# Patient Record
Sex: MALE | Race: WHITE | NOT HISPANIC OR LATINO | Employment: OTHER | URBAN - METROPOLITAN AREA
[De-identification: names, ages, dates, MRNs, and addresses within clinical notes are randomized per-mention and may not be internally consistent; named-entity substitution may affect disease eponyms.]

---

## 2022-05-27 ENCOUNTER — LAB (OUTPATIENT)
Dept: LAB | Facility: HOSPITAL | Age: 72
End: 2022-05-27

## 2022-05-27 ENCOUNTER — OFFICE VISIT (OUTPATIENT)
Dept: INTERNAL MEDICINE | Facility: CLINIC | Age: 72
End: 2022-05-27

## 2022-05-27 VITALS
DIASTOLIC BLOOD PRESSURE: 96 MMHG | WEIGHT: 315 LBS | OXYGEN SATURATION: 96 % | SYSTOLIC BLOOD PRESSURE: 152 MMHG | HEIGHT: 69 IN | HEART RATE: 71 BPM | BODY MASS INDEX: 46.65 KG/M2 | TEMPERATURE: 97.9 F

## 2022-05-27 DIAGNOSIS — E11.43 TYPE 2 DIABETES MELLITUS WITH DIABETIC AUTONOMIC NEUROPATHY, WITHOUT LONG-TERM CURRENT USE OF INSULIN: Primary | ICD-10-CM

## 2022-05-27 DIAGNOSIS — Z91.89 COMPLIANCE WITH MEDICATION REGIMEN: ICD-10-CM

## 2022-05-27 DIAGNOSIS — E55.9 VITAMIN D DEFICIENCY: ICD-10-CM

## 2022-05-27 DIAGNOSIS — G89.4 CHRONIC PAIN SYNDROME: ICD-10-CM

## 2022-05-27 DIAGNOSIS — J44.9 CHRONIC OBSTRUCTIVE PULMONARY DISEASE, UNSPECIFIED COPD TYPE: ICD-10-CM

## 2022-05-27 DIAGNOSIS — R31.29 OTHER MICROSCOPIC HEMATURIA: ICD-10-CM

## 2022-05-27 DIAGNOSIS — R82.998 DARK URINE: ICD-10-CM

## 2022-05-27 DIAGNOSIS — I25.10 CORONARY ARTERY DISEASE INVOLVING NATIVE CORONARY ARTERY OF NATIVE HEART WITHOUT ANGINA PECTORIS: ICD-10-CM

## 2022-05-27 DIAGNOSIS — D17.1 LIPOMA OF BACK: ICD-10-CM

## 2022-05-27 DIAGNOSIS — I10 ESSENTIAL HYPERTENSION: ICD-10-CM

## 2022-05-27 DIAGNOSIS — K42.9 UMBILICAL HERNIA WITHOUT OBSTRUCTION AND WITHOUT GANGRENE: ICD-10-CM

## 2022-05-27 DIAGNOSIS — K21.9 GASTROESOPHAGEAL REFLUX DISEASE WITHOUT ESOPHAGITIS: ICD-10-CM

## 2022-05-27 DIAGNOSIS — R60.0 PEDAL EDEMA: ICD-10-CM

## 2022-05-27 LAB
BILIRUB BLD-MCNC: NEGATIVE MG/DL
CLARITY, POC: CLEAR
COLOR UR: ABNORMAL
EXPIRATION DATE: ABNORMAL
EXPIRATION DATE: NORMAL
GLUCOSE UR STRIP-MCNC: NEGATIVE MG/DL
HBA1C MFR BLD: 5.6 %
KETONES UR QL: NEGATIVE
LEUKOCYTE EST, POC: NEGATIVE
Lab: ABNORMAL
Lab: NORMAL
NITRITE UR-MCNC: NEGATIVE MG/ML
PH UR: 6 [PH] (ref 5–8)
PROT UR STRIP-MCNC: NEGATIVE MG/DL
RBC # UR STRIP: ABNORMAL /UL
SP GR UR: 1.01 (ref 1–1.03)
UROBILINOGEN UR QL: NORMAL

## 2022-05-27 PROCEDURE — 83036 HEMOGLOBIN GLYCOSYLATED A1C: CPT | Performed by: NURSE PRACTITIONER

## 2022-05-27 PROCEDURE — 99204 OFFICE O/P NEW MOD 45 MIN: CPT | Performed by: NURSE PRACTITIONER

## 2022-05-27 PROCEDURE — 3044F HG A1C LEVEL LT 7.0%: CPT | Performed by: NURSE PRACTITIONER

## 2022-05-27 PROCEDURE — 81003 URINALYSIS AUTO W/O SCOPE: CPT | Performed by: NURSE PRACTITIONER

## 2022-05-27 RX ORDER — POTASSIUM CHLORIDE 750 MG/1
10 TABLET, EXTENDED RELEASE ORAL
Qty: 10 TABLET | Refills: 5 | Status: SHIPPED | OUTPATIENT
Start: 2022-05-27 | End: 2022-06-26

## 2022-05-27 RX ORDER — METOPROLOL SUCCINATE 50 MG/1
TABLET, EXTENDED RELEASE ORAL
COMMUNITY
End: 2022-05-27 | Stop reason: SDUPTHER

## 2022-05-27 RX ORDER — LISINOPRIL 10 MG/1
TABLET ORAL
COMMUNITY
Start: 2022-05-25 | End: 2022-05-27 | Stop reason: SDUPTHER

## 2022-05-27 RX ORDER — IPRATROPIUM/ALBUTEROL SULFATE 20-100 MCG
1 MIST INHALER (GRAM) INHALATION 3 TIMES DAILY
Qty: 4 G | Refills: 5 | Status: SHIPPED | OUTPATIENT
Start: 2022-05-27

## 2022-05-27 RX ORDER — LISINOPRIL 10 MG/1
10 TABLET ORAL DAILY
Qty: 30 TABLET | Refills: 5 | Status: SHIPPED | OUTPATIENT
Start: 2022-05-27 | End: 2023-02-20

## 2022-05-27 RX ORDER — GABAPENTIN 600 MG/1
TABLET ORAL
COMMUNITY
Start: 2022-04-27 | End: 2022-05-27 | Stop reason: SDUPTHER

## 2022-05-27 RX ORDER — FAMOTIDINE 20 MG/1
TABLET, FILM COATED ORAL
COMMUNITY
Start: 2022-05-25 | End: 2022-05-27 | Stop reason: SDUPTHER

## 2022-05-27 RX ORDER — FAMOTIDINE 20 MG/1
20 TABLET, FILM COATED ORAL DAILY
Qty: 30 TABLET | Refills: 5 | Status: SHIPPED | OUTPATIENT
Start: 2022-05-27 | End: 2022-12-12

## 2022-05-27 RX ORDER — PANTOPRAZOLE SODIUM 40 MG/1
40 TABLET, DELAYED RELEASE ORAL DAILY
Qty: 30 TABLET | Refills: 5 | Status: SHIPPED | OUTPATIENT
Start: 2022-05-27 | End: 2022-11-28

## 2022-05-27 RX ORDER — ERGOCALCIFEROL 1.25 MG/1
50000 CAPSULE ORAL WEEKLY
Qty: 30 CAPSULE | Refills: 5 | Status: SHIPPED | OUTPATIENT
Start: 2022-05-27

## 2022-05-27 RX ORDER — TAMSULOSIN HYDROCHLORIDE 0.4 MG/1
1 CAPSULE ORAL DAILY
Qty: 30 CAPSULE | Refills: 5 | Status: SHIPPED | OUTPATIENT
Start: 2022-05-27 | End: 2022-12-12

## 2022-05-27 RX ORDER — POTASSIUM CHLORIDE 750 MG/1
TABLET, EXTENDED RELEASE ORAL
COMMUNITY
Start: 2022-05-25 | End: 2022-05-27 | Stop reason: SDUPTHER

## 2022-05-27 RX ORDER — IBUPROFEN 600 MG/1
TABLET ORAL
COMMUNITY
Start: 2022-05-25 | End: 2022-05-27 | Stop reason: SDUPTHER

## 2022-05-27 RX ORDER — FUROSEMIDE 40 MG/1
40 TABLET ORAL
Qty: 10 TABLET | Refills: 5 | Status: SHIPPED | OUTPATIENT
Start: 2022-05-27 | End: 2022-12-12

## 2022-05-27 RX ORDER — METOPROLOL SUCCINATE 50 MG/1
50 TABLET, EXTENDED RELEASE ORAL DAILY
Qty: 30 TABLET | Refills: 5 | Status: SHIPPED | OUTPATIENT
Start: 2022-05-27 | End: 2022-12-12

## 2022-05-27 RX ORDER — ASPIRIN 81 MG/1
TABLET ORAL
COMMUNITY

## 2022-05-27 RX ORDER — PROMETHAZINE HYDROCHLORIDE 25 MG/1
TABLET ORAL
COMMUNITY
Start: 2022-03-30 | End: 2022-06-03

## 2022-05-27 RX ORDER — PANTOPRAZOLE SODIUM 40 MG/1
TABLET, DELAYED RELEASE ORAL
COMMUNITY
Start: 2022-02-28 | End: 2022-05-27 | Stop reason: SDUPTHER

## 2022-05-27 RX ORDER — FLUOXETINE HYDROCHLORIDE 20 MG/1
20 CAPSULE ORAL DAILY
Qty: 30 CAPSULE | Refills: 5 | Status: SHIPPED | OUTPATIENT
Start: 2022-05-27 | End: 2023-01-24

## 2022-05-27 RX ORDER — NITROGLYCERIN 0.4 MG/1
0.4 TABLET SUBLINGUAL
Qty: 100 TABLET | Refills: 1 | Status: SHIPPED | OUTPATIENT
Start: 2022-05-27 | End: 2022-06-26

## 2022-05-27 RX ORDER — GABAPENTIN 600 MG/1
600 TABLET ORAL 3 TIMES DAILY
Qty: 90 TABLET | Refills: 2 | Status: SHIPPED | OUTPATIENT
Start: 2022-05-27 | End: 2022-09-01

## 2022-05-27 RX ORDER — IBUPROFEN 600 MG/1
600 TABLET ORAL EVERY 6 HOURS PRN
Qty: 120 TABLET | Refills: 5 | Status: SHIPPED | OUTPATIENT
Start: 2022-05-27 | End: 2022-12-12

## 2022-05-27 RX ORDER — SIMVASTATIN 20 MG
20 TABLET ORAL NIGHTLY
Qty: 90 TABLET | Refills: 1 | Status: SHIPPED | OUTPATIENT
Start: 2022-05-27

## 2022-05-27 RX ORDER — FUROSEMIDE 40 MG/1
TABLET ORAL
COMMUNITY
Start: 2022-05-25 | End: 2022-05-27 | Stop reason: SDUPTHER

## 2022-05-27 RX ORDER — SIMVASTATIN 20 MG
TABLET ORAL
COMMUNITY
Start: 2022-05-25 | End: 2022-05-27 | Stop reason: SDUPTHER

## 2022-05-27 RX ORDER — TAMSULOSIN HYDROCHLORIDE 0.4 MG/1
CAPSULE ORAL
COMMUNITY
End: 2022-05-27 | Stop reason: SDUPTHER

## 2022-05-27 RX ORDER — FLUOXETINE HYDROCHLORIDE 20 MG/1
CAPSULE ORAL
COMMUNITY
Start: 2022-05-25 | End: 2022-05-27 | Stop reason: SDUPTHER

## 2022-05-27 RX ORDER — NITROGLYCERIN 0.4 MG/1
TABLET SUBLINGUAL
COMMUNITY
End: 2022-05-27 | Stop reason: SDUPTHER

## 2022-05-27 RX ORDER — ALBUTEROL SULFATE 2.5 MG/3ML
SOLUTION RESPIRATORY (INHALATION)
COMMUNITY
Start: 2022-05-25

## 2022-05-27 RX ORDER — IPRATROPIUM/ALBUTEROL SULFATE 20-100 MCG
MIST INHALER (GRAM) INHALATION EVERY 6 HOURS SCHEDULED
COMMUNITY
End: 2022-05-27 | Stop reason: SDUPTHER

## 2022-05-27 RX ORDER — ERGOCALCIFEROL 1.25 MG/1
CAPSULE ORAL
COMMUNITY
End: 2022-05-27 | Stop reason: SDUPTHER

## 2022-05-27 NOTE — PROGRESS NOTES
"Subjective   Chief Complaint   Patient presents with   • Establish Care   • Med Refill       Dieter Christiansen is a 72 y.o. male here today as a new patient to establish care.  Prior PCP was Dr Sal.  Pt has Type 2 Diabetes.  His current treatment includes Metformin.  Pt takes Gabapentin on a daily basis for \"pain all over body\".  He is unable to localize where the pain is.  Pt has a large umbilical hernia.  This has been present for years. Pt's stepdaughter states that no one will operate on him because they want him to lose weight.  Pt has a history of COPD.  He use to be a heavy smoker but has tapered down to 5-6 cigarettes a day.  Wears 2 liters 02 prn.  Has a history of sleep apnea.  Suppose to use a CPAP machine but pt doesn't use it because \"it makes him sick\".  Pt requesting his urine be checked.  States his urine is really dark.  He feels like he drinks plenty of water.    Review of Systems   Constitutional: Negative for activity change, appetite change and fatigue.   HENT: Negative for congestion.    Respiratory: Positive for cough and shortness of breath.    Cardiovascular: Negative for chest pain and leg swelling.   Gastrointestinal: Negative for abdominal pain.   Genitourinary: Positive for frequency and nocturia. Negative for dysuria and flank pain.   Musculoskeletal: Positive for arthralgias and back pain.   Neurological: Negative for dizziness, weakness and confusion.   Psychiatric/Behavioral: Negative for behavioral problems and decreased concentration.       Past Medical History:   Diagnosis Date   • COPD (chronic obstructive pulmonary disease) (HCC)    • Coronary artery disease involving native coronary artery of native heart without angina pectoris    • Diabetes mellitus (HCC)    • GERD (gastroesophageal reflux disease)    • Heart attack (HCC)    • Hyperlipidemia    • Hypertension    • MARY ALICE (obstructive sleep apnea)      Past Surgical History:   Procedure Laterality Date   • FEMORAL ARTERY STENT   "     Family History   Problem Relation Age of Onset   • Diabetes Father    • Diabetes Brother      Social History     Tobacco Use   Smoking Status Current Every Day Smoker   • Packs/day: 0.50   • Years: 30.00   • Pack years: 15.00   • Types: Cigarettes   Smokeless Tobacco Former User   Tobacco Comment    20 years ago      Social History     Substance and Sexual Activity   Alcohol Use Not Currently      Current Outpatient Medications on File Prior to Visit   Medication Sig   • albuterol (PROVENTIL) (2.5 MG/3ML) 0.083% nebulizer solution    • aspirin (aspirin) 81 MG EC tablet aspirin 81 mg tablet,delayed release   Take 1 tablet every day by oral route.   • promethazine (PHENERGAN) 25 MG tablet    • [DISCONTINUED] ergocalciferol (ERGOCALCIFEROL) 1.25 MG (27109 UT) capsule ergocalciferol (vitamin D2) 1,250 mcg (50,000 unit) capsule   1 po weekly   • [DISCONTINUED] famotidine (PEPCID) 20 MG tablet    • [DISCONTINUED] FLUoxetine (PROzac) 20 MG capsule    • [DISCONTINUED] furosemide (LASIX) 40 MG tablet    • [DISCONTINUED] gabapentin (NEURONTIN) 600 MG tablet    • [DISCONTINUED] ibuprofen (ADVIL,MOTRIN) 600 MG tablet    • [DISCONTINUED] ipratropium-albuterol (Combivent Respimat)  MCG/ACT inhaler Every 6 (Six) Hours.   • [DISCONTINUED] lisinopril (PRINIVIL,ZESTRIL) 10 MG tablet    • [DISCONTINUED] metFORMIN (GLUCOPHAGE) 500 MG tablet metformin 500 mg tablet   • [DISCONTINUED] metoprolol succinate XL (TOPROL-XL) 50 MG 24 hr tablet metoprolol succinate ER 50 mg tablet,extended release 24 hr   • [DISCONTINUED] nitroglycerin (NITROSTAT) 0.4 MG SL tablet nitroglycerin 0.4 mg sublingual tablet   • [DISCONTINUED] pantoprazole (PROTONIX) 40 MG EC tablet    • [DISCONTINUED] potassium chloride (K-DUR,KLOR-CON) 10 MEQ CR tablet    • [DISCONTINUED] simvastatin (ZOCOR) 20 MG tablet    • [DISCONTINUED] tamsulosin (FLOMAX) 0.4 MG capsule 24 hr capsule tamsulosin 0.4 mg capsule     No current facility-administered medications on file  "prior to visit.     No Known Allergies    Objective   Vitals:    05/27/22 1330   BP: 152/96   Pulse: 71   Temp: 97.9 °F (36.6 °C)   TempSrc: Temporal   SpO2: 96%   Weight: (!) 151 kg (333 lb 6.4 oz)   Height: 175.3 cm (69\")     Body mass index is 49.23 kg/m².    Physical Exam  Vitals and nursing note reviewed.   HENT:      Head: Normocephalic.   Eyes:      Pupils: Pupils are equal, round, and reactive to light.   Cardiovascular:      Rate and Rhythm: Normal rate and regular rhythm.      Pulses: Normal pulses.      Heart sounds: Murmur (Gr2) heard.   Pulmonary:      Effort: Pulmonary effort is normal. No respiratory distress.      Breath sounds: Rhonchi present. No rales.      Comments: Decreased breath sounds throughout  Abdominal:      General: Bowel sounds are normal.      Palpations: Abdomen is soft.      Tenderness: There is abdominal tenderness.      Hernia: A hernia (umbilical, large) is present.   Skin:     General: Skin is warm and dry.      Capillary Refill: Capillary refill takes less than 2 seconds.      Comments: Upper back noted to have a large lipoma, nontender   Neurological:      General: No focal deficit present.      Mental Status: He is alert and oriented to person, place, and time.      Gait: Gait is intact.   Psychiatric:         Attention and Perception: Attention normal.         Mood and Affect: Mood normal.         Behavior: Behavior normal.           Results for orders placed or performed in visit on 05/27/22   POC Glycosylated Hemoglobin (Hb A1C)    Specimen: Blood   Result Value Ref Range    Hemoglobin A1C 5.6 %    Lot Number [id number]     Expiration Date 02/04/2024    POC Urinalysis Dipstick, Automated    Specimen: Urine   Result Value Ref Range    Color Dark Yellow Yellow, Straw, Dark Yellow, Lupe    Clarity, UA Clear Clear    Specific Gravity  1.015 1.005 - 1.030    pH, Urine 6.0 5.0 - 8.0    Leukocytes Negative Negative    Nitrite, UA Negative Negative    Protein, POC Negative " Negative mg/dL    Glucose, UA Negative Negative, 1000 mg/dL (3+) mg/dL    Ketones, UA Negative Negative    Urobilinogen, UA Normal Normal    Bilirubin Negative Negative    Blood, UA 3+ (A) Negative    Lot Number [id number]     Expiration Date 12/17/2023          Assessment & Plan   Problem List Items Addressed This Visit    None     Visit Diagnoses     Type 2 diabetes mellitus with diabetic autonomic neuropathy, without long-term current use of insulin ([facility])    -  Primary    Relevant Medications    gabapentin (NEURONTIN) 600 MG tablet    metFORMIN (GLUCOPHAGE) 500 MG tablet    Other Relevant Orders    POC Glycosylated Hemoglobin (Hb A1C) (Completed)    Coronary artery disease involving native coronary artery of native heart without angina pectoris        Relevant Medications    metoprolol succinate XL (TOPROL-XL) 50 MG 24 hr tablet    nitroglycerin (NITROSTAT) 0.4 MG SL tablet    simvastatin (ZOCOR) 20 MG tablet    Lipoma of back        Umbilical hernia without obstruction and without gangrene        Compliance with medication regimen        Relevant Orders    Compliance Drug Analysis, Ur - Urine, Clean Catch    Chronic pain syndrome         Dark urine        Relevant Orders    POC Urinalysis Dipstick, Automated (Completed)    Other microscopic hematuria        Vitamin D deficiency        Relevant Medications    ergocalciferol (ERGOCALCIFEROL) 1.25 MG (56733 UT) capsule    Chronic obstructive pulmonary disease, unspecified COPD type ([facility])        Relevant Medications    albuterol (PROVENTIL) (2.5 MG/3ML) 0.083% nebulizer solution    promethazine (PHENERGAN) 25 MG tablet    ipratropium-albuterol (Combivent Respimat)  MCG/ACT inhaler    Pedal edema        Relevant Medications    furosemide (LASIX) 40 MG tablet    potassium chloride (K-DUR,KLOR-CON) 10 MEQ CR tablet    Gastroesophageal reflux disease without esophagitis        Relevant Medications    famotidine (PEPCID) 20 MG tablet    pantoprazole (PROTONIX)  40 MG EC tablet    Essential hypertension        Relevant Medications    furosemide (LASIX) 40 MG tablet    lisinopril (PRINIVIL,ZESTRIL) 10 MG tablet    metoprolol succinate XL (TOPROL-XL) 50 MG 24 hr tablet         [facility] appropriate  Meds refilled  UDS collected  Controlled substance contract signed  Schedule [facility] and will get labs at that time  Hernia large, pt morbidly obese, discussed weight loss with him  Large lipoma on upper back, pt does not want this removed  Large blood in urine, will cont to monitor, may need to see urology    Current Outpatient Medications:   •  ergocalciferol (ERGOCALCIFEROL) 1.25 MG (83611 UT) capsule, Take 1 capsule by mouth 1 (One) Time Per Week., Disp: 30 capsule, Rfl: 5  •  famotidine (PEPCID) 20 MG tablet, Take 1 tablet by mouth Daily., Disp: 30 tablet, Rfl: 5  •  FLUoxetine (PROzac) 20 MG capsule, Take 1 capsule by mouth Daily., Disp: 30 capsule, Rfl: 5  •  furosemide (LASIX) 40 MG tablet, Take 1 tablet by mouth Every 72 (Seventy-Two) Hours for 30 days., Disp: 10 tablet, Rfl: 5  •  gabapentin (NEURONTIN) 600 MG tablet, Take 1 tablet by mouth 3 (Three) Times a Day., Disp: 90 tablet, Rfl: 2  •  ibuprofen (ADVIL,MOTRIN) 600 MG tablet, Take 1 tablet by mouth Every 6 (Six) Hours As Needed for Mild Pain ., Disp: 120 tablet, Rfl: 5  •  ipratropium-albuterol (Combivent Respimat)  MCG/ACT inhaler, Inhale 1 puff 3 (Three) Times a Day., Disp: 4 g, Rfl: 5  •  lisinopril (PRINIVIL,ZESTRIL) 10 MG tablet, Take 1 tablet by mouth Daily., Disp: 30 tablet, Rfl: 5  •  metFORMIN (GLUCOPHAGE) 500 MG tablet, Take 1 tablet by mouth Daily With Breakfast., Disp: 30 tablet, Rfl: 5  •  metoprolol succinate XL (TOPROL-XL) 50 MG 24 hr tablet, Take 1 tablet by mouth Daily., Disp: 30 tablet, Rfl: 5  •  nitroglycerin (NITROSTAT) 0.4 MG SL tablet, Place 1 tablet under the tongue Every 5 (Five) Minutes As Needed for Chest Pain for up to 30 days. Take no more than 3 doses in 15 minutes., Disp: 100 tablet,  Rfl: 1  •  pantoprazole (PROTONIX) 40 MG EC tablet, Take 1 tablet by mouth Daily., Disp: 30 tablet, Rfl: 5  •  potassium chloride (K-DUR,KLOR-CON) 10 MEQ CR tablet, Take 1 tablet by mouth Every 72 (Seventy-Two) Hours for 30 days., Disp: 10 tablet, Rfl: 5  •  simvastatin (ZOCOR) 20 MG tablet, Take 1 tablet by mouth Every Night., Disp: 90 tablet, Rfl: 1  •  tamsulosin (FLOMAX) 0.4 MG capsule 24 hr capsule, Take 1 capsule by mouth Daily., Disp: 30 capsule, Rfl: 5  •  albuterol (PROVENTIL) (2.5 MG/3ML) 0.083% nebulizer solution, , Disp: , Rfl:   •  aspirin (aspirin) 81 MG EC tablet, aspirin 81 mg tablet,delayed release  Take 1 tablet every day by oral route., Disp: , Rfl:   •  promethazine (PHENERGAN) 25 MG tablet, , Disp: , Rfl:        Plan of care reviewed with the patient at the conclusion of today's visit.  Education was provided regarding diagnosis, management, and any prescribed or recommended OTC medications.  Patient verbalized understanding of and agreement with management plan.     Return in about 6 weeks (around 7/8/2022) for Medicare Wellness.        EUNICE Fields

## 2022-06-03 DIAGNOSIS — R11.0 NAUSEA: ICD-10-CM

## 2022-06-03 RX ORDER — PROMETHAZINE HYDROCHLORIDE 25 MG/1
TABLET ORAL
Qty: 20 TABLET | Refills: 5 | Status: SHIPPED | OUTPATIENT
Start: 2022-06-03 | End: 2022-12-06

## 2022-07-28 DIAGNOSIS — F34.1 DYSTHYMIC DISORDER: ICD-10-CM

## 2022-07-28 DIAGNOSIS — E87.6 HYPOKALEMIA: ICD-10-CM

## 2022-07-28 RX ORDER — BUPROPION HYDROCHLORIDE 150 MG/1
TABLET ORAL
Qty: 30 TABLET | Refills: 5 | Status: SHIPPED | OUTPATIENT
Start: 2022-07-28 | End: 2022-09-02

## 2022-07-28 RX ORDER — POTASSIUM CHLORIDE 750 MG/1
TABLET, EXTENDED RELEASE ORAL
Qty: 60 TABLET | Refills: 5 | Status: SHIPPED | OUTPATIENT
Start: 2022-07-28

## 2022-07-28 NOTE — TELEPHONE ENCOUNTER
I'm almost positive that I refilled all these meds when I saw him in May.  I put 5 refills on them.  Tell him to call his pharmacy

## 2022-09-01 DIAGNOSIS — E11.43 TYPE 2 DIABETES MELLITUS WITH DIABETIC AUTONOMIC NEUROPATHY, WITHOUT LONG-TERM CURRENT USE OF INSULIN: ICD-10-CM

## 2022-09-01 RX ORDER — GABAPENTIN 600 MG/1
TABLET ORAL
Qty: 90 TABLET | Refills: 1 | Status: SHIPPED | OUTPATIENT
Start: 2022-09-01 | End: 2022-10-27

## 2022-09-02 ENCOUNTER — OFFICE VISIT (OUTPATIENT)
Dept: INTERNAL MEDICINE | Facility: CLINIC | Age: 72
End: 2022-09-02

## 2022-09-02 ENCOUNTER — LAB (OUTPATIENT)
Dept: LAB | Facility: HOSPITAL | Age: 72
End: 2022-09-02

## 2022-09-02 VITALS
OXYGEN SATURATION: 99 % | SYSTOLIC BLOOD PRESSURE: 126 MMHG | HEART RATE: 85 BPM | BODY MASS INDEX: 46.65 KG/M2 | DIASTOLIC BLOOD PRESSURE: 82 MMHG | WEIGHT: 315 LBS | HEIGHT: 69 IN | TEMPERATURE: 97 F

## 2022-09-02 DIAGNOSIS — Z12.11 SCREENING FOR COLON CANCER: ICD-10-CM

## 2022-09-02 DIAGNOSIS — M47.812 CERVICAL SPINE ARTHRITIS: ICD-10-CM

## 2022-09-02 DIAGNOSIS — Z12.2 SCREENING FOR LUNG CANCER: ICD-10-CM

## 2022-09-02 DIAGNOSIS — Z13.6 SCREENING FOR AAA (ABDOMINAL AORTIC ANEURYSM): ICD-10-CM

## 2022-09-02 DIAGNOSIS — F17.210 CIGARETTE SMOKER: ICD-10-CM

## 2022-09-02 DIAGNOSIS — L40.9 PSORIASIS: ICD-10-CM

## 2022-09-02 DIAGNOSIS — M47.26 OSTEOARTHRITIS OF SPINE WITH RADICULOPATHY, LUMBAR REGION: ICD-10-CM

## 2022-09-02 DIAGNOSIS — Z00.00 MEDICARE ANNUAL WELLNESS VISIT, INITIAL: Primary | ICD-10-CM

## 2022-09-02 DIAGNOSIS — E11.43 TYPE 2 DIABETES MELLITUS WITH DIABETIC AUTONOMIC NEUROPATHY, WITHOUT LONG-TERM CURRENT USE OF INSULIN: ICD-10-CM

## 2022-09-02 DIAGNOSIS — Z12.5 SCREENING FOR PROSTATE CANCER: ICD-10-CM

## 2022-09-02 DIAGNOSIS — Z11.59 ENCOUNTER FOR HEPATITIS C SCREENING TEST FOR LOW RISK PATIENT: ICD-10-CM

## 2022-09-02 DIAGNOSIS — H91.8X3 OTHER SPECIFIED HEARING LOSS OF BOTH EARS: ICD-10-CM

## 2022-09-02 DIAGNOSIS — B37.2 CANDIDAL SKIN INFECTION: ICD-10-CM

## 2022-09-02 PROBLEM — G89.29 CHRONIC BACK PAIN: Status: ACTIVE | Noted: 2022-09-02

## 2022-09-02 PROBLEM — M54.9 CHRONIC BACK PAIN: Status: ACTIVE | Noted: 2022-09-02

## 2022-09-02 LAB
EXPIRATION DATE: NORMAL
HBA1C MFR BLD: 5.8 %
Lab: NORMAL

## 2022-09-02 PROCEDURE — G0402 INITIAL PREVENTIVE EXAM: HCPCS | Performed by: NURSE PRACTITIONER

## 2022-09-02 PROCEDURE — 86803 HEPATITIS C AB TEST: CPT | Performed by: NURSE PRACTITIONER

## 2022-09-02 PROCEDURE — 83036 HEMOGLOBIN GLYCOSYLATED A1C: CPT | Performed by: NURSE PRACTITIONER

## 2022-09-02 PROCEDURE — 1170F FXNL STATUS ASSESSED: CPT | Performed by: NURSE PRACTITIONER

## 2022-09-02 PROCEDURE — 80053 COMPREHEN METABOLIC PANEL: CPT | Performed by: NURSE PRACTITIONER

## 2022-09-02 PROCEDURE — 3044F HG A1C LEVEL LT 7.0%: CPT | Performed by: NURSE PRACTITIONER

## 2022-09-02 PROCEDURE — 1160F RVW MEDS BY RX/DR IN RCRD: CPT | Performed by: NURSE PRACTITIONER

## 2022-09-02 PROCEDURE — 84443 ASSAY THYROID STIM HORMONE: CPT | Performed by: NURSE PRACTITIONER

## 2022-09-02 PROCEDURE — G0103 PSA SCREENING: HCPCS | Performed by: NURSE PRACTITIONER

## 2022-09-02 PROCEDURE — 85025 COMPLETE CBC W/AUTO DIFF WBC: CPT | Performed by: NURSE PRACTITIONER

## 2022-09-02 PROCEDURE — 36415 COLL VENOUS BLD VENIPUNCTURE: CPT | Performed by: NURSE PRACTITIONER

## 2022-09-02 RX ORDER — TRIAMCINOLONE ACETONIDE 1 MG/G
1 CREAM TOPICAL 2 TIMES DAILY PRN
Qty: 453 G | Refills: 1 | Status: SHIPPED | OUTPATIENT
Start: 2022-09-02

## 2022-09-02 RX ORDER — NYSTATIN 100000 [USP'U]/G
POWDER TOPICAL 3 TIMES DAILY
Qty: 60 G | Refills: 5 | Status: SHIPPED | OUTPATIENT
Start: 2022-09-02

## 2022-09-02 RX ORDER — TRAMADOL HYDROCHLORIDE 50 MG/1
50 TABLET ORAL EVERY 8 HOURS PRN
Qty: 90 TABLET | Refills: 0 | Status: SHIPPED | OUTPATIENT
Start: 2022-09-02 | End: 2022-10-19

## 2022-09-02 RX ORDER — NYSTATIN AND TRIAMCINOLONE ACETONIDE 100000; 1 [USP'U]/G; MG/G
1 OINTMENT TOPICAL 2 TIMES DAILY
Qty: 60 G | Refills: 5 | Status: SHIPPED | OUTPATIENT
Start: 2022-09-02

## 2022-09-02 NOTE — PROGRESS NOTES
The ABCs of the Annual Wellness Visit  Initial Medicare Wellness Visit    Chief Complaint   Patient presents with   • Medicare Wellness-subsequent       Subjective   History of Present Illness:  Dieter Christiansen is a 72 y.o. male who presents for an Initial Medicare Wellness Visit.    HEALTH RISK ASSESSMENT    Recent Hospitalizations:  No hospitalization(s) within the last year.    Current Medical Providers:  Patient Care Team:  Ian Zamora APRN as PCP - General (Family Medicine)    Smoking Status:  Social History     Tobacco Use   Smoking Status Current Every Day Smoker   • Packs/day: 0.50   • Years: 30.00   • Pack years: 15.00   • Types: Cigarettes   Smokeless Tobacco Former User   Tobacco Comment    20 years ago       Alcohol Consumption:  Social History     Substance and Sexual Activity   Alcohol Use Not Currently       Depression Screen:   PHQ-2/PHQ-9 Depression Screening 9/2/2022   Little Interest or Pleasure in Doing Things 0-->not at all   Feeling Down, Depressed or Hopeless 0-->not at all   PHQ-9: Brief Depression Severity Measure Score 0       Fall Risk Screen:  JUSTIN Fall Risk Assessment was completed, and patient is at MODERATE risk for falls. Assessment completed on:9/2/2022    Health Habits and Functional and Cognitive Screening:  Functional & Cognitive Status 9/2/2022   Do you have difficulty preparing food and eating? Yes   Do you have difficulty bathing yourself, getting dressed or grooming yourself? Yes   Do you have difficulty using the toilet? No   Do you have difficulty moving around from place to place? Yes   Do you have trouble with steps or getting out of a bed or a chair? Yes   Current Diet Unhealthy Diet   Dental Exam Up to date   Eye Exam Up to date   Exercise (times per week) 0 times per week   Current Exercises Include No Regular Exercise   Do you need help using the phone?  No   Are you deaf or do you have serious difficulty hearing?  Yes   Do you need help with transportation? Yes    Do you need help shopping? Yes   Do you need help preparing meals?  Yes   Do you need help with housework?  No   Do you need help with laundry? Yes   Do you need help taking your medications? Yes   Do you need help managing money? Yes   Do you ever drive or ride in a car without wearing a seat belt? Yes   Have you felt unusual stress, anger or loneliness in the last month? No   Who do you live with? Child   If you need help, do you have trouble finding someone available to you? No   Have you been bothered in the last four weeks by sexual problems? No   Do you have difficulty concentrating, remembering or making decisions? No         Does the patient have evidence of cognitive impairment? No    Asprin use counseling:Taking ASA appropriately as indicated    Age-appropriate Screening Schedule:  Refer to the list below for future screening recommendations based on patient's age, sex and/or medical conditions. Orders for these recommended tests are listed in the plan section. The patient has been provided with a written plan.    Health Maintenance   Topic Date Due   • URINE MICROALBUMIN  Never done   • TDAP/TD VACCINES (1 - Tdap) Never done   • ZOSTER VACCINE (1 of 2) Never done   • DIABETIC FOOT EXAM  Never done   • DIABETIC EYE EXAM  Never done   • LIPID PANEL  Never done   • INFLUENZA VACCINE  10/01/2022   • HEMOGLOBIN A1C  03/02/2023          The following portions of the patient's history were reviewed and updated as appropriate: allergies, current medications, past family history, past medical history, past social history, past surgical history and problem list.    Outpatient Medications Prior to Visit   Medication Sig Dispense Refill   • albuterol (PROVENTIL) (2.5 MG/3ML) 0.083% nebulizer solution      • aspirin 81 MG EC tablet aspirin 81 mg tablet,delayed release   Take 1 tablet every day by oral route.     • ergocalciferol (ERGOCALCIFEROL) 1.25 MG (58279 UT) capsule Take 1 capsule by mouth 1 (One) Time Per  Week. 30 capsule 5   • famotidine (PEPCID) 20 MG tablet Take 1 tablet by mouth Daily. 30 tablet 5   • FLUoxetine (PROzac) 20 MG capsule Take 1 capsule by mouth Daily. 30 capsule 5   • gabapentin (NEURONTIN) 600 MG tablet TAKE ONE TABLET THREE TIMES DAILY 90 tablet 1   • ibuprofen (ADVIL,MOTRIN) 600 MG tablet Take 1 tablet by mouth Every 6 (Six) Hours As Needed for Mild Pain . 120 tablet 5   • ipratropium-albuterol (Combivent Respimat)  MCG/ACT inhaler Inhale 1 puff 3 (Three) Times a Day. 4 g 5   • lisinopril (PRINIVIL,ZESTRIL) 10 MG tablet Take 1 tablet by mouth Daily. 30 tablet 5   • metFORMIN (GLUCOPHAGE) 500 MG tablet Take 1 tablet by mouth Daily With Breakfast. 30 tablet 5   • metoprolol succinate XL (TOPROL-XL) 50 MG 24 hr tablet Take 1 tablet by mouth Daily. 30 tablet 5   • pantoprazole (PROTONIX) 40 MG EC tablet Take 1 tablet by mouth Daily. 30 tablet 5   • potassium chloride (K-DUR,KLOR-CON) 10 MEQ CR tablet TAKE TWO TABLETS DAILY 60 tablet 5   • promethazine (PHENERGAN) 25 MG tablet TAKE ONE TABLET EVERY 6 HOURS AS NEEDED FOR NAUSEA 20 tablet 5   • simvastatin (ZOCOR) 20 MG tablet Take 1 tablet by mouth Every Night. 90 tablet 1   • tamsulosin (FLOMAX) 0.4 MG capsule 24 hr capsule Take 1 capsule by mouth Daily. 30 capsule 5   • buPROPion XL (WELLBUTRIN XL) 150 MG 24 hr tablet TAKE ONE TABLET DAILY 30 tablet 5   • furosemide (LASIX) 40 MG tablet Take 1 tablet by mouth Every 72 (Seventy-Two) Hours for 30 days. 10 tablet 5   • nitroglycerin (NITROSTAT) 0.4 MG SL tablet Place 1 tablet under the tongue Every 5 (Five) Minutes As Needed for Chest Pain for up to 30 days. Take no more than 3 doses in 15 minutes. 100 tablet 1     No facility-administered medications prior to visit.       Patient Active Problem List   Diagnosis   • Chronic back pain       Advanced Care Planning:  ACP discussion was held with the patient during this visit. Patient does not have an advance directive, declines further  "assistance.    Review of Systems   Constitutional: Negative for activity change, appetite change and fatigue.   HENT: Negative for congestion.    Respiratory: Positive for cough and shortness of breath.    Cardiovascular: Negative for chest pain and leg swelling.   Gastrointestinal: Negative for abdominal pain.   Musculoskeletal: Positive for arthralgias and back pain.   Skin: Positive for dry skin.   Neurological: Negative for dizziness, weakness and confusion.   Psychiatric/Behavioral: Positive for agitation (due to chronic pain). Negative for behavioral problems and decreased concentration.       Compared to one year ago, the patient feels his physical health is worse.  Compared to one year ago, the patient feels his mental health is the same.    Reviewed chart for potential of high risk medication in the elderly: yes  Reviewed chart for potential of harmful drug interactions in the elderly:yes    Objective         Vitals:    09/02/22 1415   BP: 126/82   BP Location: Left arm   Patient Position: Sitting   Pulse: 85   Temp: 97 °F (36.1 °C)   SpO2: 99%   Weight: (!) 152 kg (335 lb)   Height: 175.3 cm (69\")       Body mass index is 49.47 kg/m².  Discussed the patient's BMI with him. The BMI is above average; BMI management plan is completed.    Physical Exam  Vitals and nursing note reviewed.   Constitutional:       Appearance: He is morbidly obese.      Comments: Pt is maladorous   HENT:      Head: Normocephalic.      Right Ear: External ear normal.      Left Ear: External ear normal.      Ears:      Comments: Both TMs dull, decreased hearing  Eyes:      Pupils: Pupils are equal, round, and reactive to light.   Neck:      Thyroid: No thyromegaly or thyroid tenderness.      Vascular: No carotid bruit.   Cardiovascular:      Rate and Rhythm: Normal rate and regular rhythm.      Pulses: Normal pulses.           Carotid pulses are 2+ on the right side and 2+ on the left side.     Heart sounds: Murmur (Gr 2/6) heard. "   Pulmonary:      Effort: Pulmonary effort is normal.      Breath sounds: Normal breath sounds.   Abdominal:      General: Bowel sounds are normal. There is no distension.      Palpations: Abdomen is soft.      Tenderness: There is no abdominal tenderness.      Hernia: A hernia is present.   Musculoskeletal:      Cervical back: Pain with movement present.      Lumbar back: Tenderness present. Decreased range of motion.      Right lower le+ Edema present.      Left lower le+ Edema present.   Lymphadenopathy:      Cervical: No cervical adenopathy.   Skin:     General: Skin is warm and dry.      Capillary Refill: Capillary refill takes less than 2 seconds.      Comments: Psoriasis in both ears   Neurological:      General: No focal deficit present.      Mental Status: He is alert and oriented to person, place, and time.      Gait: Gait is intact.   Psychiatric:         Attention and Perception: Attention normal.         Mood and Affect: Mood normal.         Behavior: Behavior normal.         Thought Content: Thought content normal.         Judgment: Judgment normal.         Lab Results   Component Value Date    HGBA1C 5.8 2022        Assessment & Plan   Medicare Risks and Personalized Health Plan   CMS Preventative Services Quick Reference  Abdominal Aortic Aneurysm Screening  Advance Directive Discussion  Cardiovascular risk  Chronic Pain   Colon Cancer Screening  Diabetic Lab Screening   Fall Risk  Glaucoma Risk  Hearing Problem  Immunizations Discussed/Encouraged (specific immunizations; Shingrix )  Lung Cancer Risk  Obesity/Overweight   Prostate Cancer Screening   Tobacco Use/Dependance (use dotphrase .tobaccocessation for documentation)    The above risks/problems have been discussed with the patient.  Pertinent information has been shared with the patient in the After Visit Summary.  Follow up plans and orders are seen below in the Assessment/Plan Section.    Diagnoses and all orders for this  visit:    1. Medicare annual wellness visit, initial (Primary)  -     CBC w AUTO Differential; Future  -     Comprehensive Metabolic Panel  -     TSH Rfx On Abnormal To Free T4  -     CBC w AUTO Differential    2. Screening for colon cancer  -     Cologuard - Stool, Per Rectum; Future    3. Encounter for hepatitis C screening test for low risk patient  -     Hepatitis C Antibody    4. Type 2 diabetes mellitus with diabetic autonomic neuropathy, without long-term current use of insulin (HCC)  -     POC Glycosylated Hemoglobin (Hb A1C)  -     Ambulatory Referral for Diabetic Eye Exam-Ophthalmology    5. Other specified hearing loss of both ears  -     Ambulatory Referral to ENT (Otolaryngology)    6. Cigarette smoker  -      CT Chest Low Dose Cancer Screening WO; Future    7. Screening for prostate cancer  -     PSA Screen; Future  -     PSA Screen    8. Psoriasis  -     triamcinolone (KENALOG) 0.1 % cream; Apply 1 application topically to the appropriate area as directed 2 (Two) Times a Day As Needed for Rash.  Dispense: 453 g; Refill: 1    9. Candidal skin infection  -     nystatin (MYCOSTATIN) 121489 UNIT/GM powder; Apply  topically to the appropriate area as directed 3 (Three) Times a Day.  Dispense: 60 g; Refill: 5  -     nystatin-triamcinolone (MYCOLOG) 181750-3.1 UNIT/GM-% ointment; Apply 1 application topically to the appropriate area as directed 2 (Two) Times a Day.  Dispense: 60 g; Refill: 5    10. Screening for AAA (abdominal aortic aneurysm)  -      aaa screen limited; Future    11. Cervical spine arthritis  -     traMADol (ULTRAM) 50 MG tablet; Take 1 tablet by mouth Every 8 (Eight) Hours As Needed for Moderate Pain.  Dispense: 90 tablet; Refill: 0    12. Osteoarthritis of spine with radiculopathy, lumbar region  -     traMADol (ULTRAM) 50 MG tablet; Take 1 tablet by mouth Every 8 (Eight) Hours As Needed for Moderate Pain.  Dispense: 90 tablet; Refill: 0    13. Screening for lung cancer  -      CT Chest  Low Dose Cancer Screening WO; Future      I advised the patient of the risks in continuing to use tobacco, and I provided this patient with smoking cessation educational materials.  I also discussed how to quit smoking and the patient has NOT expressed the willingness to quit.      During this visit, I spent greater than 10 minutes counseling the patient regarding smoking cessation.       Order Cologuard  Schedule lung ca screen  A1c 5.8  Refer for eye exam  Refer to hearing specialist per pt request  Prior records reviewed - C and L-spine XRays in chart  Tramadol prn pain  Discussed with pt that I would like for him to lose 5-8 lbs by his next visit in 4 weeks, if I seen that he was trying to help himself I would give him something stronger for pain if he needed it    Follow Up:  Return in about 4 weeks (around 9/30/2022) for Recheck Chronic neck and Back Pain.     An After Visit Summary and PPPS were given to the patient.

## 2022-09-03 LAB
ALBUMIN SERPL-MCNC: 3.8 G/DL (ref 3.5–5.2)
ALBUMIN/GLOB SERPL: 1.2 G/DL
ALP SERPL-CCNC: 105 U/L (ref 39–117)
ALT SERPL W P-5'-P-CCNC: 8 U/L (ref 1–41)
ANION GAP SERPL CALCULATED.3IONS-SCNC: 13.3 MMOL/L (ref 5–15)
AST SERPL-CCNC: 17 U/L (ref 1–40)
BASOPHILS # BLD AUTO: 0.03 10*3/MM3 (ref 0–0.2)
BASOPHILS NFR BLD AUTO: 0.4 % (ref 0–1.5)
BILIRUB SERPL-MCNC: 0.8 MG/DL (ref 0–1.2)
BUN SERPL-MCNC: 13 MG/DL (ref 8–23)
BUN/CREAT SERPL: 11.9 (ref 7–25)
CALCIUM SPEC-SCNC: 9.4 MG/DL (ref 8.6–10.5)
CHLORIDE SERPL-SCNC: 105 MMOL/L (ref 98–107)
CO2 SERPL-SCNC: 22.7 MMOL/L (ref 22–29)
CREAT SERPL-MCNC: 1.09 MG/DL (ref 0.76–1.27)
DEPRECATED RDW RBC AUTO: 44.1 FL (ref 37–54)
EGFRCR SERPLBLD CKD-EPI 2021: 72.6 ML/MIN/1.73
EOSINOPHIL # BLD AUTO: 0.2 10*3/MM3 (ref 0–0.4)
EOSINOPHIL NFR BLD AUTO: 2.3 % (ref 0.3–6.2)
ERYTHROCYTE [DISTWIDTH] IN BLOOD BY AUTOMATED COUNT: 12.8 % (ref 12.3–15.4)
GLOBULIN UR ELPH-MCNC: 3.1 GM/DL
GLUCOSE SERPL-MCNC: 123 MG/DL (ref 65–99)
HCT VFR BLD AUTO: 42.6 % (ref 37.5–51)
HCV AB SER DONR QL: NORMAL
HGB BLD-MCNC: 14.3 G/DL (ref 13–17.7)
IMM GRANULOCYTES # BLD AUTO: 0.04 10*3/MM3 (ref 0–0.05)
IMM GRANULOCYTES NFR BLD AUTO: 0.5 % (ref 0–0.5)
LYMPHOCYTES # BLD AUTO: 1.77 10*3/MM3 (ref 0.7–3.1)
LYMPHOCYTES NFR BLD AUTO: 20.8 % (ref 19.6–45.3)
MCH RBC QN AUTO: 31.3 PG (ref 26.6–33)
MCHC RBC AUTO-ENTMCNC: 33.6 G/DL (ref 31.5–35.7)
MCV RBC AUTO: 93.2 FL (ref 79–97)
MONOCYTES # BLD AUTO: 0.7 10*3/MM3 (ref 0.1–0.9)
MONOCYTES NFR BLD AUTO: 8.2 % (ref 5–12)
NEUTROPHILS NFR BLD AUTO: 5.78 10*3/MM3 (ref 1.7–7)
NEUTROPHILS NFR BLD AUTO: 67.8 % (ref 42.7–76)
NRBC BLD AUTO-RTO: 0 /100 WBC (ref 0–0.2)
PLATELET # BLD AUTO: 221 10*3/MM3 (ref 140–450)
PMV BLD AUTO: 10.6 FL (ref 6–12)
POTASSIUM SERPL-SCNC: 4.4 MMOL/L (ref 3.5–5.2)
PROT SERPL-MCNC: 6.9 G/DL (ref 6–8.5)
PSA SERPL-MCNC: 1.47 NG/ML (ref 0–4)
RBC # BLD AUTO: 4.57 10*6/MM3 (ref 4.14–5.8)
SODIUM SERPL-SCNC: 141 MMOL/L (ref 136–145)
TSH SERPL DL<=0.05 MIU/L-ACNC: 1.59 UIU/ML (ref 0.27–4.2)
WBC NRBC COR # BLD: 8.52 10*3/MM3 (ref 3.4–10.8)

## 2022-09-09 ENCOUNTER — TELEPHONE (OUTPATIENT)
Dept: INTERNAL MEDICINE | Facility: CLINIC | Age: 72
End: 2022-09-09

## 2022-09-09 NOTE — TELEPHONE ENCOUNTER
Caller: ELIEL COLEMAN    Relationship: Emergency Contact    Best call back number: [phone]    What medication are you requesting:     MEDICATION FOR KIDNEY INFECTION     What are your current symptoms:     BURNING WHEN URINATING, BACK PAIN    How long have you been experiencing symptoms:     2 OR 3 DAYS    Have you had these symptoms before:    [x] Yes  [] No    Have you been treated for these symptoms before:   [x] Yes  [] No    If a prescription is needed, what is your preferred pharmacy and phone number:      [facility] [address] - [phone]  - [phone] FX     Additional notes:    N/A

## 2022-09-14 ENCOUNTER — TELEPHONE (OUTPATIENT)
Dept: INTERNAL MEDICINE | Facility: CLINIC | Age: 72
End: 2022-09-14

## 2022-09-14 DIAGNOSIS — R39.9 UTI SYMPTOMS: Primary | ICD-10-CM

## 2022-09-14 RX ORDER — CIPROFLOXACIN 500 MG/1
500 TABLET, FILM COATED ORAL 2 TIMES DAILY
Qty: 20 TABLET | Refills: 0 | Status: SHIPPED | OUTPATIENT
Start: 2022-09-14 | End: 2022-09-24

## 2022-09-25 ENCOUNTER — HOSPITAL ENCOUNTER (OUTPATIENT)
Dept: CT IMAGING | Facility: HOSPITAL | Age: 72
End: 2022-09-25

## 2022-09-25 ENCOUNTER — APPOINTMENT (OUTPATIENT)
Dept: ULTRASOUND IMAGING | Facility: HOSPITAL | Age: 72
End: 2022-09-25

## 2022-10-07 ENCOUNTER — HOSPITAL ENCOUNTER (OUTPATIENT)
Dept: CT IMAGING | Facility: HOSPITAL | Age: 72
Discharge: HOME | End: 2022-10-07

## 2022-10-07 ENCOUNTER — HOSPITAL ENCOUNTER (OUTPATIENT)
Dept: ULTRASOUND IMAGING | Facility: HOSPITAL | Age: 72
Discharge: HOME | End: 2022-10-07

## 2022-10-07 DIAGNOSIS — Z12.2 SCREENING FOR LUNG CANCER: ICD-10-CM

## 2022-10-07 DIAGNOSIS — Z13.6 SCREENING FOR AAA (ABDOMINAL AORTIC ANEURYSM): ICD-10-CM

## 2022-10-07 DIAGNOSIS — F17.210 CIGARETTE SMOKER: ICD-10-CM

## 2022-10-07 PROCEDURE — 71271 CT THORAX LUNG CANCER SCR C-: CPT

## 2022-10-07 PROCEDURE — 76706 US ABDL AORTA SCREEN AAA: CPT

## 2022-10-12 ENCOUNTER — TELEPHONE (OUTPATIENT)
Dept: INTERNAL MEDICINE | Facility: CLINIC | Age: 72
End: 2022-10-12

## 2022-10-12 DIAGNOSIS — N28.9 RENAL LESION: Primary | ICD-10-CM

## 2022-10-12 NOTE — TELEPHONE ENCOUNTER
----- Message from EUNICE Fields sent at 10/12/2022  8:41 AM EDT -----  Let pt know that his aorta is on the upper end of normal but doesn't look like an aneurysm at this time.  As far as the scan of his chest.....he has a nodule in his right lung that is slightly suspicious.  The radiologist recommends a repeat CT scan in about 3 months to re-evaluate it.  The scan also picked up a large lesion on his left kidney.  I'm going to get him scheduled for a kidney ultrasound to make sure this is just a cyst.

## 2022-10-19 ENCOUNTER — LAB (OUTPATIENT)
Dept: LAB | Facility: HOSPITAL | Age: 72
End: 2022-10-19

## 2022-10-19 ENCOUNTER — TELEPHONE (OUTPATIENT)
Dept: INTERNAL MEDICINE | Facility: CLINIC | Age: 72
End: 2022-10-19

## 2022-10-19 ENCOUNTER — OFFICE VISIT (OUTPATIENT)
Dept: INTERNAL MEDICINE | Facility: CLINIC | Age: 72
End: 2022-10-19

## 2022-10-19 VITALS
BODY MASS INDEX: 46.65 KG/M2 | WEIGHT: 315 LBS | SYSTOLIC BLOOD PRESSURE: 126 MMHG | TEMPERATURE: 96.4 F | HEART RATE: 60 BPM | OXYGEN SATURATION: 99 % | DIASTOLIC BLOOD PRESSURE: 72 MMHG | HEIGHT: 69 IN

## 2022-10-19 DIAGNOSIS — I10 ESSENTIAL HYPERTENSION: ICD-10-CM

## 2022-10-19 DIAGNOSIS — G89.29 CHRONIC RIGHT-SIDED LOW BACK PAIN WITH RIGHT-SIDED SCIATICA: ICD-10-CM

## 2022-10-19 DIAGNOSIS — M54.41 CHRONIC RIGHT-SIDED LOW BACK PAIN WITH RIGHT-SIDED SCIATICA: ICD-10-CM

## 2022-10-19 DIAGNOSIS — M47.812 CERVICAL SPINE ARTHRITIS: Primary | ICD-10-CM

## 2022-10-19 DIAGNOSIS — E66.01 MORBID (SEVERE) OBESITY DUE TO EXCESS CALORIES: ICD-10-CM

## 2022-10-19 LAB
AMPHET+METHAMPHET UR QL: NEGATIVE
AMPHETAMINES UR QL: NEGATIVE
BARBITURATES UR QL SCN: NEGATIVE
BENZODIAZ UR QL SCN: NEGATIVE
BUPRENORPHINE SERPL-MCNC: NEGATIVE NG/ML
CANNABINOIDS SERPL QL: NEGATIVE
COCAINE UR QL: NEGATIVE
METHADONE UR QL SCN: NEGATIVE
OPIATES UR QL: NEGATIVE
OXYCODONE UR QL SCN: NEGATIVE
PCP UR QL SCN: NEGATIVE
PROPOXYPH UR QL: NEGATIVE
TRICYCLICS UR QL SCN: NEGATIVE

## 2022-10-19 PROCEDURE — 99214 OFFICE O/P EST MOD 30 MIN: CPT | Performed by: NURSE PRACTITIONER

## 2022-10-19 PROCEDURE — 80306 DRUG TEST PRSMV INSTRMNT: CPT | Performed by: NURSE PRACTITIONER

## 2022-10-19 RX ORDER — HYDROCODONE BITARTRATE AND ACETAMINOPHEN 5; 325 MG/1; MG/1
1 TABLET ORAL EVERY 8 HOURS PRN
Qty: 90 TABLET | Refills: 0 | Status: SHIPPED | OUTPATIENT
Start: 2022-10-19 | End: 2022-10-20 | Stop reason: SDUPTHER

## 2022-10-19 NOTE — PROGRESS NOTES
"Chief Complaint   Patient presents with   • Neck Pain     Follow-Up       HPI  Dieter Christiansen is a 72 y.o. male presents for follow-up on chronic neck and back pain.   Pt has noted arthritis in his neck.  He complains of chronic low back pain.  Occasionally the pain radiates down his right leg.  The pain is constant.  He was given Tramadol at his last appt but he states it was not effective.  He had an MRI of his back \"years ago\" but he does not have record of this.  His wife states that he complains of his pain constantly and he's breaking her heart.  He has done PT and had injection in his back years ago    The following portions of the patient's history were reviewed and updated as appropriate: allergies, current medications, past family history, past medical history, past social history, past surgical history and problem list.    Subjective  Review of Systems   Constitutional: Negative for activity change, appetite change and fatigue.   HENT: Negative for congestion.    Respiratory: Negative for cough and shortness of breath.    Cardiovascular: Negative for chest pain and leg swelling.   Gastrointestinal: Negative for abdominal pain.   Musculoskeletal: Positive for arthralgias, back pain and neck pain.   Neurological: Negative for dizziness, weakness and confusion.   Psychiatric/Behavioral: Negative for behavioral problems and decreased concentration.       Objective  Visit Vitals  /72 (BP Location: Left arm, Patient Position: Sitting)   Pulse 60   Temp 96.4 °F (35.8 °C)   Ht 175.3 cm (69\")   Wt (!) 152 kg (336 lb)   SpO2 99%   BMI 49.62 kg/m²        Physical Exam  Vitals and nursing note reviewed.   Constitutional:       Appearance: He is morbidly obese.   HENT:      Head: Normocephalic.   Eyes:      Pupils: Pupils are equal, round, and reactive to light.   Cardiovascular:      Rate and Rhythm: Normal rate and regular rhythm.      Pulses: Normal pulses.      Heart sounds: Normal heart sounds.   Pulmonary: "      Effort: Pulmonary effort is normal.      Breath sounds: Normal breath sounds.   Musculoskeletal:      Lumbar back: Decreased range of motion.      Comments: Gait slowed, walking with a cane   Skin:     General: Skin is warm and dry.      Capillary Refill: Capillary refill takes less than 2 seconds.   Neurological:      General: No focal deficit present.      Mental Status: He is alert and oriented to person, place, and time.      Gait: Gait is intact.   Psychiatric:         Attention and Perception: Attention normal.         Mood and Affect: Mood normal.         Behavior: Behavior normal.          Procedures     Assessment and Plan  Diagnoses and all orders for this visit:    1. Cervical spine arthritis (Primary)  -     HYDROcodone-acetaminophen (NORCO) 5-325 MG per tablet; Take 1 tablet by mouth Every 8 (Eight) Hours As Needed for Severe Pain.  Dispense: 90 tablet; Refill: 0  -     Urine Drug Screen - Urine, Clean Catch; Future    2. Chronic right-sided low back pain with right-sided sciatica  -     HYDROcodone-acetaminophen (NORCO) 5-325 MG per tablet; Take 1 tablet by mouth Every 8 (Eight) Hours As Needed for Severe Pain.  Dispense: 90 tablet; Refill: 0  -     Urine Drug Screen - Urine, Clean Catch; Future  -     XR Spine Lumbar 2 or 3 View; Future  -     Ambulatory Referral to Physical Therapy Evaluate and treat    3. Morbid (severe) obesity due to excess calories (HCC)    4. Essential hypertension      Tramadol ineffective  Hunter appropriate  L-spine XR and refer to PT - will need MRI and plan to refer to pain mgmt  Lortab to help with severe pain  BP well controlled on Lisinopril and Metoprolol  D/W pt at length that he needed to lose weight to help with his pain    Return in about 4 weeks (around 11/16/2022) for Recheck Chronic Back Pain. Obesity.        EUNICE Fields

## 2022-10-19 NOTE — TELEPHONE ENCOUNTER
AT CHECKOUT, PT'S EMERGENCY CONTACT REQUESTED THAT THE APPT FOR THE PT'S ULTRASOUND BE MADE AT THE [facility] LOCATION.

## 2022-10-20 ENCOUNTER — TELEPHONE (OUTPATIENT)
Dept: INTERNAL MEDICINE | Facility: CLINIC | Age: 72
End: 2022-10-20

## 2022-10-20 DIAGNOSIS — G89.29 CHRONIC RIGHT-SIDED LOW BACK PAIN WITH RIGHT-SIDED SCIATICA: ICD-10-CM

## 2022-10-20 DIAGNOSIS — M47.812 CERVICAL SPINE ARTHRITIS: ICD-10-CM

## 2022-10-20 DIAGNOSIS — M54.41 CHRONIC RIGHT-SIDED LOW BACK PAIN WITH RIGHT-SIDED SCIATICA: ICD-10-CM

## 2022-10-20 RX ORDER — HYDROCODONE BITARTRATE AND ACETAMINOPHEN 5; 325 MG/1; MG/1
1 TABLET ORAL EVERY 8 HOURS PRN
Qty: 21 TABLET | Refills: 0 | Status: SHIPPED | OUTPATIENT
Start: 2022-10-20 | End: 2022-12-06 | Stop reason: DRUGHIGH

## 2022-10-20 NOTE — TELEPHONE ENCOUNTER
Called Patient's daughter back . She want me to fax PA to the pharmacy, Faxed paper to his Pharmacy.

## 2022-10-20 NOTE — TELEPHONE ENCOUNTER
Pharmacy Name: [facility]    Pharmacy representative phone number: [phone]    What medication are you calling in regards to: HYDROCODONE    What question does the pharmacy have: PHARMACY STATES THAT INSURANCE WILL NOT COVER UNLESS PATIENT HAS A 7 DAY SUPPLY FIRST. PLEASE SEND IN A PRESCRIPTION FOR 7 DAY SUPPLY    Who is the provider that prescribed the medication: ALEXANDRE SIMON    Additional notes:

## 2022-10-20 NOTE — TELEPHONE ENCOUNTER
Provider: ALEXANDRE SIMON   Caller: ELIEL COLEMAN  Relationship to Patient: STEP DAUGHTER   Pharmacy: [facility]   Phone Number: [phone]  Reason for Call: THE CALLER STATES THAT THE PHARMACY TOLD HER THAT THEY MEDICATION HYDROCODONE NEEDS A PRIOR AUTHORIZATION  SHE STATES THAT THE PHARMACY TOLD HER THAT THEY WOULD FAX PAPERWORK TO THE OFFICE THE CALLER WOULD LIKE TO KNOW IF THERE IS ANY UPDATE ON THAT AUTHORIZATION

## 2022-10-27 DIAGNOSIS — E11.43 TYPE 2 DIABETES MELLITUS WITH DIABETIC AUTONOMIC NEUROPATHY, WITHOUT LONG-TERM CURRENT USE OF INSULIN: ICD-10-CM

## 2022-10-27 RX ORDER — GABAPENTIN 600 MG/1
TABLET ORAL
Qty: 90 TABLET | Refills: 3 | Status: SHIPPED | OUTPATIENT
Start: 2022-10-27 | End: 2023-01-04 | Stop reason: SDUPTHER

## 2022-11-28 DIAGNOSIS — K21.9 GASTROESOPHAGEAL REFLUX DISEASE WITHOUT ESOPHAGITIS: ICD-10-CM

## 2022-11-28 RX ORDER — PANTOPRAZOLE SODIUM 40 MG/1
TABLET, DELAYED RELEASE ORAL
Qty: 30 TABLET | Refills: 5 | Status: SHIPPED | OUTPATIENT
Start: 2022-11-28

## 2022-12-06 ENCOUNTER — OFFICE VISIT (OUTPATIENT)
Dept: INTERNAL MEDICINE | Facility: CLINIC | Age: 72
End: 2022-12-06

## 2022-12-06 VITALS
TEMPERATURE: 97.5 F | BODY MASS INDEX: 46.65 KG/M2 | HEIGHT: 69 IN | SYSTOLIC BLOOD PRESSURE: 130 MMHG | OXYGEN SATURATION: 99 % | WEIGHT: 315 LBS | HEART RATE: 87 BPM | DIASTOLIC BLOOD PRESSURE: 78 MMHG

## 2022-12-06 DIAGNOSIS — E11.43 TYPE 2 DIABETES MELLITUS WITH DIABETIC AUTONOMIC NEUROPATHY, WITHOUT LONG-TERM CURRENT USE OF INSULIN: ICD-10-CM

## 2022-12-06 DIAGNOSIS — G89.29 CHRONIC RIGHT-SIDED LOW BACK PAIN WITH RIGHT-SIDED SCIATICA: ICD-10-CM

## 2022-12-06 DIAGNOSIS — M54.41 CHRONIC RIGHT-SIDED LOW BACK PAIN WITH RIGHT-SIDED SCIATICA: ICD-10-CM

## 2022-12-06 DIAGNOSIS — M47.812 CERVICAL SPINE ARTHRITIS: Primary | ICD-10-CM

## 2022-12-06 DIAGNOSIS — D17.79 LIPOMA OF OTHER SPECIFIED SITES: ICD-10-CM

## 2022-12-06 DIAGNOSIS — R11.0 NAUSEA: ICD-10-CM

## 2022-12-06 DIAGNOSIS — Z23 NEEDS FLU SHOT: ICD-10-CM

## 2022-12-06 PROCEDURE — 90662 IIV NO PRSV INCREASED AG IM: CPT | Performed by: NURSE PRACTITIONER

## 2022-12-06 PROCEDURE — 99214 OFFICE O/P EST MOD 30 MIN: CPT | Performed by: NURSE PRACTITIONER

## 2022-12-06 PROCEDURE — G0008 ADMIN INFLUENZA VIRUS VAC: HCPCS | Performed by: NURSE PRACTITIONER

## 2022-12-06 RX ORDER — PROMETHAZINE HYDROCHLORIDE 25 MG/1
TABLET ORAL
Qty: 20 TABLET | Refills: 5 | Status: SHIPPED | OUTPATIENT
Start: 2022-12-06

## 2022-12-06 RX ORDER — HYDROCODONE BITARTRATE AND ACETAMINOPHEN 10; 325 MG/1; MG/1
1 TABLET ORAL EVERY 8 HOURS PRN
Qty: 90 TABLET | Refills: 0 | Status: SHIPPED | OUTPATIENT
Start: 2022-12-06 | End: 2023-01-04 | Stop reason: SDUPTHER

## 2022-12-06 NOTE — PROGRESS NOTES
"Chief Complaint   Patient presents with   • Follow-up   • Back Pain       HPI  Dieter Christiansen is a 72 y.o. male presents for follow-up on cervical and lumbar radiculopathy.   Pt was started on Hydrocodone 5mg at his last appt.  He states he has only noted mild improvement with the medication.  However, his family states that she can tell a difference in him.  He has been a little more active around the house.   He has lost 6 lbs since his last visit.      The following portions of the patient's history were reviewed and updated as appropriate: allergies, current medications, past family history, past medical history, past social history, past surgical history and problem list.    Subjective  Review of Systems   Constitutional: Negative for activity change, appetite change and fatigue.   HENT: Negative for congestion.    Respiratory: Positive for cough. Negative for shortness of breath.    Cardiovascular: Negative for chest pain and leg swelling.   Gastrointestinal: Negative for abdominal pain.   Musculoskeletal: Positive for arthralgias, back pain and neck pain.   Neurological: Negative for dizziness, weakness and confusion.   Psychiatric/Behavioral: Negative for behavioral problems and decreased concentration.       Objective  Visit Vitals  /78 (BP Location: Left arm, Patient Position: Sitting)   Pulse 87   Temp 97.5 °F (36.4 °C)   Ht 175.3 cm (69\")   Wt (!) 150 kg (330 lb)   SpO2 99%   BMI 48.73 kg/m²        Physical Exam  Vitals and nursing note reviewed.   Constitutional:       Appearance: He is morbidly obese.   HENT:      Head: Normocephalic.   Eyes:      Pupils: Pupils are equal, round, and reactive to light.   Cardiovascular:      Rate and Rhythm: Normal rate and regular rhythm.      Pulses: Normal pulses.      Heart sounds: Normal heart sounds.   Pulmonary:      Effort: Pulmonary effort is normal. No respiratory distress.      Breath sounds: Rhonchi (RUL) present. No wheezing.   Skin:     General: Skin " is warm and dry.      Capillary Refill: Capillary refill takes less than 2 seconds.          Neurological:      General: No focal deficit present.      Mental Status: He is alert and oriented to person, place, and time.      Gait: Gait is intact.   Psychiatric:         Attention and Perception: Attention normal.         Mood and Affect: Mood normal.         Behavior: Behavior normal.         Thought Content: Thought content normal.         Judgment: Judgment normal.          Procedures     Assessment and Plan  Diagnoses and all orders for this visit:    1. Cervical spine arthritis (Primary)  -     HYDROcodone-acetaminophen (NORCO)  MG per tablet; Take 1 tablet by mouth Every 8 (Eight) Hours As Needed for Moderate Pain.  Dispense: 90 tablet; Refill: 0    2. Chronic right-sided low back pain with right-sided sciatica  -     HYDROcodone-acetaminophen (NORCO)  MG per tablet; Take 1 tablet by mouth Every 8 (Eight) Hours As Needed for Moderate Pain.  Dispense: 90 tablet; Refill: 0    3. Type 2 diabetes mellitus with diabetic autonomic neuropathy, without long-term current use of insulin ([facility])  -     Ambulatory Referral for Diabetic Eye Exam-Ophthalmology    4. Needs flu shot  -     Fluzone High-Dose 65+yrs (9701-5312)    5. Lipoma of other specified sites    Increase Hydcorodone dose for uncontrolled chronic pain  Hunter reviewed, appropriate  Refer to dermatology for large lipoma  Refer for eye exam  Flu vax today  Pt encouraged to cont to lose weight and be more active    Return in about 4 weeks (around 1/3/2023) for Recheck Chronic Pain.          EUNICE Craft

## 2022-12-12 DIAGNOSIS — I25.10 CORONARY ARTERY DISEASE INVOLVING NATIVE CORONARY ARTERY OF NATIVE HEART WITHOUT ANGINA PECTORIS: ICD-10-CM

## 2022-12-12 DIAGNOSIS — K21.9 GASTROESOPHAGEAL REFLUX DISEASE WITHOUT ESOPHAGITIS: ICD-10-CM

## 2022-12-12 DIAGNOSIS — R60.0 PEDAL EDEMA: ICD-10-CM

## 2022-12-12 RX ORDER — TAMSULOSIN HYDROCHLORIDE 0.4 MG/1
CAPSULE ORAL
Qty: 30 CAPSULE | Refills: 5 | Status: SHIPPED | OUTPATIENT
Start: 2022-12-12

## 2022-12-12 RX ORDER — IBUPROFEN 600 MG/1
TABLET ORAL
Qty: 120 TABLET | Refills: 5 | Status: SHIPPED | OUTPATIENT
Start: 2022-12-12

## 2022-12-12 RX ORDER — METOPROLOL SUCCINATE 50 MG/1
TABLET, EXTENDED RELEASE ORAL
Qty: 30 TABLET | Refills: 5 | Status: SHIPPED | OUTPATIENT
Start: 2022-12-12

## 2022-12-12 RX ORDER — FUROSEMIDE 40 MG/1
TABLET ORAL
Qty: 10 TABLET | Refills: 5 | Status: SHIPPED | OUTPATIENT
Start: 2022-12-12

## 2022-12-12 RX ORDER — FAMOTIDINE 20 MG/1
TABLET, FILM COATED ORAL
Qty: 30 TABLET | Refills: 5 | Status: SHIPPED | OUTPATIENT
Start: 2022-12-12

## 2022-12-13 ENCOUNTER — TELEPHONE (OUTPATIENT)
Dept: INTERNAL MEDICINE | Facility: CLINIC | Age: 72
End: 2022-12-13

## 2022-12-13 ENCOUNTER — APPOINTMENT (OUTPATIENT)
Dept: ULTRASOUND IMAGING | Facility: HOSPITAL | Age: 72
End: 2022-12-13

## 2022-12-13 DIAGNOSIS — D17.79 LIPOMA OF OTHER SPECIFIED SITES: Primary | ICD-10-CM

## 2022-12-13 NOTE — TELEPHONE ENCOUNTER
Rand called and wanted to know if pt has an appointment scheduled or a referral to have the mass on pt neck looked at.

## 2022-12-20 ENCOUNTER — APPOINTMENT (OUTPATIENT)
Dept: ULTRASOUND IMAGING | Facility: HOSPITAL | Age: 72
End: 2022-12-20

## 2022-12-29 ENCOUNTER — HOSPITAL ENCOUNTER (OUTPATIENT)
Dept: ULTRASOUND IMAGING | Facility: HOSPITAL | Age: 72
Discharge: HOME | End: 2022-12-29
Admitting: NURSE PRACTITIONER

## 2022-12-29 DIAGNOSIS — N28.9 RENAL LESION: ICD-10-CM

## 2022-12-29 PROCEDURE — 76775 US EXAM ABDO BACK WALL LIM: CPT

## 2023-01-04 ENCOUNTER — OFFICE VISIT (OUTPATIENT)
Dept: INTERNAL MEDICINE | Facility: CLINIC | Age: 73
End: 2023-01-04
Payer: MEDICARE

## 2023-01-04 VITALS
HEART RATE: 64 BPM | SYSTOLIC BLOOD PRESSURE: 138 MMHG | DIASTOLIC BLOOD PRESSURE: 72 MMHG | OXYGEN SATURATION: 99 % | TEMPERATURE: 97.1 F | WEIGHT: 315 LBS | HEIGHT: 69 IN | BODY MASS INDEX: 46.65 KG/M2

## 2023-01-04 DIAGNOSIS — M54.41 CHRONIC RIGHT-SIDED LOW BACK PAIN WITH RIGHT-SIDED SCIATICA: Primary | ICD-10-CM

## 2023-01-04 DIAGNOSIS — J44.9 CHRONIC OBSTRUCTIVE PULMONARY DISEASE, UNSPECIFIED COPD TYPE: ICD-10-CM

## 2023-01-04 DIAGNOSIS — M47.812 CERVICAL SPINE ARTHRITIS: ICD-10-CM

## 2023-01-04 DIAGNOSIS — E11.43 TYPE 2 DIABETES MELLITUS WITH DIABETIC AUTONOMIC NEUROPATHY, WITHOUT LONG-TERM CURRENT USE OF INSULIN: ICD-10-CM

## 2023-01-04 DIAGNOSIS — G89.29 CHRONIC RIGHT-SIDED LOW BACK PAIN WITH RIGHT-SIDED SCIATICA: Primary | ICD-10-CM

## 2023-01-04 LAB
EXPIRATION DATE: NORMAL
HBA1C MFR BLD: 5.7 %
Lab: NORMAL

## 2023-01-04 PROCEDURE — 83036 HEMOGLOBIN GLYCOSYLATED A1C: CPT | Performed by: NURSE PRACTITIONER

## 2023-01-04 PROCEDURE — 99214 OFFICE O/P EST MOD 30 MIN: CPT | Performed by: NURSE PRACTITIONER

## 2023-01-04 PROCEDURE — 3044F HG A1C LEVEL LT 7.0%: CPT | Performed by: NURSE PRACTITIONER

## 2023-01-04 PROCEDURE — 1159F MED LIST DOCD IN RCRD: CPT | Performed by: NURSE PRACTITIONER

## 2023-01-04 PROCEDURE — 1160F RVW MEDS BY RX/DR IN RCRD: CPT | Performed by: NURSE PRACTITIONER

## 2023-01-04 RX ORDER — GABAPENTIN 600 MG/1
600 TABLET ORAL 3 TIMES DAILY
Qty: 90 TABLET | Refills: 3 | Status: SHIPPED | OUTPATIENT
Start: 2023-01-04

## 2023-01-04 RX ORDER — HYDROCODONE BITARTRATE AND ACETAMINOPHEN 10; 325 MG/1; MG/1
1 TABLET ORAL EVERY 8 HOURS PRN
Qty: 90 TABLET | Refills: 0 | Status: SHIPPED | OUTPATIENT
Start: 2023-01-04 | End: 2023-02-03

## 2023-01-04 NOTE — PROGRESS NOTES
Chief Complaint   Patient presents with   • Follow-up   • Pain       HPI  Dieter Christiansen is a 72 y.o. male presents for follow-up on his chronic pain of neck and lumbar spine.  His Hydrocodone was increased to 10 mg at the last appt.  He states the increase in pain medication has really helped him.  His family states that he is more mobile at home.  He is resting better at night.  He has lost a pant size and half since he has started coming to this clinic.  She states he has even been smiling more now that his pain has improved.      The following portions of the patient's history were reviewed and updated as appropriate: allergies, current medications, past family history, past medical history, past social history, past surgical history and problem list.    Subjective  Review of Systems   Constitutional: Negative for activity change, appetite change and fatigue.   HENT: Negative for congestion.    Respiratory: Negative for cough and shortness of breath.    Cardiovascular: Negative for chest pain and leg swelling.   Gastrointestinal: Negative for abdominal pain.   Musculoskeletal: Positive for arthralgias, back pain, joint swelling and neck pain.   Neurological: Negative for dizziness, weakness and confusion.   Psychiatric/Behavioral: Negative for behavioral problems and decreased concentration.       Objective  Visit Vitals  /72 (BP Location: Left arm, Patient Position: Sitting)   Pulse 64   Temp 97.1 °F (36.2 °C)   Ht 175.3 cm (69\")   Wt (!) 151 kg (333 lb)   SpO2 99%   BMI 49.18 kg/m²        Physical Exam  Vitals and nursing note reviewed.   Constitutional:       Appearance: He is morbidly obese.   HENT:      Head: Normocephalic.   Eyes:      Pupils: Pupils are equal, round, and reactive to light.   Cardiovascular:      Rate and Rhythm: Normal rate and regular rhythm.      Pulses: Normal pulses.      Heart sounds: Normal heart sounds.   Pulmonary:      Effort: Pulmonary effort is normal.      Breath sounds:  Normal breath sounds.   Musculoskeletal:      Comments: Gait slowed, walking with a cane   Skin:     General: Skin is warm and dry.      Capillary Refill: Capillary refill takes less than 2 seconds.   Neurological:      General: No focal deficit present.      Mental Status: He is alert and oriented to person, place, and time.      Gait: Gait is intact.   Psychiatric:         Attention and Perception: Attention normal.         Mood and Affect: Mood normal.         Behavior: Behavior normal.          Procedures         Results for orders placed or performed in visit on 01/04/23   POC Glycosylated Hemoglobin (Hb A1C)    Specimen: Blood   Result Value Ref Range    Hemoglobin A1C 5.7 %    Lot Number [id number]     Expiration Date 10/17/2024          Assessment and Plan  Diagnoses and all orders for this visit:    1. Chronic right-sided low back pain with right-sided sciatica (Primary)  -     HYDROcodone-acetaminophen (NORCO)  MG per tablet; Take 1 tablet by mouth Every 8 (Eight) Hours As Needed for Moderate Pain.  Dispense: 90 tablet; Refill: 0    2. Cervical spine arthritis  -     HYDROcodone-acetaminophen (NORCO)  MG per tablet; Take 1 tablet by mouth Every 8 (Eight) Hours As Needed for Moderate Pain.  Dispense: 90 tablet; Refill: 0    3. Chronic obstructive pulmonary disease, unspecified COPD type ([facility])    4. Type 2 diabetes mellitus with diabetic autonomic neuropathy, without long-term current use of insulin ([facility])  -     POC Glycosylated Hemoglobin (Hb A1C)  -     gabapentin (NEURONTIN) 600 MG tablet; Take 1 tablet by mouth 3 (Three) Times a Day.  Dispense: 90 tablet; Refill: 3      Hydrocodone helping significantly, will cont at current dose - has improved ADLs  A1c 5.7, still well controlled  Neuropathy well controlled with Gabapentin  Hunter appropriate  Last UDS appropriate  COPD well controlled at this time with Combivent inhaler  DM still well controlled with A1c 5.7, cont Metformin    Return in  about 4 months (around 5/4/2023) for Diabetes, chronic pain.        Ian Sanchez, APRN

## 2023-01-24 RX ORDER — FLUOXETINE HYDROCHLORIDE 20 MG/1
CAPSULE ORAL
Qty: 30 CAPSULE | Refills: 5 | Status: SHIPPED | OUTPATIENT
Start: 2023-01-24

## 2023-02-03 DIAGNOSIS — G89.29 CHRONIC RIGHT-SIDED LOW BACK PAIN WITH RIGHT-SIDED SCIATICA: ICD-10-CM

## 2023-02-03 DIAGNOSIS — M47.812 CERVICAL SPINE ARTHRITIS: ICD-10-CM

## 2023-02-03 DIAGNOSIS — M54.41 CHRONIC RIGHT-SIDED LOW BACK PAIN WITH RIGHT-SIDED SCIATICA: ICD-10-CM

## 2023-02-03 RX ORDER — HYDROCODONE BITARTRATE AND ACETAMINOPHEN 10; 325 MG/1; MG/1
TABLET ORAL
Qty: 90 TABLET | Refills: 0 | Status: SHIPPED | OUTPATIENT
Start: 2023-02-03 | End: 2023-03-07 | Stop reason: SDUPTHER

## 2023-02-20 DIAGNOSIS — I10 ESSENTIAL HYPERTENSION: ICD-10-CM

## 2023-02-20 RX ORDER — LISINOPRIL 10 MG/1
TABLET ORAL
Qty: 30 TABLET | Refills: 5 | Status: SHIPPED | OUTPATIENT
Start: 2023-02-20

## 2023-03-07 DIAGNOSIS — M47.812 CERVICAL SPINE ARTHRITIS: ICD-10-CM

## 2023-03-07 DIAGNOSIS — M54.41 CHRONIC RIGHT-SIDED LOW BACK PAIN WITH RIGHT-SIDED SCIATICA: ICD-10-CM

## 2023-03-07 DIAGNOSIS — G89.29 CHRONIC RIGHT-SIDED LOW BACK PAIN WITH RIGHT-SIDED SCIATICA: ICD-10-CM

## 2023-03-07 NOTE — TELEPHONE ENCOUNTER
Caller: ELIEL COLEMAN    Relationship: Emergency Contact    Best call back number: [phone]    Requested Prescriptions:   Requested Prescriptions     Pending Prescriptions Disp Refills   • HYDROcodone-acetaminophen (NORCO)  MG per tablet 90 tablet 0        Pharmacy where request should be sent: [place] [address] - [phone]  - [phone] FX     Additional details provided by patient: THE CALLER STATES THAT THAT PATIENT IS OUT OF MEDICATION    Does the patient have less than a 3 day supply:  [x] Yes  [] No    Would you like a call back once the refill request has been completed: [x] Yes [] No    If the office needs to give you a call back, can they leave a voicemail: [x] Yes [] No    Olu Scott Rep   03/07/23 14:10 EST

## 2023-03-08 RX ORDER — HYDROCODONE BITARTRATE AND ACETAMINOPHEN 10; 325 MG/1; MG/1
1 TABLET ORAL EVERY 8 HOURS PRN
Qty: 90 TABLET | Refills: 0 | Status: SHIPPED | OUTPATIENT
Start: 2023-03-08 | End: 2023-04-07

## 2023-04-07 DIAGNOSIS — G89.29 CHRONIC RIGHT-SIDED LOW BACK PAIN WITH RIGHT-SIDED SCIATICA: ICD-10-CM

## 2023-04-07 DIAGNOSIS — M47.812 CERVICAL SPINE ARTHRITIS: ICD-10-CM

## 2023-04-07 DIAGNOSIS — M54.41 CHRONIC RIGHT-SIDED LOW BACK PAIN WITH RIGHT-SIDED SCIATICA: ICD-10-CM

## 2023-04-07 RX ORDER — HYDROCODONE BITARTRATE AND ACETAMINOPHEN 10; 325 MG/1; MG/1
TABLET ORAL
Qty: 90 TABLET | Refills: 0 | Status: SHIPPED | OUTPATIENT
Start: 2023-04-07

## 2023-05-04 DIAGNOSIS — M47.812 CERVICAL SPINE ARTHRITIS: ICD-10-CM

## 2023-05-04 DIAGNOSIS — M54.41 CHRONIC RIGHT-SIDED LOW BACK PAIN WITH RIGHT-SIDED SCIATICA: ICD-10-CM

## 2023-05-04 DIAGNOSIS — G89.29 CHRONIC RIGHT-SIDED LOW BACK PAIN WITH RIGHT-SIDED SCIATICA: ICD-10-CM

## 2023-05-04 RX ORDER — HYDROCODONE BITARTRATE AND ACETAMINOPHEN 10; 325 MG/1; MG/1
TABLET ORAL
Qty: 90 TABLET | Refills: 0 | Status: CANCELLED | OUTPATIENT
Start: 2023-05-04

## 2023-05-04 NOTE — TELEPHONE ENCOUNTER
Caller: ELIEL COLEMAN    Relationship: Emergency Contact    Best call back number: [phone]    Requested Prescriptions:   Requested Prescriptions     Pending Prescriptions Disp Refills   • HYDROcodone-acetaminophen (NORCO)  MG per tablet 90 tablet 0        Pharmacy where request should be sent: [address] [place] - [phone] PH - [phone] FX     Last office visit with prescribing clinician: 1/4/2023   Last telemedicine visit with prescribing clinician: Visit date not found   Next office visit with prescribing clinician: Visit date not found     Additional details provided by patient:   Does the patient have less than a 3 day supply:  [] Yes  [] No    Would you like a call back once the refill request has been completed: [] Yes [] No    If the office needs to give you a call back, can they leave a voicemail: [] Yes [] No    Olu Simms Rep   05/04/23 15:59 EDT

## 2023-05-05 ENCOUNTER — OFFICE VISIT (OUTPATIENT)
Dept: INTERNAL MEDICINE | Facility: CLINIC | Age: 73
End: 2023-05-05
Payer: MEDICARE

## 2023-05-05 ENCOUNTER — LAB (OUTPATIENT)
Dept: LAB | Facility: HOSPITAL | Age: 73
End: 2023-05-05
Payer: MEDICARE

## 2023-05-05 VITALS
TEMPERATURE: 98.1 F | SYSTOLIC BLOOD PRESSURE: 128 MMHG | WEIGHT: 315 LBS | HEART RATE: 64 BPM | HEIGHT: 69 IN | DIASTOLIC BLOOD PRESSURE: 78 MMHG | BODY MASS INDEX: 46.65 KG/M2 | OXYGEN SATURATION: 99 %

## 2023-05-05 DIAGNOSIS — E11.43 TYPE 2 DIABETES MELLITUS WITH DIABETIC AUTONOMIC NEUROPATHY, WITHOUT LONG-TERM CURRENT USE OF INSULIN: Primary | ICD-10-CM

## 2023-05-05 DIAGNOSIS — Z79.899 MEDICATION MANAGEMENT: ICD-10-CM

## 2023-05-05 DIAGNOSIS — R01.1 CARDIAC MURMUR: ICD-10-CM

## 2023-05-05 DIAGNOSIS — J30.89 SEASONAL ALLERGIC RHINITIS DUE TO OTHER ALLERGIC TRIGGER: ICD-10-CM

## 2023-05-05 DIAGNOSIS — M47.812 CERVICAL SPINE ARTHRITIS: ICD-10-CM

## 2023-05-05 DIAGNOSIS — G89.29 CHRONIC RIGHT-SIDED LOW BACK PAIN WITH RIGHT-SIDED SCIATICA: ICD-10-CM

## 2023-05-05 DIAGNOSIS — M54.41 CHRONIC RIGHT-SIDED LOW BACK PAIN WITH RIGHT-SIDED SCIATICA: ICD-10-CM

## 2023-05-05 DIAGNOSIS — R73.09 HEMOGLOBIN A1C LESS THAN 7.0%: ICD-10-CM

## 2023-05-05 LAB
ALBUMIN UR-MCNC: 2.2 MG/DL
AMPHET+METHAMPHET UR QL: NEGATIVE
AMPHETAMINES UR QL: NEGATIVE
BARBITURATES UR QL SCN: NEGATIVE
BENZODIAZ UR QL SCN: NEGATIVE
BUPRENORPHINE SERPL-MCNC: NEGATIVE NG/ML
CANNABINOIDS SERPL QL: NEGATIVE
COCAINE UR QL: NEGATIVE
EXPIRATION DATE: NORMAL
HBA1C MFR BLD: 5.8 %
Lab: NORMAL
METHADONE UR QL SCN: NEGATIVE
OPIATES UR QL: POSITIVE
OXYCODONE UR QL SCN: NEGATIVE
PCP UR QL SCN: NEGATIVE
PROPOXYPH UR QL: NEGATIVE
TRICYCLICS UR QL SCN: NEGATIVE

## 2023-05-05 PROCEDURE — 80306 DRUG TEST PRSMV INSTRMNT: CPT | Performed by: NURSE PRACTITIONER

## 2023-05-05 PROCEDURE — 82043 UR ALBUMIN QUANTITATIVE: CPT | Performed by: NURSE PRACTITIONER

## 2023-05-05 RX ORDER — CETIRIZINE HYDROCHLORIDE 10 MG/1
10 TABLET ORAL DAILY
Qty: 30 TABLET | Refills: 5 | Status: SHIPPED | OUTPATIENT
Start: 2023-05-05

## 2023-05-05 RX ORDER — HYDROCODONE BITARTRATE AND ACETAMINOPHEN 10; 325 MG/1; MG/1
1 TABLET ORAL EVERY 8 HOURS PRN
Qty: 90 TABLET | Refills: 0 | Status: SHIPPED | OUTPATIENT
Start: 2023-05-05

## 2023-05-05 RX ORDER — GABAPENTIN 600 MG/1
600 TABLET ORAL 3 TIMES DAILY
Qty: 90 TABLET | Refills: 3 | Status: SHIPPED | OUTPATIENT
Start: 2023-05-05

## 2023-05-05 NOTE — PROGRESS NOTES
"Chief Complaint   Patient presents with   • Follow-up   • Diabetes       HPI  Dieter Christiansen is a 72 y.o. male presents for follow-up on diabetes.  He states that he takes his Metformin as prescribed.  His last A1c was <6.  Still doing well on Prozac.  Pain medication still working well for his arthritis.      The following portions of the patient's history were reviewed and updated as appropriate: allergies, current medications, past family history, past medical history, past social history, past surgical history and problem list.    Subjective  Review of Systems   Constitutional: Negative for activity change, appetite change and fatigue.   HENT: Negative for congestion.    Respiratory: Positive for cough. Negative for shortness of breath.    Cardiovascular: Negative for chest pain and leg swelling.   Gastrointestinal: Negative for abdominal pain.   Musculoskeletal: Positive for arthralgias and back pain.   Neurological: Negative for dizziness, weakness and confusion.   Psychiatric/Behavioral: Negative for behavioral problems and decreased concentration.       Objective  Visit Vitals  /78 (BP Location: Left arm, Patient Position: Sitting)   Pulse 64   Temp 98.1 °F (36.7 °C)   Ht 175.3 cm (69\")   Wt (!) 152 kg (335 lb 8 oz)   SpO2 99%   BMI 49.54 kg/m²        Physical Exam  Vitals and nursing note reviewed.   Constitutional:       Appearance: He is morbidly obese.   HENT:      Head: Normocephalic.   Eyes:      Pupils: Pupils are equal, round, and reactive to light.   Cardiovascular:      Rate and Rhythm: Normal rate and regular rhythm.      Pulses: Normal pulses.      Heart sounds: Murmur (Gr3) heard.   Pulmonary:      Effort: Pulmonary effort is normal.      Breath sounds: Rhonchi present.   Musculoskeletal:      Comments: Gait slowed, ambulating with cane   Skin:     General: Skin is warm and dry.      Capillary Refill: Capillary refill takes less than 2 seconds.   Neurological:      General: No focal deficit " present.      Mental Status: He is alert and oriented to person, place, and time.   Psychiatric:         Attention and Perception: Attention normal.         Mood and Affect: Mood normal.         Behavior: Behavior normal.         Thought Content: Thought content normal.         Cognition and Memory: Cognition and memory normal.         Judgment: Judgment normal.          Procedures     Results for orders placed or performed in visit on 05/05/23   POC Glycosylated Hemoglobin (Hb A1C)    Specimen: Blood   Result Value Ref Range    Hemoglobin A1C 5.8 %    Lot Number [id number]     Expiration Date 01/24/2025      Assessment and Plan  Diagnoses and all orders for this visit:    1. Type 2 diabetes mellitus with diabetic autonomic neuropathy, without long-term current use of insulin (Primary)  -     gabapentin (NEURONTIN) 600 MG tablet; Take 1 tablet by mouth 3 (Three) Times a Day.  Dispense: 90 tablet; Refill: 3  -     MicroAlbumin, Urine, Random - Urine, Clean Catch; Future  -     MicroAlbumin, Urine, Random - Urine, Clean Catch  -     POC Glycosylated Hemoglobin (Hb A1C)    2. Chronic right-sided low back pain with right-sided sciatica  -     HYDROcodone-acetaminophen (NORCO)  MG per tablet; Take 1 tablet by mouth Every 8 (Eight) Hours As Needed for Severe Pain.  Dispense: 90 tablet; Refill: 0    3. Cervical spine arthritis  -     HYDROcodone-acetaminophen (NORCO)  MG per tablet; Take 1 tablet by mouth Every 8 (Eight) Hours As Needed for Severe Pain.  Dispense: 90 tablet; Refill: 0    4. Medication management  -     Urine Drug Screen - Urine, Clean Catch; Future  -     Urine Drug Screen - Urine, Clean Catch    5. Seasonal allergic rhinitis due to other allergic trigger  -     cetirizine (zyrTEC) 10 MG tablet; Take 1 tablet by mouth Daily.  Dispense: 30 tablet; Refill: 5    6. Cardiac murmur  -     Adult Transthoracic Echo Complete W/ Cont if Necessary Per Protocol; Future    7. Hemoglobin A1c less than  7.0%    DM well controlled, cont Metformin  Hunter appropriate  Update UDS  Hydrocodone still working well for chronic pain  Zyrtec to try for allergies  Schedule ECHO of heart to eval murmur    Return in about 4 months (around 9/5/2023) for Medicare Wellness.        Ian Sanchez, APRN

## 2023-05-19 ENCOUNTER — HOSPITAL ENCOUNTER (INPATIENT)
Facility: HOSPITAL | Age: 73
LOS: 6 days | Discharge: HOME | End: 2023-05-25
Attending: EMERGENCY MEDICINE | Admitting: HOSPITALIST
Payer: MEDICARE

## 2023-05-19 ENCOUNTER — APPOINTMENT (OUTPATIENT)
Dept: GENERAL RADIOLOGY | Facility: HOSPITAL | Age: 73
End: 2023-05-19
Payer: MEDICARE

## 2023-05-19 DIAGNOSIS — Z86.39 HISTORY OF DIABETES MELLITUS: ICD-10-CM

## 2023-05-19 DIAGNOSIS — L03.114 CELLULITIS OF LEFT HAND: Primary | ICD-10-CM

## 2023-05-19 DIAGNOSIS — Z86.39 HISTORY OF HYPERLIPIDEMIA: ICD-10-CM

## 2023-05-19 DIAGNOSIS — Z72.0 TOBACCO ABUSE: ICD-10-CM

## 2023-05-19 DIAGNOSIS — Z86.79 HISTORY OF HYPERTENSION: ICD-10-CM

## 2023-05-19 DIAGNOSIS — L03.012 SUBUNGUAL INFECTION OF FINGER OF LEFT HAND: ICD-10-CM

## 2023-05-19 DIAGNOSIS — L03.012 CELLULITIS OF LEFT INDEX FINGER: ICD-10-CM

## 2023-05-19 LAB
ALBUMIN SERPL-MCNC: 3.6 G/DL (ref 3.5–5.2)
ALBUMIN/GLOB SERPL: 1 G/DL
ALP SERPL-CCNC: 113 U/L (ref 39–117)
ALT SERPL W P-5'-P-CCNC: 10 U/L (ref 1–41)
ANION GAP SERPL CALCULATED.3IONS-SCNC: 10 MMOL/L (ref 5–15)
AST SERPL-CCNC: 30 U/L (ref 1–40)
BASOPHILS # BLD AUTO: 0.03 10*3/MM3 (ref 0–0.2)
BASOPHILS NFR BLD AUTO: 0.3 % (ref 0–1.5)
BILIRUB SERPL-MCNC: 1 MG/DL (ref 0–1.2)
BUN SERPL-MCNC: 19 MG/DL (ref 8–23)
BUN/CREAT SERPL: 16.2 (ref 7–25)
CALCIUM SPEC-SCNC: 8.9 MG/DL (ref 8.6–10.5)
CHLORIDE SERPL-SCNC: 101 MMOL/L (ref 98–107)
CO2 SERPL-SCNC: 27 MMOL/L (ref 22–29)
CREAT SERPL-MCNC: 1.17 MG/DL (ref 0.76–1.27)
CRP SERPL-MCNC: 11.04 MG/DL (ref 0–0.5)
D-LACTATE SERPL-SCNC: 1.7 MMOL/L (ref 0.5–2)
DEPRECATED RDW RBC AUTO: 47.7 FL (ref 37–54)
EGFRCR SERPLBLD CKD-EPI 2021: 66.2 ML/MIN/1.73
EOSINOPHIL # BLD AUTO: 0.17 10*3/MM3 (ref 0–0.4)
EOSINOPHIL NFR BLD AUTO: 1.7 % (ref 0.3–6.2)
ERYTHROCYTE [DISTWIDTH] IN BLOOD BY AUTOMATED COUNT: 12.8 % (ref 12.3–15.4)
ERYTHROCYTE [SEDIMENTATION RATE] IN BLOOD: 83 MM/HR (ref 0–20)
GLOBULIN UR ELPH-MCNC: 3.6 GM/DL
GLUCOSE SERPL-MCNC: 163 MG/DL (ref 65–99)
HCT VFR BLD AUTO: 44.3 % (ref 37.5–51)
HGB BLD-MCNC: 14.3 G/DL (ref 13–17.7)
IMM GRANULOCYTES # BLD AUTO: 0.04 10*3/MM3 (ref 0–0.05)
IMM GRANULOCYTES NFR BLD AUTO: 0.4 % (ref 0–0.5)
LYMPHOCYTES # BLD AUTO: 1.37 10*3/MM3 (ref 0.7–3.1)
LYMPHOCYTES NFR BLD AUTO: 13.9 % (ref 19.6–45.3)
MCH RBC QN AUTO: 32.3 PG (ref 26.6–33)
MCHC RBC AUTO-ENTMCNC: 32.3 G/DL (ref 31.5–35.7)
MCV RBC AUTO: 100 FL (ref 79–97)
MONOCYTES # BLD AUTO: 0.83 10*3/MM3 (ref 0.1–0.9)
MONOCYTES NFR BLD AUTO: 8.4 % (ref 5–12)
NEUTROPHILS NFR BLD AUTO: 7.41 10*3/MM3 (ref 1.7–7)
NEUTROPHILS NFR BLD AUTO: 75.3 % (ref 42.7–76)
NRBC BLD AUTO-RTO: 0 /100 WBC (ref 0–0.2)
PLATELET # BLD AUTO: 262 10*3/MM3 (ref 140–450)
PMV BLD AUTO: 10 FL (ref 6–12)
POTASSIUM SERPL-SCNC: 4.2 MMOL/L (ref 3.5–5.2)
PROCALCITONIN SERPL-MCNC: 0.1 NG/ML (ref 0–0.25)
PROT SERPL-MCNC: 7.2 G/DL (ref 6–8.5)
RBC # BLD AUTO: 4.43 10*6/MM3 (ref 4.14–5.8)
SODIUM SERPL-SCNC: 138 MMOL/L (ref 136–145)
WBC NRBC COR # BLD: 9.85 10*3/MM3 (ref 3.4–10.8)

## 2023-05-19 PROCEDURE — 85025 COMPLETE CBC W/AUTO DIFF WBC: CPT | Performed by: PHYSICIAN ASSISTANT

## 2023-05-19 PROCEDURE — 85652 RBC SED RATE AUTOMATED: CPT | Performed by: PHYSICIAN ASSISTANT

## 2023-05-19 PROCEDURE — 80053 COMPREHEN METABOLIC PANEL: CPT | Performed by: PHYSICIAN ASSISTANT

## 2023-05-19 PROCEDURE — 99223 1ST HOSP IP/OBS HIGH 75: CPT | Performed by: INTERNAL MEDICINE

## 2023-05-19 PROCEDURE — 25010000002 HYDROMORPHONE PER 4 MG: Performed by: EMERGENCY MEDICINE

## 2023-05-19 PROCEDURE — 83605 ASSAY OF LACTIC ACID: CPT | Performed by: PHYSICIAN ASSISTANT

## 2023-05-19 PROCEDURE — 87040 BLOOD CULTURE FOR BACTERIA: CPT | Performed by: PHYSICIAN ASSISTANT

## 2023-05-19 PROCEDURE — 99284 EMERGENCY DEPT VISIT MOD MDM: CPT

## 2023-05-19 PROCEDURE — 86140 C-REACTIVE PROTEIN: CPT | Performed by: PHYSICIAN ASSISTANT

## 2023-05-19 PROCEDURE — 36415 COLL VENOUS BLD VENIPUNCTURE: CPT

## 2023-05-19 PROCEDURE — 84145 PROCALCITONIN (PCT): CPT | Performed by: PHYSICIAN ASSISTANT

## 2023-05-19 PROCEDURE — 73130 X-RAY EXAM OF HAND: CPT

## 2023-05-19 RX ORDER — HYDROMORPHONE HYDROCHLORIDE 1 MG/ML
0.5 INJECTION, SOLUTION INTRAMUSCULAR; INTRAVENOUS; SUBCUTANEOUS ONCE
Status: COMPLETED | OUTPATIENT
Start: 2023-05-19 | End: 2023-05-19

## 2023-05-19 RX ORDER — CLINDAMYCIN PHOSPHATE 600 MG/50ML
600 INJECTION INTRAVENOUS ONCE
Status: DISCONTINUED | OUTPATIENT
Start: 2023-05-19 | End: 2023-05-19 | Stop reason: RX

## 2023-05-19 RX ORDER — CLINDAMYCIN HYDROCHLORIDE 150 MG/1
600 CAPSULE ORAL ONCE
Status: COMPLETED | OUTPATIENT
Start: 2023-05-19 | End: 2023-05-19

## 2023-05-19 RX ADMIN — HYDROMORPHONE HYDROCHLORIDE 0.5 MG: 1 INJECTION, SOLUTION INTRAMUSCULAR; INTRAVENOUS; SUBCUTANEOUS at 23:15

## 2023-05-19 RX ADMIN — CLINDAMYCIN HYDROCHLORIDE 600 MG: 150 CAPSULE ORAL at 23:38

## 2023-05-19 NOTE — ED PROVIDER NOTES
EMERGENCY DEPARTMENT ENCOUNTER    Pt Name: Dieter Christiansen  MRN: [id number]  Pt :   1950  Room Number:  S504/1  Date of encounter:  2023  PCP: Ian Sanchez APRN  ED Provider: MYA Carmona    Historian: Patient    HPI:  Chief Complaint: Finger pain     Context: Dieter Christiansen is a 73 y.o. male who presents to the ED c/o right index finger pain and swelling patient shares that he initially had a hangnail and then had an incident where he hit his hand on a wall.  He shares that for the last week he has noticed his finger becoming more painful, red and swollen.  He reports that he has been soaking it in warm water but that it seems to be getting worse.  He denies any fever.  Reports decreased sensation and movement.  Denies any additional associated symptoms on interview and exam.  HPI     REVIEW OF SYSTEMS  A chief complaint appropriate review of systems was completed and is negative except as noted in the HPI.     PAST MEDICAL HISTORY  Past Medical History:   Diagnosis Date   • Chronic back pain    • COPD (chronic obstructive pulmonary disease)    • Coronary artery disease involving native coronary artery of native heart without angina pectoris    • Diabetes mellitus    • GERD (gastroesophageal reflux disease)    • Heart attack    • Hyperlipidemia    • Hypertension    • Lung nodule    • Murmur    • MARY ALICE (obstructive sleep apnea)        PAST SURGICAL HISTORY  Past Surgical History:   Procedure Laterality Date   • FEMORAL ARTERY STENT         FAMILY HISTORY  Family History   Problem Relation Age of Onset   • Diabetes Father    • Diabetes Brother        SOCIAL HISTORY  Social History     Socioeconomic History   • Marital status:    Tobacco Use   • Smoking status: Every Day     Packs/day: 0.50     Years: 30.00     Pack years: 15.00     Types: Cigarettes   • Smokeless tobacco: Former   • Tobacco comments:     20 years ago   Vaping Use   • Vaping Use: Never used   Substance and Sexual Activity    • Alcohol use: Not Currently   • Drug use: Never   • Sexual activity: Defer       ALLERGIES  Patient has no known allergies.    PHYSICAL EXAM  Physical Exam  GENERAL:   Appears in no acute distress.  HENT: Nares patent.  EYES: No scleral icterus.  CV: Regular rhythm, regular rate.  RESPIRATORY: Normal effort.   ABDOMEN: Soft, nontender  MUSCULOSKELETAL: Extensive edema, erythema and swelling of right index finger extending into right wrist. ##Please find photo detailing area of specific concern uploaded to MEDIA portion of patients chart##  NEURO: Alert, moves all extremities, follows commands.  SKIN: Warm, dry, no rash visualized.  PSYCH:   I have reviewed the triage vital signs and nursing notes.    Physical Exam     LAB RESULTS  Results for orders placed or performed during the hospital encounter of 05/19/23   Wound Culture - Wound, Hand, Digit Left    Specimen: Hand, Digit Left; Wound   Result Value Ref Range    Gram Stain       Moderate (3+) Gram positive cocci in pairs, chains and clusters    Gram Stain No WBCs seen    Comprehensive Metabolic Panel    Specimen: Blood   Result Value Ref Range    Glucose 163 (H) 65 - 99 mg/dL    BUN 19 8 - 23 mg/dL    Creatinine 1.17 0.76 - 1.27 mg/dL    Sodium 138 136 - 145 mmol/L    Potassium 4.2 3.5 - 5.2 mmol/L    Chloride 101 98 - 107 mmol/L    CO2 27.0 22.0 - 29.0 mmol/L    Calcium 8.9 8.6 - 10.5 mg/dL    Total Protein 7.2 6.0 - 8.5 g/dL    Albumin 3.6 3.5 - 5.2 g/dL    ALT (SGPT) 10 1 - 41 U/L    AST (SGOT) 30 1 - 40 U/L    Alkaline Phosphatase 113 39 - 117 U/L    Total Bilirubin 1.0 0.0 - 1.2 mg/dL    Globulin 3.6 gm/dL    A/G Ratio 1.0 g/dL    BUN/Creatinine Ratio 16.2 7.0 - 25.0    Anion Gap 10.0 5.0 - 15.0 mmol/L    eGFR 66.2 >60.0 mL/min/1.73   Lactic Acid, Plasma    Specimen: Blood   Result Value Ref Range    Lactate 1.7 0.5 - 2.0 mmol/L   Procalcitonin    Specimen: Blood   Result Value Ref Range    Procalcitonin 0.10 0.00 - 0.25 ng/mL   Sedimentation Rate     Specimen: Blood   Result Value Ref Range    Sed Rate 83 (H) 0 - 20 mm/hr   C-reactive Protein    Specimen: Blood   Result Value Ref Range    C-Reactive Protein 11.04 (H) 0.00 - 0.50 mg/dL   CBC Auto Differential    Specimen: Blood   Result Value Ref Range    WBC 9.85 3.40 - 10.80 10*3/mm3    RBC 4.43 4.14 - 5.80 10*6/mm3    Hemoglobin 14.3 13.0 - 17.7 g/dL    Hematocrit 44.3 37.5 - 51.0 %    .0 (H) 79.0 - 97.0 fL    MCH 32.3 26.6 - 33.0 pg    MCHC 32.3 31.5 - 35.7 g/dL    RDW 12.8 12.3 - 15.4 %    RDW-SD 47.7 37.0 - 54.0 fl    MPV 10.0 6.0 - 12.0 fL    Platelets 262 140 - 450 10*3/mm3    Neutrophil % 75.3 42.7 - 76.0 %    Lymphocyte % 13.9 (L) 19.6 - 45.3 %    Monocyte % 8.4 5.0 - 12.0 %    Eosinophil % 1.7 0.3 - 6.2 %    Basophil % 0.3 0.0 - 1.5 %    Immature Grans % 0.4 0.0 - 0.5 %    Neutrophils, Absolute 7.41 (H) 1.70 - 7.00 10*3/mm3    Lymphocytes, Absolute 1.37 0.70 - 3.10 10*3/mm3    Monocytes, Absolute 0.83 0.10 - 0.90 10*3/mm3    Eosinophils, Absolute 0.17 0.00 - 0.40 10*3/mm3    Basophils, Absolute 0.03 0.00 - 0.20 10*3/mm3    Immature Grans, Absolute 0.04 0.00 - 0.05 10*3/mm3    nRBC 0.0 0.0 - 0.2 /100 WBC   CBC Auto Differential    Specimen: Blood   Result Value Ref Range    WBC 12.48 (H) 3.40 - 10.80 10*3/mm3    RBC 4.37 4.14 - 5.80 10*6/mm3    Hemoglobin 13.9 13.0 - 17.7 g/dL    Hematocrit 43.7 37.5 - 51.0 %    .0 (H) 79.0 - 97.0 fL    MCH 31.8 26.6 - 33.0 pg    MCHC 31.8 31.5 - 35.7 g/dL    RDW 12.8 12.3 - 15.4 %    RDW-SD 47.3 37.0 - 54.0 fl    MPV 9.9 6.0 - 12.0 fL    Platelets 272 140 - 450 10*3/mm3    Neutrophil % 77.2 (H) 42.7 - 76.0 %    Lymphocyte % 11.8 (L) 19.6 - 45.3 %    Monocyte % 8.7 5.0 - 12.0 %    Eosinophil % 1.5 0.3 - 6.2 %    Basophil % 0.2 0.0 - 1.5 %    Immature Grans % 0.6 (H) 0.0 - 0.5 %    Neutrophils, Absolute 9.63 (H) 1.70 - 7.00 10*3/mm3    Lymphocytes, Absolute 1.47 0.70 - 3.10 10*3/mm3    Monocytes, Absolute 1.09 (H) 0.10 - 0.90 10*3/mm3    Eosinophils,  Absolute 0.19 0.00 - 0.40 10*3/mm3    Basophils, Absolute 0.02 0.00 - 0.20 10*3/mm3    Immature Grans, Absolute 0.08 (H) 0.00 - 0.05 10*3/mm3    nRBC 0.0 0.0 - 0.2 /100 WBC   Basic Metabolic Panel    Specimen: Blood   Result Value Ref Range    Glucose 182 (H) 65 - 99 mg/dL    BUN 19 8 - 23 mg/dL    Creatinine 1.14 0.76 - 1.27 mg/dL    Sodium 135 (L) 136 - 145 mmol/L    Potassium 4.0 3.5 - 5.2 mmol/L    Chloride 99 98 - 107 mmol/L    CO2 25.0 22.0 - 29.0 mmol/L    Calcium 8.7 8.6 - 10.5 mg/dL    BUN/Creatinine Ratio 16.7 7.0 - 25.0    Anion Gap 11.0 5.0 - 15.0 mmol/L    eGFR 68.3 >60.0 mL/min/1.73   Protime-INR    Specimen: Blood   Result Value Ref Range    Protime 15.3 (H) 12.2 - 14.5 Seconds    INR 1.20 (H) 0.89 - 1.12   Magnesium    Specimen: Blood   Result Value Ref Range    Magnesium 2.1 1.6 - 2.4 mg/dL   Procalcitonin    Specimen: Blood   Result Value Ref Range    Procalcitonin 0.10 0.00 - 0.25 ng/mL   C-reactive Protein    Specimen: Blood   Result Value Ref Range    C-Reactive Protein 9.01 (H) 0.00 - 0.50 mg/dL   Sedimentation Rate    Specimen: Blood   Result Value Ref Range    Sed Rate 88 (H) 0 - 20 mm/hr       If labs were ordered, I independently reviewed the results and considered them in treating the patient.    RADIOLOGY  XR Hand 3+ View Left   Final Result   Impression:   Small bony erosions at the second metacarpal phalangeal joint as detailed above. No periosteal.   2. Diffuse soft tissue swelling of the second digit. No radiopaque foreign body identified.         Electronically Signed: Otf Fontaine     5/19/2023 9:35 PM EDT     Workstation ID: [id number]      MRI Hand Left With & Without Contrast    (Results Pending)     [x] Radiologist's Report Reviewed:  I ordered and independently reviewed the above noted radiographic studies.  See radiologist's dictation for official interpretation.      PROCEDURES    Procedures    ECG 12 Lead Pre-Op / Pre-Procedure    (Results Pending)       MEDICATIONS GIVEN IN  ER    Medications   vancomycin 3000 mg/500 mL 0.9% NS IVPB ([facility]) (3,000 mg Intravenous New Bag 5/20/23 0237)   albuterol (PROVENTIL) nebulizer solution 0.083% 2.5 mg/3mL (has no administration in time range)   aspirin EC tablet 81 mg (has no administration in time range)   cetirizine (zyrTEC) tablet 10 mg (has no administration in time range)   famotidine (PEPCID) tablet 20 mg (has no administration in time range)   FLUoxetine (PROzac) capsule 20 mg (has no administration in time range)   gabapentin (NEURONTIN) capsule 600 mg (has no administration in time range)   HYDROcodone-acetaminophen (NORCO)  MG per tablet 1 tablet (has no administration in time range)   ipratropium-albuterol (DUO-NEB) nebulizer solution 3 mL (has no administration in time range)   lisinopril (PRINIVIL,ZESTRIL) tablet 10 mg (has no administration in time range)   metoprolol succinate XL (TOPROL-XL) 24 hr tablet 50 mg (has no administration in time range)   pantoprazole (PROTONIX) EC tablet 40 mg (has no administration in time range)   atorvastatin (LIPITOR) tablet 10 mg (has no administration in time range)   tamsulosin (FLOMAX) 24 hr capsule 0.4 mg (has no administration in time range)   sodium chloride 0.9 % flush 10 mL (10 mL Intravenous Given 5/20/23 0320)   sodium chloride 0.9 % flush 10 mL (has no administration in time range)   sodium chloride 0.9 % infusion 40 mL (has no administration in time range)   sennosides-docusate (PERICOLACE) 8.6-50 MG per tablet 2 tablet (has no administration in time range)     And   polyethylene glycol (MIRALAX) packet 17 g (has no administration in time range)     And   bisacodyl (DULCOLAX) EC tablet 5 mg (has no administration in time range)     And   bisacodyl (DULCOLAX) suppository 10 mg (has no administration in time range)   nitroglycerin (NITROSTAT) SL tablet 0.4 mg (has no administration in time range)   sodium chloride 0.9 % infusion (75 mL/hr Intravenous New Bag 5/20/23 0320)    acetaminophen (TYLENOL) tablet 650 mg (has no administration in time range)     Or   acetaminophen (TYLENOL) 160 MG/5ML solution 650 mg (has no administration in time range)     Or   acetaminophen (TYLENOL) suppository 650 mg (has no administration in time range)   morphine injection 2 mg (has no administration in time range)     And   naloxone (NARCAN) injection 0.4 mg (has no administration in time range)   nicotine (NICODERM CQ) 21 MG/24HR patch 1 patch (has no administration in time range)   ondansetron (ZOFRAN) injection 4 mg (has no administration in time range)   Pharmacy to dose vancomycin (has no administration in time range)   piperacillin-tazobactam (ZOSYN) 4.5 g in iso-osmotic dextrose 100 mL IVPB (premix) (has no administration in time range)   dextrose (GLUTOSE) oral gel 15 g (has no administration in time range)   dextrose (D50W) (25 g/50 mL) IV injection 25 g (has no administration in time range)   glucagon (GLUCAGEN) injection 1 mg (has no administration in time range)   Insulin Lispro (humaLOG) injection 2-7 Units (has no administration in time range)   vancomycin 1500 mg/500 mL 0.9% NS IVPB ([facility]) (has no administration in time range)   clindamycin (CLEOCIN) capsule 600 mg (has no administration in time range)   HYDROmorphone (DILAUDID) injection 0.5 mg (0.5 mg Intravenous Given 5/19/23 2315)   piperacillin-tazobactam (ZOSYN) 4.5 g in iso-osmotic dextrose 100 mL IVPB (premix) (4.5 g Intravenous New Bag 5/20/23 0045)   clindamycin (CLEOCIN) capsule 600 mg (600 mg Oral Given 5/19/23 2338)       MEDICAL DECISION MAKING, PROGRESS, and CONSULTS   Medical Decision Making  Cellulitis of left hand: acute illness or injury  Cellulitis of left index finger: acute illness or injury  History of diabetes mellitus: chronic illness or injury  History of hyperlipidemia: chronic illness or injury  History of hypertension: chronic illness or injury  Tobacco abuse: chronic illness or injury  Amount and/or  Complexity of Data Reviewed  Labs: ordered.  Radiology: ordered.      Risk  Prescription drug management.  Decision regarding hospitalization.        All labs have been independently reviewed by me.  All radiology studies have been reviewed by me and the radiologist dictating the report.  All EKG's have been independently viewed by me.    [] Discussed with radiology regarding test interpretation:    Discussion below represents my analysis of pertinent findings related to patient's condition, differential diagnosis, treatment plan and final disposition.    Differential diagnosis:  The differential diagnosis associated with the patient's presentation includes: Cellulitis, osteomyelitis, tenosynovitis, abscess    Additional sources  Discussed/ obtained information from independent historians:   [] Spouse  [] Parent  [x] Family member  [] Friend  [] EMS   [] Other:  External (non-ED) record review:   [] Inpatient record:   [] Office record:   [] Outpatient record:   [] Prior Outpatient labs:   [] Prior Outpatient radiology:   [] Primary Care record:   [] Outside ED record:   [] Other:   Patient's care impacted by:   [x] Diabetes  [x] Hypertension  [x] Hyperlipidemia  [] Hypothyroidism   [] Coronary Artery Disease   [] COPD   [] Cancer   [] Obesity  [] GERD   [x] Tobacco Abuse   [] Substance Abuse    [] Anxiety   [] Depression   [x] Other: Diabetic neuropathy  Care significantly affected by Social Determinants of Health (housing and economic circumstances, unemployment)    [] Yes     [x] No   If yes, Patient's care significantly limited by  Social Determinants of Health including:   [] Inadequate housing   [] Low income   [] Alcoholism and drug addiction in family   [] Problems related to primary support group   [] Unemployment   [] Problems related to employment   [] Other Social Determinants of Health:     Shared decision making: I reviewed workup performed in ED including labs and imaging. Based on findings,  recommendation made for admission. Patient is agreeable to plan of care and hospital admission.     Orders placed during this visit:  Orders Placed This Encounter   Procedures   • Blood Culture - Blood,   • Blood Culture - Blood,   • Wound Culture - Wound, Hand, Digit Left   • MRSA Screen, PCR (Inpatient) - Swab, Nares   • XR Hand 3+ View Left   • MRI Hand Left With & Without Contrast   • Comprehensive Metabolic Panel   • Lactic Acid, Plasma   • Procalcitonin   • Sedimentation Rate   • C-reactive Protein   • CBC Auto Differential   • CBC Auto Differential   • Basic Metabolic Panel   • Protime-INR   • Hemoglobin A1c   • Magnesium   • Procalcitonin   • C-reactive Protein   • Sedimentation Rate   • Vancomycin, Trough   • Diet: Cardiac Diets, Diabetic Diets; Healthy Heart (2-3 Na+); Consistent Carbohydrate; Texture: Regular Texture (IDDSI 7); Fluid Consistency: Thin (IDDSI 0)   • Vital Signs   • Intake & Output   • Weigh Patient   • Oral Care   • Place Sequential Compression Device   • Maintain Sequential Compression Device   • Telemetry - Maintain IV Access   • Telemetry - Place Orders & Notify Provider of Results When Patient Experiences Acute Chest Pain, Dysrhythmia or Respiratory Distress   • May Be Off Telemetry for Tests   • Pulse Oximetry, Continuous   • Up in Chair   • Up With Assistance   • Daily Weights   • Strict Intake & Output   • Notify Provider (With Default Parameters)   • Tobacco Cessation Education   • Code Status and Medical Interventions:   • Inpatient Infectious Diseases Consult   • Inpatient Case Management  Consult   • OT Consult: Eval & Treat   • PT Consult: Eval & Treat Functional Mobility Below Baseline   • Pulse Oximetry,  Spot   • Incentive Spirometry   • NIPPV-IPPV / BIPAP / CPAP   • POC Glucose 4x Daily AC & at Bedtime   • ECG 12 Lead Pre-Op / Pre-Procedure   • Wound Ostomy Eval & Treat   • Insert Peripheral IV   • Inpatient Admission   • ED Bed Request   • CBC & Differential        ED Course:    ED Course as of 05/20/23 0508   Fri May 19, 2023   2003 Media uploaded to patient's chart reviewed with attending Dr. Sung.  Presentation appears to be an extensive cellulitis/tenosynovitis.  Recommendation made to consult orthopedic hand coverage to see if patient could be admitted to our facility or needs to be transferred.  I have consulted with both Dr. Lyon and Dr. Coley with orthopedic hand who are not available for coverage to care for the patient in our facility and recommend transfer to the [facility]. [JG]   2004 Call placed to  MDs for transfer [JG]   2056 Spoke with orthopedic hand, Dr. Aviles at the [facility] who recommended that patient should be trialed on IV antibiotics and if symptoms worsen there would be happy to accept the patient in transfer.  She stressed that it was unlikely that the finger was fulminant tenosynovitis with a white count of 9.85.  Recommendation was made for admission to medicine, IV antibiotics and if condition worsens can contacts them back for transfer. [JG]   2146 Called hospitalist for admission who recommended checking with [facility] for possible transfer of patient. [JG]   2153 Consult placed to transfer center at the [facility] who declined transfer today as they have no beds. Patient could be place on list for possible transfer tomorrow if beds become available.  [JG]   2306 Case reviewed with Dr. Lazaro who recommended initiation of antibiotics: Vancomycin, Zosyn and clindamycin.  [JG]   Sat May 20, 2023   0026 Notified by MRI that patient could not tolerate laying flat for MRI.  We will attempt to medicate and try MRI again. [JG]   0231 I have again contacted the  transfer center to confirm patient was on transfer list. Nurse at transfer center suggested since patient was admitted here they would be just doing the same and was questioning why he still would need transfer. I shared that I  do not have hand coverage to see patient. She will obtain information from Orthopedic Hand for accepting physician for transfer. [JG]   9368 I have spoken now with Hospital Medicine at  who is working on a bed and transfer for patient.  [JG]      ED Course User Index  [JG] Gabriel Caldwell PA            DIAGNOSIS  Final diagnoses:   Cellulitis of left hand   Cellulitis of left index finger   History of diabetes mellitus   History of hypertension   History of hyperlipidemia   Tobacco abuse       DISPOSITION    ED Disposition     ED Disposition   Decision to Admit    Condition   --    Comment   Level of Care: Telemetry [5]   Diagnosis: Cellulitis of left hand [391569]   Admitting Physician: TERA COTTRELL [[id number]]   Attending Physician: TERA COTTRELL [[id number]]   Bed Request Comments: tele               Please note that portions of this document were completed with voice recognition software.      Gabriel Caldwell PA  05/20/23 6954

## 2023-05-19 NOTE — Clinical Note
Level of Care: Telemetry [5]   Diagnosis: Cellulitis of left hand [[id number]]   Admitting Physician: TERA COTTRELL [[id number]]   Attending Physician: TERA COTTRELL [[id number]]   Isolate for COVID?: No [0]   Bed Request Comments: tele   Certification: I Certify That Inpatient Hospital Services Are Medically Necessary For Greater Than 2 Midnights

## 2023-05-20 PROBLEM — K21.9 GERD WITHOUT ESOPHAGITIS: Status: ACTIVE | Noted: 2023-05-20

## 2023-05-20 PROBLEM — E78.5 HLD (HYPERLIPIDEMIA): Status: ACTIVE | Noted: 2023-05-20

## 2023-05-20 PROBLEM — L03.012: Status: ACTIVE | Noted: 2023-05-20

## 2023-05-20 PROBLEM — I10 HTN (HYPERTENSION): Status: ACTIVE | Noted: 2023-05-20

## 2023-05-20 PROBLEM — I25.10 CAD (CORONARY ARTERY DISEASE): Status: ACTIVE | Noted: 2023-05-20

## 2023-05-20 PROBLEM — G47.33 OSA (OBSTRUCTIVE SLEEP APNEA): Status: ACTIVE | Noted: 2023-05-20

## 2023-05-20 PROBLEM — E11.9 TYPE 2 DIABETES MELLITUS: Status: ACTIVE | Noted: 2023-05-20

## 2023-05-20 PROBLEM — Z72.0 TOBACCO ABUSE: Status: ACTIVE | Noted: 2023-05-20

## 2023-05-20 PROBLEM — J44.9 COPD (CHRONIC OBSTRUCTIVE PULMONARY DISEASE): Status: ACTIVE | Noted: 2023-05-20

## 2023-05-20 LAB
ANION GAP SERPL CALCULATED.3IONS-SCNC: 11 MMOL/L (ref 5–15)
BASOPHILS # BLD AUTO: 0.02 10*3/MM3 (ref 0–0.2)
BASOPHILS NFR BLD AUTO: 0.2 % (ref 0–1.5)
BUN SERPL-MCNC: 19 MG/DL (ref 8–23)
BUN/CREAT SERPL: 16.7 (ref 7–25)
CALCIUM SPEC-SCNC: 8.7 MG/DL (ref 8.6–10.5)
CHLORIDE SERPL-SCNC: 99 MMOL/L (ref 98–107)
CK SERPL-CCNC: 71 U/L (ref 20–200)
CO2 SERPL-SCNC: 25 MMOL/L (ref 22–29)
CREAT SERPL-MCNC: 1.14 MG/DL (ref 0.76–1.27)
CRP SERPL-MCNC: 9.01 MG/DL (ref 0–0.5)
DEPRECATED RDW RBC AUTO: 47.3 FL (ref 37–54)
EGFRCR SERPLBLD CKD-EPI 2021: 68.3 ML/MIN/1.73
EOSINOPHIL # BLD AUTO: 0.19 10*3/MM3 (ref 0–0.4)
EOSINOPHIL NFR BLD AUTO: 1.5 % (ref 0.3–6.2)
ERYTHROCYTE [DISTWIDTH] IN BLOOD BY AUTOMATED COUNT: 12.8 % (ref 12.3–15.4)
ERYTHROCYTE [SEDIMENTATION RATE] IN BLOOD: 88 MM/HR (ref 0–20)
GLUCOSE BLDC GLUCOMTR-MCNC: 113 MG/DL (ref 70–130)
GLUCOSE BLDC GLUCOMTR-MCNC: 131 MG/DL (ref 70–130)
GLUCOSE BLDC GLUCOMTR-MCNC: 133 MG/DL (ref 70–130)
GLUCOSE BLDC GLUCOMTR-MCNC: 158 MG/DL (ref 70–130)
GLUCOSE SERPL-MCNC: 182 MG/DL (ref 65–99)
HBA1C MFR BLD: 5.8 % (ref 4.8–5.6)
HCT VFR BLD AUTO: 43.7 % (ref 37.5–51)
HGB BLD-MCNC: 13.9 G/DL (ref 13–17.7)
IMM GRANULOCYTES # BLD AUTO: 0.08 10*3/MM3 (ref 0–0.05)
IMM GRANULOCYTES NFR BLD AUTO: 0.6 % (ref 0–0.5)
INR PPP: 1.2 (ref 0.89–1.12)
LYMPHOCYTES # BLD AUTO: 1.47 10*3/MM3 (ref 0.7–3.1)
LYMPHOCYTES NFR BLD AUTO: 11.8 % (ref 19.6–45.3)
MAGNESIUM SERPL-MCNC: 2.1 MG/DL (ref 1.6–2.4)
MCH RBC QN AUTO: 31.8 PG (ref 26.6–33)
MCHC RBC AUTO-ENTMCNC: 31.8 G/DL (ref 31.5–35.7)
MCV RBC AUTO: 100 FL (ref 79–97)
MONOCYTES # BLD AUTO: 1.09 10*3/MM3 (ref 0.1–0.9)
MONOCYTES NFR BLD AUTO: 8.7 % (ref 5–12)
MRSA DNA SPEC QL NAA+PROBE: POSITIVE
NEUTROPHILS NFR BLD AUTO: 77.2 % (ref 42.7–76)
NEUTROPHILS NFR BLD AUTO: 9.63 10*3/MM3 (ref 1.7–7)
NRBC BLD AUTO-RTO: 0 /100 WBC (ref 0–0.2)
PLATELET # BLD AUTO: 272 10*3/MM3 (ref 140–450)
PMV BLD AUTO: 9.9 FL (ref 6–12)
POTASSIUM SERPL-SCNC: 4 MMOL/L (ref 3.5–5.2)
PROCALCITONIN SERPL-MCNC: 0.1 NG/ML (ref 0–0.25)
PROTHROMBIN TIME: 15.3 SECONDS (ref 12.2–14.5)
RBC # BLD AUTO: 4.37 10*6/MM3 (ref 4.14–5.8)
SODIUM SERPL-SCNC: 135 MMOL/L (ref 136–145)
WBC NRBC COR # BLD: 12.48 10*3/MM3 (ref 3.4–10.8)

## 2023-05-20 PROCEDURE — 99233 SBSQ HOSP IP/OBS HIGH 50: CPT | Performed by: INTERNAL MEDICINE

## 2023-05-20 PROCEDURE — 86140 C-REACTIVE PROTEIN: CPT | Performed by: INTERNAL MEDICINE

## 2023-05-20 PROCEDURE — 25010000002 VANCOMYCIN 10 G RECONSTITUTED SOLUTION: Performed by: INTERNAL MEDICINE

## 2023-05-20 PROCEDURE — 25010000002 DAPTOMYCIN PER 1 MG: Performed by: NURSE PRACTITIONER

## 2023-05-20 PROCEDURE — 94799 UNLISTED PULMONARY SVC/PX: CPT

## 2023-05-20 PROCEDURE — 83735 ASSAY OF MAGNESIUM: CPT | Performed by: INTERNAL MEDICINE

## 2023-05-20 PROCEDURE — 87205 SMEAR GRAM STAIN: CPT | Performed by: INTERNAL MEDICINE

## 2023-05-20 PROCEDURE — 82948 REAGENT STRIP/BLOOD GLUCOSE: CPT

## 2023-05-20 PROCEDURE — 25010000002 PIPERACILLIN SOD-TAZOBACTAM PER 1 G: Performed by: PHYSICIAN ASSISTANT

## 2023-05-20 PROCEDURE — 94664 DEMO&/EVAL PT USE INHALER: CPT

## 2023-05-20 PROCEDURE — 87070 CULTURE OTHR SPECIMN AEROBIC: CPT | Performed by: INTERNAL MEDICINE

## 2023-05-20 PROCEDURE — 80048 BASIC METABOLIC PNL TOTAL CA: CPT | Performed by: INTERNAL MEDICINE

## 2023-05-20 PROCEDURE — 85610 PROTHROMBIN TIME: CPT | Performed by: INTERNAL MEDICINE

## 2023-05-20 PROCEDURE — 87147 CULTURE TYPE IMMUNOLOGIC: CPT | Performed by: INTERNAL MEDICINE

## 2023-05-20 PROCEDURE — 87186 SC STD MICRODIL/AGAR DIL: CPT | Performed by: INTERNAL MEDICINE

## 2023-05-20 PROCEDURE — 82550 ASSAY OF CK (CPK): CPT | Performed by: NURSE PRACTITIONER

## 2023-05-20 PROCEDURE — 87641 MR-STAPH DNA AMP PROBE: CPT

## 2023-05-20 PROCEDURE — 84145 PROCALCITONIN (PCT): CPT | Performed by: INTERNAL MEDICINE

## 2023-05-20 PROCEDURE — 25010000002 PIPERACILLIN SOD-TAZOBACTAM PER 1 G: Performed by: INTERNAL MEDICINE

## 2023-05-20 PROCEDURE — 85652 RBC SED RATE AUTOMATED: CPT | Performed by: INTERNAL MEDICINE

## 2023-05-20 PROCEDURE — 85025 COMPLETE CBC W/AUTO DIFF WBC: CPT | Performed by: INTERNAL MEDICINE

## 2023-05-20 PROCEDURE — 94761 N-INVAS EAR/PLS OXIMETRY MLT: CPT

## 2023-05-20 PROCEDURE — 83036 HEMOGLOBIN GLYCOSYLATED A1C: CPT | Performed by: INTERNAL MEDICINE

## 2023-05-20 PROCEDURE — 63710000001 INSULIN LISPRO (HUMAN) PER 5 UNITS: Performed by: INTERNAL MEDICINE

## 2023-05-20 PROCEDURE — 94640 AIRWAY INHALATION TREATMENT: CPT

## 2023-05-20 RX ORDER — CLINDAMYCIN HYDROCHLORIDE 150 MG/1
600 CAPSULE ORAL EVERY 8 HOURS SCHEDULED
Status: DISCONTINUED | OUTPATIENT
Start: 2023-05-20 | End: 2023-05-22

## 2023-05-20 RX ORDER — ALBUTEROL SULFATE 2.5 MG/3ML
2.5 SOLUTION RESPIRATORY (INHALATION) EVERY 6 HOURS PRN
Status: DISCONTINUED | OUTPATIENT
Start: 2023-05-20 | End: 2023-05-25 | Stop reason: HOSPADM

## 2023-05-20 RX ORDER — FAMOTIDINE 20 MG/1
20 TABLET, FILM COATED ORAL DAILY
Status: DISCONTINUED | OUTPATIENT
Start: 2023-05-20 | End: 2023-05-25 | Stop reason: HOSPADM

## 2023-05-20 RX ORDER — SODIUM CHLORIDE 0.9 % (FLUSH) 0.9 %
10 SYRINGE (ML) INJECTION AS NEEDED
Status: DISCONTINUED | OUTPATIENT
Start: 2023-05-20 | End: 2023-05-25 | Stop reason: HOSPADM

## 2023-05-20 RX ORDER — POLYETHYLENE GLYCOL 3350 17 G/17G
17 POWDER, FOR SOLUTION ORAL DAILY PRN
Status: DISCONTINUED | OUTPATIENT
Start: 2023-05-20 | End: 2023-05-25 | Stop reason: HOSPADM

## 2023-05-20 RX ORDER — NICOTINE 21 MG/24HR
1 PATCH, TRANSDERMAL 24 HOURS TRANSDERMAL
Status: DISCONTINUED | OUTPATIENT
Start: 2023-05-20 | End: 2023-05-25 | Stop reason: HOSPADM

## 2023-05-20 RX ORDER — AMOXICILLIN 250 MG
2 CAPSULE ORAL 2 TIMES DAILY
Status: DISCONTINUED | OUTPATIENT
Start: 2023-05-20 | End: 2023-05-25 | Stop reason: HOSPADM

## 2023-05-20 RX ORDER — SODIUM CHLORIDE 9 MG/ML
40 INJECTION, SOLUTION INTRAVENOUS AS NEEDED
Status: DISCONTINUED | OUTPATIENT
Start: 2023-05-20 | End: 2023-05-25 | Stop reason: HOSPADM

## 2023-05-20 RX ORDER — CLINDAMYCIN PHOSPHATE 600 MG/50ML
600 INJECTION INTRAVENOUS EVERY 8 HOURS
Status: DISCONTINUED | OUTPATIENT
Start: 2023-05-20 | End: 2023-05-20 | Stop reason: RX

## 2023-05-20 RX ORDER — NICOTINE POLACRILEX 4 MG
15 LOZENGE BUCCAL
Status: DISCONTINUED | OUTPATIENT
Start: 2023-05-20 | End: 2023-05-25 | Stop reason: HOSPADM

## 2023-05-20 RX ORDER — ACETAMINOPHEN 325 MG/1
650 TABLET ORAL EVERY 4 HOURS PRN
Status: DISCONTINUED | OUTPATIENT
Start: 2023-05-20 | End: 2023-05-25 | Stop reason: HOSPADM

## 2023-05-20 RX ORDER — IBUPROFEN 600 MG/1
1 TABLET ORAL
Status: DISCONTINUED | OUTPATIENT
Start: 2023-05-20 | End: 2023-05-25 | Stop reason: HOSPADM

## 2023-05-20 RX ORDER — BISACODYL 10 MG
10 SUPPOSITORY, RECTAL RECTAL DAILY PRN
Status: DISCONTINUED | OUTPATIENT
Start: 2023-05-20 | End: 2023-05-25 | Stop reason: HOSPADM

## 2023-05-20 RX ORDER — ACETAMINOPHEN 160 MG/5ML
650 SOLUTION ORAL EVERY 4 HOURS PRN
Status: DISCONTINUED | OUTPATIENT
Start: 2023-05-20 | End: 2023-05-25 | Stop reason: HOSPADM

## 2023-05-20 RX ORDER — TAMSULOSIN HYDROCHLORIDE 0.4 MG/1
0.4 CAPSULE ORAL DAILY
Status: DISCONTINUED | OUTPATIENT
Start: 2023-05-20 | End: 2023-05-25 | Stop reason: HOSPADM

## 2023-05-20 RX ORDER — SODIUM CHLORIDE 0.9 % (FLUSH) 0.9 %
10 SYRINGE (ML) INJECTION EVERY 12 HOURS SCHEDULED
Status: DISCONTINUED | OUTPATIENT
Start: 2023-05-20 | End: 2023-05-25 | Stop reason: HOSPADM

## 2023-05-20 RX ORDER — SODIUM CHLORIDE 9 MG/ML
75 INJECTION, SOLUTION INTRAVENOUS CONTINUOUS
Status: ACTIVE | OUTPATIENT
Start: 2023-05-20 | End: 2023-05-20

## 2023-05-20 RX ORDER — BISACODYL 5 MG/1
5 TABLET, DELAYED RELEASE ORAL DAILY PRN
Status: DISCONTINUED | OUTPATIENT
Start: 2023-05-20 | End: 2023-05-25 | Stop reason: HOSPADM

## 2023-05-20 RX ORDER — IPRATROPIUM BROMIDE AND ALBUTEROL SULFATE 2.5; .5 MG/3ML; MG/3ML
3 SOLUTION RESPIRATORY (INHALATION)
Status: DISCONTINUED | OUTPATIENT
Start: 2023-05-20 | End: 2023-05-25 | Stop reason: HOSPADM

## 2023-05-20 RX ORDER — ACETAMINOPHEN 650 MG/1
650 SUPPOSITORY RECTAL EVERY 4 HOURS PRN
Status: DISCONTINUED | OUTPATIENT
Start: 2023-05-20 | End: 2023-05-25 | Stop reason: HOSPADM

## 2023-05-20 RX ORDER — CETIRIZINE HYDROCHLORIDE 10 MG/1
10 TABLET ORAL DAILY
Status: DISCONTINUED | OUTPATIENT
Start: 2023-05-20 | End: 2023-05-25 | Stop reason: HOSPADM

## 2023-05-20 RX ORDER — LISINOPRIL 10 MG/1
10 TABLET ORAL DAILY
Status: DISCONTINUED | OUTPATIENT
Start: 2023-05-20 | End: 2023-05-25 | Stop reason: HOSPADM

## 2023-05-20 RX ORDER — FLUOXETINE HYDROCHLORIDE 20 MG/1
20 CAPSULE ORAL DAILY
Status: DISCONTINUED | OUTPATIENT
Start: 2023-05-20 | End: 2023-05-25 | Stop reason: HOSPADM

## 2023-05-20 RX ORDER — NITROGLYCERIN 0.4 MG/1
0.4 TABLET SUBLINGUAL
Status: DISCONTINUED | OUTPATIENT
Start: 2023-05-20 | End: 2023-05-25 | Stop reason: HOSPADM

## 2023-05-20 RX ORDER — HYDROCODONE BITARTRATE AND ACETAMINOPHEN 10; 325 MG/1; MG/1
1 TABLET ORAL EVERY 8 HOURS PRN
Status: DISCONTINUED | OUTPATIENT
Start: 2023-05-20 | End: 2023-05-25 | Stop reason: HOSPADM

## 2023-05-20 RX ORDER — INSULIN LISPRO 100 [IU]/ML
2-7 INJECTION, SOLUTION INTRAVENOUS; SUBCUTANEOUS
Status: DISCONTINUED | OUTPATIENT
Start: 2023-05-20 | End: 2023-05-25 | Stop reason: HOSPADM

## 2023-05-20 RX ORDER — METOPROLOL SUCCINATE 50 MG/1
50 TABLET, EXTENDED RELEASE ORAL DAILY
Status: DISCONTINUED | OUTPATIENT
Start: 2023-05-20 | End: 2023-05-25 | Stop reason: HOSPADM

## 2023-05-20 RX ORDER — DEXTROSE MONOHYDRATE 25 G/50ML
25 INJECTION, SOLUTION INTRAVENOUS
Status: DISCONTINUED | OUTPATIENT
Start: 2023-05-20 | End: 2023-05-25 | Stop reason: HOSPADM

## 2023-05-20 RX ORDER — ONDANSETRON 2 MG/ML
4 INJECTION INTRAMUSCULAR; INTRAVENOUS EVERY 6 HOURS PRN
Status: DISCONTINUED | OUTPATIENT
Start: 2023-05-20 | End: 2023-05-25 | Stop reason: HOSPADM

## 2023-05-20 RX ORDER — PANTOPRAZOLE SODIUM 40 MG/1
40 TABLET, DELAYED RELEASE ORAL DAILY
Status: DISCONTINUED | OUTPATIENT
Start: 2023-05-20 | End: 2023-05-25 | Stop reason: HOSPADM

## 2023-05-20 RX ORDER — NALOXONE HCL 0.4 MG/ML
0.4 VIAL (ML) INJECTION AS NEEDED
Status: DISCONTINUED | OUTPATIENT
Start: 2023-05-20 | End: 2023-05-25 | Stop reason: HOSPADM

## 2023-05-20 RX ORDER — GABAPENTIN 300 MG/1
600 CAPSULE ORAL EVERY 8 HOURS SCHEDULED
Status: DISCONTINUED | OUTPATIENT
Start: 2023-05-20 | End: 2023-05-25 | Stop reason: HOSPADM

## 2023-05-20 RX ORDER — ATORVASTATIN CALCIUM 10 MG/1
10 TABLET, FILM COATED ORAL DAILY
Status: DISCONTINUED | OUTPATIENT
Start: 2023-05-20 | End: 2023-05-22

## 2023-05-20 RX ORDER — MORPHINE SULFATE 2 MG/ML
2 INJECTION, SOLUTION INTRAMUSCULAR; INTRAVENOUS
Status: DISCONTINUED | OUTPATIENT
Start: 2023-05-20 | End: 2023-05-25 | Stop reason: HOSPADM

## 2023-05-20 RX ADMIN — Medication 81 MG: at 10:08

## 2023-05-20 RX ADMIN — CLINDAMYCIN HYDROCHLORIDE 600 MG: 150 CAPSULE ORAL at 10:07

## 2023-05-20 RX ADMIN — Medication 10 ML: at 10:21

## 2023-05-20 RX ADMIN — IPRATROPIUM BROMIDE AND ALBUTEROL SULFATE 3 ML: 2.5; .5 SOLUTION RESPIRATORY (INHALATION) at 15:15

## 2023-05-20 RX ADMIN — Medication 1 PATCH: at 10:08

## 2023-05-20 RX ADMIN — CETIRIZINE HYDROCHLORIDE 10 MG: 10 TABLET, FILM COATED ORAL at 10:07

## 2023-05-20 RX ADMIN — SENNOSIDES AND DOCUSATE SODIUM 2 TABLET: 50; 8.6 TABLET ORAL at 10:07

## 2023-05-20 RX ADMIN — Medication 10 ML: at 03:20

## 2023-05-20 RX ADMIN — SODIUM CHLORIDE 75 ML/HR: 9 INJECTION, SOLUTION INTRAVENOUS at 03:20

## 2023-05-20 RX ADMIN — IPRATROPIUM BROMIDE AND ALBUTEROL SULFATE 3 ML: 2.5; .5 SOLUTION RESPIRATORY (INHALATION) at 06:55

## 2023-05-20 RX ADMIN — INSULIN LISPRO 2 UNITS: 100 INJECTION, SOLUTION INTRAVENOUS; SUBCUTANEOUS at 16:59

## 2023-05-20 RX ADMIN — PIPERACILLIN SODIUM AND TAZOBACTAM SODIUM 4.5 G: 4; .5 INJECTION, SOLUTION INTRAVENOUS at 16:39

## 2023-05-20 RX ADMIN — VANCOMYCIN HYDROCHLORIDE 3000 MG: 10 INJECTION, POWDER, LYOPHILIZED, FOR SOLUTION INTRAVENOUS at 02:37

## 2023-05-20 RX ADMIN — GABAPENTIN 600 MG: 300 CAPSULE ORAL at 06:41

## 2023-05-20 RX ADMIN — PIPERACILLIN SODIUM AND TAZOBACTAM SODIUM 4.5 G: 4; .5 INJECTION, SOLUTION INTRAVENOUS at 00:45

## 2023-05-20 RX ADMIN — FAMOTIDINE 20 MG: 20 TABLET ORAL at 10:08

## 2023-05-20 RX ADMIN — GABAPENTIN 600 MG: 300 CAPSULE ORAL at 13:41

## 2023-05-20 RX ADMIN — Medication 10 ML: at 22:21

## 2023-05-20 RX ADMIN — GABAPENTIN 600 MG: 300 CAPSULE ORAL at 22:20

## 2023-05-20 RX ADMIN — TAMSULOSIN HYDROCHLORIDE 0.4 MG: 0.4 CAPSULE ORAL at 10:08

## 2023-05-20 RX ADMIN — FLUOXETINE 20 MG: 20 CAPSULE ORAL at 10:07

## 2023-05-20 RX ADMIN — METOPROLOL SUCCINATE 50 MG: 50 TABLET, EXTENDED RELEASE ORAL at 10:06

## 2023-05-20 RX ADMIN — IPRATROPIUM BROMIDE AND ALBUTEROL SULFATE 3 ML: 2.5; .5 SOLUTION RESPIRATORY (INHALATION) at 11:47

## 2023-05-20 RX ADMIN — DAPTOMYCIN 600 MG: 500 INJECTION, POWDER, LYOPHILIZED, FOR SOLUTION INTRAVENOUS at 10:20

## 2023-05-20 RX ADMIN — LISINOPRIL 10 MG: 10 TABLET ORAL at 10:07

## 2023-05-20 RX ADMIN — CLINDAMYCIN HYDROCHLORIDE 600 MG: 150 CAPSULE ORAL at 17:52

## 2023-05-20 RX ADMIN — ATORVASTATIN CALCIUM 10 MG: 10 TABLET, FILM COATED ORAL at 10:07

## 2023-05-20 RX ADMIN — PIPERACILLIN SODIUM AND TAZOBACTAM SODIUM 4.5 G: 4; .5 INJECTION, SOLUTION INTRAVENOUS at 10:21

## 2023-05-20 RX ADMIN — PANTOPRAZOLE SODIUM 40 MG: 40 TABLET, DELAYED RELEASE ORAL at 06:41

## 2023-05-20 NOTE — H&P
[facility] Medicine Services  HISTORY AND PHYSICAL    Patient Name: Dieter Christiansen  : 1950  MRN: [id number]  Primary Care Physician: Ian Sanchez APRN  Date of admission: 2023      Subjective   Subjective     Chief Complaint:  Finger infection    HPI:  Dieter Christiansen is a 73 y.o. male with a PMH significant for diabetes mellitus type 2, CAD s/p remote stent, COPD, tobacco abuse, HTN, HLD, MARY ALICE who comes to the ED due to concerns for finger infection.  Patient says that he had a hangnail on his left index finger around 9 days ago.  5 days ago he began to notice redness, swelling and pain to the finger.  Since that time the swelling, pain and redness have become much more severe.  He has been soaking it in warm water but it seems to be getting worse.  He denies fever, malaise, chills, nausea, vomiting, diarrhea.  Due to worsening infection he came to the ED tonight.  He has not been seen for the finger infection by a medical provider prior to today.  He currently has very minimal movement to the finger secondary to pain and reports some decreased sensation.        Review of Systems   Constitutional: Negative.    HENT: Negative.    Eyes: Negative.    Respiratory: Negative.    Cardiovascular: Negative.    Gastrointestinal: Negative.    Endocrine: Negative.    Genitourinary: Negative.    Musculoskeletal: Positive for arthralgias and joint swelling.   Skin: Positive for color change and wound.   Allergic/Immunologic: Negative.    Neurological: Positive for numbness.   Hematological: Negative.    Psychiatric/Behavioral: Negative.           Personal History     Past Medical History:   Diagnosis Date   • Chronic back pain    • COPD (chronic obstructive pulmonary disease)    • Coronary artery disease involving native coronary artery of native heart without angina pectoris    • Diabetes mellitus    • GERD (gastroesophageal reflux disease)    • Heart attack    • Hyperlipidemia    •  Hypertension    • Lung nodule    • Murmur    • MARY ALICE (obstructive sleep apnea)        Past Surgical History:   Procedure Laterality Date   • FEMORAL ARTERY STENT         Family History: family history includes Diabetes in his brother and father.     Social History:  reports that he has been smoking cigarettes. He has a 15.00 pack-year smoking history. He has quit using smokeless tobacco. He reports that he does not currently use alcohol. He reports that he does not use drugs.  Social History     Social History Narrative   • Not on file       Medications:  Available home medication information reviewed.  Medications Prior to Admission   Medication Sig Dispense Refill Last Dose   • albuterol (PROVENTIL) (2.5 MG/3ML) 0.083% nebulizer solution       • aspirin 81 MG EC tablet aspirin 81 mg tablet,delayed release   Take 1 tablet every day by oral route.      • cetirizine (zyrTEC) 10 MG tablet Take 1 tablet by mouth Daily. 30 tablet 5    • ergocalciferol (ERGOCALCIFEROL) 1.25 MG (99602 UT) capsule Take 1 capsule by mouth 1 (One) Time Per Week. 30 capsule 5    • famotidine (PEPCID) 20 MG tablet TAKE ONE TABLET DAILY 30 tablet 5    • FLUoxetine (PROzac) 20 MG capsule TAKE ONE CAPSULE DAILY 30 capsule 5    • furosemide (LASIX) 40 MG tablet TAKE ONE TABLET EVERY 72 HOURS 10 tablet 5    • gabapentin (NEURONTIN) 600 MG tablet Take 1 tablet by mouth 3 (Three) Times a Day. 90 tablet 3    • HYDROcodone-acetaminophen (NORCO)  MG per tablet Take 1 tablet by mouth Every 8 (Eight) Hours As Needed for Severe Pain. 90 tablet 0    • ibuprofen (ADVIL,MOTRIN) 600 MG tablet TAKE ONE TABLET EVERY 6 HOURS AS NEEDED FOR MILD PAIN 120 tablet 5    • ipratropium-albuterol (Combivent Respimat)  MCG/ACT inhaler Inhale 1 puff 3 (Three) Times a Day. 4 g 5    • lisinopril (PRINIVIL,ZESTRIL) 10 MG tablet TAKE ONE TABLET DAILY 30 tablet 5    • metFORMIN (GLUCOPHAGE) 500 MG tablet Take 1 tablet by mouth Daily With Breakfast. 30 tablet 5    •  metoprolol succinate XL (TOPROL-XL) 50 MG 24 hr tablet TAKE ONE TABLET DAILY 30 tablet 5    • nitroglycerin (NITROSTAT) 0.4 MG SL tablet Place 1 tablet under the tongue Every 5 (Five) Minutes As Needed for Chest Pain for up to 30 days. Take no more than 3 doses in 15 minutes. 100 tablet 1    • nystatin (MYCOSTATIN) 409933 UNIT/GM powder Apply  topically to the appropriate area as directed 3 (Three) Times a Day. 60 g 5    • nystatin-triamcinolone (MYCOLOG) 642854-8.1 UNIT/GM-% ointment Apply 1 application topically to the appropriate area as directed 2 (Two) Times a Day. 60 g 5    • pantoprazole (PROTONIX) 40 MG EC tablet TAKE ONE TABLET DAILY 30 tablet 5    • potassium chloride (K-DUR,KLOR-CON) 10 MEQ CR tablet TAKE TWO TABLETS DAILY 60 tablet 5    • promethazine (PHENERGAN) 25 MG tablet TAKE ONE TABLET EVERY 6 HOURS AS NEEDED FOR NAUSEA 20 tablet 5    • simvastatin (ZOCOR) 20 MG tablet Take 1 tablet by mouth Every Night. 90 tablet 1    • tamsulosin (FLOMAX) 0.4 MG capsule 24 hr capsule TAKE ONE CAPSULE DAILY 30 capsule 5    • triamcinolone (KENALOG) 0.1 % cream Apply 1 application topically to the appropriate area as directed 2 (Two) Times a Day As Needed for Rash. 453 g 1        No Known Allergies    Objective   Objective     Vital Signs:   Temp:  [99.5 °F (37.5 °C)] 99.5 °F (37.5 °C)  Heart Rate:  [78-82] 78  Resp:  [20] 20  BP: (135-160)/(66-73) 135/66       Physical Exam   Constitutional: Awake, alert  Eyes: PERRLA, sclerae anicteric, no conjunctival injection  HENT: NCAT, mucous membranes moist  Neck: Supple, no thyromegaly, no lymphadenopathy, trachea midline  Respiratory: Scattered wheezes bilateral, nonlabored respirations   Cardiovascular: RRR, no murmurs, rubs, or gallops, palpable pedal pulses bilaterally  Gastrointestinal: Positive bowel sounds, soft, nontender, nondistended  Musculoskeletal: No bilateral ankle edema, no clubbing or cyanosis to extremities.  Extensive swelling, redness to left index  finger.  Minimal flexion to left PIP, no flexion to MCP or DIP of left index finger  Psychiatric: Appropriate affect, cooperative  Neurologic: Oriented x 3, strength symmetric in all extremities, Cranial Nerves grossly intact to confrontation, speech clear  Skin: Extensive swelling, discoloration to left index finger noted in pictures below          Result Review:  I have personally reviewed the results from the time of this admission to 5/20/2023 02:09 EDT and agree with these findings:  [x]  Laboratory list / accordion  [x]  Microbiology  [x]  Radiology  []  EKG/Telemetry   []  Cardiology/Vascular   []  Pathology  [x]  Old records  []  Other:        LAB RESULTS:      Lab 05/1950   WBC 9.85   HEMOGLOBIN 14.3   HEMATOCRIT 44.3   PLATELETS 262   NEUTROS ABS 7.41*   IMMATURE GRANS (ABS) 0.04   LYMPHS ABS 1.37   MONOS ABS 0.83   EOS ABS 0.17   .0*   SED RATE 83*   CRP 11.04*   PROCALCITONIN 0.10   LACTATE 1.7         Lab 05/1950   SODIUM 138   POTASSIUM 4.2   CHLORIDE 101   CO2 27.0   ANION GAP 10.0   BUN 19   CREATININE 1.17   EGFR 66.2   GLUCOSE 163*   CALCIUM 8.9         Lab 05/1950   TOTAL PROTEIN 7.2   ALBUMIN 3.6   GLOBULIN 3.6   ALT (SGPT) 10   AST (SGOT) 30   BILIRUBIN 1.0   ALK PHOS 113                     UA        5/27/2022    14:31   Urinalysis   Ketones, UA Negative     Leukocytes, UA Negative         Microbiology Results (last 10 days)     ** No results found for the last 240 hours. **          XR Hand 3+ View Left    Result Date: 5/19/2023  XR HAND 3+ VW LEFT Date of Exam: 5/19/2023 9:03 PM EDT Indication: Index finger pain and swelling Comparison: None available. Findings: There is diffuse soft tissue swelling of the digit. There are bony erosions along the radial side of the base of the proximal phalanx of the second digit. There is also some mild bony erosion along the radial side of the head of the second metacarpal. No  abnormal periosteal reaction. No acute fracture or  dislocation. Carpal bones and radiocarpal joint are within normal limits. No radiopaque body identified.    Impression: Impression: Small bony erosions at the second metacarpal phalangeal joint as detailed above. No periosteal. 2. Diffuse soft tissue swelling of the second digit. No radiopaque foreign body identified. Electronically Signed: Otf Fontaine  5/19/2023 9:35 PM EDT  Workstation ID: [id number]          Assessment & Plan   Assessment & Plan     Active Hospital Problems    Diagnosis  POA   • **Cellulitis of left hand [L03.114]  Yes   • Tobacco abuse [Z72.0]  Unknown   • CAD (coronary artery disease) [I25.10]  Unknown   • COPD (chronic obstructive pulmonary disease) [J44.9]  Unknown   • Type 2 diabetes mellitus [E11.9]  Unknown   • HTN (hypertension) [I10]  Unknown   • HLD (hyperlipidemia) [E78.5]  Unknown   • GERD without esophagitis [K21.9]  Unknown   • MARY ALICE (obstructive sleep apnea) [G47.33]  Unknown   • Infection of finger of left hand [L03.012]  Unknown   Dieter Christiansen is a 73 y.o. male with a PMH significant for diabetes mellitus type 2, CAD s/p remote stent, COPD, tobacco abuse, HTN, HLD, MARY ALICE who comes to the ED due to concerns for finger infection.      Cellulitis of left hand  Infection of left index finger  - Elevated CRP, ESR  - Radiographs noted above  - MRI left hand pending  - Concerns for aggressive infection  - Consult ID for a.m.  - Vancomycin, Zosyn, clindamycin  - IVFs  - Blood culture  - Wound culture  - WO  - Dr. Hutton and Dr. Coley are not available for coverage this week  - Dr. Aviles at , orthopedic hand surgeon recommends trial of IV antibiotics for now.  If patient declines, they will reevaluate her transfer at that time  - [facility], declined transfer today and they have no beds. Patient could be place on list for possible transfer tomorrow if beds become available    COPD  - DuoNebs scheduled and as needed    CAD  HTN  HLD  -Holding home Lasix while giving IVF's,  otherwise continue home meds with hold parameters    Diabetes mellitus type 2  - Hold home metformin  - SSI with Accu-Cheks    GERD  - Continue PPI    MARY ALICE  - CPAP    Chronic tobacco use  -- on cessation  --nicotine patch    Home med list reviewed    DVT prophylaxis: SCDs      CODE STATUS: Full code  Code Status and Medical Interventions:   Ordered at: 05/20/23 0141     Level Of Support Discussed With:    Patient     Code Status (Patient has no pulse and is not breathing):    CPR (Attempt to Resuscitate)     Medical Interventions (Patient has pulse or is breathing):    Full Support       Expected Discharge  Expected Discharge Date: 5/22/2023; Expected Discharge Time:      Neeru Lazaro DO  05/20/23

## 2023-05-20 NOTE — PROGRESS NOTES
"    [facility] Medicine Services  PROGRESS NOTE    Patient Name: Dieter Christiansen  : 1950  MRN: [id number]    Date of Admission: 2023  Primary Care Physician: Ian Sanchez APRN    Subjective   Subjective     CC:  Left finger infection     HPI:  Pain is controlled \"except when people poke at it.\"  No fever today.   Says he cannot lie flat supine or prone, which would be necessary to get MRI of hand.      ROS:  Breathing okay  No pain in forearm  R handed       Objective   Objective     Vital Signs:   Temp:  [97.5 °F (36.4 °C)-99.5 °F (37.5 °C)] 98.2 °F (36.8 °C)  Heart Rate:  [72-82] 72  Resp:  [18-20] 18  BP: (132-160)/(66-97) 132/69  Flow (L/min):  [1-4] 4     Physical Exam:  Constitutional: Awake, alert and up in chair on RA   Eyes: PER  HENT: NCAT, mucous membranes moist  Neck: Supple,  Respiratory: Clear to auscultation bilaterally, nonlabored respirations   Cardiovascular: RRR,   Gastrointestinal: nontender, obese   Musculoskeletal: L hand dressed - not removed.  No tenderness or redness of forearm.  Mild tenderness dorsal hand and palm.    Psychiatric: Appropriate affect, cooperative  Neurologic: Oriented x 3, strength symmetric in all extremities, Cranial Nerves grossly intact to confrontation, speech clear  Skin: No rashes      Results Reviewed:  LAB RESULTS:      Lab 23  0332 23   WBC 12.48* 9.85   HEMOGLOBIN 13.9 14.3   HEMATOCRIT 43.7 44.3   PLATELETS 272 262   NEUTROS ABS 9.63* 7.41*   IMMATURE GRANS (ABS) 0.08* 0.04   LYMPHS ABS 1.47 1.37   MONOS ABS 1.09* 0.83   EOS ABS 0.19 0.17   .0* 100.0*   SED RATE 88* 83*   CRP 9.01* 11.04*   PROCALCITONIN 0.10 0.10   LACTATE  --  1.7   PROTIME 15.3*  --          Lab 23  0332 23   SODIUM 135* 138   POTASSIUM 4.0 4.2   CHLORIDE 99 101   CO2 25.0 27.0   ANION GAP 11.0 10.0   BUN 19 19   CREATININE 1.14 1.17   EGFR 68.3 66.2   GLUCOSE 182* 163*   CALCIUM 8.7 8.9   MAGNESIUM 2.1  --  "   HEMOGLOBIN A1C 5.80*  --          Lab 05/[birth year]/23  1950   TOTAL PROTEIN 7.2   ALBUMIN 3.6   GLOBULIN 3.6   ALT (SGPT) 10   AST (SGOT) 30   BILIRUBIN 1.0   ALK PHOS 113         Lab 05/20/23  0332   PROTIME 15.3*   INR 1.20*                 Brief Urine Lab Results  (Last result in the past 365 days)      Color   Clarity   Blood   Leuk Est   Nitrite   Protein   CREAT   Urine HCG        05/27/22 1431 Dark Yellow   Clear   3+   Negative   Negative   Negative                 Microbiology Results Abnormal     Procedure Component Value - Date/Time    Wound Culture - Wound, Hand, Digit Left [[id number]] Collected: 05/20/23 0236    Lab Status: Preliminary result Specimen: Wound from Hand, Digit Left Updated: 05/20/23 0315     Gram Stain Moderate (3+) Gram positive cocci in pairs, chains and clusters      No WBCs seen          XR Hand 3+ View Left    Result Date: 5/19/2023  XR HAND 3+ VW LEFT Date of Exam: 5/19/2023 9:03 PM EDT Indication: Index finger pain and swelling Comparison: None available. Findings: There is diffuse soft tissue swelling of the digit. There are bony erosions along the radial side of the base of the proximal phalanx of the second digit. There is also some mild bony erosion along the radial side of the head of the second metacarpal. No  abnormal periosteal reaction. No acute fracture or dislocation. Carpal bones and radiocarpal joint are within normal limits. No radiopaque body identified.    Impression: Impression: Small bony erosions at the second metacarpal phalangeal joint as detailed above. No periosteal. 2. Diffuse soft tissue swelling of the second digit. No radiopaque foreign body identified. Electronically Signed: Otf Fontaine  5/19/2023 9:35 PM EDT  Workstation ID: [id number]          Current medications:  Scheduled Meds:aspirin, 81 mg, Oral, Daily  atorvastatin, 10 mg, Oral, Daily  cetirizine, 10 mg, Oral, Daily  clindamycin, 600 mg, Oral, Q8H  DAPTOmycin, 6 mg/kg (Adjusted), Intravenous,  Q24H  famotidine, 20 mg, Oral, Daily  FLUoxetine, 20 mg, Oral, Daily  gabapentin, 600 mg, Oral, Q8H  insulin lispro, 2-7 Units, Subcutaneous, 4x Daily AC & at Bedtime  ipratropium-albuterol, 3 mL, Nebulization, 4x Daily - RT  lisinopril, 10 mg, Oral, Daily  metoprolol succinate XL, 50 mg, Oral, Daily  nicotine, 1 patch, Transdermal, Q24H  pantoprazole, 40 mg, Oral, Daily  piperacillin-tazobactam, 4.5 g, Intravenous, Q8H  senna-docusate sodium, 2 tablet, Oral, BID  sodium chloride, 10 mL, Intravenous, Q12H  tamsulosin, 0.4 mg, Oral, Daily      Continuous Infusions:sodium chloride, 75 mL/hr, Last Rate: 75 mL/hr (05/20/23 0320)      PRN Meds:.•  acetaminophen **OR** acetaminophen **OR** acetaminophen  •  albuterol  •  senna-docusate sodium **AND** polyethylene glycol **AND** bisacodyl **AND** bisacodyl  •  dextrose  •  dextrose  •  glucagon (human recombinant)  •  HYDROcodone-acetaminophen  •  Morphine **AND** naloxone  •  nitroglycerin  •  ondansetron  •  sodium chloride  •  sodium chloride    Assessment & Plan   Assessment & Plan     Active Hospital Problems    Diagnosis  POA   • **Cellulitis of left hand [L03.114]  Yes   • Tobacco abuse [Z72.0]  Unknown   • CAD (coronary artery disease) [I25.10]  Unknown   • COPD (chronic obstructive pulmonary disease) [J44.9]  Unknown   • Type 2 diabetes mellitus [E11.9]  Unknown   • HTN (hypertension) [I10]  Unknown   • HLD (hyperlipidemia) [E78.5]  Unknown   • GERD without esophagitis [K21.9]  Unknown   • MARY ALICE (obstructive sleep apnea) [G47.33]  Unknown   • Infection of finger of left hand [L03.012]  Unknown      Resolved Hospital Problems   No resolved problems to display.        Brief Hospital Course to date:  Dieter Christiansen is a 73 y.o. male w PMH COPD, CAD, DM, MARY ALICE.  Admitted for left index finger infection.  Began a week ago with a hangnail after minor trauma; has spread and worsened.  Tmax 99.5.      Cellulitis of left hand  Infection of left index finger  - Radiographs noted  "above; MRI left hand pending  - abx day 1; ID following.  Have discussed with ID MD  multiple times.   -  Blood culture, Wound culture  - WOC  - control blood glucoses     Regarding efforts to obtain hand surgeon eval  - several discussions with Dr Alfaro.   - per partner:   Dr. Hutton and Dr. Coley are not available for coverage this week  - partner or ED staff reportedly spoke w Dr. Aviles at , orthopedic hand surgeon, who declined to accept in transfer,  recommended trial of IV antibiotics for now.    - [facility], declined transfer as they have no beds  - Patient states \"I will not go to [facility]\" and avers that he would rather lose his finger than go there.  He also states \"I will not go to [place].\"  - Called  transfer office at 1015.  Spoke w Dr Kay, hand surgeon; sent photographs.  He declined to accept ( on divert) but noted that he would reconsider if pt progressed to being toxic or infection spread up arm.   - Called Dr Allan Coelho, sent photographs.  He refused the consult.  - Discussed with Dr Baez of [facility].  The hand surgeon in their group, Dr Loo, is not reachable today.  If he is reachable tomorrow, Dr Baez will discuss the situation with him.   However, it is clarified that Dr Loo does not take hand call for [facility].          COPD - DuoNebs scheduled and as needed     CAD  HTN  HLD  - hold lasix today, consider restart tomorrow      Diabetes mellitus type 2 , A1C 5.8   - Hold home metformin  - SSI with Accu-Cheks     GERD  - Continue PPI     MARY ALICE  - CPAP     Chronic tobacco use  -- on cessation  --nicotine patch    Expected Discharge Location and Transportation: home by car vs tx for surgery  Expected Discharge tbd  Expected Discharge Date: 5/22/2023; Expected Discharge Time:      DVT prophylaxis:  Mechanical DVT prophylaxis orders are present.     AM-PAC 6 Clicks Score (PT): 18 (05/20/23 0200)    CODE STATUS:   Code Status and Medical Interventions:   Ordered at: " 05/20/23 0141     Level Of Support Discussed With:    Patient     Code Status (Patient has no pulse and is not breathing):    CPR (Attempt to Resuscitate)     Medical Interventions (Patient has pulse or is breathing):    Full Support       Ronna French MD  05/20/23

## 2023-05-20 NOTE — PLAN OF CARE
Problem: Adult Inpatient Plan of Care  Goal: Plan of Care Review  Outcome: Ongoing, Progressing  Goal: Patient-Specific Goal (Individualized)  Outcome: Ongoing, Progressing  Goal: Absence of Hospital-Acquired Illness or Injury  Outcome: Ongoing, Progressing  Intervention: Identify and Manage Fall Risk  Recent Flowsheet Documentation  Taken 5/20/2023 1400 by Suhail Celis RN  Safety Promotion/Fall Prevention:   activity supervised   assistive device/personal items within reach   clutter free environment maintained   fall prevention program maintained   nonskid shoes/slippers when out of bed   room organization consistent   safety round/check completed  Taken 5/20/2023 1200 by Suhail Celis RN  Safety Promotion/Fall Prevention:   activity supervised   assistive device/personal items within reach   clutter free environment maintained   fall prevention program maintained   lighting adjusted   nonskid shoes/slippers when out of bed   room organization consistent   safety round/check completed  Intervention: Prevent Skin Injury  Recent Flowsheet Documentation  Taken 5/20/2023 1400 by Suhail Celis RN  Body Position:   position changed independently   weight shifting  Skin Protection:   adhesive use limited   incontinence pads utilized   skin-to-device areas padded   skin-to-skin areas padded   tubing/devices free from skin contact  Taken 5/20/2023 1200 by Suhail Celis RN  Body Position: position changed independently  Skin Protection:   adhesive use limited   incontinence pads utilized   skin-to-device areas padded   skin-to-skin areas padded   tubing/devices free from skin contact  Intervention: Prevent and Manage VTE (Venous Thromboembolism) Risk  Recent Flowsheet Documentation  Taken 5/20/2023 1400 by Suhail Celis RN  Activity Management: up in chair  Taken 5/20/2023 1200 by Suhail Celis RN  Activity Management: up in chair  Intervention: Prevent Infection  Recent Flowsheet Documentation  Taken  5/20/2023 1400 by Suhail Celis RN  Infection Prevention:   environmental surveillance performed   equipment surfaces disinfected   hand hygiene promoted   personal protective equipment utilized   rest/sleep promoted   single patient room provided  Taken 5/20/2023 1200 by Suhail Celis RN  Infection Prevention:   environmental surveillance performed   equipment surfaces disinfected   hand hygiene promoted   personal protective equipment utilized   rest/sleep promoted   single patient room provided  Goal: Optimal Comfort and Wellbeing  Outcome: Ongoing, Progressing  Goal: Readiness for Transition of Care  Outcome: Ongoing, Progressing     Problem: Fall Injury Risk  Goal: Absence of Fall and Fall-Related Injury  Outcome: Ongoing, Progressing  Intervention: Identify and Manage Contributors  Recent Flowsheet Documentation  Taken 5/20/2023 1400 by Suhail Celis RN  Medication Review/Management:   medications reviewed   high-risk medications identified  Self-Care Promotion:   safe use of adaptive equipment encouraged   independence encouraged  Taken 5/20/2023 1200 by Suhail Celis RN  Medication Review/Management:   medications reviewed   high-risk medications identified  Self-Care Promotion:   safe use of adaptive equipment encouraged   BADL personal objects within reach   BADL personal routines maintained  Intervention: Promote Injury-Free Environment  Recent Flowsheet Documentation  Taken 5/20/2023 1400 by Suhail Celis RN  Safety Promotion/Fall Prevention:   activity supervised   assistive device/personal items within reach   clutter free environment maintained   fall prevention program maintained   nonskid shoes/slippers when out of bed   room organization consistent   safety round/check completed  Taken 5/20/2023 1200 by Suhail Celis RN  Safety Promotion/Fall Prevention:   activity supervised   assistive device/personal items within reach   clutter free environment maintained   fall prevention program  maintained   lighting adjusted   nonskid shoes/slippers when out of bed   room organization consistent   safety round/check completed     Problem: Skin Injury Risk Increased  Goal: Skin Health and Integrity  Outcome: Ongoing, Progressing  Intervention: Promote and Optimize Oral Intake  Recent Flowsheet Documentation  Taken 5/20/2023 1400 by Suhail Celis RN  Oral Nutrition Promotion:   safe use of adaptive equipment encouraged   rest periods promoted  Taken 5/20/2023 1200 by Suhail Celis RN  Oral Nutrition Promotion:   safe use of adaptive equipment encouraged   rest periods promoted  Intervention: Optimize Skin Protection  Recent Flowsheet Documentation  Taken 5/20/2023 1400 by Suhail Celis RN  Pressure Reduction Techniques:   frequent weight shift encouraged   heels elevated off bed   pressure points protected   rest period provided between sit times  Head of Bed (HOB) Positioning: HOB elevated  Pressure Reduction Devices:   pressure-redistributing mattress utilized   positioning supports utilized  Skin Protection:   adhesive use limited   incontinence pads utilized   skin-to-device areas padded   skin-to-skin areas padded   tubing/devices free from skin contact  Taken 5/20/2023 1200 by Suhail Celis RN  Pressure Reduction Techniques:   frequent weight shift encouraged   heels elevated off bed   pressure points protected   rest period provided between sit times  Head of Bed (HOB) Positioning: HOB elevated  Pressure Reduction Devices:   pressure-redistributing mattress utilized   positioning supports utilized   heel offloading device utilized  Skin Protection:   adhesive use limited   incontinence pads utilized   skin-to-device areas padded   skin-to-skin areas padded   tubing/devices free from skin contact   Goal Outcome Evaluation:

## 2023-05-20 NOTE — CONSULTS
INFECTIOUS DISEASE CONSULT/INITIAL HOSPITAL VISIT    Dieter Christiansen  1950  [id number]    Date of Consult: 5/20/2023    Admission Date: 5/19/2023      Requesting Provider: Dr French  Evaluating Physician: Dr Chase Alfaro  Reason for Consultation: left index finger infection     History of present illness:    Patient is a 73 y.o. male, with PMH COPD, CAD, DM, MARY ALICE, seen today for left index finger infection.  He had developed a hangnail of the finger after which he injured his hand when he hit his hand on a wall approximately one week ago.  The redness and swelling progressively worsened prompting his presentation to the ED.  Xray of the hand with diffuse swelling of the index finger, with bony erosions along the radial side of the base of proximal phalanx of second digit. Admitting labs with WBC 9.85, plt 262, CRP 11, ESR 83, PCT 0.10, Scr 1.17, and tmax of 99.5.  Wound gram stain with moderate gram positive cocci in pairs, chains and clusters, no WBCs. Started on Vancomycin, Zosyn and Clindamycin and we were consulted for antibiotic management.  Plans made to transfer to [place] per d/w Dr French, however he refused per d/w Dr French.    He has pain at his left index finger/hand which is constant, sharp, nonradiating, worse with movement/pressure, better with pain meds and 4-5 out of 10 in severity.  He denied any other specific trauma aside from above.  No other blood force penetrating trauma.  No animal insect arthropod bite.  Denies any prior history MRSA VRE C. Difficile or ESBL/kpC/CRE organisms     Denies any other specific focal symptoms.  No headache photophobia or neck stiffness.  He is on nasal cannula oxygen but denies dyspnea cough or hemoptysis.  No nausea vomiting diarrhea or abdominal pain.  No dysuria hematuria or pyuria.        Past Medical History:   Diagnosis Date   • Chronic back pain    • COPD (chronic obstructive pulmonary disease)    • Coronary artery disease involving native coronary  artery of native heart without angina pectoris    • Diabetes mellitus    • GERD (gastroesophageal reflux disease)    • Heart attack    • Hyperlipidemia    • Hypertension    • Lung nodule    • Murmur    • MARY ALICE (obstructive sleep apnea)        Past Surgical History:   Procedure Laterality Date   • FEMORAL ARTERY STENT         Family History   Problem Relation Age of Onset   • Diabetes Father    • Diabetes Brother        Social History     Socioeconomic History   • Marital status:    Tobacco Use   • Smoking status: Every Day     Packs/day: 0.50     Years: 30.00     Pack years: 15.00     Types: Cigarettes   • Smokeless tobacco: Former   • Tobacco comments:     20 years ago   Vaping Use   • Vaping Use: Never used   Substance and Sexual Activity   • Alcohol use: Not Currently   • Drug use: Never   • Sexual activity: Defer       No Known Allergies      Medication:    Current Facility-Administered Medications:   •  acetaminophen (TYLENOL) tablet 650 mg, 650 mg, Oral, Q4H PRN **OR** acetaminophen (TYLENOL) 160 MG/5ML solution 650 mg, 650 mg, Oral, Q4H PRN **OR** acetaminophen (TYLENOL) suppository 650 mg, 650 mg, Rectal, Q4H PRN, Iveth, Neeru G, DO  •  albuterol (PROVENTIL) nebulizer solution 0.083% 2.5 mg/3mL, 2.5 mg, Nebulization, Q6H PRN, Iveth, Neeru G, DO  •  aspirin EC tablet 81 mg, 81 mg, Oral, Daily, Iveth, Neeru G, DO  •  atorvastatin (LIPITOR) tablet 10 mg, 10 mg, Oral, Daily, Iveth, Neeru G, DO  •  sennosides-docusate (PERICOLACE) 8.6-50 MG per tablet 2 tablet, 2 tablet, Oral, BID **AND** polyethylene glycol (MIRALAX) packet 17 g, 17 g, Oral, Daily PRN **AND** bisacodyl (DULCOLAX) EC tablet 5 mg, 5 mg, Oral, Daily PRN **AND** bisacodyl (DULCOLAX) suppository 10 mg, 10 mg, Rectal, Daily PRN, Iveth, Neeru G, DO  •  cetirizine (zyrTEC) tablet 10 mg, 10 mg, Oral, Daily, Iveth, Neeru G, DO  •  clindamycin (CLEOCIN) capsule 600 mg, 600 mg, Oral, Q8H, Neeru Lazaro, DO  •  dextrose (D50W) (25 g/50 mL) IV  injection 25 g, 25 g, Intravenous, Q15 Min PRN, IvethNeeru jewell G, DO  •  dextrose (GLUTOSE) oral gel 15 g, 15 g, Oral, Q15 Min PRN, IvethNeeru jewell G, DO  •  famotidine (PEPCID) tablet 20 mg, 20 mg, Oral, Daily, IvethEveline jewellsie G, DO  •  FLUoxetine (PROzac) capsule 20 mg, 20 mg, Oral, Daily, IvehtAlee jewelle G, DO  •  gabapentin (NEURONTIN) capsule 600 mg, 600 mg, Oral, Q8H, IvethNeeru jewell G, DO, 600 mg at 05/20/23 0641  •  glucagon (GLUCAGEN) injection 1 mg, 1 mg, Intramuscular, Q15 Min PRN, Neeru Lazaro G, DO  •  HYDROcodone-acetaminophen (NORCO)  MG per tablet 1 tablet, 1 tablet, Oral, Q8H PRN, Neeru Lazaro G, DO  •  Insulin Lispro (humaLOG) injection 2-7 Units, 2-7 Units, Subcutaneous, 4x Daily AC & at Bedtime, Neeru Lazaro, DO  •  ipratropium-albuterol (DUO-NEB) nebulizer solution 3 mL, 3 mL, Nebulization, 4x Daily - RT, Neeru Lazaro, DO, 3 mL at 05/20/23 0655  •  lisinopril (PRINIVIL,ZESTRIL) tablet 10 mg, 10 mg, Oral, Daily, Neeru Lazaro G, DO  •  metoprolol succinate XL (TOPROL-XL) 24 hr tablet 50 mg, 50 mg, Oral, Daily, Neeru Lazaro G, DO  •  morphine injection 2 mg, 2 mg, Intravenous, Q3H PRN **AND** naloxone (NARCAN) injection 0.4 mg, 0.4 mg, Intravenous, PRN, Neeru Lazaro G, DO  •  nicotine (NICODERM CQ) 21 MG/24HR patch 1 patch, 1 patch, Transdermal, Q24H, Neeru Lazaro G, DO  •  nitroglycerin (NITROSTAT) SL tablet 0.4 mg, 0.4 mg, Sublingual, Q5 Min PRN, Iveth, Neeru G, DO  •  ondansetron (ZOFRAN) injection 4 mg, 4 mg, Intravenous, Q6H PRN, IvethEveline jewellsie G, DO  •  pantoprazole (PROTONIX) EC tablet 40 mg, 40 mg, Oral, Daily, IvethNeeru G, DO, 40 mg at 05/20/23 0641  •  Pharmacy to dose vancomycin, , Does not apply, Continuous PRN, Iveth, Neeru G, DO  •  piperacillin-tazobactam (ZOSYN) 4.5 g in iso-osmotic dextrose 100 mL IVPB (premix), 4.5 g, Intravenous, Q8H, IvethEvelineNeeru G, DO  •  sodium chloride 0.9 % flush 10 mL, 10 mL, Intravenous, Q12H, Neeru Lazaro G, DO, 10 mL at 05/20/23 0320  •   sodium chloride 0.9 % flush 10 mL, 10 mL, Intravenous, PRN, Iveth, Neeru G, DO  •  sodium chloride 0.9 % infusion 40 mL, 40 mL, Intravenous, PRN, Iveth, Neeru G, DO  •  sodium chloride 0.9 % infusion, 75 mL/hr, Intravenous, Continuous, Iveth, Neeru G, DO, Last Rate: 75 mL/hr at 05/20/23 0320, 75 mL/hr at 05/20/23 0320  •  tamsulosin (FLOMAX) 24 hr capsule 0.4 mg, 0.4 mg, Oral, Daily, Iveth, Neeru G, DO  •  [START ON 5/21/2023] vancomycin 1500 mg/500 mL 0.9% NS IVPB ([facility]), 9.7 mg/kg, Intravenous, Q24H **AND** [START ON 5/22/2023] Vancomycin, Trough, , , Timed, Jozef León, PharmD    Antibiotics:  Anti-Infectives (From admission, onward)    Ordered     Dose/Rate Route Frequency Start Stop    05/20/23 0223  vancomycin 1500 mg/500 mL 0.9% NS IVPB ([facility])        Ordering Provider: Jozef León, PharmD   See [facility] for full Linked Orders Report.    9.7 mg/kg × 154 kg  over 90 Minutes Intravenous Every 24 Hours Scheduled 05/21/23 0600 05/25/23 0859    05/20/23 0150  piperacillin-tazobactam (ZOSYN) 4.5 g in iso-osmotic dextrose 100 mL IVPB (premix)        Ordering Provider: Alee Lazaroe G, DO    4.5 g  over 4 Hours Intravenous Every 8 Hours 05/20/23 0800 05/25/23 0759    05/20/23 0302  clindamycin (CLEOCIN) capsule 600 mg        Ordering Provider: Iveth, Neeru G, DO    600 mg Oral Every 8 Hours Scheduled 05/20/23 0800 05/25/23 0759    05/20/23 0150  Pharmacy to dose vancomycin        Ordering Provider: Iveth, Neeru G, DO     Does not apply Continuous PRN 05/20/23 0150 05/25/23 0149    05/19/23 2315  clindamycin (CLEOCIN) capsule 600 mg        Ordering Provider: Gabriel Caldwell PA    600 mg Oral Once 05/19/23 2317 05/19/23 2338    05/19/23 2306  vancomycin 3000 mg/500 mL 0.9% NS IVPB ([facility])        Ordering Provider: Neeru Lazaro DO    20 mg/kg × 154 kg  over 180 Minutes Intravenous Once 05/19/23 2308 05/20/23 0537    05/19/23 2306  piperacillin-tazobactam (ZOSYN) 4.5 g in iso-osmotic dextrose 100  mL IVPB (premix)        Ordering Provider: Gabriel Caldwell PA    4.5 g Intravenous Once 23 2308 23 0045            Review of Systems:  Constitutional-- No Fever, chills or sweats.  Appetite good, and no malaise. +fatigue.  HEENT-- No new vision, hearing or throat complaints.  No epistaxis or oral sores.  Denies odynophagia or dysphagia. No headache, photophobia or neck stiffness.  CV-- No chest pain, palpitation or syncope  Resp-- No SOB/cough/Hemoptysis  GI- No nausea, vomiting, or diarrhea.  No hematochezia, melena, or hematemesis. Denies jaundice or chronic liver disease.  -- No dysuria, hematuria, or flank pain.  Denies hesitancy, urgency or flank pain.  Lymph- no swollen lymph nodes in neck/axilla or groin.   Heme- No active bruising or bleeding; no Hx of DVT or PE.  MS-- no swelling or pain in the bones or joints of arms/legs.  No new back pain.  Neuro-- No acute focal weakness or numbness in the arms or legs.  No seizures.  Skin--No rashes   Left index finger with edema, redness extending over 1st 2nd fingers, and dorsum of hand.  PIP joint with slough, some areas of necrosis.       Physical Exam:   Vital Signs  Temp (24hrs), Av.4 °F (36.9 °C), Min:97.5 °F (36.4 °C), Max:99.5 °F (37.5 °C)    Temp  Min: 97.5 °F (36.4 °C)  Max: 99.5 °F (37.5 °C)  BP  Min: 132/69  Max: 160/73  Pulse  Min: 72  Max: 82  Resp  Min: 18  Max: 20  SpO2  Min: 90 %  Max: 94 %    GENERAL: Awake and alert, in no acute distress.   HEENT: Normocephalic, atraumatic.  PERRL. EOMI. No conjunctival injection. No icterus. Oropharynx clear without evidence of thrush or exudate. No evidence of peridontal disease.    NECK: Supple   HEART: RRR; No murmur, rubs, gallops.   LUNGS: Clear to auscultation bilaterally without wheezing, rales, rhonchi. Normal respiratory effort. Nonlabored.   ABDOMEN: Soft, nontender, nondistended. Positive bowel sounds. No rebound or guarding. NO mass or HSM.  EXT:  No cyanosis, clubbing or edema. No  cord.  :  Without Torres catheter.  MSK: No joint effusions or erythema  SKIN: Warm and dry  Left index finger with diffuse multifocal purulence, ecchymosis extending to the dorsum of his hand past the MCP joint,with edema, redness extending over 1st 2nd fingers, and dorsum of hand.  PIP joint with slough, areas of necrosis. He is not able to flex the index finger significantly and pain limits exam otherwise   NEURO: Oriented to PPT.  Motor 5/5 strength  PSYCHIATRIC: Normal insight and judgment. Cooperative with PE    Laboratory Data    Results from last 7 days   Lab Units 05/20/23 0332 05/1950   WBC 10*3/mm3 12.48* 9.85   HEMOGLOBIN g/dL 13.9 14.3   HEMATOCRIT % 43.7 44.3   PLATELETS 10*3/mm3 272 262     Results from last 7 days   Lab Units 05/20/23 0332   SODIUM mmol/L 135*   POTASSIUM mmol/L 4.0   CHLORIDE mmol/L 99   CO2 mmol/L 25.0   BUN mg/dL 19   CREATININE mg/dL 1.14   GLUCOSE mg/dL 182*   CALCIUM mg/dL 8.7     Results from last 7 days   Lab Units 05/1950   ALK PHOS U/L 113   BILIRUBIN mg/dL 1.0   ALT (SGPT) U/L 10   AST (SGOT) U/L 30     Results from last 7 days   Lab Units 05/20/23 0332   SED RATE mm/hr 88*     Results from last 7 days   Lab Units 05/20/23 0332   CRP mg/dL 9.01*     Results from last 7 days   Lab Units 05/1950   LACTATE mmol/L 1.7             Estimated Creatinine Clearance: 86.2 mL/min (by C-G formula based on SCr of 1.14 mg/dL).      Microbiology:  No results found for: ACANTHNAEG, AFBCX, BPERTUSSISCX, BLOODCX  No results found for: BCIDPCR, CXREFLEX, CSFCX, CULTURETIS  No results found for: CULTURES, HSVCX, URCX  No results found for: EYECULTURE, GCCX, HSVCULTURE, LABHSV  No results found for: LEGIONELLA, MRSACX, MUMPSCX, MYCOPLASCX  No results found for: NOCARDIACX, STOOLCX  No results found for: THROATCX, UNSTIMCULT, URINECX, CULTURE, VZVCULTUR  No results found for: VIRALCULTU, WOUNDCX        Radiology:  Imaging Results (Last 72 Hours)     Procedure Component  Value Units Date/Time    XR Hand 3+ View Left [[id number]] Collected: 05/19/23 2133     Updated: 05/19/23 2138    Narrative:      XR HAND 3+ VW LEFT    Date of Exam: 5/19/2023 9:03 PM EDT    Indication: Index finger pain and swelling    Comparison: None available.    Findings:  There is diffuse soft tissue swelling of the digit. There are bony erosions along the radial side of the base of the proximal phalanx of the second digit. There is also some mild bony erosion along the radial side of the head of the second metacarpal. No   abnormal periosteal reaction. No acute fracture or dislocation. Carpal bones and radiocarpal joint are within normal limits. No radiopaque body identified.    Impression:      Impression:  Small bony erosions at the second metacarpal phalangeal joint as detailed above. No periosteal.  2. Diffuse soft tissue swelling of the second digit. No radiopaque foreign body identified.      Electronically Signed: Otf Fontaine    5/19/2023 9:35 PM EDT    Workstation ID: [id number]            Impression:   - acute/Severe left index finger wound infection/cellulitis- MRI pending.  He has multifocal areas of slough/purulence with difficulty flexing the finger and pain limiting exam otherwise with ecchymosis extending past the MCP on the dorsal hand and vague/diffuse erythema.  I have advised/recommended the patient be evaluated by surgery.  I discussed with Dr. French directly and she reports medicine team had made arrangements to have him transferred to [facility] but patient refused.  Apparently [facility] unable to accept.  I have reiterated my recommendation to patient that he have a surgical evaluation with ongoing risk for further serious morbidity and other serious sequela including functional/limb loss/amputation, persistence of progressive or recurrent infection including risk for systemic spread/sepsis and death.  Gram-positive cocci raise concern for strep versus staph infection.  He is on broad antibiotic  therapy.  Dr. French is looking into options for transfer to a center with hand specialist, she will get back to me regarding options and patient preferences; if you are unable to transfer to a center with a hand specialist you should have general orthopedics see the patient for evaluation, Dr French aware and will keep us informed  - COPD  - Diabetes mellitus, type 2  - Obesity       PLAN/RECOMMENDATIONS:   Thank you for asking us to see Dieter Christiansen, I recommend the following:    - Daptomycin 6mg/kg IV daily  - Baseline CK  - Continue Zosyn, Clinda for now   - Wound and blood cultures, pending    --check/review labs cultures and scans  --Partial history per nursing staff  --Discussed with microbiology  --Discussed with medicine team, Dr French as above  --I have discussed very directly  my recommendations with the patient and my concerns as outlined above.  He is considering his options and Dr French is looking into transfer options as above  --highly complex at of issues with high risk for further serious morbidity and other serious sequela    Dr. Alfaro has obtained the history, performed the physical exam and formulated the above treatment plan.        Karen Hinojosa, EUNICE  5/20/2023  06:58 EDT      Addendum regarding patient request/approval to take picture of his hand in order to send to Dr French; nursing staff present;  patient gives approval/consent for picture to be taken and sent via text messaging from my I-phone to Dr French     5/20/23 10:54 addendum regarding further conversations   Patient has refused transfer to the [facility] either [facility] or [facility] and has told Dr. French that he would rather lose his left index finger than to go to 1 of those hospitals.  He has refused transfer to [facility].  Dr. French has been in contact with me on multiple occasions this morning.  She contacted hand surgery at [facility] and they would not facilitate transfer.  They have seen a  picture of his hand per my discussion with Dr. French; they apparently agreed with Dr. French regarding the severity and the they stated  high risk  for amputation of  his left index finger; they asked that orthopedics see at [facility]; Dr. French has contacted orthopedics and they are not a hand surgeon and they have refused.  I have reached out to administration to help facilitate additional options

## 2023-05-21 LAB
ANION GAP SERPL CALCULATED.3IONS-SCNC: 10 MMOL/L (ref 5–15)
BUN SERPL-MCNC: 20 MG/DL (ref 8–23)
BUN/CREAT SERPL: 18.5 (ref 7–25)
CALCIUM SPEC-SCNC: 8.9 MG/DL (ref 8.6–10.5)
CHLORIDE SERPL-SCNC: 102 MMOL/L (ref 98–107)
CO2 SERPL-SCNC: 26 MMOL/L (ref 22–29)
CREAT SERPL-MCNC: 1.08 MG/DL (ref 0.76–1.27)
DEPRECATED RDW RBC AUTO: 47.1 FL (ref 37–54)
EGFRCR SERPLBLD CKD-EPI 2021: 72.9 ML/MIN/1.73
ERYTHROCYTE [DISTWIDTH] IN BLOOD BY AUTOMATED COUNT: 12.7 % (ref 12.3–15.4)
GLUCOSE BLDC GLUCOMTR-MCNC: 116 MG/DL (ref 70–130)
GLUCOSE BLDC GLUCOMTR-MCNC: 93 MG/DL (ref 70–130)
GLUCOSE BLDC GLUCOMTR-MCNC: 95 MG/DL (ref 70–130)
GLUCOSE BLDC GLUCOMTR-MCNC: 95 MG/DL (ref 70–130)
GLUCOSE SERPL-MCNC: 97 MG/DL (ref 65–99)
HCT VFR BLD AUTO: 41.4 % (ref 37.5–51)
HGB BLD-MCNC: 13.4 G/DL (ref 13–17.7)
MCH RBC QN AUTO: 32.5 PG (ref 26.6–33)
MCHC RBC AUTO-ENTMCNC: 32.4 G/DL (ref 31.5–35.7)
MCV RBC AUTO: 100.5 FL (ref 79–97)
PLATELET # BLD AUTO: 289 10*3/MM3 (ref 140–450)
PMV BLD AUTO: 9.8 FL (ref 6–12)
POTASSIUM SERPL-SCNC: 4.2 MMOL/L (ref 3.5–5.2)
RBC # BLD AUTO: 4.12 10*6/MM3 (ref 4.14–5.8)
SODIUM SERPL-SCNC: 138 MMOL/L (ref 136–145)
WBC NRBC COR # BLD: 8.32 10*3/MM3 (ref 3.4–10.8)

## 2023-05-21 PROCEDURE — 25010000002 PIPERACILLIN SOD-TAZOBACTAM PER 1 G: Performed by: INTERNAL MEDICINE

## 2023-05-21 PROCEDURE — 85027 COMPLETE CBC AUTOMATED: CPT | Performed by: INTERNAL MEDICINE

## 2023-05-21 PROCEDURE — 80048 BASIC METABOLIC PNL TOTAL CA: CPT | Performed by: INTERNAL MEDICINE

## 2023-05-21 PROCEDURE — 97162 PT EVAL MOD COMPLEX 30 MIN: CPT

## 2023-05-21 PROCEDURE — 94664 DEMO&/EVAL PT USE INHALER: CPT

## 2023-05-21 PROCEDURE — 99232 SBSQ HOSP IP/OBS MODERATE 35: CPT | Performed by: INTERNAL MEDICINE

## 2023-05-21 PROCEDURE — 82948 REAGENT STRIP/BLOOD GLUCOSE: CPT

## 2023-05-21 PROCEDURE — 25010000002 DAPTOMYCIN PER 1 MG: Performed by: NURSE PRACTITIONER

## 2023-05-21 PROCEDURE — 97165 OT EVAL LOW COMPLEX 30 MIN: CPT

## 2023-05-21 PROCEDURE — 94761 N-INVAS EAR/PLS OXIMETRY MLT: CPT

## 2023-05-21 PROCEDURE — 94799 UNLISTED PULMONARY SVC/PX: CPT

## 2023-05-21 RX ORDER — FUROSEMIDE 40 MG/1
40 TABLET ORAL DAILY
Status: DISCONTINUED | OUTPATIENT
Start: 2023-05-21 | End: 2023-05-25 | Stop reason: HOSPADM

## 2023-05-21 RX ADMIN — Medication 81 MG: at 08:48

## 2023-05-21 RX ADMIN — PIPERACILLIN SODIUM AND TAZOBACTAM SODIUM 4.5 G: 4; .5 INJECTION, SOLUTION INTRAVENOUS at 16:33

## 2023-05-21 RX ADMIN — ATORVASTATIN CALCIUM 10 MG: 10 TABLET, FILM COATED ORAL at 08:48

## 2023-05-21 RX ADMIN — Medication 10 ML: at 08:50

## 2023-05-21 RX ADMIN — CLINDAMYCIN HYDROCHLORIDE 600 MG: 150 CAPSULE ORAL at 00:44

## 2023-05-21 RX ADMIN — GABAPENTIN 600 MG: 300 CAPSULE ORAL at 13:52

## 2023-05-21 RX ADMIN — CLINDAMYCIN HYDROCHLORIDE 600 MG: 150 CAPSULE ORAL at 16:33

## 2023-05-21 RX ADMIN — Medication 1 PATCH: at 08:49

## 2023-05-21 RX ADMIN — PIPERACILLIN SODIUM AND TAZOBACTAM SODIUM 4.5 G: 4; .5 INJECTION, SOLUTION INTRAVENOUS at 00:44

## 2023-05-21 RX ADMIN — METOPROLOL SUCCINATE 50 MG: 50 TABLET, EXTENDED RELEASE ORAL at 08:47

## 2023-05-21 RX ADMIN — GABAPENTIN 600 MG: 300 CAPSULE ORAL at 06:26

## 2023-05-21 RX ADMIN — IPRATROPIUM BROMIDE AND ALBUTEROL SULFATE 3 ML: 2.5; .5 SOLUTION RESPIRATORY (INHALATION) at 06:39

## 2023-05-21 RX ADMIN — TAMSULOSIN HYDROCHLORIDE 0.4 MG: 0.4 CAPSULE ORAL at 08:48

## 2023-05-21 RX ADMIN — FAMOTIDINE 20 MG: 20 TABLET ORAL at 08:48

## 2023-05-21 RX ADMIN — FUROSEMIDE 40 MG: 40 TABLET ORAL at 13:52

## 2023-05-21 RX ADMIN — CLINDAMYCIN HYDROCHLORIDE 600 MG: 150 CAPSULE ORAL at 08:47

## 2023-05-21 RX ADMIN — GABAPENTIN 600 MG: 300 CAPSULE ORAL at 21:55

## 2023-05-21 RX ADMIN — DAPTOMYCIN 600 MG: 500 INJECTION, POWDER, LYOPHILIZED, FOR SOLUTION INTRAVENOUS at 08:46

## 2023-05-21 RX ADMIN — LISINOPRIL 10 MG: 10 TABLET ORAL at 08:48

## 2023-05-21 RX ADMIN — CETIRIZINE HYDROCHLORIDE 10 MG: 10 TABLET, FILM COATED ORAL at 08:48

## 2023-05-21 RX ADMIN — IPRATROPIUM BROMIDE AND ALBUTEROL SULFATE 3 ML: 2.5; .5 SOLUTION RESPIRATORY (INHALATION) at 18:42

## 2023-05-21 RX ADMIN — FLUOXETINE 20 MG: 20 CAPSULE ORAL at 08:48

## 2023-05-21 RX ADMIN — PIPERACILLIN SODIUM AND TAZOBACTAM SODIUM 4.5 G: 4; .5 INJECTION, SOLUTION INTRAVENOUS at 08:46

## 2023-05-21 RX ADMIN — PANTOPRAZOLE SODIUM 40 MG: 40 TABLET, DELAYED RELEASE ORAL at 06:26

## 2023-05-21 NOTE — PLAN OF CARE
Goal Outcome Evaluation:  Plan of Care Reviewed With: patient        Progress: no change  Outcome Evaluation: OT eval complete. Very pleasent and cooperative. Presents below baseline with decreased balance and tolerance affecting overall independence with ADLS. Pt with good BUE strength however some decreased L hand strength due to pain and sweeling of 2nd digit. Independent to dof/don slide on shoes. Ambulates with CGA and SPC, Yousuf once fatigued. Recommend d/c home with assist from sister and HH OT.

## 2023-05-21 NOTE — PLAN OF CARE
Goal Outcome Evaluation:  Plan of Care Reviewed With: patient           Outcome Evaluation: Physical therapy evaluation complete. Patient currently below baseline for mobility. The patient presents with decreased activity tolerance and balance deficits affecting functional mobility. Patient would continue to benefit from skilled PT to address deficits. At hospital discharge recommend patient return home with assist and HHPT. Patient encouraged to use RW over SPC while in the acute care setting and at home.

## 2023-05-21 NOTE — THERAPY EVALUATION
Patient Name: Dieter Christiansen  : 1950    MRN: [id number]                              Today's Date: 2023       Admit Date: 2023    Visit Dx:     ICD-10-CM ICD-9-CM   1. Cellulitis of left hand  L03.114 682.4   2. Cellulitis of left index finger  L03.012 681.00   3. History of diabetes mellitus  Z86.39 V12.29   4. History of hypertension  Z86.79 V12.59   5. History of hyperlipidemia  Z86.39 V12.29   6. Tobacco abuse  Z72.0 305.1     Patient Active Problem List   Diagnosis   • Chronic back pain   • Cellulitis of left hand   • Tobacco abuse   • CAD (coronary artery disease)   • COPD (chronic obstructive pulmonary disease)   • Type 2 diabetes mellitus   • HTN (hypertension)   • HLD (hyperlipidemia)   • GERD without esophagitis   • MARY ALICE (obstructive sleep apnea)   • Infection of finger of left hand     Past Medical History:   Diagnosis Date   • Chronic back pain    • COPD (chronic obstructive pulmonary disease)    • Coronary artery disease involving native coronary artery of native heart without angina pectoris    • Diabetes mellitus    • GERD (gastroesophageal reflux disease)    • Heart attack    • Hyperlipidemia    • Hypertension    • Lung nodule    • Murmur    • MARY ALICE (obstructive sleep apnea)      Past Surgical History:   Procedure Laterality Date   • FEMORAL ARTERY STENT        General Information     Row Name 23 1403          OT Time and Intention    Document Type evaluation  -     Mode of Treatment occupational therapy  -     Row Name 23 1403          General Information    Patient Profile Reviewed yes  -     Existing Precautions/Restrictions fall  monitor O2  -     Barriers to Rehab medically complex;previous functional deficit  -     Row Name 23 1403          Occupational Profile    Environmental Supports and Barriers (Occupational Profile) Reports tub shower  -     Row Name 23 1403          Living Environment    People in Home sibling(s)  -     Row Name  05/21/23 1403          Home Main Entrance    Number of Stairs, Main Entrance three  -HM     Stair Railings, Main Entrance none  -HM     Row Name 05/21/23 1403          Stairs Within Home, Primary    Number of Stairs, Within Home, Primary none  -HM     Stair Railings, Within Home, Primary none  -HM     Row Name 05/21/23 1403          Cognition    Orientation Status (Cognition) oriented x 3  -HM     Row Name 05/21/23 1403          Safety Issues, Functional Mobility    Safety Issues Affecting Function (Mobility) safety precautions follow-through/compliance;safety precaution awareness;insight into deficits/self-awareness;awareness of need for assistance;judgment;problem-solving  -     Impairments Affecting Function (Mobility) balance;endurance/activity tolerance;grasp;shortness of breath  -           User Key  (r) = Recorded By, (t) = Taken By, (c) = Cosigned By    Initials Name Provider Type     May Otoole, OT Occupational Therapist                 Mobility/ADL's     Row Name 05/21/23 1406          Bed Mobility    Comment, (Bed Mobility) Received and left UIC  -     Row Name 05/21/23 1406          Transfers    Transfers sit-stand transfer  -     Row Name 05/21/23 1406          Sit-Stand Transfer    Sit-Stand [place] (Transfers) contact guard;verbal cues  -     Assistive Device (Sit-Stand Transfers) cane, straight  -     Row Name 05/21/23 1406          Functional Mobility    Functional Mobility- Comment deferred to PT  -     Row Name 05/21/23 1406          Activities of Daily Living    BADL Assessment/Intervention lower body dressing  -     Row Name 05/21/23 1406          Lower Body Dressing Assessment/Training    Comment, (Lower Body Dressing) pt reports he mostly wears slip on shoes however sister assist with LBD as needed.  -           User Key  (r) = Recorded By, (t) = Taken By, (c) = Cosigned By    Initials Name Provider Type    May Montesinos, OT Occupational Therapist                Obj/Interventions     Row Name 05/21/23 1407          Sensory Assessment (Somatosensory)    Sensory Assessment (Somatosensory) sensation intact  -     Row Name 05/21/23 1407          Vision Assessment/Intervention    Visual Impairment/Limitations WFL  -     Row Name 05/21/23 1407          Range of Motion Comprehensive    General Range of Motion no range of motion deficits identified  -     Comment, General Range of Motion BUE shoulder ROM WNL  -     Row Name 05/21/23 1407          Strength Comprehensive (MMT)    General Manual Muscle Testing (MMT) Assessment no strength deficits identified;hand strength deficits identified  -     Comment, General Manual Muscle Testing (MMT) Assessment BUE shoulder strength 5/5  -     Hand Strength Assessment hand strength (manual muscle testing)  -     Row Name 05/21/23 1407          Motor Skills    Motor Skills coordination;functional endurance  -     Coordination WFL  -     Functional Endurance decreased  -     Row Name 05/21/23 1407          Balance    Balance Assessment sitting static balance;sitting dynamic balance;standing static balance;standing dynamic balance  -     Static Sitting Balance supervision  -     Dynamic Sitting Balance supervision  -     Position, Sitting Balance unsupported;sitting in chair  -     Static Standing Balance contact guard  -     Dynamic Standing Balance contact guard;minimal assist  -     Position/Device Used, Standing Balance cane, straight  -     Row Name 05/21/23 1407          Hand (Manual Muscle Testing)    Hand: Manual Muscle Testing (MMT) left fingers strength deficit  -     Row Name 05/21/23 1407          Left Fingers (Manual Muscle Testing)    Left Fingers Manual Muscle Testing (MMT) flexion  -     MMT: Flexion, Left Fingers flexion  -     MMT, Gross Movement: Left Finger Flexion (Digits 2-5) (4-/5) good minus  decreased use of L 2nd digit. Can .  -           User Key  (r) = Recorded By,  (t) = Taken By, (c) = Cosigned By    Initials Name Provider Type     May Otoole OT Occupational Therapist               Goals/Plan     Row Name 05/21/23 1415          Transfer Goal 1 (OT)    Activity/Assistive Device (Transfer Goal 1, OT) sit-to-stand/stand-to-sit;bed-to-chair/chair-to-bed;toilet  -HM     [place] Level/Cues Needed (Transfer Goal 1, OT) supervision required  -HM     Time Frame (Transfer Goal 1, OT) by discharge;long term goal (LTG)  -HM     Progress/Outcome (Transfer Goal 1, OT) goal ongoing  -     Row Name 05/21/23 1415          Toileting Goal 1 (OT)    Activity/Device (Toileting Goal 1, OT) adjust/manage clothing;perform perineal hygiene  -HM     [place] Level/Cues Needed (Toileting Goal 1, OT) supervision required  -HM     Time Frame (Toileting Goal 1, OT) by discharge;long term goal (LTG)  -HM     Progress/Outcome (Toileting Goal 1, OT) goal ongoing  -     Row Name 05/21/23 1415          Grooming Goal 1 (OT)    Activity/Device (Grooming Goal 1, OT) hair care;oral care;wash face, hands  -HM     [place] (Grooming Goal 1, OT) minimum assist (75% or more patient effort);verbal cues required  -HM     Time Frame (Grooming Goal 1, OT) long term goal (LTG);by discharge  -     Strategies/Barriers (Grooming Goal 1, OT) sink side  -HM     Progress/Outcome (Grooming Goal 1, OT) goal ongoing  -     Row Name 05/21/23 1415          Therapy Assessment/Plan (OT)    Planned Therapy Interventions (OT) adaptive equipment training;BADL retraining;functional balance retraining;occupation/activity based interventions;ROM/therapeutic exercise;transfer/mobility retraining  -           User Key  (r) = Recorded By, (t) = Taken By, (c) = Cosigned By    Initials Name Provider Type     May Otoole OT Occupational Therapist               Clinical Impression     Row Name 05/21/23 1407          Pain Assessment    Pretreatment Pain Rating 0/10 - no pain  -     Posttreatment Pain  Rating 0/10 - no pain  -     Row Name 05/21/23 1409          Plan of Care Review    Plan of Care Reviewed With patient  -     Progress no change  -     Outcome Evaluation OT eval complete. Very pleasent and cooperative. Presents below baseline with decreased balance and tolerance affecting overall independence with ADLS. Pt with good BUE strength however some decreased L hand strength due to pain and sweeling of 2nd digit. Independent to dof/don slide on shoes. Ambulates with CGA and SPC, Yousuf once fatigued. Recommend d/c home with assist from sister and HH OT.  -     Row Name 05/21/23 1409          Therapy Assessment/Plan (OT)    Patient/Family Therapy Goal Statement (OT) Pt would like to improve and return home.  -     Rehab Potential (OT) good, to achieve stated therapy goals  -     Criteria for Skilled Therapeutic Interventions Met (OT) yes;skilled treatment is necessary  -     Therapy Frequency (OT) daily  -     Row Name 05/21/23 1409          Therapy Plan Review/Discharge Plan (OT)    Anticipated Discharge Disposition (OT) home with assist;home with home health  -     Row Name 05/21/23 1401          Vital Signs    Pre Systolic BP Rehab --  RN cleared for tx; VSS  -HM     Pre SpO2 (%) 91  -HM     O2 Delivery Pre Treatment room air  -HM     Intra SpO2 (%) 87  -HM     O2 Delivery Intra Treatment room air  -HM     Post SpO2 (%) 90  -HM     O2 Delivery Post Treatment room air  -HM     Pre Patient Position Sitting  -HM     Intra Patient Position Standing  -     Post Patient Position Sitting  -     Row Name 05/21/23 1409          Positioning and Restraints    Pre-Treatment Position sitting in chair/recliner  -     Post Treatment Position chair  -HM     In Chair notified nsg;reclined;call light within reach;encouraged to call for assist;exit alarm on  -           User Key  (r) = Recorded By, (t) = Taken By, (c) = Cosigned By    Initials Name Provider Type     May Otoole, OT  Occupational Therapist               Outcome Measures     Row Name 05/21/23 1416          How much help from another is currently needed...    Putting on and taking off regular lower body clothing? 2  -HM     Bathing (including washing, rinsing, and drying) 3  -HM     Toileting (which includes using toilet bed pan or urinal) 3  -HM     Putting on and taking off regular upper body clothing 4  -HM     Taking care of personal grooming (such as brushing teeth) 3  -HM     Eating meals 4  -HM     AM-PAC 6 Clicks Score (OT) 19  -HM     Row Name 05/21/23 1320 05/21/23 0800       How much help from another person do you currently need...    Turning from your back to your side while in flat bed without using bedrails? 4  -ML 4  -[place]    Moving from lying on back to sitting on the side of a flat bed without bedrails? 4  -ML 4  -[place]    Moving to and from a bed to a chair (including a wheelchair)? 3  -ML 3  -[place]    Standing up from a chair using your arms (e.g., wheelchair, bedside chair)? 4  -ML 3  -[place]    Climbing 3-5 steps with a railing? 3  -ML 3  -[place]    To walk in hospital room? 3  -ML 3  -[place]    AM-PAC 6 Clicks Score (PT) 21  -ML 20  -[place]    Highest level of mobility 6 --> Walked 10 steps or more  -ML 6 --> Walked 10 steps or more  -[place]    Row Name 05/21/23 1416 05/21/23 1320       Functional Assessment    Outcome Measure Options AM-PAC 6 Clicks Daily Activity (OT)  - AM-PAC 6 Clicks Basic Mobility (PT)  -ML          User Key  (r) = Recorded By, (t) = Taken By, (c) = Cosigned By    Initials Name Provider Type     May Otoole, OT Occupational Therapist    Ramila Parada, RN Registered Nurse    Nathalia Esteves Physical Therapist                Occupational Therapy Education     Title: PT OT SLP Therapies (In Progress)     Topic: Occupational Therapy (In Progress)     Point: ADL training (Done)     Description:   Instruct learner(s) on proper safety adaptation and remediation techniques during self care or transfers.    Instruct in proper use of assistive devices.              Learning Progress Summary           Patient Acceptance, TB,E,D, VU,NR by  at 5/21/2023 1417                   Point: Home exercise program (Not Started)     Description:   Instruct learner(s) on appropriate technique for monitoring, assisting and/or progressing therapeutic exercises/activities.              Learner Progress:  Not documented in this visit.          Point: Precautions (Done)     Description:   Instruct learner(s) on prescribed precautions during self-care and functional transfers.              Learning Progress Summary           Patient Acceptance, TB,E,D, VU,NR by  at 5/21/2023 1417                   Point: Body mechanics (Done)     Description:   Instruct learner(s) on proper positioning and spine alignment during self-care, functional mobility activities and/or exercises.              Learning Progress Summary           Patient Acceptance, TB,E,D, VU,NR by  at 5/21/2023 1417                               User Key     Initials Effective Dates Name Provider Type Discipline     10/25/22 -  May Otoole OT Occupational Therapist OT              OT Recommendation and Plan  Planned Therapy Interventions (OT): adaptive equipment training, BADL retraining, functional balance retraining, occupation/activity based interventions, ROM/therapeutic exercise, transfer/mobility retraining  Therapy Frequency (OT): daily  Plan of Care Review  Plan of Care Reviewed With: patient  Progress: no change  Outcome Evaluation: OT eval complete. Very pleasent and cooperative. Presents below baseline with decreased balance and tolerance affecting overall independence with ADLS. Pt with good BUE strength however some decreased L hand strength due to pain and sweeling of 2nd digit. Independent to dof/don slide on shoes. Ambulates with CGA and SPC, Yousuf once fatigued. Recommend d/c home with assist from sister and HH OT.     Time Calculation:    Time  Calculation- OT     Row Name 05/21/23 1115             Time Calculation- OT    OT Start Time 1115  -HM      OT Received On 05/21/23  -HM      OT Goal Re-Cert Due Date 05/31/23  -HM         Untimed Charges    OT Eval/Re-eval Minutes 56  -HM         Total Minutes    Untimed Charges Total Minutes 56  -HM       Total Minutes 56  -HM            User Key  (r) = Recorded By, (t) = Taken By, (c) = Cosigned By    Initials Name Provider Type     May Otoole OT Occupational Therapist              Therapy Charges for Today     Code Description Service Date Service Provider Modifiers Qty    [id number] HC OT EVAL LOW COMPLEXITY 4 5/21/2023 May Otoole OT GO 1               May Otoole OT  5/21/2023

## 2023-05-21 NOTE — THERAPY EVALUATION
Patient Name: Dieter Christiansen  : 1950    MRN: [id number]                              Today's Date: 2023       Admit Date: 2023    Visit Dx:     ICD-10-CM ICD-9-CM   1. Cellulitis of left hand  L03.114 682.4   2. Cellulitis of left index finger  L03.012 681.00   3. History of diabetes mellitus  Z86.39 V12.29   4. History of hypertension  Z86.79 V12.59   5. History of hyperlipidemia  Z86.39 V12.29   6. Tobacco abuse  Z72.0 305.1     Patient Active Problem List   Diagnosis   • Chronic back pain   • Cellulitis of left hand   • Tobacco abuse   • CAD (coronary artery disease)   • COPD (chronic obstructive pulmonary disease)   • Type 2 diabetes mellitus   • HTN (hypertension)   • HLD (hyperlipidemia)   • GERD without esophagitis   • MARY ALICE (obstructive sleep apnea)   • Infection of finger of left hand     Past Medical History:   Diagnosis Date   • Chronic back pain    • COPD (chronic obstructive pulmonary disease)    • Coronary artery disease involving native coronary artery of native heart without angina pectoris    • Diabetes mellitus    • GERD (gastroesophageal reflux disease)    • Heart attack    • Hyperlipidemia    • Hypertension    • Lung nodule    • Murmur    • MARY ALICE (obstructive sleep apnea)      Past Surgical History:   Procedure Laterality Date   • FEMORAL ARTERY STENT        General Information     Row Name 23 1311          Physical Therapy Time and Intention    Document Type evaluation  -ML     Mode of Treatment physical therapy  -ML     Row Name 23 1311          General Information    Patient Profile Reviewed yes  -ML     Prior Level of Function independent:;all household mobility;gait;transfer;ADL's;dependent:;driving  patient reports ambulating with SPC at baseline, requiers assist with transfer in and out of tub  -ML     Existing Precautions/Restrictions fall;oxygen therapy device and L/min  -ML     Barriers to Rehab medically complex;previous functional deficit  -ML     Row Name  05/21/23 1311          Living Environment    People in Home sibling(s)  -ML     Row Name 05/21/23 1311          Home Main Entrance    Number of Stairs, Main Entrance three  -ML     Stair Railings, Main Entrance none  -ML     Row Name 05/21/23 1311          Stairs Within Home, Primary    Number of Stairs, Within Home, Primary none  -ML     Row Name 05/21/23 1311          Cognition    Orientation Status (Cognition) oriented x 3  -ML     Row Name 05/21/23 1311          Safety Issues, Functional Mobility    Safety Issues Affecting Function (Mobility) awareness of need for assistance;insight into deficits/self-awareness;positioning of assistive device;safety precaution awareness;safety precautions follow-through/compliance;sequencing abilities  -ML     Impairments Affecting Function (Mobility) balance;endurance/activity tolerance;grasp;shortness of breath  -ML           User Key  (r) = Recorded By, (t) = Taken By, (c) = Cosigned By    Initials Name Provider Type     Nathalia Daly Physical Therapist               Mobility     Row Name 05/21/23 1312          Bed Mobility    Comment, (Bed Mobility) Patient found and left seated in bedside chair.  -ML     Row Name 05/21/23 1312          Sit-Stand Transfer    Sit-Stand [place] (Transfers) standby assist  -ML     Assistive Device (Sit-Stand Transfers) cane, straight  -ML     Row Name 05/21/23 1312          Gait/Stairs (Locomotion)    [place] Level (Gait) verbal cues;contact guard  -ML     Assistive Device (Gait) cane, straight  -ML     Distance in Feet (Gait) 60  -ML     Deviations/Abnormal Patterns (Gait) bilateral deviations;base of support, wide;bhumi decreased;gait speed decreased;stride length decreased  -ML     Bilateral Gait Deviations heel strike decreased  -ML     Comment, (Gait/Stairs) Patient noted with diffiuclty coordinating use of SPC. Patient also reaching for handrail on right with cane in left hand. Patient's ambulation distance limited by  decreased activity tolerance/dyspnea.  -ML           User Key  (r) = Recorded By, (t) = Taken By, (c) = Cosigned By    Initials Name Provider Type    ML Nathalia Daly Physical Therapist               Obj/Interventions     Row Name 05/21/23 1315          Range of Motion Comprehensive    General Range of Motion bilateral lower extremity ROM WFL  -ML     Row Name 05/21/23 1315          Strength Comprehensive (MMT)    General Manual Muscle Testing (MMT) Assessment no strength deficits identified  -ML     Row Name 05/21/23 1315          Motor Skills    Motor Skills functional endurance  -ML     Functional Endurance decreased activity tolerance, patient ambulated on RA with oxygen saturation 87% and end of ambulation distance  -ML     Row Name 05/21/23 1315          Balance    Balance Assessment sitting static balance;sitting dynamic balance;sit to stand dynamic balance;standing static balance;standing dynamic balance  -ML     Static Sitting Balance independent  -ML     Dynamic Sitting Balance standby assist  -ML     Position, Sitting Balance unsupported;sitting in chair  -ML     Sit to Stand Dynamic Balance standby assist  -ML     Static Standing Balance standby assist  -ML     Dynamic Standing Balance contact guard;verbal cues  -ML     Position/Device Used, Standing Balance supported;cane, straight  -ML     Balance Interventions sitting;standing;sit to stand;supported;static;dynamic;occupation based/functional task  -     Row Name 05/21/23 1315          Sensory Assessment (Somatosensory)    Sensory Assessment (Somatosensory) LE sensation intact  -ML           User Key  (r) = Recorded By, (t) = Taken By, (c) = Cosigned By    Initials Name Provider Type    Nathalia Esteves Physical Therapist               Goals/Plan     Row Name 05/21/23 1319          Bed Mobility Goal 1 (PT)    Activity/Assistive Device (Bed Mobility Goal 1, PT) sit to supine/supine to sit  -ML     [place] Level/Cues Needed (Bed Mobility Goal 1, PT)  independent  -ML     Time Frame (Bed Mobility Goal 1, PT) 10 days  -ML     Row Name 05/21/23 1319          Transfer Goal 1 (PT)    Activity/Assistive Device (Transfer Goal 1, PT) sit-to-stand/stand-to-sit;bed-to-chair/chair-to-bed;walker, rolling;cane, straight  -ML     [place] Level/Cues Needed (Transfer Goal 1, PT) modified independence  -ML     Time Frame (Transfer Goal 1, PT) 10 days  -ML     Row Name 05/21/23 1319          Gait Training Goal 1 (PT)    Activity/Assistive Device (Gait Training Goal 1, PT) gait (walking locomotion);assistive device use;decrease fall risk;improve balance and speed;increase endurance/gait distance;walker, rolling;cane, straight  -ML     [place] Level (Gait Training Goal 1, PT) modified independence  -ML     Distance (Gait Training Goal 1, PT) 200  -ML     Time Frame (Gait Training Goal 1, PT) 10 days  -ML     Row Name 05/21/23 1319          Stairs Goal 1 (PT)    Activity/Assistive Device (Stairs Goal 1, PT) ascending stairs;descending stairs;step-to-step;cane, straight  -ML     [place] Level/Cues Needed (Stairs Goal 1, PT) standby assist  -ML     Number of Stairs (Stairs Goal 1, PT) 3  -ML     Time Frame (Stairs Goal 1, PT) 10 days  -ML     Row Name 05/21/23 1319          Therapy Assessment/Plan (PT)    Planned Therapy Interventions (PT) balance training;bed mobility training;gait training;home exercise program;neuromuscular re-education;patient/family education;postural re-education;ROM (range of motion);stair training;strengthening;stretching;transfer training  -ML           User Key  (r) = Recorded By, (t) = Taken By, (c) = Cosigned By    Initials Name Provider Type    ML Nathalia Daly Physical Therapist               Clinical Impression     Row Name 05/21/23 1316          Pain    Pretreatment Pain Rating 0/10 - no pain  -ML     Posttreatment Pain Rating 0/10 - no pain  -ML     Row Name 05/21/23 1316          Plan of Care Review    Plan of Care Reviewed With patient   -ML     Outcome Evaluation Physical therapy evaluation complete. Patient currently below baseline for mobility. The patient presents with decreased activity tolerance and balance deficits affecting functional mobility. Patient would continue to benefit from skilled PT to address deficits. At hospital discharge recommend patient return home with assist and HHPT. Patient encouraged to use RW over SPC while in the acute care setting and at home.  -     Row Name 05/21/23 1316          Therapy Assessment/Plan (PT)    Rehab Potential (PT) good, to achieve stated therapy goals  -     Criteria for Skilled Interventions Met (PT) yes;meets criteria;skilled treatment is necessary  -     Therapy Frequency (PT) daily  -ML     Row Name 05/21/23 1316          Vital Signs    Pre SpO2 (%) 91  -ML     O2 Delivery Pre Treatment room air  -ML     Intra SpO2 (%) 87  -ML     O2 Delivery Intra Treatment room air  -ML     Post SpO2 (%) 90  -ML     O2 Delivery Post Treatment room air  -ML     Pre Patient Position Sitting  -ML     Intra Patient Position Standing  -ML     Post Patient Position Sitting  -ML     Row Name 05/21/23 1316          Positioning and Restraints    Pre-Treatment Position sitting in chair/recliner  -ML     Post Treatment Position chair  -ML     In Chair notified nsg;sitting;call light within reach;encouraged to call for assist;exit alarm on;legs elevated  -ML           User Key  (r) = Recorded By, (t) = Taken By, (c) = Cosigned By    Initials Name Provider Type    Nathalia Esteves Physical Therapist               Outcome Measures     Row Name 05/21/23 1320 05/21/23 0800       How much help from another person do you currently need...    Turning from your back to your side while in flat bed without using bedrails? 4  -ML 4  -[place]    Moving from lying on back to sitting on the side of a flat bed without bedrails? 4  -ML 4  -[place]    Moving to and from a bed to a chair (including a wheelchair)? 3  -ML 3  -[place]    Standing up  from a chair using your arms (e.g., wheelchair, bedside chair)? 4  -ML 3  -[place]    Climbing 3-5 steps with a railing? 3  -ML 3  -[place]    To walk in hospital room? 3  -ML 3  -[place]    AM-PAC 6 Clicks Score (PT) 21  -ML 20  -[place]    Highest level of mobility 6 --> Walked 10 steps or more  -ML 6 --> Walked 10 steps or more  -[place]    Row Name 05/21/23 1320          Functional Assessment    Outcome Measure Options AM-PAC 6 Clicks Basic Mobility (PT)  -           User Key  (r) = Recorded By, (t) = Taken By, (c) = Cosigned By    Initials Name Provider Type    [place] Ramila Jones, RN Registered Nurse    Nathalia Esteves Physical Therapist                             Physical Therapy Education     Title: PT OT SLP Therapies (In Progress)     Topic: Physical Therapy (In Progress)     Point: Mobility training (Done)     Learning Progress Summary           Patient Acceptance, E, VU,NR by  at 5/21/2023 1321                   Point: Home exercise program (Not Started)     Learner Progress:  Not documented in this visit.          Point: Body mechanics (Not Started)     Learner Progress:  Not documented in this visit.          Point: Precautions (Done)     Learning Progress Summary           Patient Acceptance, E, VU,NR by  at 5/21/2023 1321                               User Key     Initials Effective Dates Name Provider Type [facility] 04/22/21 -  Nathalia Daly Physical Therapist PT              PT Recommendation and Plan  Planned Therapy Interventions (PT): balance training, bed mobility training, gait training, home exercise program, neuromuscular re-education, patient/family education, postural re-education, ROM (range of motion), stair training, strengthening, stretching, transfer training  Plan of Care Reviewed With: patient  Outcome Evaluation: Physical therapy evaluation complete. Patient currently below baseline for mobility. The patient presents with decreased activity tolerance and balance deficits affecting functional  mobility. Patient would continue to benefit from skilled PT to address deficits. At hospital discharge recommend patient return home with assist and HHPT. Patient encouraged to use RW over SPC while in the acute care setting and at home.     Time Calculation:    PT Charges     Row Name 05/21/23 1321             Time Calculation    Start Time 1120  -ML      PT Received On 05/21/23  -ML      PT Goal Re-Cert Due Date 05/31/23  -ML         Untimed Charges    PT Eval/Re-eval Minutes 46  -ML         Total Minutes    Untimed Charges Total Minutes 46  -ML       Total Minutes 46  -ML            User Key  (r) = Recorded By, (t) = Taken By, (c) = Cosigned By    Initials Name Provider Type    Nathalia Esteves Physical Therapist              Therapy Charges for Today     Code Description Service Date Service Provider Modifiers Qty    [id number] HC PT EVAL MOD COMPLEXITY 4 5/21/2023 Nathalia Daly GP 1          PT G-Codes  Outcome Measure Options: AM-PAC 6 Clicks Basic Mobility (PT)  AM-PAC 6 Clicks Score (PT): 21  PT Discharge Summary  Anticipated Discharge Disposition (PT): home with assist, home with home health    Nathalia Daly  5/21/2023

## 2023-05-21 NOTE — PROGRESS NOTES
INFECTIOUS DISEASE Progress note     Dieter Christiansen  1950  [id number]      Admission Date: 5/19/2023      Requesting Provider: Dr French  Evaluating Physician: Dr Chase Alfaro  Reason for Consultation: left index finger infection     History of present illness:    Patient is a 73 y.o. male, with PMH COPD, CAD, DM, MARY ALICE, seen today for left index finger infection.  He had developed a hangnail of the finger after which he injured his hand when he hit his hand on a wall approximately one week ago.  The redness and swelling progressively worsened prompting his presentation to the ED.  Xray of the hand with diffuse swelling of the index finger, with bony erosions along the radial side of the base of proximal phalanx of second digit. Admitting labs with WBC 9.85, plt 262, CRP 11, ESR 83, PCT 0.10, Scr 1.17, and tmax of 99.5.  Wound gram stain with moderate gram positive cocci in pairs, chains and clusters, no WBCs. Started on Vancomycin, Zosyn and Clindamycin and we were consulted for antibiotic management.  Plans made to transfer to [place] per d/w Dr French, however he refused per d/w Dr French.    5/20/23 He has pain at his left index finger/hand which is constant, sharp, nonradiating, worse with movement/pressure, better with pain meds and 4-5 out of 10 in severity.  He denied any other specific trauma aside from above.  No other blood force penetrating trauma.  No animal insect arthropod bite.  Denies any prior history MRSA VRE C. Difficile or ESBL/kpC/CRE organisms     Denies any other specific focal symptoms.  No headache photophobia or neck stiffness.  He is on nasal cannula oxygen but denies dyspnea cough or hemoptysis.  No nausea vomiting diarrhea or abdominal pain.  No dysuria hematuria or pyuria.    ON 5/20/23   Discussed very openly with patient risk of his hand infection including risk of digit loss/higher level amputation including loss of hand or arm in addition to further systemic spread of  infection with risk for other dire consequences/mortality.  Conversation in presence of nursing staff and similar conversation by Dr. french with him; we recommended evaluation by orthopedic surgery/hand surgery, and transfer to a center with hand surgery as no available hand surgeon at [facility],  in addition to ongoing IV antibiotics and other supportive measures.  Patient  refused transfer to the [facility] either [facility] or [facility] that he would rather lose his left index finger/hand/life  than to go to 1 of those hospitals.  He has refused transfer to [facility] with similar explanation.  Dr. French has been in contact with me on multiple occasions , She contacted hand surgery at [facility] and they refused transfer.  They have seen a picture of his hand per my discussion with Dr. French; they apparently agreed with Dr. French regarding the severity and the they stated  high risk  for amputation of  his left index finger; they asked that orthopedics see at [facility]; Dr. French has contacted orthopedics and they are not a hand surgeon and they have refused to see consult.  I d/w administration and they are unable to facilitate transfer to     5/21/23:   D/w Dr French again multiple occasions today  Discussed AGAIN very openly with patient risk of his hand infection including risk of digit loss/higher level amputation including loss of hand or arm in addition to further systemic spread of infection with risk for other dire consequences/mortality/death.  Conversation in presence of nursing staff ; we recommended evaluation by orthopedic surgery/hand surgery, and transfer to a center with hand surgery as no available hand surgeon at [facility],  in addition to ongoing IV antibiotics and other supportive measures.  Patient  Again has refused transfer to the [facility] either [facility] or [facility] that he would rather  "lose his left index finger/hand/life  than to go to 1 of those hospitals.  He has refused transfer to [facility] with similar explanation again.  Dr. French has been in contact with me on multiple occasions again today as above , She anticipates contacting  hand surgery at [facility].  Dr. French has contacted orthopedics at [facility] and they are not a hand surgeon and they have refused to see consult.  Patient is aware of the above limitations with respect to no orthopedic surgeon for consultation at [facility], he is aware of my recommendations and he says to me he would \" rather take his chances\" regarding loss of finger/hand/life, and stay  at [facility] at present rather than consider any alternatives to [facility] or     Refusing am labs per nursing staff.  MRsA screen positive. Blood cultures negative so far. He is unable to lie flat to take the MRI.  He remains afebrile.  He thinks his finger is less painful , and he is able to move it better but it appears more bruised along the finger with same redness at the hand.  No progressive redness or pain.  He is able to flex the finger a bit     pain at his left index finger/hand which is constant, sharp, nonradiating, worse with movement/pressure, better with pain meds and 4  out of 10 in severity.       Past Medical History:   Diagnosis Date   • Chronic back pain    • COPD (chronic obstructive pulmonary disease)    • Coronary artery disease involving native coronary artery of native heart without angina pectoris    • Diabetes mellitus    • GERD (gastroesophageal reflux disease)    • Heart attack    • Hyperlipidemia    • Hypertension    • Lung nodule    • Murmur    • MARY ALICE (obstructive sleep apnea)        Past Surgical History:   Procedure Laterality Date   • FEMORAL ARTERY STENT         Family History   Problem Relation Age of Onset   • Diabetes Father    • Diabetes Brother        Social History     Socioeconomic History   • " Marital status:    Tobacco Use   • Smoking status: Every Day     Packs/day: 0.50     Years: 30.00     Pack years: 15.00     Types: Cigarettes   • Smokeless tobacco: Former   • Tobacco comments:     20 years ago   Vaping Use   • Vaping Use: Never used   Substance and Sexual Activity   • Alcohol use: Not Currently   • Drug use: Never   • Sexual activity: Defer       No Known Allergies      Medication:    Current Facility-Administered Medications:   •  acetaminophen (TYLENOL) tablet 650 mg, 650 mg, Oral, Q4H PRN **OR** acetaminophen (TYLENOL) 160 MG/5ML solution 650 mg, 650 mg, Oral, Q4H PRN **OR** acetaminophen (TYLENOL) suppository 650 mg, 650 mg, Rectal, Q4H PRN, Iveth, Neeru G, DO  •  albuterol (PROVENTIL) nebulizer solution 0.083% 2.5 mg/3mL, 2.5 mg, Nebulization, Q6H PRN, Iveth, Neeru G, DO  •  aspirin EC tablet 81 mg, 81 mg, Oral, Daily, Iveth, Neeru G, DO, 81 mg at 05/20/23 1008  •  atorvastatin (LIPITOR) tablet 10 mg, 10 mg, Oral, Daily, Iveth, Neeru G, DO, 10 mg at 05/20/23 1007  •  sennosides-docusate (PERICOLACE) 8.6-50 MG per tablet 2 tablet, 2 tablet, Oral, BID, 2 tablet at 05/20/23 1007 **AND** polyethylene glycol (MIRALAX) packet 17 g, 17 g, Oral, Daily PRN **AND** bisacodyl (DULCOLAX) EC tablet 5 mg, 5 mg, Oral, Daily PRN **AND** bisacodyl (DULCOLAX) suppository 10 mg, 10 mg, Rectal, Daily PRN, Iveth, Neeru G, DO  •  cetirizine (zyrTEC) tablet 10 mg, 10 mg, Oral, Daily, Iveth, Neeru G, DO, 10 mg at 05/20/23 1007  •  clindamycin (CLEOCIN) capsule 600 mg, 600 mg, Oral, Q8H, Iveth, Neeru G, DO, 600 mg at 05/21/23 0044  •  DAPTOmycin (CUBICIN) 600 mg in sodium chloride 0.9 % 50 mL IVPB, 6 mg/kg (Adjusted), Intravenous, Q24H, Karen Hinojosa APRN, Last Rate: 100 mL/hr at 05/20/23 1020, 600 mg at 05/20/23 1020  •  dextrose (D50W) (25 g/50 mL) IV injection 25 g, 25 g, Intravenous, Q15 Min PRN, Neeru Lazaro G, DO  •  dextrose (GLUTOSE) oral gel 15 g, 15 g, Oral, Q15 Min PRN, Nereu Lazaro,  DO  •  famotidine (PEPCID) tablet 20 mg, 20 mg, Oral, Daily, Iveth, Neeru G, DO, 20 mg at 05/20/23 1008  •  FLUoxetine (PROzac) capsule 20 mg, 20 mg, Oral, Daily, Iveth, Neeru G, DO, 20 mg at 05/20/23 1007  •  gabapentin (NEURONTIN) capsule 600 mg, 600 mg, Oral, Q8H, Iveth, Neeru G, DO, 600 mg at 05/21/23 0626  •  glucagon (GLUCAGEN) injection 1 mg, 1 mg, Intramuscular, Q15 Min PRN, Iveth, Neeru G, DO  •  HYDROcodone-acetaminophen (NORCO)  MG per tablet 1 tablet, 1 tablet, Oral, Q8H PRN, Iveth, Neeru G, DO  •  Insulin Lispro (humaLOG) injection 2-7 Units, 2-7 Units, Subcutaneous, 4x Daily AC & at Bedtime, Iveth, Neeru G, DO, 2 Units at 05/20/23 1659  •  ipratropium-albuterol (DUO-NEB) nebulizer solution 3 mL, 3 mL, Nebulization, 4x Daily - RT, Iveth, Neeru G, DO, 3 mL at 05/20/23 1515  •  lisinopril (PRINIVIL,ZESTRIL) tablet 10 mg, 10 mg, Oral, Daily, Iveth, Neeru G, DO, 10 mg at 05/20/23 1007  •  metoprolol succinate XL (TOPROL-XL) 24 hr tablet 50 mg, 50 mg, Oral, Daily, Iveth, Neeru G, DO, 50 mg at 05/20/23 1006  •  morphine injection 2 mg, 2 mg, Intravenous, Q3H PRN **AND** naloxone (NARCAN) injection 0.4 mg, 0.4 mg, Intravenous, PRN, Iveth, Neeru G, DO  •  nicotine (NICODERM CQ) 21 MG/24HR patch 1 patch, 1 patch, Transdermal, Q24H, Iveth, Neeru G, DO, 1 patch at 05/20/23 1008  •  nitroglycerin (NITROSTAT) SL tablet 0.4 mg, 0.4 mg, Sublingual, Q5 Min PRN, Iveth, Neeru G, DO  •  ondansetron (ZOFRAN) injection 4 mg, 4 mg, Intravenous, Q6H PRN, Iveth, Neeru G, DO  •  pantoprazole (PROTONIX) EC tablet 40 mg, 40 mg, Oral, Daily, Iveth, Neeru G, DO, 40 mg at 05/21/23 0626  •  piperacillin-tazobactam (ZOSYN) 4.5 g in iso-osmotic dextrose 100 mL IVPB (premix), 4.5 g, Intravenous, Q8H, Iveth, Neeru G, DO, 4.5 g at 05/21/23 0044  •  sodium chloride 0.9 % flush 10 mL, 10 mL, Intravenous, Q12H, Iveth, Neeru G, DO, 10 mL at 05/20/23 2221  •  sodium chloride 0.9 % flush 10 mL, 10 mL, Intravenous, PRN,  Iveth, Neeru G, DO  •  sodium chloride 0.9 % infusion 40 mL, 40 mL, Intravenous, PRN, Iveth, Neeru G, DO  •  tamsulosin (FLOMAX) 24 hr capsule 0.4 mg, 0.4 mg, Oral, Daily, Alee Lazaroe G, DO, 0.4 mg at 23 1008    Antibiotics:  Anti-Infectives (From admission, onward)    Ordered     Dose/Rate Route Frequency Start Stop    23 0717  DAPTOmycin (CUBICIN) 600 mg in sodium chloride 0.9 % 50 mL IVPB        Ordering Provider: Karen Hinojosa APRN    6 mg/kg × 104 kg (Adjusted)  100 mL/hr over 30 Minutes Intravenous Every 24 Hours 23 0830 23 0829    23 0150  piperacillin-tazobactam (ZOSYN) 4.5 g in iso-osmotic dextrose 100 mL IVPB (premix)        Ordering Provider: Eveline Lazarosie G, DO    4.5 g  over 4 Hours Intravenous Every 8 Hours 23 0800 23 0759    23 0302  clindamycin (CLEOCIN) capsule 600 mg        Ordering Provider: Eveline Lazarosie G, DO    600 mg Oral Every 8 Hours Scheduled 23 0800 23 0759    23 2315  clindamycin (CLEOCIN) capsule 600 mg        Ordering Provider: Gabriel Caldwell PA    600 mg Oral Once 23 2317 23 2338    23 2306  vancomycin 3000 mg/500 mL 0.9% NS IVPB ([facility])        Ordering Provider: Eveline Lazarosie G, DO    20 mg/kg × 154 kg  over 180 Minutes Intravenous Once 23 2308 23 0537    23 2306  piperacillin-tazobactam (ZOSYN) 4.5 g in iso-osmotic dextrose 100 mL IVPB (premix)        Ordering Provider: Gabriel Caldwell PA    4.5 g Intravenous Once 23 2308 23 0045                 Physical Exam:   Vital Signs  Temp (24hrs), Av.1 °F (36.7 °C), Min:97.7 °F (36.5 °C), Max:98.4 °F (36.9 °C)    Temp  Min: 97.7 °F (36.5 °C)  Max: 98.4 °F (36.9 °C)  BP  Min: 95/43  Max: 155/73  Pulse  Min: 58  Max: 88  Resp  Min: 16  Max: 20  SpO2  Min: 90 %  Max: 99 %    GENERAL: Awake and alert, in no acute distress.   HEENT: Normocephalic, atraumatic.  PERRL. EOMI. No conjunctival injection. No icterus. Oropharynx  clear without evidence of thrush or exudate. No evidence of peridontal disease.    NECK: Supple   HEART: RRR; No murmur, rubs, gallops.   LUNGS: Clear to auscultation bilaterally without wheezing, rales, rhonchi. Normal respiratory effort. Nonlabored.   ABDOMEN: Soft, nontender, nondistended. Positive bowel sounds. No rebound or guarding. NO mass or HSM.  EXT:  No cyanosis, clubbing or edema. No cord.  :  Without Torres catheter.  MSK: No joint effusions or erythema  SKIN: Warm and dry  Left index finger with diffuse multifocal purulence/ecchymosis extending to the dorsal hand, ecchymosis extending to the dorsum of his hand past the MCP joint,with edema, stable redness extending over 1st 2nd fingers, and dorsum of hand.  PIP joint with slough, areas of necrosis- small amount of bloody drainage, some decreased swelling.    NEURO: Oriented to PPT.  Motor 5/5 strength  PSYCHIATRIC: Normal insight and judgment. Cooperative with PE    Laboratory Data    Results from last 7 days   Lab Units 05/21/23  0617 05/20/23 0332 05/1950   WBC 10*3/mm3 8.32 12.48* 9.85   HEMOGLOBIN g/dL 13.4 13.9 14.3   HEMATOCRIT % 41.4 43.7 44.3   PLATELETS 10*3/mm3 289 272 262     Results from last 7 days   Lab Units 05/20/23 0332   SODIUM mmol/L 135*   POTASSIUM mmol/L 4.0   CHLORIDE mmol/L 99   CO2 mmol/L 25.0   BUN mg/dL 19   CREATININE mg/dL 1.14   GLUCOSE mg/dL 182*   CALCIUM mg/dL 8.7     Results from last 7 days   Lab Units 05/1950   ALK PHOS U/L 113   BILIRUBIN mg/dL 1.0   ALT (SGPT) U/L 10   AST (SGOT) U/L 30     Results from last 7 days   Lab Units 05/20/23 0332   SED RATE mm/hr 88*     Results from last 7 days   Lab Units 05/20/23 0332   CRP mg/dL 9.01*     Results from last 7 days   Lab Units 05/1950   LACTATE mmol/L 1.7     Results from last 7 days   Lab Units 05/20/23 0332   CK TOTAL U/L 71         Estimated Creatinine Clearance: 86.2 mL/min (by C-G formula based on SCr of 1.14  mg/dL).      Microbiology:  No results found for: ACANTHNAEG, AFBCX, BPERTUSSISCX, BLOODCX  No results found for: BCIDPCR, CXREFLEX, CSFCX, CULTURETIS  No results found for: CULTURES, HSVCX, URCX  No results found for: EYECULTURE, GCCX, HSVCULTURE, LABHSV  No results found for: LEGIONELLA, MRSACX, MUMPSCX, MYCOPLASCX  No results found for: NOCARDIACX, STOOLCX  No results found for: THROATCX, UNSTIMCULT, URINECX, CULTURE, VZVCULTUR  No results found for: VIRALCULTU, WOUNDCX        Radiology:  Imaging Results (Last 72 Hours)     Procedure Component Value Units Date/Time    XR Hand 3+ View Left [[id number]] Collected: 05/19/23 2133     Updated: 05/19/23 2138    Narrative:      XR HAND 3+ VW LEFT    Date of Exam: 5/19/2023 9:03 PM EDT    Indication: Index finger pain and swelling    Comparison: None available.    Findings:  There is diffuse soft tissue swelling of the digit. There are bony erosions along the radial side of the base of the proximal phalanx of the second digit. There is also some mild bony erosion along the radial side of the head of the second metacarpal. No   abnormal periosteal reaction. No acute fracture or dislocation. Carpal bones and radiocarpal joint are within normal limits. No radiopaque body identified.    Impression:      Impression:  Small bony erosions at the second metacarpal phalangeal joint as detailed above. No periosteal.  2. Diffuse soft tissue swelling of the second digit. No radiopaque foreign body identified.      Electronically Signed: Otf Fontaine    5/19/2023 9:35 PM EDT    Workstation ID: [id number]            Impression:   - acute/Severe left index finger wound infection/cellulitis;  He has multifocal areas of slough/purulence/ecchymosis  with difficulty flexing the finger and pain limiting exam otherwise with ecchymosis extending past the MCP on the dorsal hand and vague/diffuse erythema extending towards the wrist.  I have advised/recommended the patient be evaluated by surgery  as previously noted.  I discussed with Dr. French directly and she reports medicine team had made arrangements to have him transferred to [place] but patient refused.    has refused facilitating transfer on multiple occasions and administration at [facility] unable to facilitate that any further per my discussion with them.  Per discussion with orthopedics on call, no hand surgeon available at [facility] and orthopedic surgeon on-call has refused consultation.  I have reiterated my recommendation to patient that he have a surgical evaluation with ongoing risk for further serious morbidity and other serious sequela including functional/limb loss/amputation, persistence of progressive or recurrent infection including risk for systemic spread/sepsis and death.  Gram-positive cocci raise concern for strep versus staph infection.  He is on broad antibiotic therapy.  Dr. French continues to look into options for transfer to a center with hand specialist, she will get back to me regarding options and patient preferences if she finds out more information; general orthopedics has refused consultation as above, Dr French aware and will keep us informed; as noted above, patient refuses to consider transfer to another facility other than , he refuses to consider [facility] or [facility] and he refuses to be transferred to any facility in [place] or [place].  He knows the risks he is taking with his decisions as outlined above; picture taken on my iPhone at the approval/agreement of patient in order to share with Dr French by text messaging  - COPD  - Diabetes mellitus, type 2  - Obesity       PLAN/RECOMMENDATIONS:     - Daptomycin 6mg/kg IV daily  - Continue Zosyn, Clinda for now   - Wound  cultures, pending    --check/review labs cultures and scans  --Partial history per nursing staff  --Discussed with microbiology  --Discussed with medicine team, Dr French as above  --I have  discussed very directly  my recommendations with the patient and my concerns as outlined above.    --highly complex at of issues with high risk for further serious morbidity and other serious sequela    Dr. Alfaro has obtained the history, performed the physical exam and formulated the above treatment plan.        Karen Hinojosa, EUNICE  5/21/2023  06:34 EDT       5/23/23 08:45 addendum regarding review of systems for May 21 visit as follows:    Review of Systems:  Constitutional-- No Fever, chills or sweats.  Appetite good, and no malaise. +fatigue.  HEENT-- No new vision, hearing or throat complaints.  No epistaxis or oral sores.  Denies odynophagia or dysphagia. No headache, photophobia or neck stiffness.  CV-- No chest pain, palpitation or syncope  Resp-- No SOB/cough/Hemoptysis  GI- No nausea, vomiting, or diarrhea.  No hematochezia, melena, or hematemesis. Denies jaundice or chronic liver disease.  -- No dysuria, hematuria, or flank pain.  Denies hesitancy, urgency or flank pain.  Lymph- no swollen lymph nodes in neck/axilla or groin.   Heme- No active bruising or bleeding; no Hx of DVT or PE.  MS-- no swelling or pain in the bones or joints of arms/legs.  No new back pain.  Neuro-- No acute focal weakness or numbness in the arms or legs.  No seizures.  Skin--No rashes ; no new skin change  All other systems negative for acute complaints on a 12 system review of systems

## 2023-05-21 NOTE — PROGRESS NOTES
Psychiatric Medicine Services  PROGRESS NOTE    Patient Name: Dieter Christiansen  : 1950  MRN: [id number]    Date of Admission: 2023  Primary Care Physician: Ian Sanchez APRN    Subjective   Subjective     CC:  Left finger infection     HPI:  Feels the same, no fever or nausea.      ROS:  Breathing okay  No pain in forearm  R handed       Objective   Objective     Vital Signs:   Temp:  [97.7 °F (36.5 °C)-98.4 °F (36.9 °C)] 98 °F (36.7 °C)  Heart Rate:  [58-88] 70  Resp:  [16-20] 18  BP: ()/(43-79) 110/63  Flow (L/min):  [2-3] 3     Physical Exam:  Constitutional: Awake, alert and up in chair on RA   Eyes: PER  HENT: NCAT, mucous membranes moist  Neck: Supple,  Respiratory: Clear to auscultation bilaterally, nonlabored respirations   Cardiovascular: RRR,   Gastrointestinal: nontender, obese   Musculoskeletal: L hand dressed; forearm without swelling     Psychiatric: Appropriate affect, cooperative  Neurologic: Oriented x 3, strength symmetric in all extremities, Cranial Nerves grossly intact to confrontation, speech clear  Skin: No rashes      Results Reviewed:  LAB RESULTS:      Lab 23   WBC 8.32 12.48* 9.85   HEMOGLOBIN 13.4 13.9 14.3   HEMATOCRIT 41.4 43.7 44.3   PLATELETS 289 272 262   NEUTROS ABS  --  9.63* 7.41*   IMMATURE GRANS (ABS)  --  0.08* 0.04   LYMPHS ABS  --  1.47 1.37   MONOS ABS  --  1.09* 0.83   EOS ABS  --  0.19 0.17   .5* 100.0* 100.0*   SED RATE  --  88* 83*   CRP  --  9.01* 11.04*   PROCALCITONIN  --  0.10 0.10   LACTATE  --   --  1.7   PROTIME  --  15.3*  --          Lab 23   SODIUM 138 135* 138   POTASSIUM 4.2 4.0 4.2   CHLORIDE 102 99 101   CO2 26.0 25.0 27.0   ANION GAP 10.0 11.0 10.0   BUN 20 19 19   CREATININE 1.08 1.14 1.17   EGFR 72.9 68.3 66.2   GLUCOSE 97 182* 163*   CALCIUM 8.9 8.7 8.9   MAGNESIUM  --  2.1  --    HEMOGLOBIN A1C  --  5.80*  --           Lab 05/1950   TOTAL PROTEIN 7.2   ALBUMIN 3.6   GLOBULIN 3.6   ALT (SGPT) 10   AST (SGOT) 30   BILIRUBIN 1.0   ALK PHOS 113         Lab 05/20/23  0332   PROTIME 15.3*   INR 1.20*                 Brief Urine Lab Results  (Last result in the past 365 days)      Color   Clarity   Blood   Leuk Est   Nitrite   Protein   CREAT   Urine HCG        05/27/22 1431 Dark Yellow   Clear   3+   Negative   Negative   Negative                 Microbiology Results Abnormal     Procedure Component Value - Date/Time    Blood Culture - Blood, Arm, Right [[id number]]  (Normal) Collected: 05/19/23 1949    Lab Status: Preliminary result Specimen: Blood from Arm, Right Updated: 05/20/23 2015     Blood Culture No growth at 24 hours    Blood Culture - Blood, Arm, Left [[id number]]  (Normal) Collected: 05/1950    Lab Status: Preliminary result Specimen: Blood from Arm, Left Updated: 05/20/23 2015     Blood Culture No growth at 24 hours    Wound Culture - Wound, Hand, Digit Left [[id number]] Collected: 05/20/23 0236    Lab Status: Preliminary result Specimen: Wound from Hand, Digit Left Updated: 05/20/23 0315     Gram Stain Moderate (3+) Gram positive cocci in pairs, chains and clusters      No WBCs seen          XR Hand 3+ View Left    Result Date: 5/19/2023  XR HAND 3+ VW LEFT Date of Exam: 5/19/2023 9:03 PM EDT Indication: Index finger pain and swelling Comparison: None available. Findings: There is diffuse soft tissue swelling of the digit. There are bony erosions along the radial side of the base of the proximal phalanx of the second digit. There is also some mild bony erosion along the radial side of the head of the second metacarpal. No  abnormal periosteal reaction. No acute fracture or dislocation. Carpal bones and radiocarpal joint are within normal limits. No radiopaque body identified.    Impression: Impression: Small bony erosions at the second metacarpal phalangeal joint as detailed above. No periosteal. 2.  Diffuse soft tissue swelling of the second digit. No radiopaque foreign body identified. Electronically Signed: Otf Fontaine  5/19/2023 9:35 PM EDT  Workstation ID: [id number]          Current medications:  Scheduled Meds:aspirin, 81 mg, Oral, Daily  atorvastatin, 10 mg, Oral, Daily  cetirizine, 10 mg, Oral, Daily  clindamycin, 600 mg, Oral, Q8H  DAPTOmycin, 6 mg/kg (Adjusted), Intravenous, Q24H  famotidine, 20 mg, Oral, Daily  FLUoxetine, 20 mg, Oral, Daily  gabapentin, 600 mg, Oral, Q8H  insulin lispro, 2-7 Units, Subcutaneous, 4x Daily AC & at Bedtime  ipratropium-albuterol, 3 mL, Nebulization, 4x Daily - RT  lisinopril, 10 mg, Oral, Daily  metoprolol succinate XL, 50 mg, Oral, Daily  nicotine, 1 patch, Transdermal, Q24H  pantoprazole, 40 mg, Oral, Daily  piperacillin-tazobactam, 4.5 g, Intravenous, Q8H  senna-docusate sodium, 2 tablet, Oral, BID  sodium chloride, 10 mL, Intravenous, Q12H  tamsulosin, 0.4 mg, Oral, Daily      Continuous Infusions:   PRN Meds:.•  acetaminophen **OR** acetaminophen **OR** acetaminophen  •  albuterol  •  senna-docusate sodium **AND** polyethylene glycol **AND** bisacodyl **AND** bisacodyl  •  dextrose  •  dextrose  •  glucagon (human recombinant)  •  HYDROcodone-acetaminophen  •  Morphine **AND** naloxone  •  nitroglycerin  •  ondansetron  •  sodium chloride  •  sodium chloride    Assessment & Plan   Assessment & Plan     Active Hospital Problems    Diagnosis  POA   • **Cellulitis of left hand [L03.114]  Yes   • Tobacco abuse [Z72.0]  Unknown   • CAD (coronary artery disease) [I25.10]  Unknown   • COPD (chronic obstructive pulmonary disease) [J44.9]  Unknown   • Type 2 diabetes mellitus [E11.9]  Unknown   • HTN (hypertension) [I10]  Unknown   • HLD (hyperlipidemia) [E78.5]  Unknown   • GERD without esophagitis [K21.9]  Unknown   • MARY ALICE (obstructive sleep apnea) [G47.33]  Unknown   • Infection of finger of left hand [L03.012]  Unknown      Resolved Hospital Problems   No resolved  "problems to display.        Brief Hospital Course to date:  Dieter Christiansen is a 73 y.o. male w PMH COPD, CAD, DM, MARY ALICE.  Admitted for left index finger infection.  Began a week ago with a hangnail after minor trauma; has spread and worsened.  Tmax 99.5.      Cellulitis of left hand  Infection of left index finger  - Radiographs noted above; MRI left hand not obtainable due to pt cannot lie flat   - abx day 2; ID following.  Have discussed with ID MD multiple times.   -  Blood culture, Wound culture  - WOC  - control blood glucoses     Regarding efforts to obtain hand surgeon eval  - several discussions with Dr Alfaro.   - per partner:   Dr. Hutton and Dr. Coley are not available for coverage this week  - partner or ED staff reportedly spoke w Dr. Aviles at , orthopedic hand surgeon, who declined to accept in transfer,  recommended trial of IV antibiotics for now.    - [facility], declined transfer as they have no beds  - Patient states \"I will not go to [facility]\" and avers that he would rather lose his finger than go there.  He also states \"I will not go to [place] [facility].\"  - Called  transfer office at 1015.  Spoke w Dr Kay, hand surgeon; sent photographs.  He declined to accept ( on divert) but noted that he would reconsider if pt progressed to being toxic or infection spread up arm.   - Called Dr Allan Coelho, sent photographs.  He refused the consult.  - Discussed with Dr Baez of [facility].  The hand surgeon in their group, Dr Loo, does not take hand call for [facility].   Additionally he is unavailable all this coming week  - Discussed again 5/21 w Dr Alfaro.  He again spoke w patient about risk to limb and options for transfer to a hospital w a hand surgeon.  Pt again stated he wants to stay at [facility], does not want to go to [facility] or [facility] [place] for care.  Pt and I discussed his options again today.  He says he will talk to his daughter.        COPD - DuoNebs scheduled and as needed     CAD  HTN  HLD  - retart " lasix       Diabetes mellitus type 2 , A1C 5.8   - Hold home metformin  - SSI with Accu-Cheks     GERD  - Continue PPI     MARY ALICE  - CPAP     Chronic tobacco use  -- on cessation  --nicotine patch    Expected Discharge Location and Transportation: home by car vs tx for surgery  Expected Discharge tbd  Expected Discharge Date: 5/22/2023; Expected Discharge Time:      DVT prophylaxis:  Mechanical DVT prophylaxis orders are present.     AM-PAC 6 Clicks Score (PT): 21 (05/20/23 0800)    CODE STATUS:   Code Status and Medical Interventions:   Ordered at: 05/20/23 0141     Level Of Support Discussed With:    Patient     Code Status (Patient has no pulse and is not breathing):    CPR (Attempt to Resuscitate)     Medical Interventions (Patient has pulse or is breathing):    Full Support       Ronna French MD  05/21/23

## 2023-05-21 NOTE — PLAN OF CARE
Problem: Adult Inpatient Plan of Care  Goal: Plan of Care Review  Outcome: Ongoing, Progressing  Goal: Patient-Specific Goal (Individualized)  Outcome: Ongoing, Progressing  Goal: Absence of Hospital-Acquired Illness or Injury  Outcome: Ongoing, Progressing  Intervention: Identify and Manage Fall Risk  Recent Flowsheet Documentation  Taken 5/21/2023 0400 by Batsheva Sheth RN  Safety Promotion/Fall Prevention:   activity supervised   room organization consistent   safety round/check completed  Taken 5/21/2023 0200 by Batsheva Sheth RN  Safety Promotion/Fall Prevention:   room organization consistent   safety round/check completed   clutter free environment maintained  Taken 5/20/2023 2200 by Batsheva Sheth RN  Safety Promotion/Fall Prevention:   room organization consistent   safety round/check completed  Taken 5/20/2023 2000 by Batsheva Sheth RN  Safety Promotion/Fall Prevention:   clutter free environment maintained   fall prevention program maintained   nonskid shoes/slippers when out of bed   room organization consistent   safety round/check completed  Intervention: Prevent Skin Injury  Recent Flowsheet Documentation  Taken 5/20/2023 2000 by Batsheva Sheth RN  Body Position: position changed independently  Intervention: Prevent and Manage VTE (Venous Thromboembolism) Risk  Recent Flowsheet Documentation  Taken 5/20/2023 2000 by Batsheva Sheth RN  Activity Management: up ad naif  Range of Motion: active ROM (range of motion) encouraged  Intervention: Prevent Infection  Recent Flowsheet Documentation  Taken 5/21/2023 0400 by Batsheva Sheth RN  Infection Prevention:   environmental surveillance performed   rest/sleep promoted  Taken 5/21/2023 0200 by Batsheva Sheth RN  Infection Prevention: environmental surveillance performed  Taken 5/20/2023 2200 by Batsheva Sheth RN  Infection Prevention:   rest/sleep promoted   personal protective equipment utilized  Taken 5/20/2023 2000 by Batsheva Sheth  RN  Infection Prevention:   environmental surveillance performed   rest/sleep promoted  Goal: Optimal Comfort and Wellbeing  Outcome: Ongoing, Progressing  Intervention: Provide Person-Centered Care  Recent Flowsheet Documentation  Taken 5/20/2023 2000 by Batsheva Sheth RN  Trust Relationship/Rapport:   care explained   choices provided   questions answered   questions encouraged   thoughts/feelings acknowledged  Goal: Readiness for Transition of Care  Outcome: Ongoing, Progressing     Problem: Fall Injury Risk  Goal: Absence of Fall and Fall-Related Injury  Outcome: Ongoing, Progressing  Intervention: Promote Injury-Free Environment  Recent Flowsheet Documentation  Taken 5/21/2023 0400 by Batsheva Sheth RN  Safety Promotion/Fall Prevention:   activity supervised   room organization consistent   safety round/check completed  Taken 5/21/2023 0200 by Batsheva Sheth RN  Safety Promotion/Fall Prevention:   room organization consistent   safety round/check completed   clutter free environment maintained  Taken 5/20/2023 2200 by Batsheva Sheth RN  Safety Promotion/Fall Prevention:   room organization consistent   safety round/check completed  Taken 5/20/2023 2000 by Batsheva Sheth RN  Safety Promotion/Fall Prevention:   clutter free environment maintained   fall prevention program maintained   nonskid shoes/slippers when out of bed   room organization consistent   safety round/check completed     Problem: Skin Injury Risk Increased  Goal: Skin Health and Integrity  Outcome: Ongoing, Progressing  Intervention: Optimize Skin Protection  Recent Flowsheet Documentation  Taken 5/20/2023 2000 by Batsheva Sheth RN  Head of Bed (HOB) Positioning: HOB elevated   Goal Outcome Evaluation:

## 2023-05-22 DIAGNOSIS — K21.9 GASTROESOPHAGEAL REFLUX DISEASE WITHOUT ESOPHAGITIS: ICD-10-CM

## 2023-05-22 LAB
BACTERIA SPEC AEROBE CULT: ABNORMAL
GLUCOSE BLDC GLUCOMTR-MCNC: 102 MG/DL (ref 70–130)
GLUCOSE BLDC GLUCOMTR-MCNC: 104 MG/DL (ref 70–130)
GLUCOSE BLDC GLUCOMTR-MCNC: 106 MG/DL (ref 70–130)
GLUCOSE BLDC GLUCOMTR-MCNC: 174 MG/DL (ref 70–130)
GRAM STN SPEC: ABNORMAL
GRAM STN SPEC: ABNORMAL

## 2023-05-22 PROCEDURE — 25010000002 PIPERACILLIN SOD-TAZOBACTAM PER 1 G: Performed by: INTERNAL MEDICINE

## 2023-05-22 PROCEDURE — 94799 UNLISTED PULMONARY SVC/PX: CPT

## 2023-05-22 PROCEDURE — 99232 SBSQ HOSP IP/OBS MODERATE 35: CPT | Performed by: INTERNAL MEDICINE

## 2023-05-22 PROCEDURE — 63710000001 INSULIN LISPRO (HUMAN) PER 5 UNITS: Performed by: INTERNAL MEDICINE

## 2023-05-22 PROCEDURE — 25010000002 DAPTOMYCIN PER 1 MG: Performed by: NURSE PRACTITIONER

## 2023-05-22 PROCEDURE — 82948 REAGENT STRIP/BLOOD GLUCOSE: CPT

## 2023-05-22 RX ORDER — PANTOPRAZOLE SODIUM 40 MG/1
TABLET, DELAYED RELEASE ORAL
Qty: 30 TABLET | Refills: 5 | Status: SHIPPED | OUTPATIENT
Start: 2023-05-22

## 2023-05-22 RX ADMIN — TAMSULOSIN HYDROCHLORIDE 0.4 MG: 0.4 CAPSULE ORAL at 08:02

## 2023-05-22 RX ADMIN — GABAPENTIN 600 MG: 300 CAPSULE ORAL at 06:20

## 2023-05-22 RX ADMIN — PIPERACILLIN SODIUM AND TAZOBACTAM SODIUM 4.5 G: 4; .5 INJECTION, SOLUTION INTRAVENOUS at 01:16

## 2023-05-22 RX ADMIN — LISINOPRIL 10 MG: 10 TABLET ORAL at 08:03

## 2023-05-22 RX ADMIN — Medication 10 ML: at 08:05

## 2023-05-22 RX ADMIN — IPRATROPIUM BROMIDE AND ALBUTEROL SULFATE 3 ML: 2.5; .5 SOLUTION RESPIRATORY (INHALATION) at 20:45

## 2023-05-22 RX ADMIN — CLINDAMYCIN HYDROCHLORIDE 600 MG: 150 CAPSULE ORAL at 08:06

## 2023-05-22 RX ADMIN — METOPROLOL SUCCINATE 50 MG: 50 TABLET, EXTENDED RELEASE ORAL at 08:03

## 2023-05-22 RX ADMIN — IPRATROPIUM BROMIDE AND ALBUTEROL SULFATE 3 ML: 2.5; .5 SOLUTION RESPIRATORY (INHALATION) at 08:55

## 2023-05-22 RX ADMIN — INSULIN LISPRO 2 UNITS: 100 INJECTION, SOLUTION INTRAVENOUS; SUBCUTANEOUS at 07:58

## 2023-05-22 RX ADMIN — IPRATROPIUM BROMIDE AND ALBUTEROL SULFATE 3 ML: 2.5; .5 SOLUTION RESPIRATORY (INHALATION) at 13:00

## 2023-05-22 RX ADMIN — ATORVASTATIN CALCIUM 10 MG: 10 TABLET, FILM COATED ORAL at 08:02

## 2023-05-22 RX ADMIN — GABAPENTIN 600 MG: 300 CAPSULE ORAL at 13:44

## 2023-05-22 RX ADMIN — IPRATROPIUM BROMIDE AND ALBUTEROL SULFATE 3 ML: 2.5; .5 SOLUTION RESPIRATORY (INHALATION) at 15:12

## 2023-05-22 RX ADMIN — FAMOTIDINE 20 MG: 20 TABLET ORAL at 08:02

## 2023-05-22 RX ADMIN — GABAPENTIN 600 MG: 300 CAPSULE ORAL at 21:11

## 2023-05-22 RX ADMIN — DAPTOMYCIN 600 MG: 500 INJECTION, POWDER, LYOPHILIZED, FOR SOLUTION INTRAVENOUS at 08:07

## 2023-05-22 RX ADMIN — Medication 1 PATCH: at 08:14

## 2023-05-22 RX ADMIN — CLINDAMYCIN HYDROCHLORIDE 600 MG: 150 CAPSULE ORAL at 01:15

## 2023-05-22 RX ADMIN — PIPERACILLIN SODIUM AND TAZOBACTAM SODIUM 4.5 G: 4; .5 INJECTION, SOLUTION INTRAVENOUS at 08:14

## 2023-05-22 RX ADMIN — Medication 81 MG: at 08:02

## 2023-05-22 RX ADMIN — FLUOXETINE 20 MG: 20 CAPSULE ORAL at 08:14

## 2023-05-22 RX ADMIN — PANTOPRAZOLE SODIUM 40 MG: 40 TABLET, DELAYED RELEASE ORAL at 06:20

## 2023-05-22 RX ADMIN — CETIRIZINE HYDROCHLORIDE 10 MG: 10 TABLET, FILM COATED ORAL at 08:03

## 2023-05-22 RX ADMIN — Medication 10 ML: at 21:11

## 2023-05-22 RX ADMIN — FUROSEMIDE 40 MG: 40 TABLET ORAL at 08:03

## 2023-05-22 RX ADMIN — SENNOSIDES AND DOCUSATE SODIUM 2 TABLET: 50; 8.6 TABLET ORAL at 21:11

## 2023-05-22 NOTE — CASE MANAGEMENT/SOCIAL WORK
Discharge Planning Assessment  [facility]     Patient Name: Dieter Christiansen  MRN: [id number]  Today's Date: 5/22/2023    Admit Date: 5/19/2023    Plan: Home at DC   Discharge Needs Assessment     Row Name 05/22/23 0955       Living Environment    People in Home child(nelli), adult    Current Living Arrangements home    Living Arrangement Comments Lives in one level of the home he shares with his stepdaughter. She lives in upper level. His stepdaughters helps him with his ADLs as needed.       Discharge Needs Assessment    Equipment Currently Used at Home cane, straight;walker, rolling;oxygen    Equipment Needed After Discharge none    Discharge Coordination/Progress He wears 02 at HS thru [facility]. He denies current use of HH or outpt services.               Discharge Plan     Row Name 05/22/23 1001       Plan    Plan Home at DC    Patient/Family in Agreement with Plan yes    Plan Comments I spoke with the pt. He denies any DC needs at this time. I will follow.    Final Discharge Disposition Code 01 - home or self-care    Row Name 05/22/23 0752       Plan    Final Discharge Disposition Code 01 - home or self-care              Continued Care and Services - Admitted Since 5/19/2023    Coordination has not been started for this encounter.       Expected Discharge Date and Time     Expected Discharge Date Expected Discharge Time    May 24, 2023          Demographic Summary    No documentation.                Functional Status     Row Name 05/22/23 0952       Functional Status    Usual Activity Tolerance moderate       Functional Status, IADL    Medications independent    Meal Preparation assistive person    Housekeeping assistive person    Laundry assistive person    Shopping assistive person               Psychosocial    No documentation.                Abuse/Neglect    No documentation.                Legal    No documentation.                Substance Abuse    No documentation.                Patient Forms    No  documentation.                   Karen Reynolds RN

## 2023-05-22 NOTE — NURSING NOTE
WOC consult for: left index finger.    Well documented struggle to get patient to hospital with hand surgeon.    Previously dressing placed a few hours ago with stained serosanginuous drainage but no purulence or malodor.    Cleansed with normal saline flush, applied square of silver hydrofiber to open wound for drainage absorption and antimicrobial. Then applied gauze and loose koban to keep dressing in place.    Entire finger encased in blister with open area towards proximal end of finger. Open wound bed with clean, moist tissue. Finger is hot compared to hand/other fingers.             Nursing to perform daily dressing changes. May be beneficial to wait until physicians assess wound everyday before changing wound dressing.    Will follow.

## 2023-05-22 NOTE — PROGRESS NOTES
INFECTIOUS DISEASE Progress note     Dieter Christiansen  1950  [id number]      Admission Date: 5/19/2023      Requesting Provider: Dr French  Evaluating Physician: Dr Chase Alfaro  Reason for Consultation: left index finger infection     History of present illness:    Patient is a 73 y.o. male, with PMH COPD, CAD, DM, MARY ALICE, seen today for left index finger infection.  He had developed a hangnail of the finger after which he injured his hand when he hit his hand on a wall approximately one week ago.  The redness and swelling progressively worsened prompting his presentation to the ED.  Xray of the hand with diffuse swelling of the index finger, with bony erosions along the radial side of the base of proximal phalanx of second digit. Admitting labs with WBC 9.85, plt 262, CRP 11, ESR 83, PCT 0.10, Scr 1.17, and tmax of 99.5.  Wound gram stain with moderate gram positive cocci in pairs, chains and clusters, no WBCs. Started on Vancomycin, Zosyn and Clindamycin and we were consulted for antibiotic management.  Plans made to transfer to [place] per d/w Dr French, however he refused per d/w Dr French.    5/20/23 He has pain at his left index finger/hand which is constant, sharp, nonradiating, worse with movement/pressure, better with pain meds and 4-5 out of 10 in severity.  He denied any other specific trauma aside from above.  No other blood force penetrating trauma.  No animal insect arthropod bite.  Denies any prior history MRSA VRE C. Difficile or ESBL/kpC/CRE organisms     Denies any other specific focal symptoms.  No headache photophobia or neck stiffness.  He is on nasal cannula oxygen but denies dyspnea cough or hemoptysis.  No nausea vomiting diarrhea or abdominal pain.  No dysuria hematuria or pyuria.    ON 5/20/23   Discussed very openly with patient risk of his hand infection including risk of digit loss/higher level amputation including loss of hand or arm in addition to further systemic spread of  infection with risk for other dire consequences/mortality.  Conversation in presence of nursing staff and similar conversation by Dr. french with him; we recommended evaluation by orthopedic surgery/hand surgery, and transfer to a center with hand surgery as no available hand surgeon at [facility],  in addition to ongoing IV antibiotics and other supportive measures.  Patient  refused transfer to the [facility] either [facility] or [facility] that he would rather lose his left index finger/hand/life  than to go to 1 of those hospitals.  He has refused transfer to [facility] with similar explanation.  Dr. French has been in contact with me on multiple occasions , She contacted hand surgery at [facility] and they refused transfer.  They have seen a picture of his hand per my discussion with Dr. French; they apparently agreed with Dr. French regarding the severity and the they stated  high risk  for amputation of  his left index finger; they asked that orthopedics see at [facility]; Dr. French has contacted orthopedics and they are not a hand surgeon and they have refused to see consult.  I d/w administration and they are unable to facilitate transfer to     5/21/23:   D/w Dr French again multiple occasions today  Discussed AGAIN very openly with patient risk of his hand infection including risk of digit loss/higher level amputation including loss of hand or arm in addition to further systemic spread of infection with risk for other dire consequences/mortality/death.  Conversation in presence of nursing staff ; we recommended evaluation by orthopedic surgery/hand surgery, and transfer to a center with hand surgery as no available hand surgeon at [facility],  in addition to ongoing IV antibiotics and other supportive measures.  Patient  Again has refused transfer to the [facility] either [facility] or [facility] that he would rather  "lose his left index finger/hand/life  than to go to 1 of those hospitals.  He has refused transfer to [facility] with similar explanation again.  Dr. French has been in contact with me on multiple occasions again today as above , She anticipates contacting  hand surgery at [facility].  Dr. French has contacted orthopedics at [facility] and they are not a hand surgeon and they have refused to see consult.  Patient is aware of the above limitations with respect to no orthopedic surgeon for consultation at [facility], he is aware of my recommendations and he says to me he would \" rather take his chances\" regarding loss of finger/hand/life, and stay  at [facility] at present rather than consider any alternatives to [facility], [place] or     Refusing am labs per nursing staff.  MRsA screen positive. Blood cultures negative so far. He is unable to lie flat to take the MRI.  He remains afebrile.  He thinks his finger is less painful , and he is able to move it better but it appears more bruised along the finger with same redness at the hand.  No progressive redness or pain.  He is able to flex the finger a bit    5/22/23 d/w Dr Sutton; Discussed AGAIN with nursing present with him very openly the risk of his hand infection including risk of digit loss/higher level amputation including loss of hand or arm in addition to further systemic spread of infection with risk for other dire consequences/mortality/death.  We have  recommended evaluation by orthopedic surgery/hand surgery on each visit and on multiple occasions, and transfer to a center with hand surgery as no available hand surgeon at [facility],  in addition to ongoing IV antibiotics and other supportive measures.  Patient  Again has refused transfer to the [facility] either [facility] or [facility] as that he would rather lose his left index finger/hand/life  than to go to 1 of those hospitals.  He has refused " "transfer to [facility] with similar explanation again.  Dr. French has been in contact with me on multiple occasions in recent days as above and I have d/w Dr Sutton;  Dr. French  contacted orthopedics at [facility] and they are not a hand surgeon and they have refused to see consult.  Patient is aware of the above limitations with respect to no orthopedic surgeon for consultation at [facility], he is aware of my recommendations and he says to me he would \" rather take his chances\" regarding loss of finger/hand/life, and stay  at [facility] at present rather than consider any alternatives to [facility] or     Hand culture with MRSA.  MRsA screen positive. Blood cultures negative so far.   He remains afebrile.  He thinks his finger is less painful , and he is able to move it better  Albeit still with restrictions flexion at the left index finger.  No progressive redness or pain.  He has significant slough of skin tissue at the dorsal aspect of the finger     pain at his left index finger/hand which is constant, sharp, nonradiating, worse with movement/pressure, better with pain meds and 3-4  out of 10 in severity and less intense.         Past Medical History:   Diagnosis Date   • Chronic back pain    • COPD (chronic obstructive pulmonary disease)    • Coronary artery disease involving native coronary artery of native heart without angina pectoris    • Diabetes mellitus    • GERD (gastroesophageal reflux disease)    • Heart attack    • Hyperlipidemia    • Hypertension    • Lung nodule    • Murmur    • MARY ALICE (obstructive sleep apnea)        Past Surgical History:   Procedure Laterality Date   • FEMORAL ARTERY STENT         Family History   Problem Relation Age of Onset   • Diabetes Father    • Diabetes Brother        Social History     Socioeconomic History   • Marital status:    Tobacco Use   • Smoking status: Every Day     Packs/day: 0.50     Years: 30.00     Pack years: 15.00     " Types: Cigarettes   • Smokeless tobacco: Former   • Tobacco comments:     20 years ago   Vaping Use   • Vaping Use: Never used   Substance and Sexual Activity   • Alcohol use: Not Currently   • Drug use: Never   • Sexual activity: Defer       No Known Allergies         Physical Exam:   Vital Signs  Temp (24hrs), Av.8 °F (36.6 °C), Min:97.5 °F (36.4 °C), Max:98.4 °F (36.9 °C)    Temp  Min: 97.5 °F (36.4 °C)  Max: 98.4 °F (36.9 °C)  BP  Min: 106/60  Max: 122/76  Pulse  Min: 59  Max: 78  Resp  Min: 16  Max: 18  SpO2  Min: 90 %  Max: 97 %    GENERAL: Awake and alert  HEENT: Normocephalic, atraumatic.  PERRL. EOMI. No conjunctival injection. No icterus. Oropharynx clear without evidence of thrush or exudate. No evidence of peridontal disease.    NECK: Supple   HEART: RRR; No murmur, rubs, gallops.   LUNGS: Clear to auscultation bilaterally without wheezing, rales, rhonchi. Normal respiratory effort. Nonlabored.   ABDOMEN: Soft, nontender, nondistended. Positive bowel sounds. No rebound or guarding. NO mass or HSM.  EXT:  No cyanosis, clubbing or edema. No cord.  :  Without Torres catheter.  MSK: No joint effusions or erythema  SKIN:   Left index finger with ecchymosis extending to the dorsal hand,  past the MCP joint,with edema, stable redness extending over 1st 2nd fingers, and dorsum of hand.  He has had slough of skin from the dorsal aspect of his index finger at the proximal phalanx, some bleeding there.   some decreased swelling.  No new fluctuance or crepitus   NEURO: Oriented to PPT.  Motor 5/5 strength  PSYCHIATRIC: Normal insight and judgment. Cooperative with PE    Laboratory Data    Results from last 7 days   Lab Units 23  0617 23  0332 23   WBC 10*3/mm3 8.32 12.48* 9.85   HEMOGLOBIN g/dL 13.4 13.9 14.3   HEMATOCRIT % 41.4 43.7 44.3   PLATELETS 10*3/mm3 289 272 262     Results from last 7 days   Lab Units 23  0617   SODIUM mmol/L 138   POTASSIUM mmol/L 4.2   CHLORIDE mmol/L  102   CO2 mmol/L 26.0   BUN mg/dL 20   CREATININE mg/dL 1.08   GLUCOSE mg/dL 97   CALCIUM mg/dL 8.9     Results from last 7 days   Lab Units 05/1950   ALK PHOS U/L 113   BILIRUBIN mg/dL 1.0   ALT (SGPT) U/L 10   AST (SGOT) U/L 30     Results from last 7 days   Lab Units 05/20/23  0332   SED RATE mm/hr 88*     Results from last 7 days   Lab Units 05/20/23  0332   CRP mg/dL 9.01*     Results from last 7 days   Lab Units 05/1950   LACTATE mmol/L 1.7     Results from last 7 days   Lab Units 05/20/23  0332   CK TOTAL U/L 71         Estimated Creatinine Clearance: 90.9 mL/min (by C-G formula based on SCr of 1.08 mg/dL).              Radiology:  Imaging Results (Last 72 Hours)     Procedure Component Value Units Date/Time    XR Hand 3+ View Left [[id number]] Collected: 05/19/23 2133     Updated: 05/19/23 2138    Narrative:      XR HAND 3+ VW LEFT    Date of Exam: 5/19/2023 9:03 PM EDT    Indication: Index finger pain and swelling    Comparison: None available.    Findings:  There is diffuse soft tissue swelling of the digit. There are bony erosions along the radial side of the base of the proximal phalanx of the second digit. There is also some mild bony erosion along the radial side of the head of the second metacarpal. No   abnormal periosteal reaction. No acute fracture or dislocation. Carpal bones and radiocarpal joint are within normal limits. No radiopaque body identified.    Impression:      Impression:  Small bony erosions at the second metacarpal phalangeal joint as detailed above. No periosteal.  2. Diffuse soft tissue swelling of the second digit. No radiopaque foreign body identified.      Electronically Signed: Otf Fontaine    5/19/2023 9:35 PM EDT    Workstation ID: [id number]            Impression:   - acute/Severe left index finger wound infection/cellulitis;  He has multifocal areas of slough/purulence/ecchymosis  with difficulty flexing the finger and pain limiting exam otherwise with  ecchymosis extending past the MCP on the dorsal hand and vague/diffuse erythema extending towards the wrist at admission, subsequently with some slough of tissue at the more proximal aspect and culture with MRSA.  I have advised/recommended the patient be evaluated by surgery as previously noted.  I discussed with Dr. French and Dr Sutton directly and  medicine team had made arrangements to have him transferred to [place] but patient refused.  Patient refused transfer to the [facility] system.  has refused facilitating transfer on multiple occasions and administration at [facility] unable to facilitate that any further per my discussion with Dr Herrera.  Per discussion with orthopedics on call, no hand surgeon available at [facility] and orthopedic surgeon on-call has refused consultation.  I have reiterated my recommendation to patient that he have a surgical evaluation with ongoing risk for further serious morbidity and other serious sequela including functional/limb loss/amputation, persistence of progressive or recurrent infection including risk for systemic spread/sepsis and death.  Medicine team continues to look into options for transfer to a center with hand specialist, and they will get back to me regarding options and patient preferences if she finds out more information; general orthopedics has refused consultation as above, Dr French aware and will keep us informed; as noted above, patient refuses to consider transfer to another facility other than , he refuses to consider [facility] or [facility] and he refuses to be transferred to any facility in [place] or [place].  He knows the risks he is taking with his decisions as outlined above; if you remain unable to transfer to a center with hand surgeon, you could consider outpatient appointment on same day of discharge with ongoing IV antibiotics as an alternative.  - COPD  - Diabetes  mellitus, type 2  - Obesity       PLAN/RECOMMENDATIONS:     - Daptomycin 6mg/kg IV daily    --check/review labs cultures and scans  --Partial history per nursing staff  --Discussed with microbiology  --Discussed with medicine team, Dr Sutton as above  --I have discussed very directly  my recommendations with the patient and my concerns as outlined above.    --highly complex at of issues with high risk for further serious morbidity and other serious sequela    **If you are unable to transfer to a center with hand surgery, you should consider identifying outpatient hand surgeon for same day appointment and outpatient IV antibiotics    Copied text in this note has been reviewed and is accurate as of 05/22/23.      Chase Alfaro MD  5/22/2023  09:23 EDT     5/23/23 08:44 addendum regarding review of systems for May 22 visit as follows      Review of Systems:  Constitutional-- No Fever, chills or sweats.  Appetite good, and no malaise. +fatigue.  HEENT-- No new vision, hearing or throat complaints.  No epistaxis or oral sores.  Denies odynophagia or dysphagia. No headache, photophobia or neck stiffness.  CV-- No chest pain, palpitation or syncope  Resp-- No SOB/cough/Hemoptysis  GI- No nausea, vomiting, or diarrhea.  No hematochezia, melena, or hematemesis. Denies jaundice or chronic liver disease.  -- No dysuria, hematuria, or flank pain.  Denies hesitancy, urgency or flank pain.  Lymph- no swollen lymph nodes in neck/axilla or groin.   Heme- No active bruising or bleeding; no Hx of DVT or PE.  MS-- no swelling or pain in the bones or joints of arms/legs.  No new back pain.  Neuro-- No acute focal weakness or numbness in the arms or legs.  No seizures.  Skin--No rashes   All other systems negative for acute complaints on a 12 system review of systems

## 2023-05-22 NOTE — PROGRESS NOTES
[facility] Medicine Services  PROGRESS NOTE    Patient Name: Dieter Christiansen  : 1950  MRN: [id number]    Date of Admission: 2023  Primary Care Physician: Ian Sanchez APRN    Subjective   Subjective     CC:  Finger infection     HPI:  No acute events. States he feels ok. Pain improved.     ROS:  Gen- No fevers, chills  CV- No chest pain, palpitations  Resp- No cough, dyspnea  GI- No N/V/D, abd pain     Objective   Objective     Vital Signs:   Temp:  [97.5 °F (36.4 °C)-98.4 °F (36.9 °C)] 98 °F (36.7 °C)  Heart Rate:  [57-78] 57  Resp:  [16-18] 18  BP: (106-135)/(53-84) 135/64  Flow (L/min):  [2] 2     Physical Exam:  Constitutional: No acute distress, awake, alert  HENT: NCAT, mucous membranes moist  Respiratory: Clear to auscultation bilaterally, respiratory effort normal   Cardiovascular: RRR, no murmurs, rubs, or gallops  Gastrointestinal: Positive bowel sounds, soft, nontender, nondistended  Musculoskeletal: No bilateral ankle edema -- left index finger wrapped - erythema; limited in flexion - states it is improved   Psychiatric: Appropriate affect, cooperative  Neurologic: Oriented x 3, strength symmetric in all extremities, Cranial Nerves grossly intact to confrontation, speech clear  Skin: No rashes      Results Reviewed:  LAB RESULTS:      Lab 23  0617 23  0332 23   WBC 8.32 12.48* 9.85   HEMOGLOBIN 13.4 13.9 14.3   HEMATOCRIT 41.4 43.7 44.3   PLATELETS 289 272 262   NEUTROS ABS  --  9.63* 7.41*   IMMATURE GRANS (ABS)  --  0.08* 0.04   LYMPHS ABS  --  1.47 1.37   MONOS ABS  --  1.09* 0.83   EOS ABS  --  0.19 0.17   .5* 100.0* 100.0*   SED RATE  --  88* 83*   CRP  --  9.01* 11.04*   PROCALCITONIN  --  0.10 0.10   LACTATE  --   --  1.7   PROTIME  --  15.3*  --          Lab 05/21/23  0617 23  0332 23   SODIUM 138 135* 138   POTASSIUM 4.2 4.0 4.2   CHLORIDE 102 99 101   CO2 26.0 25.0 27.0   ANION GAP 10.0 11.0 10.0    BUN 20 19 19   CREATININE 1.08 1.14 1.17   EGFR 72.9 68.3 66.2   GLUCOSE 97 182* 163*   CALCIUM 8.9 8.7 8.9   MAGNESIUM  --  2.1  --    HEMOGLOBIN A1C  --  5.80*  --          Lab 05/1950   TOTAL PROTEIN 7.2   ALBUMIN 3.6   GLOBULIN 3.6   ALT (SGPT) 10   AST (SGOT) 30   BILIRUBIN 1.0   ALK PHOS 113         Lab 05/20/23  0332   PROTIME 15.3*   INR 1.20*                 Brief Urine Lab Results  (Last result in the past 365 days)      Color   Clarity   Blood   Leuk Est   Nitrite   Protein   CREAT   Urine HCG        05/27/22 1431 Dark Yellow   Clear   3+   Negative   Negative   Negative                 Microbiology Results Abnormal     Procedure Component Value - Date/Time    Blood Culture - Blood, Arm, Right [[id number]]  (Normal) Collected: 05/19/23 1949    Lab Status: Preliminary result Specimen: Blood from Arm, Right Updated: 05/21/23 2017     Blood Culture No growth at 2 days    Blood Culture - Blood, Arm, Left [[id number]]  (Normal) Collected: 05/1950    Lab Status: Preliminary result Specimen: Blood from Arm, Left Updated: 05/21/23 2017     Blood Culture No growth at 2 days          No radiology results from the last 24 hrs        Current medications:  Scheduled Meds:aspirin, 81 mg, Oral, Daily  cetirizine, 10 mg, Oral, Daily  DAPTOmycin, 6 mg/kg (Adjusted), Intravenous, Q24H  famotidine, 20 mg, Oral, Daily  FLUoxetine, 20 mg, Oral, Daily  furosemide, 40 mg, Oral, Daily  gabapentin, 600 mg, Oral, Q8H  insulin lispro, 2-7 Units, Subcutaneous, 4x Daily AC & at Bedtime  ipratropium-albuterol, 3 mL, Nebulization, 4x Daily - RT  lisinopril, 10 mg, Oral, Daily  metoprolol succinate XL, 50 mg, Oral, Daily  nicotine, 1 patch, Transdermal, Q24H  pantoprazole, 40 mg, Oral, Daily  senna-docusate sodium, 2 tablet, Oral, BID  sodium chloride, 10 mL, Intravenous, Q12H  tamsulosin, 0.4 mg, Oral, Daily      Continuous Infusions:   PRN Meds:.•  acetaminophen **OR** acetaminophen **OR** acetaminophen  •  albuterol  •   "senna-docusate sodium **AND** polyethylene glycol **AND** bisacodyl **AND** bisacodyl  •  dextrose  •  dextrose  •  glucagon (human recombinant)  •  HYDROcodone-acetaminophen  •  Morphine **AND** naloxone  •  nitroglycerin  •  ondansetron  •  sodium chloride  •  sodium chloride    Assessment & Plan   Assessment & Plan     Active Hospital Problems    Diagnosis  POA   • **Cellulitis of left hand [L03.114]  Yes   • Tobacco abuse [Z72.0]  Unknown   • CAD (coronary artery disease) [I25.10]  Unknown   • COPD (chronic obstructive pulmonary disease) [J44.9]  Unknown   • Type 2 diabetes mellitus [E11.9]  Unknown   • HTN (hypertension) [I10]  Unknown   • HLD (hyperlipidemia) [E78.5]  Unknown   • GERD without esophagitis [K21.9]  Unknown   • MARY ALICE (obstructive sleep apnea) [G47.33]  Unknown   • Infection of finger of left hand [L03.012]  Unknown      Resolved Hospital Problems   No resolved problems to display.        Brief Hospital Course to date:  Dieter Christiansen is a 73 y.o. male w PMH COPD, CAD, DM, MARY ALICE.  Admitted for left index finger infection.  Began a week ago with a hangnail after minor trauma; has spread and worsened.  Tmax 99.5.       Cellulitis of left hand  Infection of left index finger  - Radiographs noted above; MRI left hand not obtainable due to pt cannot lie flat   -  Blood culture NGTD, Wound culture MRSA  - WO     Regarding efforts to obtain hand surgeon eval  - several discussions with Dr Alfaro.   - per partner:   Dr. Hutton and Dr. Coley are not available for coverage this week  - partner or ED staff reportedly spoke w Dr. Aviles at , orthopedic hand surgeon, who declined to accept in transfer,  recommended trial of IV antibiotics for now.    - [facility], declined transfer as they have no beds  - Patient states \"I will not go to [facility]\" and avers that he would rather lose his finger than go there.  He also states \"I will not go to [place].\"  - My partner Called  transfer office  " 5/20  Spoke w Dr Kay, hand surgeon; sent photographs.  He declined to accept ([facility] on divert) but noted that he would reconsider if pt progressed to being toxic or infection spread up arm.   - Called Dr Allan Coelho, sent photographs.  He refused the consult.  - Discussed with Dr Baez of [facility].  The hand surgeon in their group, Dr Loo, does not take hand call for [facility].   Additionally he is unavailable all this coming week  - My partner again discussed again 5/21 w Dr Alfaro.  He again spoke w patient about risk to limb and options for transfer to a hospital w a hand surgeon.  Pt again stated he wants to stay at [facility], does not want to go to [facility] or [place] for care.    - Culture with MRSA  - Clinically stable. Continue dapto. Work on arranging close outpatient eval with hand surgeon        COPD - DuoNebs scheduled and as needed     CAD  HTN  HLD  - retart lasix       Diabetes mellitus type 2 , A1C 5.8   - Hold home metformin  - SSI with Accu-Cheks     GERD  - Continue PPI     MARY ALICE  - CPAP     Chronic tobacco use  -- on cessation  --nicotine patch    Expected Discharge Location and Transportation: home  Expected Discharge  Expected Discharge Date: 5/24/2023; Expected Discharge Time:      DVT prophylaxis:  Mechanical DVT prophylaxis orders are present.     AM-PAC 6 Clicks Score (PT): 21 (05/22/23 9705)    CODE STATUS:   Code Status and Medical Interventions:   Ordered at: 05/20/23 0141     Level Of Support Discussed With:    Patient     Code Status (Patient has no pulse and is not breathing):    CPR (Attempt to Resuscitate)     Medical Interventions (Patient has pulse or is breathing):    Full Support       Maddie Sutton,   05/22/23

## 2023-05-22 NOTE — PLAN OF CARE
Problem: Adult Inpatient Plan of Care  Goal: Plan of Care Review  Outcome: Ongoing, Progressing  Goal: Patient-Specific Goal (Individualized)  Outcome: Ongoing, Progressing  Goal: Absence of Hospital-Acquired Illness or Injury  Outcome: Ongoing, Progressing  Intervention: Identify and Manage Fall Risk  Recent Flowsheet Documentation  Taken 5/22/2023 0400 by Batsheva Sheth RN  Safety Promotion/Fall Prevention:   assistive device/personal items within reach   room organization consistent   safety round/check completed  Taken 5/22/2023 0200 by Batsheva Sheth RN  Safety Promotion/Fall Prevention:   clutter free environment maintained   room organization consistent   safety round/check completed  Taken 5/22/2023 0000 by Batsheva Sheth RN  Safety Promotion/Fall Prevention:   clutter free environment maintained   room organization consistent   safety round/check completed  Taken 5/21/2023 2000 by Batsheva Sheth RN  Safety Promotion/Fall Prevention: clutter free environment maintained  Intervention: Prevent Skin Injury  Recent Flowsheet Documentation  Taken 5/21/2023 2000 by Batsheva Sheth RN  Body Position: position changed independently  Intervention: Prevent and Manage VTE (Venous Thromboembolism) Risk  Recent Flowsheet Documentation  Taken 5/21/2023 2000 by Batsheva Sheth RN  Activity Management: up in chair  Range of Motion: active ROM (range of motion) encouraged  Intervention: Prevent Infection  Recent Flowsheet Documentation  Taken 5/22/2023 0200 by Batsheva Sheth RN  Infection Prevention:   environmental surveillance performed   rest/sleep promoted  Taken 5/22/2023 0000 by Batsheva Sheth RN  Infection Prevention:   environmental surveillance performed   personal protective equipment utilized  Taken 5/21/2023 2000 by Batsheva Sheth RN  Infection Prevention: environmental surveillance performed  Goal: Optimal Comfort and Wellbeing  Outcome: Ongoing, Progressing  Goal: Readiness for Transition of  Care  Outcome: Ongoing, Progressing     Problem: Fall Injury Risk  Goal: Absence of Fall and Fall-Related Injury  Outcome: Ongoing, Progressing  Intervention: Identify and Manage Contributors  Recent Flowsheet Documentation  Taken 5/22/2023 0400 by Batsheva Sheth RN  Medication Review/Management: medications reviewed  Taken 5/22/2023 0200 by Batsheva Sheth RN  Medication Review/Management: medications reviewed  Taken 5/22/2023 0000 by Batsheva Sheth RN  Medication Review/Management: medications reviewed  Intervention: Promote Injury-Free Environment  Recent Flowsheet Documentation  Taken 5/22/2023 0400 by Batsheva Sheth RN  Safety Promotion/Fall Prevention:   assistive device/personal items within reach   room organization consistent   safety round/check completed  Taken 5/22/2023 0200 by Batsheva Sheth RN  Safety Promotion/Fall Prevention:   clutter free environment maintained   room organization consistent   safety round/check completed  Taken 5/22/2023 0000 by Batsheva Sheth RN  Safety Promotion/Fall Prevention:   clutter free environment maintained   room organization consistent   safety round/check completed  Taken 5/21/2023 2000 by Batsheva Sheth RN  Safety Promotion/Fall Prevention: clutter free environment maintained     Problem: Skin Injury Risk Increased  Goal: Skin Health and Integrity  Outcome: Ongoing, Progressing  Intervention: Optimize Skin Protection  Recent Flowsheet Documentation  Taken 5/21/2023 2000 by Batsheva Sheth RN  Head of Bed (HOB) Positioning: HOB elevated   Goal Outcome Evaluation:

## 2023-05-22 NOTE — PAYOR COMM NOTE
"Silvia Christiansen (73 y.o. Male)     Date of Birth   1950    Social Security Number       Address   [address] [place], [place] [address]    Home Phone   [phone]    MRN   [id number]       [facility]    Marital Status                               Admission Date   23    Admission Type   Emergency    Admitting Provider   Maddie Sutton DO    Attending Provider   Maddie Sutton DO    Department, Room/Bed   [facility] 5F, S504/1       Discharge Date       Discharge Disposition       Discharge Destination                               Attending Provider: Maddie Sutton DO    Allergies: No Known Allergies    Isolation: None   Infection: MRSA (23)   Code Status: CPR    Ht: 175.3 cm (69\")   Wt: 154 kg (339 lb)    Admission Cmt: None   Principal Problem: Cellulitis of left hand [L03.114]                 Active Insurance as of 2023     Primary Coverage     Payor Plan Insurance Group Employer/Plan Group    [facility] MEDICARE REPLACEMENT [facility]      Payor Plan Address Payor Plan Phone Number Payor Plan Fax Number Effective Dates    [address] [address] [phone]  2022 - None Entered    [facility] [id number]       Subscriber Name Subscriber Birth Date Member ID       SILVIA CHRISTIANSEN 1950 [id number]                 Emergency Contacts      (Rel.) Home Phone Work Phone Mobile Phone    ELIEL COLEMAN (Daughter) -- -- [phone]               History & Physical      Neeru Lazaro DO at [address] [place] [address] Medicine Services  HISTORY AND PHYSICAL    Patient Name: Silvia Christiansen  : 1950  MRN: [id number]  Primary Care Physician: Ian Sanchez APRN  Date of admission: 2023      Subjective    Subjective     Chief Complaint:  Finger infection    HPI:  Silvia Christiansen is a 73 y.o. male with a PMH significant for diabetes mellitus type 2, CAD s/p remote stent, COPD, tobacco " abuse, HTN, HLD, MARY ALICE who comes to the ED due to concerns for finger infection.  Patient says that he had a hangnail on his left index finger around 9 days ago.  5 days ago he began to notice redness, swelling and pain to the finger.  Since that time the swelling, pain and redness have become much more severe.  He has been soaking it in warm water but it seems to be getting worse.  He denies fever, malaise, chills, nausea, vomiting, diarrhea.  Due to worsening infection he came to the ED tonight.  He has not been seen for the finger infection by a medical provider prior to today.  He currently has very minimal movement to the finger secondary to pain and reports some decreased sensation.        Review of Systems   Constitutional: Negative.    HENT: Negative.    Eyes: Negative.    Respiratory: Negative.    Cardiovascular: Negative.    Gastrointestinal: Negative.    Endocrine: Negative.    Genitourinary: Negative.    Musculoskeletal: Positive for arthralgias and joint swelling.   Skin: Positive for color change and wound.   Allergic/Immunologic: Negative.    Neurological: Positive for numbness.   Hematological: Negative.    Psychiatric/Behavioral: Negative.           Personal History     Past Medical History:   Diagnosis Date   • Chronic back pain    • COPD (chronic obstructive pulmonary disease)    • Coronary artery disease involving native coronary artery of native heart without angina pectoris    • Diabetes mellitus    • GERD (gastroesophageal reflux disease)    • Heart attack    • Hyperlipidemia    • Hypertension    • Lung nodule    • Murmur    • MARY ALICE (obstructive sleep apnea)        Past Surgical History:   Procedure Laterality Date   • FEMORAL ARTERY STENT         Family History: family history includes Diabetes in his brother and father.     Social History:  reports that he has been smoking cigarettes. He has a 15.00 pack-year smoking history. He has quit using smokeless tobacco. He reports that he does not  currently use alcohol. He reports that he does not use drugs.  Social History     Social History Narrative   • Not on file       Medications:  Available home medication information reviewed.  Medications Prior to Admission   Medication Sig Dispense Refill Last Dose   • albuterol (PROVENTIL) (2.5 MG/3ML) 0.083% nebulizer solution       • aspirin 81 MG EC tablet aspirin 81 mg tablet,delayed release   Take 1 tablet every day by oral route.      • cetirizine (zyrTEC) 10 MG tablet Take 1 tablet by mouth Daily. 30 tablet 5    • ergocalciferol (ERGOCALCIFEROL) 1.25 MG (95180 UT) capsule Take 1 capsule by mouth 1 (One) Time Per Week. 30 capsule 5    • famotidine (PEPCID) 20 MG tablet TAKE ONE TABLET DAILY 30 tablet 5    • FLUoxetine (PROzac) 20 MG capsule TAKE ONE CAPSULE DAILY 30 capsule 5    • furosemide (LASIX) 40 MG tablet TAKE ONE TABLET EVERY 72 HOURS 10 tablet 5    • gabapentin (NEURONTIN) 600 MG tablet Take 1 tablet by mouth 3 (Three) Times a Day. 90 tablet 3    • HYDROcodone-acetaminophen (NORCO)  MG per tablet Take 1 tablet by mouth Every 8 (Eight) Hours As Needed for Severe Pain. 90 tablet 0    • ibuprofen (ADVIL,MOTRIN) 600 MG tablet TAKE ONE TABLET EVERY 6 HOURS AS NEEDED FOR MILD PAIN 120 tablet 5    • ipratropium-albuterol (Combivent Respimat)  MCG/ACT inhaler Inhale 1 puff 3 (Three) Times a Day. 4 g 5    • lisinopril (PRINIVIL,ZESTRIL) 10 MG tablet TAKE ONE TABLET DAILY 30 tablet 5    • metFORMIN (GLUCOPHAGE) 500 MG tablet Take 1 tablet by mouth Daily With Breakfast. 30 tablet 5    • metoprolol succinate XL (TOPROL-XL) 50 MG 24 hr tablet TAKE ONE TABLET DAILY 30 tablet 5    • nitroglycerin (NITROSTAT) 0.4 MG SL tablet Place 1 tablet under the tongue Every 5 (Five) Minutes As Needed for Chest Pain for up to 30 days. Take no more than 3 doses in 15 minutes. 100 tablet 1    • nystatin (MYCOSTATIN) 532460 UNIT/GM powder Apply  topically to the appropriate area as directed 3 (Three) Times a Day. 60 g  5    • nystatin-triamcinolone (MYCOLOG) 090288-9.1 UNIT/GM-% ointment Apply 1 application topically to the appropriate area as directed 2 (Two) Times a Day. 60 g 5    • pantoprazole (PROTONIX) 40 MG EC tablet TAKE ONE TABLET DAILY 30 tablet 5    • potassium chloride (K-DUR,KLOR-CON) 10 MEQ CR tablet TAKE TWO TABLETS DAILY 60 tablet 5    • promethazine (PHENERGAN) 25 MG tablet TAKE ONE TABLET EVERY 6 HOURS AS NEEDED FOR NAUSEA 20 tablet 5    • simvastatin (ZOCOR) 20 MG tablet Take 1 tablet by mouth Every Night. 90 tablet 1    • tamsulosin (FLOMAX) 0.4 MG capsule 24 hr capsule TAKE ONE CAPSULE DAILY 30 capsule 5    • triamcinolone (KENALOG) 0.1 % cream Apply 1 application topically to the appropriate area as directed 2 (Two) Times a Day As Needed for Rash. 453 g 1        No Known Allergies    Objective    Objective     Vital Signs:   Temp:  [99.5 °F (37.5 °C)] 99.5 °F (37.5 °C)  Heart Rate:  [78-82] 78  Resp:  [20] 20  BP: (135-160)/(66-73) 135/66       Physical Exam   Constitutional: Awake, alert  Eyes: PERRLA, sclerae anicteric, no conjunctival injection  HENT: NCAT, mucous membranes moist  Neck: Supple, no thyromegaly, no lymphadenopathy, trachea midline  Respiratory: Scattered wheezes bilateral, nonlabored respirations   Cardiovascular: RRR, no murmurs, rubs, or gallops, palpable pedal pulses bilaterally  Gastrointestinal: Positive bowel sounds, soft, nontender, nondistended  Musculoskeletal: No bilateral ankle edema, no clubbing or cyanosis to extremities.  Extensive swelling, redness to left index finger.  Minimal flexion to left PIP, no flexion to MCP or DIP of left index finger  Psychiatric: Appropriate affect, cooperative  Neurologic: Oriented x 3, strength symmetric in all extremities, Cranial Nerves grossly intact to confrontation, speech clear  Skin: Extensive swelling, discoloration to left index finger noted in pictures below          Result Review:  I have personally reviewed the results from the time  of this admission to 5/20/2023 02:09 EDT and agree with these findings:  [x]  Laboratory list / accordion  [x]  Microbiology  [x]  Radiology  []  EKG/Telemetry   []  Cardiology/Vascular   []  Pathology  [x]  Old records  []  Other:        LAB RESULTS:      Lab 05/1950   WBC 9.85   HEMOGLOBIN 14.3   HEMATOCRIT 44.3   PLATELETS 262   NEUTROS ABS 7.41*   IMMATURE GRANS (ABS) 0.04   LYMPHS ABS 1.37   MONOS ABS 0.83   EOS ABS 0.17   .0*   SED RATE 83*   CRP 11.04*   PROCALCITONIN 0.10   LACTATE 1.7         Lab 05/1950   SODIUM 138   POTASSIUM 4.2   CHLORIDE 101   CO2 27.0   ANION GAP 10.0   BUN 19   CREATININE 1.17   EGFR 66.2   GLUCOSE 163*   CALCIUM 8.9         Lab 05/1950   TOTAL PROTEIN 7.2   ALBUMIN 3.6   GLOBULIN 3.6   ALT (SGPT) 10   AST (SGOT) 30   BILIRUBIN 1.0   ALK PHOS 113                     UA        5/27/2022    14:31   Urinalysis   Ketones, UA Negative     Leukocytes, UA Negative         Microbiology Results (last 10 days)     ** No results found for the last 240 hours. **          XR Hand 3+ View Left    Result Date: 5/19/2023  XR HAND 3+ VW LEFT Date of Exam: 5/19/2023 9:03 PM EDT Indication: Index finger pain and swelling Comparison: None available. Findings: There is diffuse soft tissue swelling of the digit. There are bony erosions along the radial side of the base of the proximal phalanx of the second digit. There is also some mild bony erosion along the radial side of the head of the second metacarpal. No  abnormal periosteal reaction. No acute fracture or dislocation. Carpal bones and radiocarpal joint are within normal limits. No radiopaque body identified.    Impression: Impression: Small bony erosions at the second metacarpal phalangeal joint as detailed above. No periosteal. 2. Diffuse soft tissue swelling of the second digit. No radiopaque foreign body identified. Electronically Signed: Otf Fontaine  5/19/2023 9:35 PM EDT  Workstation ID:  [id number]          Assessment & Plan   Assessment & Plan     Active Hospital Problems    Diagnosis  POA   • **Cellulitis of left hand [L03.114]  Yes   • Tobacco abuse [Z72.0]  Unknown   • CAD (coronary artery disease) [I25.10]  Unknown   • COPD (chronic obstructive pulmonary disease) [J44.9]  Unknown   • Type 2 diabetes mellitus [E11.9]  Unknown   • HTN (hypertension) [I10]  Unknown   • HLD (hyperlipidemia) [E78.5]  Unknown   • GERD without esophagitis [K21.9]  Unknown   • MARY ALICE (obstructive sleep apnea) [G47.33]  Unknown   • Infection of finger of left hand [L03.012]  Unknown   Dieter Christiansen is a 73 y.o. male with a PMH significant for diabetes mellitus type 2, CAD s/p remote stent, COPD, tobacco abuse, HTN, HLD, MARY ALICE who comes to the ED due to concerns for finger infection.      Cellulitis of left hand  Infection of left index finger  - Elevated CRP, ESR  - Radiographs noted above  - MRI left hand pending  - Concerns for aggressive infection  - Consult ID for a.m.  - Vancomycin, Zosyn, clindamycin  - IVFs  - Blood culture  - Wound culture  - WOC  - Dr. Hutton and Dr. Coley are not available for coverage this week  - Dr. Aviles at , orthopedic hand surgeon recommends trial of IV antibiotics for now.  If patient declines, they will reevaluate her transfer at that time  - [facility], declined transfer today and they have no beds. Patient could be place on list for possible transfer tomorrow if beds become available    COPD  - DuoNebs scheduled and as needed    CAD  HTN  HLD  -Holding home Lasix while giving IVF's, otherwise continue home meds with hold parameters    Diabetes mellitus type 2  - Hold home metformin  - SSI with Accu-Cheks    GERD  - Continue PPI    MARY ALICE  - CPAP    Chronic tobacco use  -- on cessation  --nicotine patch    Home med list reviewed    DVT prophylaxis: SCDs      CODE STATUS: Full code  Code Status and Medical Interventions:   Ordered at: 05/20/23 0141     Level Of Support  Discussed With:    Patient     Code Status (Patient has no pulse and is not breathing):    CPR (Attempt to Resuscitate)     Medical Interventions (Patient has pulse or is breathing):    Full Support       Expected Discharge  Expected Discharge Date: 5/22/2023; Expected Discharge Time:      Neeru Lazaro DO  05/20/23      Electronically signed by Neeru Lazaro DO at 05/20/23 0209          Physician Progress Notes (all)      Chase Alfaro MD at 05/22/23 0923              INFECTIOUS DISEASE Progress note     Dieter Christiansen  1950  [id number]      Admission Date: 5/19/2023      Requesting Provider: Dr French  Evaluating Physician: Dr Chase Alfaro  Reason for Consultation: left index finger infection     History of present illness:    Patient is a 73 y.o. male, with PMH COPD, CAD, DM, MARY ALICE, seen today for left index finger infection.  He had developed a hangnail of the finger after which he injured his hand when he hit his hand on a wall approximately one week ago.  The redness and swelling progressively worsened prompting his presentation to the ED.  Xray of the hand with diffuse swelling of the index finger, with bony erosions along the radial side of the base of proximal phalanx of second digit. Admitting labs with WBC 9.85, plt 262, CRP 11, ESR 83, PCT 0.10, Scr 1.17, and tmax of 99.5.  Wound gram stain with moderate gram positive cocci in pairs, chains and clusters, no WBCs. Started on Vancomycin, Zosyn and Clindamycin and we were consulted for antibiotic management.  Plans made to transfer to [place] per d/w Dr French, however he refused per d/w Dr French.    5/20/23 He has pain at his left index finger/hand which is constant, sharp, nonradiating, worse with movement/pressure, better with pain meds and 4-5 out of 10 in severity.  He denied any other specific trauma aside from above.  No other blood force penetrating trauma.  No animal insect arthropod bite.  Denies any prior history MRSA VRE C.  Difficile or ESBL/kpC/CRE organisms     Denies any other specific focal symptoms.  No headache photophobia or neck stiffness.  He is on nasal cannula oxygen but denies dyspnea cough or hemoptysis.  No nausea vomiting diarrhea or abdominal pain.  No dysuria hematuria or pyuria.    ON 5/20/23   Discussed very openly with patient risk of his hand infection including risk of digit loss/higher level amputation including loss of hand or arm in addition to further systemic spread of infection with risk for other dire consequences/mortality.  Conversation in presence of nursing staff and similar conversation by Dr. french with him; we recommended evaluation by orthopedic surgery/hand surgery, and transfer to a center with hand surgery as no available hand surgeon at [facility],  in addition to ongoing IV antibiotics and other supportive measures.  Patient  refused transfer to the [facility] either [facility] or [facility] a that he would rather lose his left index finger/hand/life  than to go to 1 of those hospitals.  He has refused transfer to [facility] with similar explanation.  Dr. French has been in contact with me on multiple occasions , She contacted hand surgery at [facility] and they refused transfer.  They have seen a picture of his hand per my discussion with Dr. French; they apparently agreed with Dr. French regarding the severity and the they stated  high risk  for amputation of  his left index finger; they asked that orthopedics see at [facility]; Dr. French has contacted orthopedics and they are not a hand surgeon and they have refused to see consult.  I d/w administration and they are unable to facilitate transfer to     5/21/23:   D/w Dr French again multiple occasions today  Discussed AGAIN very openly with patient risk of his hand infection including risk of digit loss/higher level amputation including loss of hand or arm in addition to further systemic  "spread of infection with risk for other dire consequences/mortality/death.  Conversation in presence of nursing staff ; we recommended evaluation by orthopedic surgery/hand surgery, and transfer to a center with hand surgery as no available hand surgeon at [facility],  in addition to ongoing IV antibiotics and other supportive measures.  Patient  Again has refused transfer to the [facility] either [facility] or [facility] that he would rather lose his left index finger/hand/life  than to go to 1 of those hospitals.  He has refused transfer to [facility] with similar explanation again.  Dr. French has been in contact with me on multiple occasions again today as above , She anticipates contacting  hand surgery at [facility].  Dr. French has contacted orthopedics at [facility] and they are not a hand surgeon and they have refused to see consult.  Patient is aware of the above limitations with respect to no orthopedic surgeon for consultation at [facility], he is aware of my recommendations and he says to me he would \" rather take his chances\" regarding loss of finger/hand/life, and stay  at [facility] at present rather than consider any alternatives to [facility] or     Refusing am labs per nursing staff.  MRsA screen positive. Blood cultures negative so far. He is unable to lie flat to take the MRI.  He remains afebrile.  He thinks his finger is less painful , and he is able to move it better but it appears more bruised along the finger with same redness at the hand.  No progressive redness or pain.  He is able to flex the finger a bit    5/22/23 d/w Dr Sutton; Discussed AGAIN with nursing present with him very openly the risk of his hand infection including risk of digit loss/higher level amputation including loss of hand or arm in addition to further systemic spread of infection with risk for other dire consequences/mortality/death.  We have  " "recommended evaluation by orthopedic surgery/hand surgery on each visit and on multiple occasions, and transfer to a center with hand surgery as no available hand surgeon at [facility],  in addition to ongoing IV antibiotics and other supportive measures.  Patient  Again has refused transfer to the [facility] either [facility] or [facility] as that he would rather lose his left index finger/hand/life  than to go to 1 of those hospitals.  He has refused transfer to [facility] with similar explanation again.  Dr. French has been in contact with me on multiple occasions in recent days as above and I have d/w Dr Sutton;  Dr. French  contacted orthopedics at [facility] and they are not a hand surgeon and they have refused to see consult.  Patient is aware of the above limitations with respect to no orthopedic surgeon for consultation at [facility], he is aware of my recommendations and he says to me he would \" rather take his chances\" regarding loss of finger/hand/life, and stay  at [facility] at present rather than consider any alternatives to [facility] or     Hand culture with MRSA.  MRsA screen positive. Blood cultures negative so far.   He remains afebrile.  He thinks his finger is less painful , and he is able to move it better  Albeit still with restrictions flexion at the left index finger.  No progressive redness or pain.  He has significant slough of skin tissue at the dorsal aspect of the finger     pain at his left index finger/hand which is constant, sharp, nonradiating, worse with movement/pressure, better with pain meds and 3-4  out of 10 in severity and less intense.         Past Medical History:   Diagnosis Date   • Chronic back pain    • COPD (chronic obstructive pulmonary disease)    • Coronary artery disease involving native coronary artery of native heart without angina pectoris    • Diabetes mellitus    • GERD (gastroesophageal reflux disease)  "   • Heart attack    • Hyperlipidemia    • Hypertension    • Lung nodule    • Murmur    • MARY ALICE (obstructive sleep apnea)        Past Surgical History:   Procedure Laterality Date   • FEMORAL ARTERY STENT         Family History   Problem Relation Age of Onset   • Diabetes Father    • Diabetes Brother        Social History     Socioeconomic History   • Marital status:    Tobacco Use   • Smoking status: Every Day     Packs/day: 0.50     Years: 30.00     Pack years: 15.00     Types: Cigarettes   • Smokeless tobacco: Former   • Tobacco comments:     20 years ago   Vaping Use   • Vaping Use: Never used   Substance and Sexual Activity   • Alcohol use: Not Currently   • Drug use: Never   • Sexual activity: Defer       No Known Allergies         Physical Exam:   Vital Signs  Temp (24hrs), Av.8 °F (36.6 °C), Min:97.5 °F (36.4 °C), Max:98.4 °F (36.9 °C)    Temp  Min: 97.5 °F (36.4 °C)  Max: 98.4 °F (36.9 °C)  BP  Min: 106/60  Max: 122/76  Pulse  Min: 59  Max: 78  Resp  Min: 16  Max: 18  SpO2  Min: 90 %  Max: 97 %    GENERAL: Awake and alert  HEENT: Normocephalic, atraumatic.  PERRL. EOMI. No conjunctival injection. No icterus. Oropharynx clear without evidence of thrush or exudate. No evidence of peridontal disease.    NECK: Supple   HEART: RRR; No murmur, rubs, gallops.   LUNGS: Clear to auscultation bilaterally without wheezing, rales, rhonchi. Normal respiratory effort. Nonlabored.   ABDOMEN: Soft, nontender, nondistended. Positive bowel sounds. No rebound or guarding. NO mass or HSM.  EXT:  No cyanosis, clubbing or edema. No cord.  :  Without Torres catheter.  MSK: No joint effusions or erythema  SKIN:   Left index finger with ecchymosis extending to the dorsal hand,  past the MCP joint,with edema, stable redness extending over 1st 2nd fingers, and dorsum of hand.  He has had slough of skin from the dorsal aspect of his index finger at the proximal phalanx, some bleeding there.   some decreased swelling.  No new  fluctuance or crepitus   NEURO: Oriented to PPT.  Motor 5/5 strength  PSYCHIATRIC: Normal insight and judgment. Cooperative with PE    Laboratory Data    Results from last 7 days   Lab Units 05/21/23 0617 05/20/23 0332 05/1950   WBC 10*3/mm3 8.32 12.48* 9.85   HEMOGLOBIN g/dL 13.4 13.9 14.3   HEMATOCRIT % 41.4 43.7 44.3   PLATELETS 10*3/mm3 289 272 262     Results from last 7 days   Lab Units 05/21/23 0617   SODIUM mmol/L 138   POTASSIUM mmol/L 4.2   CHLORIDE mmol/L 102   CO2 mmol/L 26.0   BUN mg/dL 20   CREATININE mg/dL 1.08   GLUCOSE mg/dL 97   CALCIUM mg/dL 8.9     Results from last 7 days   Lab Units 05/1950   ALK PHOS U/L 113   BILIRUBIN mg/dL 1.0   ALT (SGPT) U/L 10   AST (SGOT) U/L 30     Results from last 7 days   Lab Units 05/20/23 0332   SED RATE mm/hr 88*     Results from last 7 days   Lab Units 05/20/23 0332   CRP mg/dL 9.01*     Results from last 7 days   Lab Units 05/1950   LACTATE mmol/L 1.7     Results from last 7 days   Lab Units 05/20/23 0332   CK TOTAL U/L 71         Estimated Creatinine Clearance: 90.9 mL/min (by C-G formula based on SCr of 1.08 mg/dL).              Radiology:  Imaging Results (Last 72 Hours)     Procedure Component Value Units Date/Time    XR Hand 3+ View Left [[id number]] Collected: 05/19/23 2133     Updated: 05/19/23 2138    Narrative:      XR HAND 3+ VW LEFT    Date of Exam: 5/19/2023 9:03 PM EDT    Indication: Index finger pain and swelling    Comparison: None available.    Findings:  There is diffuse soft tissue swelling of the digit. There are bony erosions along the radial side of the base of the proximal phalanx of the second digit. There is also some mild bony erosion along the radial side of the head of the second metacarpal. No   abnormal periosteal reaction. No acute fracture or dislocation. Carpal bones and radiocarpal joint are within normal limits. No radiopaque body identified.    Impression:      Impression:  Small bony erosions at the  second metacarpal phalangeal joint as detailed above. No periosteal.  2. Diffuse soft tissue swelling of the second digit. No radiopaque foreign body identified.      Electronically Signed: Otf Fontaine    5/19/2023 9:35 PM EDT    Workstation ID: [id number]            Impression:   - acute/Severe left index finger wound infection/cellulitis;  He has multifocal areas of slough/purulence/ecchymosis  with difficulty flexing the finger and pain limiting exam otherwise with ecchymosis extending past the MCP on the dorsal hand and vague/diffuse erythema extending towards the wrist at admission, subsequently with some slough of tissue at the more proximal aspect and culture with MRSA.  I have advised/recommended the patient be evaluated by surgery as previously noted.  I discussed with Dr. French and Dr Sutton directly and  medicine team had made arrangements to have him transferred to [place] but patient refused.  Patient refused transfer to the [facility] system.  has refused facilitating transfer on multiple occasions and administration at [facility] unable to facilitate that any further per my discussion with Dr Herrera.  Per discussion with orthopedics on call, no hand surgeon available at [facility] and orthopedic surgeon on-call has refused consultation.  I have reiterated my recommendation to patient that he have a surgical evaluation with ongoing risk for further serious morbidity and other serious sequela including functional/limb loss/amputation, persistence of progressive or recurrent infection including risk for systemic spread/sepsis and death.  Medicine team continues to look into options for transfer to a center with hand specialist, and they will get back to me regarding options and patient preferences if she finds out more information; general orthopedics has refused consultation as above, Dr French aware and will keep us informed; as noted above, patient refuses to  consider transfer to another facility other than , he refuses to consider [facility] or [facility] and he refuses to be transferred to any facility in [place] or [place].  He knows the risks he is taking with his decisions as outlined above; if you remain unable to transfer to a center with hand surgeon, you could consider outpatient appointment on same day of discharge with ongoing IV antibiotics as an alternative.  - COPD  - Diabetes mellitus, type 2  - Obesity       PLAN/RECOMMENDATIONS:     - Daptomycin 6mg/kg IV daily    --check/review labs cultures and scans  --Partial history per nursing staff  --Discussed with microbiology  --Discussed with medicine team, Dr Sutton as above  --I have discussed very directly  my recommendations with the patient and my concerns as outlined above.    --highly complex at of issues with high risk for further serious morbidity and other serious sequela    **If you are unable to transfer to a center with hand surgery, you should consider identifying outpatient hand surgeon for same day appointment and outpatient IV antibiotics    Copied text in this note has been reviewed and is accurate as of 23.      Chase Alfaro MD  2023  09:23 EDT           Electronically signed by Chase Alfaro MD at 23 0932     Ronna French MD at 23 0900              [facility] Medicine Services  PROGRESS NOTE    Patient Name: Dieter Christiansen  : 1950  MRN: [id number]    Date of Admission: 2023  Primary Care Physician: Ian Sanchez APRN    Subjective   Subjective     CC:  Left finger infection     HPI:  Feels the same, no fever or nausea.      ROS:  Breathing okay  No pain in forearm  R handed       Objective   Objective     Vital Signs:   Temp:  [97.7 °F (36.5 °C)-98.4 °F (36.9 °C)] 98 °F (36.7 °C)  Heart Rate:  [58-88] 70  Resp:  [16-20] 18  BP: ()/(43-79) 110/63  Flow (L/min):  [2-3] 3      Physical Exam:  Constitutional: Awake, alert and up in chair on RA   Eyes: PER  HENT: NCAT, mucous membranes moist  Neck: Supple,  Respiratory: Clear to auscultation bilaterally, nonlabored respirations   Cardiovascular: RRR,   Gastrointestinal: nontender, obese   Musculoskeletal: L hand dressed; forearm without swelling     Psychiatric: Appropriate affect, cooperative  Neurologic: Oriented x 3, strength symmetric in all extremities, Cranial Nerves grossly intact to confrontation, speech clear  Skin: No rashes      Results Reviewed:  LAB RESULTS:      Lab 05/21/23  0617 05/20/23 0332 05/1950   WBC 8.32 12.48* 9.85   HEMOGLOBIN 13.4 13.9 14.3   HEMATOCRIT 41.4 43.7 44.3   PLATELETS 289 272 262   NEUTROS ABS  --  9.63* 7.41*   IMMATURE GRANS (ABS)  --  0.08* 0.04   LYMPHS ABS  --  1.47 1.37   MONOS ABS  --  1.09* 0.83   EOS ABS  --  0.19 0.17   .5* 100.0* 100.0*   SED RATE  --  88* 83*   CRP  --  9.01* 11.04*   PROCALCITONIN  --  0.10 0.10   LACTATE  --   --  1.7   PROTIME  --  15.3*  --          Lab 05/21/23 0617 05/20/23  0332 05/1950   SODIUM 138 135* 138   POTASSIUM 4.2 4.0 4.2   CHLORIDE 102 99 101   CO2 26.0 25.0 27.0   ANION GAP 10.0 11.0 10.0   BUN 20 19 19   CREATININE 1.08 1.14 1.17   EGFR 72.9 68.3 66.2   GLUCOSE 97 182* 163*   CALCIUM 8.9 8.7 8.9   MAGNESIUM  --  2.1  --    HEMOGLOBIN A1C  --  5.80*  --          Lab 05/1950   TOTAL PROTEIN 7.2   ALBUMIN 3.6   GLOBULIN 3.6   ALT (SGPT) 10   AST (SGOT) 30   BILIRUBIN 1.0   ALK PHOS 113         Lab 05/20/23 0332   PROTIME 15.3*   INR 1.20*                 Brief Urine Lab Results  (Last result in the past 365 days)      Color   Clarity   Blood   Leuk Est   Nitrite   Protein   CREAT   Urine HCG        05/27/22 1431 Dark Yellow   Clear   3+   Negative   Negative   Negative                 Microbiology Results Abnormal     Procedure Component Value - Date/Time    Blood Culture - Blood, Arm, Right [[id number]]  (Normal) Collected:  05/19/23 1949    Lab Status: Preliminary result Specimen: Blood from Arm, Right Updated: 05/20/23 2015     Blood Culture No growth at 24 hours    Blood Culture - Blood, Arm, Left [[id number]]  (Normal) Collected: 05/1950    Lab Status: Preliminary result Specimen: Blood from Arm, Left Updated: 05/20/23 2015     Blood Culture No growth at 24 hours    Wound Culture - Wound, Hand, Digit Left [[id number]] Collected: 05/20/23 0236    Lab Status: Preliminary result Specimen: Wound from Hand, Digit Left Updated: 05/20/23 0315     Gram Stain Moderate (3+) Gram positive cocci in pairs, chains and clusters      No WBCs seen          XR Hand 3+ View Left    Result Date: 5/19/2023  XR HAND 3+ VW LEFT Date of Exam: 5/19/2023 9:03 PM EDT Indication: Index finger pain and swelling Comparison: None available. Findings: There is diffuse soft tissue swelling of the digit. There are bony erosions along the radial side of the base of the proximal phalanx of the second digit. There is also some mild bony erosion along the radial side of the head of the second metacarpal. No  abnormal periosteal reaction. No acute fracture or dislocation. Carpal bones and radiocarpal joint are within normal limits. No radiopaque body identified.    Impression: Impression: Small bony erosions at the second metacarpal phalangeal joint as detailed above. No periosteal. 2. Diffuse soft tissue swelling of the second digit. No radiopaque foreign body identified. Electronically Signed: Otf Fontaine  5/19/2023 9:35 PM EDT  Workstation ID: [id number]          Current medications:  Scheduled Meds:aspirin, 81 mg, Oral, Daily  atorvastatin, 10 mg, Oral, Daily  cetirizine, 10 mg, Oral, Daily  clindamycin, 600 mg, Oral, Q8H  DAPTOmycin, 6 mg/kg (Adjusted), Intravenous, Q24H  famotidine, 20 mg, Oral, Daily  FLUoxetine, 20 mg, Oral, Daily  gabapentin, 600 mg, Oral, Q8H  insulin lispro, 2-7 Units, Subcutaneous, 4x Daily AC & at Bedtime  ipratropium-albuterol, 3 mL,  Nebulization, 4x Daily - RT  lisinopril, 10 mg, Oral, Daily  metoprolol succinate XL, 50 mg, Oral, Daily  nicotine, 1 patch, Transdermal, Q24H  pantoprazole, 40 mg, Oral, Daily  piperacillin-tazobactam, 4.5 g, Intravenous, Q8H  senna-docusate sodium, 2 tablet, Oral, BID  sodium chloride, 10 mL, Intravenous, Q12H  tamsulosin, 0.4 mg, Oral, Daily      Continuous Infusions:   PRN Meds:.•  acetaminophen **OR** acetaminophen **OR** acetaminophen  •  albuterol  •  senna-docusate sodium **AND** polyethylene glycol **AND** bisacodyl **AND** bisacodyl  •  dextrose  •  dextrose  •  glucagon (human recombinant)  •  HYDROcodone-acetaminophen  •  Morphine **AND** naloxone  •  nitroglycerin  •  ondansetron  •  sodium chloride  •  sodium chloride    Assessment & Plan   Assessment & Plan     Active Hospital Problems    Diagnosis  POA   • **Cellulitis of left hand [L03.114]  Yes   • Tobacco abuse [Z72.0]  Unknown   • CAD (coronary artery disease) [I25.10]  Unknown   • COPD (chronic obstructive pulmonary disease) [J44.9]  Unknown   • Type 2 diabetes mellitus [E11.9]  Unknown   • HTN (hypertension) [I10]  Unknown   • HLD (hyperlipidemia) [E78.5]  Unknown   • GERD without esophagitis [K21.9]  Unknown   • MARY ALICE (obstructive sleep apnea) [G47.33]  Unknown   • Infection of finger of left hand [L03.012]  Unknown      Resolved Hospital Problems   No resolved problems to display.        Brief Hospital Course to date:  Dieter Christiansen is a 73 y.o. male w PMH COPD, CAD, DM, MARY ALICE.  Admitted for left index finger infection.  Began a week ago with a hangnail after minor trauma; has spread and worsened.  Tmax 99.5.      Cellulitis of left hand  Infection of left index finger  - Radiographs noted above; MRI left hand not obtainable due to pt cannot lie flat   - abx day 2; ID following.  Have discussed with ID MD multiple times.   -  Blood culture, Wound culture  - WOC  - control blood glucoses     Regarding efforts to obtain hand surgeon eval  - several  "discussions with Dr Alfaro.   - per partner:   Dr. Hutton and Dr. Coley are not available for coverage this week  - partner or ED staff reportedly spoke w Dr. Aviles at , orthopedic hand surgeon, who declined to accept in transfer,  recommended trial of IV antibiotics for now.    - [facility], declined transfer as they have no beds  - Patient states \"I will not go to [facility]\" and avers that he would rather lose his finger than go there.  He also states \"I will not go to [place].\"  - Called  transfer office at 1015.  Spoke w Dr Kay, hand surgeon; sent photographs.  He declined to accept ( on divert) but noted that he would reconsider if pt progressed to being toxic or infection spread up arm.   - Called Dr Allan Coelho, sent photographs.  He refused the consult.  - Discussed with Dr Baez of [facility].  The hand surgeon in their group, Dr Loo, does not take hand call for [facility].   Additionally he is unavailable all this coming week  - Discussed again 5/21 w Dr Alfaro.  He again spoke w patient about risk to limb and options for transfer to a hospital w a hand surgeon.  Pt again stated he wants to stay at [facility], does not want to go to [facility] or [place] for care.  Pt and I discussed his options again today.  He says he will talk to his daughter.        COPD - DuoNebs scheduled and as needed     CAD  HTN  HLD  - retart lasix       Diabetes mellitus type 2 , A1C 5.8   - Hold home metformin  - SSI with Accu-Cheks     GERD  - Continue PPI     MARY ALICE  - CPAP     Chronic tobacco use  -- on cessation  --nicotine patch    Expected Discharge Location and Transportation: home by car vs tx for surgery  Expected Discharge tbd  Expected Discharge Date: 5/22/2023; Expected Discharge Time:      DVT prophylaxis:  Mechanical DVT prophylaxis orders are present.     AM-PAC 6 Clicks Score (PT): 21 (05/20/23 0800)    CODE STATUS:   Code Status and Medical Interventions:   Ordered at: 05/20/23 0141     Level Of Support " Discussed With:    Patient     Code Status (Patient has no pulse and is not breathing):    CPR (Attempt to Resuscitate)     Medical Interventions (Patient has pulse or is breathing):    Full Support       Ronna French MD  05/21/23        Electronically signed by Ronna French MD at 05/21/23 1041     Chase Alfaro MD at 05/21/23 0634              INFECTIOUS DISEASE Progress note     Dieter Christiansen  1950  [id number]      Admission Date: 5/19/2023      Requesting Provider: Dr French  Evaluating Physician: Dr Chase Alfaro  Reason for Consultation: left index finger infection     History of present illness:    Patient is a 73 y.o. male, with PMH COPD, CAD, DM, MARY ALICE, seen today for left index finger infection.  He had developed a hangnail of the finger after which he injured his hand when he hit his hand on a wall approximately one week ago.  The redness and swelling progressively worsened prompting his presentation to the ED.  Xray of the hand with diffuse swelling of the index finger, with bony erosions along the radial side of the base of proximal phalanx of second digit. Admitting labs with WBC 9.85, plt 262, CRP 11, ESR 83, PCT 0.10, Scr 1.17, and tmax of 99.5.  Wound gram stain with moderate gram positive cocci in pairs, chains and clusters, no WBCs. Started on Vancomycin, Zosyn and Clindamycin and we were consulted for antibiotic management.  Plans made to transfer to [place] per d/w Dr French, however he refused per d/w Dr French.    5/20/23 He has pain at his left index finger/hand which is constant, sharp, nonradiating, worse with movement/pressure, better with pain meds and 4-5 out of 10 in severity.  He denied any other specific trauma aside from above.  No other blood force penetrating trauma.  No animal insect arthropod bite.  Denies any prior history MRSA VRE C. Difficile or ESBL/kpC/CRE organisms     Denies any other specific focal symptoms.  No headache photophobia or neck stiffness.  He is on  nasal cannula oxygen but denies dyspnea cough or hemoptysis.  No nausea vomiting diarrhea or abdominal pain.  No dysuria hematuria or pyuria.    ON 5/20/23   Discussed very openly with patient risk of his hand infection including risk of digit loss/higher level amputation including loss of hand or arm in addition to further systemic spread of infection with risk for other dire consequences/mortality.  Conversation in presence of nursing staff and similar conversation by Dr. french with him; we recommended evaluation by orthopedic surgery/hand surgery, and transfer to a center with hand surgery as no available hand surgeon at [facility],  in addition to ongoing IV antibiotics and other supportive measures.  Patient  refused transfer to the [facility] either [facility] or [facility] that he would rather lose his left index finger/hand/life  than to go to 1 of those hospitals.  He has refused transfer to [facility] with similar explanation.  Dr. French has been in contact with me on multiple occasions , She contacted hand surgery at [facility] and they refused transfer.  They have seen a picture of his hand per my discussion with Dr. French; they apparently agreed with Dr. French regarding the severity and the they stated  high risk  for amputation of  his left index finger; they asked that orthopedics see at [facility]; Dr. French has contacted orthopedics and they are not a hand surgeon and they have refused to see consult.  I d/w administration and they are unable to facilitate transfer to     5/21/23:   D/w Dr French again multiple occasions today  Discussed AGAIN very openly with patient risk of his hand infection including risk of digit loss/higher level amputation including loss of hand or arm in addition to further systemic spread of infection with risk for other dire consequences/mortality/death.  Conversation in presence of nursing staff ; we recommended  "evaluation by orthopedic surgery/hand surgery, and transfer to a center with hand surgery as no available hand surgeon at [facility],  in addition to ongoing IV antibiotics and other supportive measures.  Patient  Again has refused transfer to the [facility] either [facility] or [facility] that he would rather lose his left index finger/hand/life  than to go to 1 of those hospitals.  He has refused transfer to [facility] with similar explanation again.  Dr. French has been in contact with me on multiple occasions again today as above , She anticipates contacting  hand surgery at [facility].  Dr. French has contacted orthopedics at [facility] and they are not a hand surgeon and they have refused to see consult.  Patient is aware of the above limitations with respect to no orthopedic surgeon for consultation at [facility], he is aware of my recommendations and he says to me he would \" rather take his chances\" regarding loss of finger/hand/life, and stay  at [facility] at present rather than consider any alternatives to [facility] or     Refusing am labs per nursing staff.  MRsA screen positive. Blood cultures negative so far. He is unable to lie flat to take the MRI.  He remains afebrile.  He thinks his finger is less painful , and he is able to move it better but it appears more bruised along the finger with same redness at the hand.  No progressive redness or pain.  He is able to flex the finger a bit     pain at his left index finger/hand which is constant, sharp, nonradiating, worse with movement/pressure, better with pain meds and 4  out of 10 in severity.       Past Medical History:   Diagnosis Date   • Chronic back pain    • COPD (chronic obstructive pulmonary disease)    • Coronary artery disease involving native coronary artery of native heart without angina pectoris    • Diabetes mellitus    • GERD (gastroesophageal reflux disease)    • Heart " attack    • Hyperlipidemia    • Hypertension    • Lung nodule    • Murmur    • MARY ALICE (obstructive sleep apnea)        Past Surgical History:   Procedure Laterality Date   • FEMORAL ARTERY STENT         Family History   Problem Relation Age of Onset   • Diabetes Father    • Diabetes Brother        Social History     Socioeconomic History   • Marital status:    Tobacco Use   • Smoking status: Every Day     Packs/day: 0.50     Years: 30.00     Pack years: 15.00     Types: Cigarettes   • Smokeless tobacco: Former   • Tobacco comments:     20 years ago   Vaping Use   • Vaping Use: Never used   Substance and Sexual Activity   • Alcohol use: Not Currently   • Drug use: Never   • Sexual activity: Defer       No Known Allergies      Medication:    Current Facility-Administered Medications:   •  acetaminophen (TYLENOL) tablet 650 mg, 650 mg, Oral, Q4H PRN **OR** acetaminophen (TYLENOL) 160 MG/5ML solution 650 mg, 650 mg, Oral, Q4H PRN **OR** acetaminophen (TYLENOL) suppository 650 mg, 650 mg, Rectal, Q4H PRN, Iveth, Neeru G, DO  •  albuterol (PROVENTIL) nebulizer solution 0.083% 2.5 mg/3mL, 2.5 mg, Nebulization, Q6H PRN, Iveth, Neeru G, DO  •  aspirin EC tablet 81 mg, 81 mg, Oral, Daily, Iveth, Neeru G, DO, 81 mg at 05/20/23 1008  •  atorvastatin (LIPITOR) tablet 10 mg, 10 mg, Oral, Daily, Iveth, Neeru G, DO, 10 mg at 05/20/23 1007  •  sennosides-docusate (PERICOLACE) 8.6-50 MG per tablet 2 tablet, 2 tablet, Oral, BID, 2 tablet at 05/20/23 1007 **AND** polyethylene glycol (MIRALAX) packet 17 g, 17 g, Oral, Daily PRN **AND** bisacodyl (DULCOLAX) EC tablet 5 mg, 5 mg, Oral, Daily PRN **AND** bisacodyl (DULCOLAX) suppository 10 mg, 10 mg, Rectal, Daily PRN, Iveth, Neeru G, DO  •  cetirizine (zyrTEC) tablet 10 mg, 10 mg, Oral, Daily, Iveth, Neeru G, DO, 10 mg at 05/20/23 1007  •  clindamycin (CLEOCIN) capsule 600 mg, 600 mg, Oral, Q8H, Neeru Lazaro DO, 600 mg at 05/21/23 0044  •  DAPTOmycin (CUBICIN) 600 mg in  sodium chloride 0.9 % 50 mL IVPB, 6 mg/kg (Adjusted), Intravenous, Q24H, Karen Hinojosa APRN, Last Rate: 100 mL/hr at 05/20/23 1020, 600 mg at 05/20/23 1020  •  dextrose (D50W) (25 g/50 mL) IV injection 25 g, 25 g, Intravenous, Q15 Min PRN, Iveth, Neeru G, DO  •  dextrose (GLUTOSE) oral gel 15 g, 15 g, Oral, Q15 Min PRN, Iveth, Neeru G, DO  •  famotidine (PEPCID) tablet 20 mg, 20 mg, Oral, Daily, Iveth, Neeru G, DO, 20 mg at 05/20/23 1008  •  FLUoxetine (PROzac) capsule 20 mg, 20 mg, Oral, Daily, Iveth, Neeru G, DO, 20 mg at 05/20/23 1007  •  gabapentin (NEURONTIN) capsule 600 mg, 600 mg, Oral, Q8H, Iveth, Neeru G, DO, 600 mg at 05/21/23 0626  •  glucagon (GLUCAGEN) injection 1 mg, 1 mg, Intramuscular, Q15 Min PRN, Iveth, Neeru G, DO  •  HYDROcodone-acetaminophen (NORCO)  MG per tablet 1 tablet, 1 tablet, Oral, Q8H PRN, Iveth, Neeru G, DO  •  Insulin Lispro (humaLOG) injection 2-7 Units, 2-7 Units, Subcutaneous, 4x Daily AC & at Bedtime, Iveth, Neeru G, DO, 2 Units at 05/20/23 1659  •  ipratropium-albuterol (DUO-NEB) nebulizer solution 3 mL, 3 mL, Nebulization, 4x Daily - RT, Iveth, Neeru G, DO, 3 mL at 05/20/23 1515  •  lisinopril (PRINIVIL,ZESTRIL) tablet 10 mg, 10 mg, Oral, Daily, Iveth, Neeru G, DO, 10 mg at 05/20/23 1007  •  metoprolol succinate XL (TOPROL-XL) 24 hr tablet 50 mg, 50 mg, Oral, Daily, Iveth, Neeru G, DO, 50 mg at 05/20/23 1006  •  morphine injection 2 mg, 2 mg, Intravenous, Q3H PRN **AND** naloxone (NARCAN) injection 0.4 mg, 0.4 mg, Intravenous, PRN, Iveth, Neeru G, DO  •  nicotine (NICODERM CQ) 21 MG/24HR patch 1 patch, 1 patch, Transdermal, Q24H, Iveth, Neeru G, DO, 1 patch at 05/20/23 1008  •  nitroglycerin (NITROSTAT) SL tablet 0.4 mg, 0.4 mg, Sublingual, Q5 Min PRN, Iveth, Neeru G, DO  •  ondansetron (ZOFRAN) injection 4 mg, 4 mg, Intravenous, Q6H PRN, Iveth, Neeru G, DO  •  pantoprazole (PROTONIX) EC tablet 40 mg, 40 mg, Oral, Daily, Iveth, Neeru G, DO, 40 mg at  23 0626  •  piperacillin-tazobactam (ZOSYN) 4.5 g in iso-osmotic dextrose 100 mL IVPB (premix), 4.5 g, Intravenous, Q8H, Iveth, Neeru G, DO, 4.5 g at 23 0044  •  sodium chloride 0.9 % flush 10 mL, 10 mL, Intravenous, Q12H, Iveth, Neeru G, DO, 10 mL at 23 2221  •  sodium chloride 0.9 % flush 10 mL, 10 mL, Intravenous, PRN, Iveth, Neeru G, DO  •  sodium chloride 0.9 % infusion 40 mL, 40 mL, Intravenous, PRN, Iveth, Neeru G, DO  •  tamsulosin (FLOMAX) 24 hr capsule 0.4 mg, 0.4 mg, Oral, Daily, Iveth, Neeru G, DO, 0.4 mg at 23 1008    Antibiotics:  Anti-Infectives (From admission, onward)    Ordered     Dose/Rate Route Frequency Start Stop    23 0717  DAPTOmycin (CUBICIN) 600 mg in sodium chloride 0.9 % 50 mL IVPB        Ordering Provider: Karen Hinojosa APRN    6 mg/kg × 104 kg (Adjusted)  100 mL/hr over 30 Minutes Intravenous Every 24 Hours 23 0830 23 0829    23 0150  piperacillin-tazobactam (ZOSYN) 4.5 g in iso-osmotic dextrose 100 mL IVPB (premix)        Ordering Provider: Iveth, Neeru G, DO    4.5 g  over 4 Hours Intravenous Every 8 Hours 23 0800 23 0759    23 0302  clindamycin (CLEOCIN) capsule 600 mg        Ordering Provider: Iveth, Neeru G, DO    600 mg Oral Every 8 Hours Scheduled 23 0800 23 0759    23 2315  clindamycin (CLEOCIN) capsule 600 mg        Ordering Provider: Gabriel Caldwell PA    600 mg Oral Once 23 2317 23 2338    23 2306  vancomycin 3000 mg/500 mL 0.9% NS IVPB ([facility])        Ordering Provider: Neeru Lazaro DO    20 mg/kg × 154 kg  over 180 Minutes Intravenous Once 23 2308 23 0537    23 2306  piperacillin-tazobactam (ZOSYN) 4.5 g in iso-osmotic dextrose 100 mL IVPB (premix)        Ordering Provider: Gabriel Caldwell PA    4.5 g Intravenous Once 238 23 0045                 Physical Exam:   Vital Signs  Temp (24hrs), Av.1 °F (36.7 °C), Min:97.7 °F  (36.5 °C), Max:98.4 °F (36.9 °C)    Temp  Min: 97.7 °F (36.5 °C)  Max: 98.4 °F (36.9 °C)  BP  Min: 95/43  Max: 155/73  Pulse  Min: 58  Max: 88  Resp  Min: 16  Max: 20  SpO2  Min: 90 %  Max: 99 %    GENERAL: Awake and alert, in no acute distress.   HEENT: Normocephalic, atraumatic.  PERRL. EOMI. No conjunctival injection. No icterus. Oropharynx clear without evidence of thrush or exudate. No evidence of peridontal disease.    NECK: Supple   HEART: RRR; No murmur, rubs, gallops.   LUNGS: Clear to auscultation bilaterally without wheezing, rales, rhonchi. Normal respiratory effort. Nonlabored.   ABDOMEN: Soft, nontender, nondistended. Positive bowel sounds. No rebound or guarding. NO mass or HSM.  EXT:  No cyanosis, clubbing or edema. No cord.  :  Without Torres catheter.  MSK: No joint effusions or erythema  SKIN: Warm and dry  Left index finger with diffuse multifocal purulence/ecchymosis extending to the dorsal hand, ecchymosis extending to the dorsum of his hand past the MCP joint,with edema, stable redness extending over 1st 2nd fingers, and dorsum of hand.  PIP joint with slough, areas of necrosis- small amount of bloody drainage, some decreased swelling.    NEURO: Oriented to PPT.  Motor 5/5 strength  PSYCHIATRIC: Normal insight and judgment. Cooperative with PE    Laboratory Data    Results from last 7 days   Lab Units 05/21/23  0617 05/20/23  0332 05/1950   WBC 10*3/mm3 8.32 12.48* 9.85   HEMOGLOBIN g/dL 13.4 13.9 14.3   HEMATOCRIT % 41.4 43.7 44.3   PLATELETS 10*3/mm3 289 272 262     Results from last 7 days   Lab Units 05/20/23  0332   SODIUM mmol/L 135*   POTASSIUM mmol/L 4.0   CHLORIDE mmol/L 99   CO2 mmol/L 25.0   BUN mg/dL 19   CREATININE mg/dL 1.14   GLUCOSE mg/dL 182*   CALCIUM mg/dL 8.7     Results from last 7 days   Lab Units 05/1950   ALK PHOS U/L 113   BILIRUBIN mg/dL 1.0   ALT (SGPT) U/L 10   AST (SGOT) U/L 30     Results from last 7 days   Lab Units 05/20/23  0332   SED RATE mm/hr  88*     Results from last 7 days   Lab Units 05/20/23  0332   CRP mg/dL 9.01*     Results from last 7 days   Lab Units 05/19/23  1951   LACTATE mmol/L 1.7     Results from last 7 days   Lab Units 05/20/23  0332   CK TOTAL U/L 71         Estimated Creatinine Clearance: 86.2 mL/min (by C-G formula based on SCr of 1.14 mg/dL).      Microbiology:  No results found for: ACANTHNAEG, AFBCX, BPERTUSSISCX, BLOODCX  No results found for: BCIDPCR, CXREFLEX, CSFCX, CULTURETIS  No results found for: CULTURES, HSVCX, URCX  No results found for: EYECULTURE, GCCX, HSVCULTURE, LABHSV  No results found for: LEGIONELLA, MRSACX, MUMPSCX, MYCOPLASCX  No results found for: NOCARDIACX, STOOLCX  No results found for: THROATCX, UNSTIMCULT, URINECX, CULTURE, VZVCULTUR  No results found for: VIRALCULTU, WOUNDCX        Radiology:  Imaging Results (Last 72 Hours)     Procedure Component Value Units Date/Time    XR Hand 3+ View Left [[id number]] Collected: 05/19/23 2133     Updated: 05/19/23 2138    Narrative:      XR HAND 3+ VW LEFT    Date of Exam: 5/19/2023 9:03 PM EDT    Indication: Index finger pain and swelling    Comparison: None available.    Findings:  There is diffuse soft tissue swelling of the digit. There are bony erosions along the radial side of the base of the proximal phalanx of the second digit. There is also some mild bony erosion along the radial side of the head of the second metacarpal. No   abnormal periosteal reaction. No acute fracture or dislocation. Carpal bones and radiocarpal joint are within normal limits. No radiopaque body identified.    Impression:      Impression:  Small bony erosions at the second metacarpal phalangeal joint as detailed above. No periosteal.  2. Diffuse soft tissue swelling of the second digit. No radiopaque foreign body identified.      Electronically Signed: Otf Fontaine    5/19/2023 9:35 PM EDT    Workstation ID: [id number]            Impression:   - acute/Severe left index finger wound  infection/cellulitis;  He has multifocal areas of slough/purulence/ecchymosis  with difficulty flexing the finger and pain limiting exam otherwise with ecchymosis extending past the MCP on the dorsal hand and vague/diffuse erythema extending towards the wrist.  I have advised/recommended the patient be evaluated by surgery as previously noted.  I discussed with Dr. French directly and she reports medicine team had made arrangements to have him transferred to [place] but patient refused.    has refused facilitating transfer on multiple occasions and administration at [facility] unable to facilitate that any further per my discussion with them.  Per discussion with orthopedics on call, no hand surgeon available at [facility] and orthopedic surgeon on-call has refused consultation.  I have reiterated my recommendation to patient that he have a surgical evaluation with ongoing risk for further serious morbidity and other serious sequela including functional/limb loss/amputation, persistence of progressive or recurrent infection including risk for systemic spread/sepsis and death.  Gram-positive cocci raise concern for strep versus staph infection.  He is on broad antibiotic therapy.  Dr. French continues to look into options for transfer to a center with hand specialist, she will get back to me regarding options and patient preferences if she finds out more information; general orthopedics has refused consultation as above, Dr French aware and will keep us informed; as noted above, patient refuses to consider transfer to another facility other than , he refuses to consider [facility] or [facility] and he refuses to be transferred to any facility in [place] or [place].  He knows the risks he is taking with his decisions as outlined above; picture taken on my iPhone at the approval/agreement of patient in order to share with Dr French by text messaging  - COPD  -  "Diabetes mellitus, type 2  - Obesity       PLAN/RECOMMENDATIONS:     - Daptomycin 6mg/kg IV daily  - Continue Zosyn, Clinda for now   - Wound  cultures, pending    --check/review labs cultures and scans  --Partial history per nursing staff  --Discussed with microbiology  --Discussed with medicine team, Dr French as above  --I have discussed very directly  my recommendations with the patient and my concerns as outlined above.    --highly complex at of issues with high risk for further serious morbidity and other serious sequela    Dr. Alfaro has obtained the history, performed the physical exam and formulated the above treatment plan.        EUNICE Zayas  2023  06:34 EDT           Electronically signed by Chase Alfaro MD at 23 [phone]     Ronna French MD at 23 1001              [facility] Medicine Services  PROGRESS NOTE    Patient Name: Dieter Christiansen  : 1950  MRN: [id number]    Date of Admission: 2023  Primary Care Physician: Ian Sanchez, EUNICE    Subjective   Subjective     CC:  Left finger infection     HPI:  Pain is controlled \"except when people poke at it.\"  No fever today.   Says he cannot lie flat supine or prone, which would be necessary to get MRI of hand.      ROS:  Breathing okay  No pain in forearm  R handed       Objective   Objective     Vital Signs:   Temp:  [97.5 °F (36.4 °C)-99.5 °F (37.5 °C)] 98.2 °F (36.8 °C)  Heart Rate:  [72-82] 72  Resp:  [18-20] 18  BP: (132-160)/(66-97) 132/69  Flow (L/min):  [1-4] 4     Physical Exam:  Constitutional: Awake, alert and up in chair on RA   Eyes: PER  HENT: NCAT, mucous membranes moist  Neck: Supple,  Respiratory: Clear to auscultation bilaterally, nonlabored respirations   Cardiovascular: RRR,   Gastrointestinal: nontender, obese   Musculoskeletal: L hand dressed - not removed.  No tenderness or redness of forearm.  Mild tenderness dorsal hand and palm.    Psychiatric: Appropriate " affect, cooperative  Neurologic: Oriented x 3, strength symmetric in all extremities, Cranial Nerves grossly intact to confrontation, speech clear  Skin: No rashes      Results Reviewed:  LAB RESULTS:      Lab 05/20/23 0332 05/1950   WBC 12.48* 9.85   HEMOGLOBIN 13.9 14.3   HEMATOCRIT 43.7 44.3   PLATELETS 272 262   NEUTROS ABS 9.63* 7.41*   IMMATURE GRANS (ABS) 0.08* 0.04   LYMPHS ABS 1.47 1.37   MONOS ABS 1.09* 0.83   EOS ABS 0.19 0.17   .0* 100.0*   SED RATE 88* 83*   CRP 9.01* 11.04*   PROCALCITONIN 0.10 0.10   LACTATE  --  1.7   PROTIME 15.3*  --          Lab 05/20/23 0332 05/1950   SODIUM 135* 138   POTASSIUM 4.0 4.2   CHLORIDE 99 101   CO2 25.0 27.0   ANION GAP 11.0 10.0   BUN 19 19   CREATININE 1.14 1.17   EGFR 68.3 66.2   GLUCOSE 182* 163*   CALCIUM 8.7 8.9   MAGNESIUM 2.1  --    HEMOGLOBIN A1C 5.80*  --          Lab 05/1950   TOTAL PROTEIN 7.2   ALBUMIN 3.6   GLOBULIN 3.6   ALT (SGPT) 10   AST (SGOT) 30   BILIRUBIN 1.0   ALK PHOS 113         Lab 05/20/23 0332   PROTIME 15.3*   INR 1.20*                 Brief Urine Lab Results  (Last result in the past 365 days)      Color   Clarity   Blood   Leuk Est   Nitrite   Protein   CREAT   Urine HCG        05/27/22 1431 Dark Yellow   Clear   3+   Negative   Negative   Negative                 Microbiology Results Abnormal     Procedure Component Value - Date/Time    Wound Culture - Wound, Hand, Digit Left [[id number]] Collected: 05/20/23 0236    Lab Status: Preliminary result Specimen: Wound from Hand, Digit Left Updated: 05/20/23 0315     Gram Stain Moderate (3+) Gram positive cocci in pairs, chains and clusters      No WBCs seen          XR Hand 3+ View Left    Result Date: 5/19/2023  XR HAND 3+ VW LEFT Date of Exam: 5/19/2023 9:03 PM EDT Indication: Index finger pain and swelling Comparison: None available. Findings: There is diffuse soft tissue swelling of the digit. There are bony erosions along the radial side of the base of the  proximal phalanx of the second digit. There is also some mild bony erosion along the radial side of the head of the second metacarpal. No  abnormal periosteal reaction. No acute fracture or dislocation. Carpal bones and radiocarpal joint are within normal limits. No radiopaque body identified.    Impression: Impression: Small bony erosions at the second metacarpal phalangeal joint as detailed above. No periosteal. 2. Diffuse soft tissue swelling of the second digit. No radiopaque foreign body identified. Electronically Signed: Otf Fontaine  5/19/2023 9:35 PM EDT  Workstation ID: [id number]          Current medications:  Scheduled Meds:aspirin, 81 mg, Oral, Daily  atorvastatin, 10 mg, Oral, Daily  cetirizine, 10 mg, Oral, Daily  clindamycin, 600 mg, Oral, Q8H  DAPTOmycin, 6 mg/kg (Adjusted), Intravenous, Q24H  famotidine, 20 mg, Oral, Daily  FLUoxetine, 20 mg, Oral, Daily  gabapentin, 600 mg, Oral, Q8H  insulin lispro, 2-7 Units, Subcutaneous, 4x Daily AC & at Bedtime  ipratropium-albuterol, 3 mL, Nebulization, 4x Daily - RT  lisinopril, 10 mg, Oral, Daily  metoprolol succinate XL, 50 mg, Oral, Daily  nicotine, 1 patch, Transdermal, Q24H  pantoprazole, 40 mg, Oral, Daily  piperacillin-tazobactam, 4.5 g, Intravenous, Q8H  senna-docusate sodium, 2 tablet, Oral, BID  sodium chloride, 10 mL, Intravenous, Q12H  tamsulosin, 0.4 mg, Oral, Daily      Continuous Infusions:sodium chloride, 75 mL/hr, Last Rate: 75 mL/hr (05/20/23 0320)      PRN Meds:.•  acetaminophen **OR** acetaminophen **OR** acetaminophen  •  albuterol  •  senna-docusate sodium **AND** polyethylene glycol **AND** bisacodyl **AND** bisacodyl  •  dextrose  •  dextrose  •  glucagon (human recombinant)  •  HYDROcodone-acetaminophen  •  Morphine **AND** naloxone  •  nitroglycerin  •  ondansetron  •  sodium chloride  •  sodium chloride    Assessment & Plan   Assessment & Plan     Active Hospital Problems    Diagnosis  POA   • **Cellulitis of left hand [L03.114]   "Yes   • Tobacco abuse [Z72.0]  Unknown   • CAD (coronary artery disease) [I25.10]  Unknown   • COPD (chronic obstructive pulmonary disease) [J44.9]  Unknown   • Type 2 diabetes mellitus [E11.9]  Unknown   • HTN (hypertension) [I10]  Unknown   • HLD (hyperlipidemia) [E78.5]  Unknown   • GERD without esophagitis [K21.9]  Unknown   • MARY ALICE (obstructive sleep apnea) [G47.33]  Unknown   • Infection of finger of left hand [L03.012]  Unknown      Resolved Hospital Problems   No resolved problems to display.        Brief Hospital Course to date:  Dieter Christiansen is a 73 y.o. male w PMH COPD, CAD, DM, MARY ALICE.  Admitted for left index finger infection.  Began a week ago with a hangnail after minor trauma; has spread and worsened.  Tmax 99.5.      Cellulitis of left hand  Infection of left index finger  - Radiographs noted above; MRI left hand pending  - abx day 1; ID following.  Have discussed with ID MD  multiple times.   -  Blood culture, Wound culture  - WOC  - control blood glucoses     Regarding efforts to obtain hand surgeon eval  - several discussions with Dr Alfaro.   - per partner:   Dr. Hutton and Dr. Coley are not available for coverage this week  - partner or ED staff reportedly spoke w Dr. Aviles at , orthopedic hand surgeon, who declined to accept in transfer,  recommended trial of IV antibiotics for now.    - [facility], declined transfer as they have no beds  - Patient states \"I will not go to [facility]\" and avers that he would rather lose his finger than go there.  He also states \"I will not go to [place].\"  - Called  transfer office at 1015.  Spoke w Dr Kay, hand surgeon; sent photographs.  He declined to accept ( on divert) but noted that he would reconsider if pt progressed to being toxic or infection spread up arm.   - Called Dr Allan Coelho, sent photographs.  He refused the consult.  - [facility].  The hand surgeon in their group, Dr Loo, is not reachable today.  If " he is reachable tomorrow, Dr Baez will discuss the situation with him.   However, it is clarified that Dr Loo does not take hand call for [facility].          COPD - DuoNebs scheduled and as needed     CAD  HTN  HLD  - hold lasix today, consider restart tomorrow      Diabetes mellitus type 2 , A1C 5.8   - Hold home metformin  - SSI with Accu-Cheks     GERD  - Continue PPI     MARY ALICE  - CPAP     Chronic tobacco use  -- on cessation  --nicotine patch    Expected Discharge Location and Transportation: home by car vs tx for surgery  Expected Discharge tbd  Expected Discharge Date: 5/22/2023; Expected Discharge Time:      DVT prophylaxis:  Mechanical DVT prophylaxis orders are present.     AM-PAC 6 Clicks Score (PT): 18 (05/20/23 0200)    CODE STATUS:   Code Status and Medical Interventions:   Ordered at: 05/20/23 0141     Level Of Support Discussed With:    Patient     Code Status (Patient has no pulse and is not breathing):    CPR (Attempt to Resuscitate)     Medical Interventions (Patient has pulse or is breathing):    Full Support       Ronna French MD  05/20/23        Electronically signed by Ronna French MD at 05/20/23 1448          Consult Notes (all)      Chase Alfaro MD at 05/20/23 0658      Consult Orders    1. Inpatient Infectious Diseases Consult [[id number]] ordered by Neeru Lazaro DO at 05/20/23 0141                   INFECTIOUS DISEASE CONSULT/INITIAL HOSPITAL VISIT    Dieter Christiansen  1950  [id number]    Date of Consult: 5/20/2023    Admission Date: 5/19/2023      Requesting Provider: Dr French  Evaluating Physician: Dr Chase Alfaro  Reason for Consultation: left index finger infection     History of present illness:    Patient is a 73 y.o. male, with PMH COPD, CAD, DM, MARY ALICE, seen today for left index finger infection.  He had developed a hangnail of the finger after which he injured his hand when he hit his hand on a wall approximately one week ago.  The redness and swelling  progressively worsened prompting his presentation to the ED.  Xray of the hand with diffuse swelling of the index finger, with bony erosions along the radial side of the base of proximal phalanx of second digit. Admitting labs with WBC 9.85, plt 262, CRP 11, ESR 83, PCT 0.10, Scr 1.17, and tmax of 99.5.  Wound gram stain with moderate gram positive cocci in pairs, chains and clusters, no WBCs. Started on Vancomycin, Zosyn and Clindamycin and we were consulted for antibiotic management.  Plans made to transfer to [place] per d/w Dr French, however he refused per d/w Dr French.    He has pain at his left index finger/hand which is constant, sharp, nonradiating, worse with movement/pressure, better with pain meds and 4-5 out of 10 in severity.  He denied any other specific trauma aside from above.  No other blood force penetrating trauma.  No animal insect arthropod bite.  Denies any prior history MRSA VRE C. Difficile or ESBL/kpC/CRE organisms     Denies any other specific focal symptoms.  No headache photophobia or neck stiffness.  He is on nasal cannula oxygen but denies dyspnea cough or hemoptysis.  No nausea vomiting diarrhea or abdominal pain.  No dysuria hematuria or pyuria.        Past Medical History:   Diagnosis Date   • Chronic back pain    • COPD (chronic obstructive pulmonary disease)    • Coronary artery disease involving native coronary artery of native heart without angina pectoris    • Diabetes mellitus    • GERD (gastroesophageal reflux disease)    • Heart attack    • Hyperlipidemia    • Hypertension    • Lung nodule    • Murmur    • MARY ALICE (obstructive sleep apnea)        Past Surgical History:   Procedure Laterality Date   • FEMORAL ARTERY STENT         Family History   Problem Relation Age of Onset   • Diabetes Father    • Diabetes Brother        Social History     Socioeconomic History   • Marital status:    Tobacco Use   • Smoking status: Every Day     Packs/day: 0.50     Years: 30.00     Pack  years: 15.00     Types: Cigarettes   • Smokeless tobacco: Former   • Tobacco comments:     20 years ago   Vaping Use   • Vaping Use: Never used   Substance and Sexual Activity   • Alcohol use: Not Currently   • Drug use: Never   • Sexual activity: Defer       No Known Allergies      Medication:    Current Facility-Administered Medications:   •  acetaminophen (TYLENOL) tablet 650 mg, 650 mg, Oral, Q4H PRN **OR** acetaminophen (TYLENOL) 160 MG/5ML solution 650 mg, 650 mg, Oral, Q4H PRN **OR** acetaminophen (TYLENOL) suppository 650 mg, 650 mg, Rectal, Q4H PRN, Iveth, Neeru G, DO  •  albuterol (PROVENTIL) nebulizer solution 0.083% 2.5 mg/3mL, 2.5 mg, Nebulization, Q6H PRN, Iveth, Neeru G, DO  •  aspirin EC tablet 81 mg, 81 mg, Oral, Daily, Iveth, Neeru G, DO  •  atorvastatin (LIPITOR) tablet 10 mg, 10 mg, Oral, Daily, Iveth, Neeru G, DO  •  sennosides-docusate (PERICOLACE) 8.6-50 MG per tablet 2 tablet, 2 tablet, Oral, BID **AND** polyethylene glycol (MIRALAX) packet 17 g, 17 g, Oral, Daily PRN **AND** bisacodyl (DULCOLAX) EC tablet 5 mg, 5 mg, Oral, Daily PRN **AND** bisacodyl (DULCOLAX) suppository 10 mg, 10 mg, Rectal, Daily PRN, Iveth, Neeru G, DO  •  cetirizine (zyrTEC) tablet 10 mg, 10 mg, Oral, Daily, Iveth, Neeru G, DO  •  clindamycin (CLEOCIN) capsule 600 mg, 600 mg, Oral, Q8H, Iveth, Neeru G, DO  •  dextrose (D50W) (25 g/50 mL) IV injection 25 g, 25 g, Intravenous, Q15 Min PRN, Iveth, Neeru G, DO  •  dextrose (GLUTOSE) oral gel 15 g, 15 g, Oral, Q15 Min PRN, Iveth, Neeru G, DO  •  famotidine (PEPCID) tablet 20 mg, 20 mg, Oral, Daily, Iveth, Neeru G, DO  •  FLUoxetine (PROzac) capsule 20 mg, 20 mg, Oral, Daily, Iveth, Neeru G, DO  •  gabapentin (NEURONTIN) capsule 600 mg, 600 mg, Oral, Q8H, Iveth, Neeru G, DO, 600 mg at 05/20/23 0641  •  glucagon (GLUCAGEN) injection 1 mg, 1 mg, Intramuscular, Q15 Min PRN, Alee Lazaroe G, DO  •  HYDROcodone-acetaminophen (NORCO)  MG per tablet 1 tablet, 1  tablet, Oral, Q8H PRN, Iveth, Neeru G, DO  •  Insulin Lispro (humaLOG) injection 2-7 Units, 2-7 Units, Subcutaneous, 4x Daily AC & at Bedtime, Eveline Lazarosie G, DO  •  ipratropium-albuterol (DUO-NEB) nebulizer solution 3 mL, 3 mL, Nebulization, 4x Daily - RT, Eveline Lazarosie G, DO, 3 mL at 05/20/23 0655  •  lisinopril (PRINIVIL,ZESTRIL) tablet 10 mg, 10 mg, Oral, Daily, Iveth, Neeru G, DO  •  metoprolol succinate XL (TOPROL-XL) 24 hr tablet 50 mg, 50 mg, Oral, Daily, IvethEveline jewellsie G, DO  •  morphine injection 2 mg, 2 mg, Intravenous, Q3H PRN **AND** naloxone (NARCAN) injection 0.4 mg, 0.4 mg, Intravenous, PRN, Iveth, Neeru G, DO  •  nicotine (NICODERM CQ) 21 MG/24HR patch 1 patch, 1 patch, Transdermal, Q24H, IvethEveline jewellsie G, DO  •  nitroglycerin (NITROSTAT) SL tablet 0.4 mg, 0.4 mg, Sublingual, Q5 Min PRN, IvethEveline jewellsie G, DO  •  ondansetron (ZOFRAN) injection 4 mg, 4 mg, Intravenous, Q6H PRN, Iveth, Neeru G, DO  •  pantoprazole (PROTONIX) EC tablet 40 mg, 40 mg, Oral, Daily, IvethEveline jewellsie G, DO, 40 mg at 05/20/23 0641  •  Pharmacy to dose vancomycin, , Does not apply, Continuous PRN, Iveth, Neeru G, DO  •  piperacillin-tazobactam (ZOSYN) 4.5 g in iso-osmotic dextrose 100 mL IVPB (premix), 4.5 g, Intravenous, Q8H, Iveth, Neeru G, DO  •  sodium chloride 0.9 % flush 10 mL, 10 mL, Intravenous, Q12H, Iveth, Neeru G, DO, 10 mL at 05/20/23 0320  •  sodium chloride 0.9 % flush 10 mL, 10 mL, Intravenous, PRN, Iveth, Neeru G, DO  •  sodium chloride 0.9 % infusion 40 mL, 40 mL, Intravenous, PRN, Iveth, Neeru G, DO  •  sodium chloride 0.9 % infusion, 75 mL/hr, Intravenous, Continuous, Iveth, Neeru G, DO, Last Rate: 75 mL/hr at 05/20/23 0320, 75 mL/hr at 05/20/23 0320  •  tamsulosin (FLOMAX) 24 hr capsule 0.4 mg, 0.4 mg, Oral, Daily, Iveth, Neeru G, DO  •  [START ON 5/21/2023] vancomycin 1500 mg/500 mL 0.9% NS IVPB ([facility]), 9.7 mg/kg, Intravenous, Q24H **AND** [START ON 5/22/2023] Vancomycin, Trough, , , Timed, Romelia,  Jozef WAKEFIELD, PharmD    Antibiotics:  Anti-Infectives (From admission, onward)    Ordered     Dose/Rate Route Frequency Start Stop    05/20/23 0223  vancomycin 1500 mg/500 mL 0.9% NS IVPB ([facility])        Ordering Provider: Jozef León PharmD   See [facility] for full Linked Orders Report.    9.7 mg/kg × 154 kg  over 90 Minutes Intravenous Every 24 Hours Scheduled 05/21/23 0600 05/25/23 0859    05/20/23 0150  piperacillin-tazobactam (ZOSYN) 4.5 g in iso-osmotic dextrose 100 mL IVPB (premix)        Ordering Provider: Neeru Lazaro, DO    4.5 g  over 4 Hours Intravenous Every 8 Hours 05/20/23 0800 05/25/23 0759    05/20/23 0302  clindamycin (CLEOCIN) capsule 600 mg        Ordering Provider: Neeru Lazaro DO    600 mg Oral Every 8 Hours Scheduled 05/20/23 0800 05/25/23 0759    05/20/23 0150  Pharmacy to dose vancomycin        Ordering Provider: Neeru Lazaro DO     Does not apply Continuous PRN 05/20/23 0150 05/25/23 0149    05/19/23 2315  clindamycin (CLEOCIN) capsule 600 mg        Ordering Provider: Gabriel Caldwell PA    600 mg Oral Once 05/19/23 2317 05/19/23 2338    05/19/23 2306  vancomycin 3000 mg/500 mL 0.9% NS IVPB ([facility])        Ordering Provider: Neeru Lazaro, DO    20 mg/kg × 154 kg  over 180 Minutes Intravenous Once 05/19/23 2308 05/20/23 0537    05/19/23 2306  piperacillin-tazobactam (ZOSYN) 4.5 g in iso-osmotic dextrose 100 mL IVPB (premix)        Ordering Provider: Gabriel Caldwell PA    4.5 g Intravenous Once 05/19/23 2308 05/20/23 0045            Review of Systems:  Constitutional-- No Fever, chills or sweats.  Appetite good, and no malaise. +fatigue.  HEENT-- No new vision, hearing or throat complaints.  No epistaxis or oral sores.  Denies odynophagia or dysphagia. No headache, photophobia or neck stiffness.  CV-- No chest pain, palpitation or syncope  Resp-- No SOB/cough/Hemoptysis  GI- No nausea, vomiting, or diarrhea.  No hematochezia, melena, or hematemesis. Denies jaundice or chronic  liver disease.  -- No dysuria, hematuria, or flank pain.  Denies hesitancy, urgency or flank pain.  Lymph- no swollen lymph nodes in neck/axilla or groin.   Heme- No active bruising or bleeding; no Hx of DVT or PE.  MS-- no swelling or pain in the bones or joints of arms/legs.  No new back pain.  Neuro-- No acute focal weakness or numbness in the arms or legs.  No seizures.  Skin--No rashes   Left index finger with edema, redness extending over 1st 2nd fingers, and dorsum of hand.  PIP joint with slough, some areas of necrosis.       Physical Exam:   Vital Signs  Temp (24hrs), Av.4 °F (36.9 °C), Min:97.5 °F (36.4 °C), Max:99.5 °F (37.5 °C)    Temp  Min: 97.5 °F (36.4 °C)  Max: 99.5 °F (37.5 °C)  BP  Min: 132/69  Max: 160/73  Pulse  Min: 72  Max: 82  Resp  Min: 18  Max: 20  SpO2  Min: 90 %  Max: 94 %    GENERAL: Awake and alert, in no acute distress.   HEENT: Normocephalic, atraumatic.  PERRL. EOMI. No conjunctival injection. No icterus. Oropharynx clear without evidence of thrush or exudate. No evidence of peridontal disease.    NECK: Supple   HEART: RRR; No murmur, rubs, gallops.   LUNGS: Clear to auscultation bilaterally without wheezing, rales, rhonchi. Normal respiratory effort. Nonlabored.   ABDOMEN: Soft, nontender, nondistended. Positive bowel sounds. No rebound or guarding. NO mass or HSM.  EXT:  No cyanosis, clubbing or edema. No cord.  :  Without Torres catheter.  MSK: No joint effusions or erythema  SKIN: Warm and dry  Left index finger with diffuse multifocal purulence, ecchymosis extending to the dorsum of his hand past the MCP joint,with edema, redness extending over 1st 2nd fingers, and dorsum of hand.  PIP joint with slough, areas of necrosis. He is not able to flex the index finger significantly and pain limits exam otherwise   NEURO: Oriented to PPT.  Motor 5/5 strength  PSYCHIATRIC: Normal insight and judgment. Cooperative with PE    Laboratory Data    Results from last 7 days   Lab Units  05/20/23 0332 05/1950   WBC 10*3/mm3 12.48* 9.85   HEMOGLOBIN g/dL 13.9 14.3   HEMATOCRIT % 43.7 44.3   PLATELETS 10*3/mm3 272 262     Results from last 7 days   Lab Units 05/20/23 0332   SODIUM mmol/L 135*   POTASSIUM mmol/L 4.0   CHLORIDE mmol/L 99   CO2 mmol/L 25.0   BUN mg/dL 19   CREATININE mg/dL 1.14   GLUCOSE mg/dL 182*   CALCIUM mg/dL 8.7     Results from last 7 days   Lab Units 05/1950   ALK PHOS U/L 113   BILIRUBIN mg/dL 1.0   ALT (SGPT) U/L 10   AST (SGOT) U/L 30     Results from last 7 days   Lab Units 05/20/23 0332   SED RATE mm/hr 88*     Results from last 7 days   Lab Units 05/20/23 0332   CRP mg/dL 9.01*     Results from last 7 days   Lab Units 05/1950   LACTATE mmol/L 1.7             Estimated Creatinine Clearance: 86.2 mL/min (by C-G formula based on SCr of 1.14 mg/dL).      Microbiology:  No results found for: ACANTHNAEG, AFBCX, BPERTUSSISCX, BLOODCX  No results found for: BCIDPCR, CXREFLEX, CSFCX, CULTURETIS  No results found for: CULTURES, HSVCX, URCX  No results found for: EYECULTURE, GCCX, HSVCULTURE, LABHSV  No results found for: LEGIONELLA, MRSACX, MUMPSCX, MYCOPLASCX  No results found for: NOCARDIACX, STOOLCX  No results found for: THROATCX, UNSTIMCULT, URINECX, CULTURE, VZVCULTUR  No results found for: VIRALCULTU, WOUNDCX        Radiology:  Imaging Results (Last 72 Hours)     Procedure Component Value Units Date/Time    XR Hand 3+ View Left [[id number]] Collected: 05/19/23 2133     Updated: 05/19/23 2138    Narrative:      XR HAND 3+ VW LEFT    Date of Exam: 5/19/2023 9:03 PM EDT    Indication: Index finger pain and swelling    Comparison: None available.    Findings:  There is diffuse soft tissue swelling of the digit. There are bony erosions along the radial side of the base of the proximal phalanx of the second digit. There is also some mild bony erosion along the radial side of the head of the second metacarpal. No   abnormal periosteal reaction. No acute  fracture or dislocation. Carpal bones and radiocarpal joint are within normal limits. No radiopaque body identified.    Impression:      Impression:  Small bony erosions at the second metacarpal phalangeal joint as detailed above. No periosteal.  2. Diffuse soft tissue swelling of the second digit. No radiopaque foreign body identified.      Electronically Signed: Otf Fontaine    5/19/2023 9:35 PM EDT    Workstation ID: [id number]            Impression:   - acute/Severe left index finger wound infection/cellulitis- MRI pending.  He has multifocal areas of slough/purulence with difficulty flexing the finger and pain limiting exam otherwise with ecchymosis extending past the MCP on the dorsal hand and vague/diffuse erythema.  I have advised/recommended the patient be evaluated by surgery.  I discussed with Dr. French directly and she reports medicine team had made arrangements to have him transferred to [place] [facility] but patient refused.  Apparently [facility] unable to accept.  I have reiterated my recommendation to patient that he have a surgical evaluation with ongoing risk for further serious morbidity and other serious sequela including functional/limb loss/amputation, persistence of progressive or recurrent infection including risk for systemic spread/sepsis and death.  Gram-positive cocci raise concern for strep versus staph infection.  He is on broad antibiotic therapy.  Dr. French is looking into options for transfer to a center with hand specialist, she will get back to me regarding options and patient preferences; if you are unable to transfer to a center with a hand specialist you should have general orthopedics see the patient for evaluation, Dr French aware and will keep us informed  - COPD  - Diabetes mellitus, type 2  - Obesity       PLAN/RECOMMENDATIONS:   Thank you for asking us to see Dieter Christiansen, I recommend the following:    - Daptomycin 6mg/kg IV daily  - Baseline CK  - Continue Zosyn, Clinda for now   - Wound  and blood cultures, pending    --check/review labs cultures and scans  --Partial history per nursing staff  --Discussed with microbiology  --Discussed with medicine team, Dr French as above  --I have discussed very directly  my recommendations with the patient and my concerns as outlined above.  He is considering his options and Dr French is looking into transfer options as above  --highly complex at of issues with high risk for further serious morbidity and other serious sequela    Dr. Alfaro has obtained the history, performed the physical exam and formulated the above treatment plan.        Karen Hinojosa, APRN  5/20/2023  06:58 EDT      Addendum regarding patient request/approval to take picture of his hand in order to send to Dr rFench; nursing staff present;  patient gives approval/consent for picture to be taken and sent via text messaging from my I-phone to Dr French     5/20/23 10:54 addendum regarding further conversations   Patient has refused transfer to the [facility] either [facility] or [facility] and has told Dr. French that he would rather lose his left index finger than to go to 1 of those hospitals.  He has refused transfer to [facility].  Dr. French has been in contact with me on multiple occasions this morning.  She contacted hand surgery at [facility] and they would not facilitate transfer.  They have seen a picture of his hand per my discussion with Dr. French; they apparently agreed with Dr. French regarding the severity and the they stated  high risk  for amputation of  his left index finger; they asked that orthopedics see at [facility]; Dr. French has contacted orthopedics and they are not a hand surgeon and they have refused.  I have reached out to administration to help facilitate additional options        Electronically signed by Chase Alfaro MD at 05/20/23 8731

## 2023-05-23 LAB
ANION GAP SERPL CALCULATED.3IONS-SCNC: 11 MMOL/L (ref 5–15)
BUN SERPL-MCNC: 20 MG/DL (ref 8–23)
BUN/CREAT SERPL: 15.9 (ref 7–25)
CALCIUM SPEC-SCNC: 9.3 MG/DL (ref 8.6–10.5)
CHLORIDE SERPL-SCNC: 100 MMOL/L (ref 98–107)
CO2 SERPL-SCNC: 27 MMOL/L (ref 22–29)
CREAT SERPL-MCNC: 1.26 MG/DL (ref 0.76–1.27)
DEPRECATED RDW RBC AUTO: 46.5 FL (ref 37–54)
EGFRCR SERPLBLD CKD-EPI 2021: 60.6 ML/MIN/1.73
ERYTHROCYTE [DISTWIDTH] IN BLOOD BY AUTOMATED COUNT: 12.8 % (ref 12.3–15.4)
GLUCOSE BLDC GLUCOMTR-MCNC: 104 MG/DL (ref 70–130)
GLUCOSE BLDC GLUCOMTR-MCNC: 123 MG/DL (ref 70–130)
GLUCOSE BLDC GLUCOMTR-MCNC: 127 MG/DL (ref 70–130)
GLUCOSE BLDC GLUCOMTR-MCNC: 99 MG/DL (ref 70–130)
GLUCOSE SERPL-MCNC: 100 MG/DL (ref 65–99)
HCT VFR BLD AUTO: 40.3 % (ref 37.5–51)
HGB BLD-MCNC: 13 G/DL (ref 13–17.7)
MCH RBC QN AUTO: 31.9 PG (ref 26.6–33)
MCHC RBC AUTO-ENTMCNC: 32.3 G/DL (ref 31.5–35.7)
MCV RBC AUTO: 99 FL (ref 79–97)
PLATELET # BLD AUTO: 352 10*3/MM3 (ref 140–450)
PMV BLD AUTO: 9.6 FL (ref 6–12)
POTASSIUM SERPL-SCNC: 4.2 MMOL/L (ref 3.5–5.2)
RBC # BLD AUTO: 4.07 10*6/MM3 (ref 4.14–5.8)
SODIUM SERPL-SCNC: 138 MMOL/L (ref 136–145)
WBC NRBC COR # BLD: 10.73 10*3/MM3 (ref 3.4–10.8)

## 2023-05-23 PROCEDURE — 85027 COMPLETE CBC AUTOMATED: CPT | Performed by: INTERNAL MEDICINE

## 2023-05-23 PROCEDURE — 82948 REAGENT STRIP/BLOOD GLUCOSE: CPT

## 2023-05-23 PROCEDURE — 99232 SBSQ HOSP IP/OBS MODERATE 35: CPT | Performed by: INTERNAL MEDICINE

## 2023-05-23 PROCEDURE — 25010000002 DAPTOMYCIN PER 1 MG: Performed by: NURSE PRACTITIONER

## 2023-05-23 PROCEDURE — 80048 BASIC METABOLIC PNL TOTAL CA: CPT | Performed by: INTERNAL MEDICINE

## 2023-05-23 PROCEDURE — 94799 UNLISTED PULMONARY SVC/PX: CPT

## 2023-05-23 RX ORDER — QUETIAPINE FUMARATE 25 MG/1
12.5 TABLET, FILM COATED ORAL ONCE
Status: DISCONTINUED | OUTPATIENT
Start: 2023-05-23 | End: 2023-05-25 | Stop reason: HOSPADM

## 2023-05-23 RX ADMIN — IPRATROPIUM BROMIDE AND ALBUTEROL SULFATE 3 ML: 2.5; .5 SOLUTION RESPIRATORY (INHALATION) at 16:31

## 2023-05-23 RX ADMIN — GABAPENTIN 600 MG: 300 CAPSULE ORAL at 13:53

## 2023-05-23 RX ADMIN — Medication 81 MG: at 08:23

## 2023-05-23 RX ADMIN — SENNOSIDES AND DOCUSATE SODIUM 2 TABLET: 50; 8.6 TABLET ORAL at 21:10

## 2023-05-23 RX ADMIN — Medication 10 ML: at 08:07

## 2023-05-23 RX ADMIN — METOPROLOL SUCCINATE 50 MG: 50 TABLET, EXTENDED RELEASE ORAL at 08:08

## 2023-05-23 RX ADMIN — CETIRIZINE HYDROCHLORIDE 10 MG: 10 TABLET, FILM COATED ORAL at 08:08

## 2023-05-23 RX ADMIN — GABAPENTIN 600 MG: 300 CAPSULE ORAL at 21:09

## 2023-05-23 RX ADMIN — GABAPENTIN 600 MG: 300 CAPSULE ORAL at 05:25

## 2023-05-23 RX ADMIN — Medication 1 PATCH: at 08:23

## 2023-05-23 RX ADMIN — FLUOXETINE 20 MG: 20 CAPSULE ORAL at 08:08

## 2023-05-23 RX ADMIN — FUROSEMIDE 40 MG: 40 TABLET ORAL at 08:08

## 2023-05-23 RX ADMIN — TAMSULOSIN HYDROCHLORIDE 0.4 MG: 0.4 CAPSULE ORAL at 08:08

## 2023-05-23 RX ADMIN — DAPTOMYCIN 600 MG: 500 INJECTION, POWDER, LYOPHILIZED, FOR SOLUTION INTRAVENOUS at 08:07

## 2023-05-23 RX ADMIN — IPRATROPIUM BROMIDE AND ALBUTEROL SULFATE 3 ML: 2.5; .5 SOLUTION RESPIRATORY (INHALATION) at 20:26

## 2023-05-23 RX ADMIN — LISINOPRIL 10 MG: 10 TABLET ORAL at 08:08

## 2023-05-23 RX ADMIN — Medication 10 ML: at 21:10

## 2023-05-23 RX ADMIN — PANTOPRAZOLE SODIUM 40 MG: 40 TABLET, DELAYED RELEASE ORAL at 05:25

## 2023-05-23 RX ADMIN — IPRATROPIUM BROMIDE AND ALBUTEROL SULFATE 3 ML: 2.5; .5 SOLUTION RESPIRATORY (INHALATION) at 07:26

## 2023-05-23 RX ADMIN — FAMOTIDINE 20 MG: 20 TABLET ORAL at 08:08

## 2023-05-23 NOTE — NURSING NOTE
Spoke with Shira at  [facility], regarding a possible transfer if they have an opening.  This RN requested the appointment to be earliest as possible. Shira said, she might be able to get patient in on Thursday, May 25, 2023.  She will need to get that approval first, since it would be an overbook. She said, she would call this nurse back tomorrow, May 23, 2023 with an update.

## 2023-05-23 NOTE — PROGRESS NOTES
[facility] Medicine Services  PROGRESS NOTE    Patient Name: Dieter Christiansen  : 1950  MRN: [id number]    Date of Admission: 2023  Primary Care Physician: Ian Sanchez APRN    Subjective   Subjective     CC:  Finger infection     HPI:  No acute events. More paranoid today. Oriented. Knows why he is in the hospital. Updated him on plan of care. Called daughter with no answer.     ROS:  Gen- No fevers, chills  CV- No chest pain, palpitations  Resp- No cough, dyspnea  GI- No N/V/D, abd pain     Objective   Objective     Vital Signs:   Temp:  [97.5 °F (36.4 °C)-98.2 °F (36.8 °C)] 97.5 °F (36.4 °C)  Heart Rate:  [57-80] 62  Resp:  [18-22] 18  BP: (117-127)/(59-83) 121/76  Flow (L/min):  [3] 3     Physical Exam:  Constitutional: No acute distress, awake, alert; disheveled   HENT: NCAT, mucous membranes moist; no teeth   Respiratory: Clear to auscultation bilaterally, respiratory effort normal   Cardiovascular: RRR, no murmurs, rubs, or gallops  Gastrointestinal: Positive bowel sounds, soft, nontender, nondistended  Musculoskeletal: left index finger wrapped  Psychiatric: Appropriate affect, cooperative  Neurologic: Oriented x 3, strength symmetric in all extremities, Cranial Nerves grossly intact to confrontation, speech clear  Skin: No rashes    Results Reviewed:  LAB RESULTS:      Lab 23  0353 23  0617 23  0332 23   WBC 10.73 8.32 12.48* 9.85   HEMOGLOBIN 13.0 13.4 13.9 14.3   HEMATOCRIT 40.3 41.4 43.7 44.3   PLATELETS 352 289 272 262   NEUTROS ABS  --   --  9.63* 7.41*   IMMATURE GRANS (ABS)  --   --  0.08* 0.04   LYMPHS ABS  --   --  1.47 1.37   MONOS ABS  --   --  1.09* 0.83   EOS ABS  --   --  0.19 0.17   MCV 99.0* 100.5* 100.0* 100.0*   SED RATE  --   --  88* 83*   CRP  --   --  9.01* 11.04*   PROCALCITONIN  --   --  0.10 0.10   LACTATE  --   --   --  1.7   PROTIME  --   --  15.3*  --          Lab 23  0353 23  0617 23  0332  05/1950   SODIUM 138 138 135* 138   POTASSIUM 4.2 4.2 4.0 4.2   CHLORIDE 100 102 99 101   CO2 27.0 26.0 25.0 27.0   ANION GAP 11.0 10.0 11.0 10.0   BUN 20 20 19 19   CREATININE 1.26 1.08 1.14 1.17   EGFR 60.6 72.9 68.3 66.2   GLUCOSE 100* 97 182* 163*   CALCIUM 9.3 8.9 8.7 8.9   MAGNESIUM  --   --  2.1  --    HEMOGLOBIN A1C  --   --  5.80*  --          Lab 05/1950   TOTAL PROTEIN 7.2   ALBUMIN 3.6   GLOBULIN 3.6   ALT (SGPT) 10   AST (SGOT) 30   BILIRUBIN 1.0   ALK PHOS 113         Lab 05/20/23  0332   PROTIME 15.3*   INR 1.20*                 Brief Urine Lab Results  (Last result in the past 365 days)      Color   Clarity   Blood   Leuk Est   Nitrite   Protein   CREAT   Urine HCG        05/27/22 1431 Dark Yellow   Clear   3+   Negative   Negative   Negative                 Microbiology Results Abnormal     Procedure Component Value - Date/Time    Blood Culture - Blood, Arm, Right [[id number]]  (Normal) Collected: 05/19/23 1949    Lab Status: Preliminary result Specimen: Blood from Arm, Right Updated: 05/22/23 2015     Blood Culture No growth at 3 days    Blood Culture - Blood, Arm, Left [[id number]]  (Normal) Collected: 05/1950    Lab Status: Preliminary result Specimen: Blood from Arm, Left Updated: 05/22/23 2015     Blood Culture No growth at 3 days          No radiology results from the last 24 hrs        Current medications:  Scheduled Meds:aspirin, 81 mg, Oral, Daily  cetirizine, 10 mg, Oral, Daily  DAPTOmycin, 6 mg/kg (Adjusted), Intravenous, Q24H  famotidine, 20 mg, Oral, Daily  FLUoxetine, 20 mg, Oral, Daily  furosemide, 40 mg, Oral, Daily  gabapentin, 600 mg, Oral, Q8H  insulin lispro, 2-7 Units, Subcutaneous, 4x Daily AC & at Bedtime  ipratropium-albuterol, 3 mL, Nebulization, 4x Daily - RT  lisinopril, 10 mg, Oral, Daily  metoprolol succinate XL, 50 mg, Oral, Daily  nicotine, 1 patch, Transdermal, Q24H  pantoprazole, 40 mg, Oral, Daily  senna-docusate sodium, 2 tablet, Oral, BID  sodium  "chloride, 10 mL, Intravenous, Q12H  tamsulosin, 0.4 mg, Oral, Daily      Continuous Infusions:   PRN Meds:.•  acetaminophen **OR** acetaminophen **OR** acetaminophen  •  albuterol  •  senna-docusate sodium **AND** polyethylene glycol **AND** bisacodyl **AND** bisacodyl  •  dextrose  •  dextrose  •  glucagon (human recombinant)  •  HYDROcodone-acetaminophen  •  Morphine **AND** naloxone  •  nitroglycerin  •  ondansetron  •  sodium chloride  •  sodium chloride    Assessment & Plan   Assessment & Plan     Active Hospital Problems    Diagnosis  POA   • **Cellulitis of left hand [L03.114]  Yes   • Tobacco abuse [Z72.0]  Unknown   • CAD (coronary artery disease) [I25.10]  Unknown   • COPD (chronic obstructive pulmonary disease) [J44.9]  Unknown   • Type 2 diabetes mellitus [E11.9]  Unknown   • HTN (hypertension) [I10]  Unknown   • HLD (hyperlipidemia) [E78.5]  Unknown   • GERD without esophagitis [K21.9]  Unknown   • MARY ALIEC (obstructive sleep apnea) [G47.33]  Unknown   • Infection of finger of left hand [L03.012]  Unknown      Resolved Hospital Problems   No resolved problems to display.        Brief Hospital Course to date:  Dieter Christiansen is a 73 y.o. male w PMH COPD, CAD, DM, MARY ALICE.  Admitted for left index finger infection.  Began a week ago with a hangnail after minor trauma; has spread and worsened.  Tmax 99.5.       Cellulitis of left hand  Infection of left index finger  - Radiographs noted above; MRI left hand not obtainable due to pt cannot lie flat   -  Blood culture NGTD, Wound culture MRSA  - per partner:   Dr. Hutton and Dr. Coley are not available for coverage this week  - partner or ED staff reportedly spoke w Dr. Aviles at , orthopedic hand surgeon, who declined to accept in transfer,  recommended trial of IV antibiotics for now.    - [facility], declined transfer as they have no beds  - Patient states \"I will not go to [facility]\" and avers that he would rather lose his finger than go there.  He " "also states \"I will not go to [facility].\"  - My partner Called  transfer office  5/20  Spoke w Dr Kay, hand surgeon; sent photographs.  He declined to accept ( on divert) but noted that he would reconsider if pt progressed to being toxic or infection spread up arm.   - Called Dr Allan Coelho, sent photographs.  He refused the consult.  - Discussed with Dr Baez of [facility].  The hand surgeon in their group, Dr Loo, does not take hand call for [facility].   Additionally he is unavailable all this coming week  - My partner again discussed again 5/21 w Dr Alfaro.  He again spoke w patient about risk to limb and options for transfer to a hospital w a hand surgeon.  Pt again stated he wants to stay at [facility], does not want to go to [facility] or [facility] for care.    - Culture with MRSA  - Clinically stable. Continue dapto.   - Patient has appt with  hand surgeon Dr. Chavis at 1:10 PM on 5/25. Plans to come to [facility] for infusion. [facility] outpatient wound care.   - Working on transportation with patient. I attempted to call daughter with no answer.      COPD - DuoNebs scheduled and as needed     CAD  HTN  HLD  - retart lasix       Diabetes mellitus type 2 , A1C 5.8   - Hold home metformin  - SSI with Accu-Cheks     GERD  - Continue PPI     MARY ALICE  - CPAP     Chronic tobacco use  -- on cessation  --nicotine patch    Expected Discharge Location and Transportation: home  Expected Discharge   Expected Discharge Date: 5/24/2023; Expected Discharge Time:      DVT prophylaxis:  Mechanical DVT prophylaxis orders are present.     AM-PAC 6 Clicks Score (PT): 18 (05/23/23 9754)    CODE STATUS:   Code Status and Medical Interventions:   Ordered at: 05/20/23 0141     Level Of Support Discussed With:    Patient     Code Status (Patient has no pulse and is not breathing):    CPR (Attempt to Resuscitate)     Medical Interventions (Patient has pulse or is breathing):    Full Support       Maddie Sutton, DO  05/23/23      "

## 2023-05-23 NOTE — DISCHARGE PLACEMENT REQUEST
Dieter Christiansen (73 y.o. Male)   To [facility] outpt oncology  From Karen Reynolds [phone]       [address]  [address] [facility] [address]  Phone:  [phone]  Fax:  [phone] Date: May 23, 2023      Ambulatory Referral to Infusion Treatment     Patient:  Dieter Christiansen MRN:  [id number]   [address]   [facility] [address] :  1950  SSN:    Phone: [phone] Sex:  M      INSURANCE PAYOR PLAN GROUP # SUBSCRIBER ID   Primary:    [facility] MEDICARE REPLACEMENT [id number]   [id number]      Referring Provider Information:  CHASE ORTEGA Phone: [phone] Fax: [phone]       Referral Information:   # Visits:  1 Referral Type: Infusion [106]   Urgency:  Routine Referral Reason: Specialty Services Required   Start Date: May 23, 2023 End Date:  To be determined by Insurer   Diagnosis: Cellulitis of left hand (L03.114 [ICD-10-CM] 682.4 [ICD-9-CM])  Cellulitis of left index finger (L03.012 [ICD-10-CM] 681.00 [ICD-9-CM])  History of diabetes mellitus (Z86.39 [ICD-10-CM] V12.29 [ICD-9-CM])  Subungual infection of finger of left hand (L03.012 [ICD-10-CM] 681.02 [ICD-9-CM])      Refer to Dept: [facility] INFUSION  Refer to Provider:   Refer to Provider Phone:   Refer to Facility:    daptomycin 600 mg IV daily at [facility] outpatient infusion clinic by peripheral IV.  What is the duration of the infusion treatment? 1 Hr     This document serves as a request of services and does not constitute Insurance authorization or approval of services.  To determine eligibility, please contact the members Insurance carrier to verify and review coverage.     If you have medical questions regarding this request for services. Please contact [address] at [phone] during normal business hours.        Authorizing Provider:Chase Ortega MD  Authorizing Provider's NPI: [id number]  Order Entered By: Karen Reynolds RN 2023 11:45 AM     Electronically  "signed by: Chase Alfaro MD 5/23/2023 11:45 AM         PLAN/RECOMMENDATIONS:      - Daptomycin 6mg/kg IV daily     --check/review labs cultures and scans        --Partial history per nursing staff  --Discussed with microbiology  --Discussed with medicine team, Dr Sutton as above  --I have discussed very directly  my recommendations with the patient and my concerns as outlined above.    --highly complex at of issues with high risk for further serious morbidity and other serious sequela     ** you should consider identifying outpatient hand surgeon for same day appointment and outpatient IV antibiotics; case management to look into options.  If they are able to arrange outpatient appointment, anticipate IV daptomycin daily at [facility] outpatient clinic.  Can follow-up with me tomorrow in clinic May 24     Copied text in this note has been reviewed and is accurate as of 05/23/23.      Chase Alfaro MD  5/23/2023  08:40 EDT    Date of Birth   1950    Social Security Number       Address   [address] [place], [place] [address]    Home Phone   [phone]    MRN   [id number]       [facility]    Marital Status                               Admission Date   5/19/23    Admission Type   Emergency    Admitting Provider   Maddie Sutton DO    Attending Provider   Maddie Sutton DO    Department, Room/Bed   [address], S[address]/1       Discharge Date       Discharge Disposition       Discharge Destination                               Attending Provider: Maddie Sutton DO    Allergies: No Known Allergies    Isolation: None   Infection: MRSA (05/20/23)   Code Status: CPR    Ht: 175.3 cm (69\")   Wt: 154 kg (339 lb)    Admission Cmt: None   Principal Problem: Cellulitis of left hand [L03.114]                 Active Insurance as of 5/19/2023     Primary Coverage     Payor Plan Insurance Group Employer/Plan Group    [employer] [place] MED[place]ARE REPLACEMENT WELLCARE MEDICARE " REPLACEMENT      Payor Plan Address Payor Plan Phone Number Payor Plan Fax Number Effective Dates    [address] [address] [phone]  2022 - None Entered    [facility] [address]       Subscriber Name Subscriber Birth Date Member ID       SILVIA CHRISTIANSEN 1950 [id number]                 Emergency Contacts      (Rel.) Home Phone Work Phone Mobile Phone    ELIEL COLEMAN (Daughter) -- -- [phone]            Insurance Information                [employer] [place] MEDICARE REPLACEMENT/WELLCARE MEDICARE REPLACEMENT Phone: [phone]    Subscriber: Silvia Christiansen Subscriber#: [id number]    Group#: -- Precert#: --             History & Physical      Neeru Lazaro DO at [facility]              [facility]  HISTORY AND PHYSICAL    Patient Name: Silvia Christiansen  : 1950  MRN: [id number]  Primary Care Physician: Ian Sanchez APRN  Date of admission: 2023      Subjective    Subjective     Chief Complaint:  Finger infection    HPI:  Silvia Christiansen is a 73 y.o. male with a PMH significant for diabetes mellitus type 2, CAD s/p remote stent, COPD, tobacco abuse, HTN, HLD, MARY ALICE who comes to the ED due to concerns for finger infection.  Patient says that he had a hangnail on his left index finger around 9 days ago.  5 days ago he began to notice redness, swelling and pain to the finger.  Since that time the swelling, pain and redness have become much more severe.  He has been soaking it in warm water but it seems to be getting worse.  He denies fever, malaise, chills, nausea, vomiting, diarrhea.  Due to worsening infection he came to the ED tonight.  He has not been seen for the finger infection by a medical provider prior to today.  He currently has very minimal movement to the finger secondary to pain and reports some decreased sensation.        Review of Systems   Constitutional: Negative.    HENT: Negative.    Eyes: Negative.    Respiratory: Negative.     Cardiovascular: Negative.    Gastrointestinal: Negative.    Endocrine: Negative.    Genitourinary: Negative.    Musculoskeletal: Positive for arthralgias and joint swelling.   Skin: Positive for color change and wound.   Allergic/Immunologic: Negative.    Neurological: Positive for numbness.   Hematological: Negative.    Psychiatric/Behavioral: Negative.           Personal History     Past Medical History:   Diagnosis Date   • Chronic back pain    • COPD (chronic obstructive pulmonary disease)    • Coronary artery disease involving native coronary artery of native heart without angina pectoris    • Diabetes mellitus    • GERD (gastroesophageal reflux disease)    • Heart attack    • Hyperlipidemia    • Hypertension    • Lung nodule    • Murmur    • MARY ALICE (obstructive sleep apnea)        Past Surgical History:   Procedure Laterality Date   • FEMORAL ARTERY STENT         Family History: family history includes Diabetes in his brother and father.     Social History:  reports that he has been smoking cigarettes. He has a 15.00 pack-year smoking history. He has quit using smokeless tobacco. He reports that he does not currently use alcohol. He reports that he does not use drugs.  Social History     Social History Narrative   • Not on file       Medications:  Available home medication information reviewed.  Medications Prior to Admission   Medication Sig Dispense Refill Last Dose   • albuterol (PROVENTIL) (2.5 MG/3ML) 0.083% nebulizer solution       • aspirin 81 MG EC tablet aspirin 81 mg tablet,delayed release   Take 1 tablet every day by oral route.      • cetirizine (zyrTEC) 10 MG tablet Take 1 tablet by mouth Daily. 30 tablet 5    • ergocalciferol (ERGOCALCIFEROL) 1.25 MG (51077 UT) capsule Take 1 capsule by mouth 1 (One) Time Per Week. 30 capsule 5    • famotidine (PEPCID) 20 MG tablet TAKE ONE TABLET DAILY 30 tablet 5    • FLUoxetine (PROzac) 20 MG capsule TAKE ONE CAPSULE DAILY 30 capsule 5    • furosemide (LASIX)  40 MG tablet TAKE ONE TABLET EVERY 72 HOURS 10 tablet 5    • gabapentin (NEURONTIN) 600 MG tablet Take 1 tablet by mouth 3 (Three) Times a Day. 90 tablet 3    • HYDROcodone-acetaminophen (NORCO)  MG per tablet Take 1 tablet by mouth Every 8 (Eight) Hours As Needed for Severe Pain. 90 tablet 0    • ibuprofen (ADVIL,MOTRIN) 600 MG tablet TAKE ONE TABLET EVERY 6 HOURS AS NEEDED FOR MILD PAIN 120 tablet 5    • ipratropium-albuterol (Combivent Respimat)  MCG/ACT inhaler Inhale 1 puff 3 (Three) Times a Day. 4 g 5    • lisinopril (PRINIVIL,ZESTRIL) 10 MG tablet TAKE ONE TABLET DAILY 30 tablet 5    • metFORMIN (GLUCOPHAGE) 500 MG tablet Take 1 tablet by mouth Daily With Breakfast. 30 tablet 5    • metoprolol succinate XL (TOPROL-XL) 50 MG 24 hr tablet TAKE ONE TABLET DAILY 30 tablet 5    • nitroglycerin (NITROSTAT) 0.4 MG SL tablet Place 1 tablet under the tongue Every 5 (Five) Minutes As Needed for Chest Pain for up to 30 days. Take no more than 3 doses in 15 minutes. 100 tablet 1    • nystatin (MYCOSTATIN) 538754 UNIT/GM powder Apply  topically to the appropriate area as directed 3 (Three) Times a Day. 60 g 5    • nystatin-triamcinolone (MYCOLOG) 731186-7.1 UNIT/GM-% ointment Apply 1 application topically to the appropriate area as directed 2 (Two) Times a Day. 60 g 5    • pantoprazole (PROTONIX) 40 MG EC tablet TAKE ONE TABLET DAILY 30 tablet 5    • potassium chloride (K-DUR,KLOR-CON) 10 MEQ CR tablet TAKE TWO TABLETS DAILY 60 tablet 5    • promethazine (PHENERGAN) 25 MG tablet TAKE ONE TABLET EVERY 6 HOURS AS NEEDED FOR NAUSEA 20 tablet 5    • simvastatin (ZOCOR) 20 MG tablet Take 1 tablet by mouth Every Night. 90 tablet 1    • tamsulosin (FLOMAX) 0.4 MG capsule 24 hr capsule TAKE ONE CAPSULE DAILY 30 capsule 5    • triamcinolone (KENALOG) 0.1 % cream Apply 1 application topically to the appropriate area as directed 2 (Two) Times a Day As Needed for Rash. 453 g 1        No Known Allergies    Objective     Objective     Vital Signs:   Temp:  [99.5 °F (37.5 °C)] 99.5 °F (37.5 °C)  Heart Rate:  [78-82] 78  Resp:  [20] 20  BP: (135-160)/(66-73) 135/66       Physical Exam   Constitutional: Awake, alert  Eyes: PERRLA, sclerae anicteric, no conjunctival injection  HENT: NCAT, mucous membranes moist  Neck: Supple, no thyromegaly, no lymphadenopathy, trachea midline  Respiratory: Scattered wheezes bilateral, nonlabored respirations   Cardiovascular: RRR, no murmurs, rubs, or gallops, palpable pedal pulses bilaterally  Gastrointestinal: Positive bowel sounds, soft, nontender, nondistended  Musculoskeletal: No bilateral ankle edema, no clubbing or cyanosis to extremities.  Extensive swelling, redness to left index finger.  Minimal flexion to left PIP, no flexion to MCP or DIP of left index finger  Psychiatric: Appropriate affect, cooperative  Neurologic: Oriented x 3, strength symmetric in all extremities, Cranial Nerves grossly intact to confrontation, speech clear  Skin: Extensive swelling, discoloration to left index finger noted in pictures below          Result Review:  I have personally reviewed the results from the time of this admission to 5/20/2023 02:09 EDT and agree with these findings:  [x]  Laboratory list / accordion  [x]  Microbiology  [x]  Radiology  []  EKG/Telemetry   []  Cardiology/Vascular   []  Pathology  [x]  Old records  []  Other:        LAB RESULTS:      Lab 05/1950   WBC 9.85   HEMOGLOBIN 14.3   HEMATOCRIT 44.3   PLATELETS 262   NEUTROS ABS 7.41*   IMMATURE GRANS (ABS) 0.04   LYMPHS ABS 1.37   MONOS ABS 0.83   EOS ABS 0.17   .0*   SED RATE 83*   CRP 11.04*   PROCALCITONIN 0.10   LACTATE 1.7         Lab 05/1950   SODIUM 138   POTASSIUM 4.2   CHLORIDE 101   CO2 27.0   ANION GAP 10.0   BUN 19   CREATININE 1.17   EGFR 66.2   GLUCOSE 163*   CALCIUM 8.9         Lab 05/1950   TOTAL PROTEIN 7.2   ALBUMIN 3.6   GLOBULIN 3.6   ALT (SGPT) 10   AST (SGOT) 30   BILIRUBIN 1.0   ALK PHOS  113                     UA        5/27/2022    14:31   Urinalysis   Ketones, UA Negative     Leukocytes, UA Negative         Microbiology Results (last 10 days)     ** No results found for the last 240 hours. **          XR Hand 3+ View Left    Result Date: 5/19/2023  XR HAND 3+ VW LEFT Date of Exam: 5/19/2023 9:03 PM EDT Indication: Index finger pain and swelling Comparison: None available. Findings: There is diffuse soft tissue swelling of the digit. There are bony erosions along the radial side of the base of the proximal phalanx of the second digit. There is also some mild bony erosion along the radial side of the head of the second metacarpal. No  abnormal periosteal reaction. No acute fracture or dislocation. Carpal bones and radiocarpal joint are within normal limits. No radiopaque body identified.    Impression: Impression: Small bony erosions at the second metacarpal phalangeal joint as detailed above. No periosteal. 2. Diffuse soft tissue swelling of the second digit. No radiopaque foreign body identified. Electronically Signed: Otf Fontaine  5/19/2023 9:35 PM EDT  Workstation ID: [id number]          Assessment & Plan   Assessment & Plan     Active Hospital Problems    Diagnosis  POA   • **Cellulitis of left hand [L03.114]  Yes   • Tobacco abuse [Z72.0]  Unknown   • CAD (coronary artery disease) [I25.10]  Unknown   • COPD (chronic obstructive pulmonary disease) [J44.9]  Unknown   • Type 2 diabetes mellitus [E11.9]  Unknown   • HTN (hypertension) [I10]  Unknown   • HLD (hyperlipidemia) [E78.5]  Unknown   • GERD without esophagitis [K21.9]  Unknown   • MARY ALICE (obstructive sleep apnea) [G47.33]  Unknown   • Infection of finger of left hand [L03.012]  Unknown   Dieter Christiansen is a 73 y.o. male with a PMH significant for diabetes mellitus type 2, CAD s/p remote stent, COPD, tobacco abuse, HTN, HLD, MARY ALICE who comes to the ED due to concerns for finger infection.      Cellulitis of left hand  Infection of left index  finger  - Elevated CRP, ESR  - Radiographs noted above  - MRI left hand pending  - Concerns for aggressive infection  - Consult ID for a.m.  - Vancomycin, Zosyn, clindamycin  - IVFs  - Blood culture  - Wound culture  - WOC  - Dr. Hutton and Dr. Coley are not available for coverage this week  - Dr. Aviles at , orthopedic hand surgeon recommends trial of IV antibiotics for now.  If patient declines, they will reevaluate her transfer at that time  - [facility], declined transfer today and they have no beds. Patient could be place on list for possible transfer tomorrow if beds become available    COPD  - DuoNebs scheduled and as needed    CAD  HTN  HLD  -Holding home Lasix while giving IVF's, otherwise continue home meds with hold parameters    Diabetes mellitus type 2  - Hold home metformin  - SSI with Accu-Cheks    GERD  - Continue PPI    MARY ALICE  - CPAP    Chronic tobacco use  -- on cessation  --nicotine patch    Home med list reviewed    DVT prophylaxis: SCDs      CODE STATUS: Full code  Code Status and Medical Interventions:   Ordered at: 05/20/23 0141     Level Of Support Discussed With:    Patient     Code Status (Patient has no pulse and is not breathing):    CPR (Attempt to Resuscitate)     Medical Interventions (Patient has pulse or is breathing):    Full Support       Expected Discharge  Expected Discharge Date: 5/22/2023; Expected Discharge Time:      Neeru Lazaro DO  05/20/23      Electronically signed by Neeru Lazaro DO at 05/20/23 3927

## 2023-05-23 NOTE — NURSING NOTE
Received a call from Shira at  [facility]. Patient can be seen on Thursday, May 25, [address] @ 1:10 PM with Dr. Chavis, [facility]

## 2023-05-23 NOTE — DISCHARGE PLACEMENT REQUEST
Dieter Christiansen (73 y.o. Male)   [facility] outpt oncology/infusion  From Karen Reynolds [phone]     [address]  [address] [facility] [address]  Phone:  [phone]  Fax:  [phone] Date: May 23, 2023      Ambulatory Referral to Infusion Treatment     Patient:  Dieter Christiansen MRN:  [id number]   [address] [place] [address]   [facility] [address] :  1950  SSN:    Phone: [phone] Sex:  M      INSURANCE PAYOR PLAN GROUP # SUBSCRIBER ID   Primary:    [facility] MEDICARE REPLACEMENT [id number]   [id number]      Referring Provider Information:  CHASE ORTEGA Phone: [phone] Fax: [phone]       Referral Information:   # Visits:  1 Referral Type: Infusion [106]   Urgency:  Routine Referral Reason: Specialty Services Required   Start Date: May 23, 2023 End Date:  To be determined by Insurer   Diagnosis: Cellulitis of left hand (L03.114 [ICD-10-CM] 682.4 [ICD-9-CM])  Cellulitis of left index finger (L03.012 [ICD-10-CM] 681.00 [ICD-9-CM])  History of diabetes mellitus (Z86.39 [ICD-10-CM] V12.29 [ICD-9-CM])  Subungual infection of finger of left hand (L03.012 [ICD-10-CM] 681.02 [ICD-9-CM])      Refer to Dept: [facility] INFUSION  Refer to Provider:   Refer to Provider Phone:   Refer to Facility:    daptomycin 600 mg IV daily at [facility] outpatient infusion clinic by peripheral IV.  What is the duration of the infusion treatment? 1 Hr     This document serves as a request of services and does not constitute Insurance authorization or approval of services.  To determine eligibility, please contact the members Insurance carrier to verify and review coverage.     If you have medical questions regarding this request for services. Please contact [address] at [phone] during normal business hours.        Authorizing Provider:Chase Ortega MD  Authorizing Provider's NPI: [id number]  Order Entered By: Karen Reynolds RN 2023 11:45 AM     Electronically  "signed by: Chase Alfaro MD 2023 11:45 AM           Date of Birth   1950    Social Security Number       Address   [address] [place] [address]    Home Phone   [phone]    MRN   [id number]       [facility]    Marital Status                               Admission Date   23    Admission Type   Emergency    Admitting Provider   Maddie Sutton DO    Attending Provider   Maddie Sutton DO    Department, Room/Bed   [address], S504/1       Discharge Date       Discharge Disposition       Discharge Destination                               Attending Provider: Maddie Sutton DO    Allergies: No Known Allergies    Isolation: None   Infection: MRSA (23)   Code Status: CPR    Ht: 175.3 cm (69\")   Wt: 154 kg (339 lb)    Admission Cmt: None   Principal Problem: Cellulitis of left hand [L03.114]                 Active Insurance as of 2023     Primary Coverage     Payor Plan Insurance Group Employer/Plan Group    [facility] MEDICARE REPLACEMENT [employer] MEDICARE REPLACEMENT      Payor Plan Address Payor Plan Phone Number Payor Plan Fax Number Effective Dates    [address] [address] [phone]  2022 - None Entered    [facility] [address]       Subscriber Name Subscriber Birth Date Member ID       SILVIA CHRISTIANSEN 1950 [id number]                 Emergency Contacts      (Rel.) Home Phone Work Phone Mobile Phone    ELIEL COLEMAN (Daughter) -- -- [phone]            Insurance Information                [facility] MEDICARE REPLACEMENT/[employer]LL[employer] MEDICARE REPLACEMENT Phone: [phone]    Subscriber: Silvia Christiansen Subscriber#: [id number]    Group#: -- Precert#: --             History & Physical      Neeru Lazaro DO at 23 [facility]7              [facility] Medicine Services  HISTORY AND PHYSICAL    Patient Name: Silvia Christiansen  : 1950  MRN: [id number]  Primary Care Physician: Daniel" Ian Sulema, APRN  Date of admission: 5/19/2023      Subjective    Subjective     Chief Complaint:  Finger infection    HPI:  Dieter Christiansen is a 73 y.o. male with a PMH significant for diabetes mellitus type 2, CAD s/p remote stent, COPD, tobacco abuse, HTN, HLD, MARY ALICE who comes to the ED due to concerns for finger infection.  Patient says that he had a hangnail on his left index finger around 9 days ago.  5 days ago he began to notice redness, swelling and pain to the finger.  Since that time the swelling, pain and redness have become much more severe.  He has been soaking it in warm water but it seems to be getting worse.  He denies fever, malaise, chills, nausea, vomiting, diarrhea.  Due to worsening infection he came to the ED tonight.  He has not been seen for the finger infection by a medical provider prior to today.  He currently has very minimal movement to the finger secondary to pain and reports some decreased sensation.        Review of Systems   Constitutional: Negative.    HENT: Negative.    Eyes: Negative.    Respiratory: Negative.    Cardiovascular: Negative.    Gastrointestinal: Negative.    Endocrine: Negative.    Genitourinary: Negative.    Musculoskeletal: Positive for arthralgias and joint swelling.   Skin: Positive for color change and wound.   Allergic/Immunologic: Negative.    Neurological: Positive for numbness.   Hematological: Negative.    Psychiatric/Behavioral: Negative.           Personal History     Past Medical History:   Diagnosis Date   • Chronic back pain    • COPD (chronic obstructive pulmonary disease)    • Coronary artery disease involving native coronary artery of native heart without angina pectoris    • Diabetes mellitus    • GERD (gastroesophageal reflux disease)    • Heart attack    • Hyperlipidemia    • Hypertension    • Lung nodule    • Murmur    • MARY ALICE (obstructive sleep apnea)        Past Surgical History:   Procedure Laterality Date   • FEMORAL ARTERY STENT         Family  History: family history includes Diabetes in his brother and father.     Social History:  reports that he has been smoking cigarettes. He has a 15.00 pack-year smoking history. He has quit using smokeless tobacco. He reports that he does not currently use alcohol. He reports that he does not use drugs.  Social History     Social History Narrative   • Not on file       Medications:  Available home medication information reviewed.  Medications Prior to Admission   Medication Sig Dispense Refill Last Dose   • albuterol (PROVENTIL) (2.5 MG/3ML) 0.083% nebulizer solution       • aspirin 81 MG EC tablet aspirin 81 mg tablet,delayed release   Take 1 tablet every day by oral route.      • cetirizine (zyrTEC) 10 MG tablet Take 1 tablet by mouth Daily. 30 tablet 5    • ergocalciferol (ERGOCALCIFEROL) 1.25 MG (30526 UT) capsule Take 1 capsule by mouth 1 (One) Time Per Week. 30 capsule 5    • famotidine (PEPCID) 20 MG tablet TAKE ONE TABLET DAILY 30 tablet 5    • FLUoxetine (PROzac) 20 MG capsule TAKE ONE CAPSULE DAILY 30 capsule 5    • furosemide (LASIX) 40 MG tablet TAKE ONE TABLET EVERY 72 HOURS 10 tablet 5    • gabapentin (NEURONTIN) 600 MG tablet Take 1 tablet by mouth 3 (Three) Times a Day. 90 tablet 3    • HYDROcodone-acetaminophen (NORCO)  MG per tablet Take 1 tablet by mouth Every 8 (Eight) Hours As Needed for Severe Pain. 90 tablet 0    • ibuprofen (ADVIL,MOTRIN) 600 MG tablet TAKE ONE TABLET EVERY 6 HOURS AS NEEDED FOR MILD PAIN 120 tablet 5    • ipratropium-albuterol (Combivent Respimat)  MCG/ACT inhaler Inhale 1 puff 3 (Three) Times a Day. 4 g 5    • lisinopril (PRINIVIL,ZESTRIL) 10 MG tablet TAKE ONE TABLET DAILY 30 tablet 5    • metFORMIN (GLUCOPHAGE) 500 MG tablet Take 1 tablet by mouth Daily With Breakfast. 30 tablet 5    • metoprolol succinate XL (TOPROL-XL) 50 MG 24 hr tablet TAKE ONE TABLET DAILY 30 tablet 5    • nitroglycerin (NITROSTAT) 0.4 MG SL tablet Place 1 tablet under the tongue Every 5  (Five) Minutes As Needed for Chest Pain for up to 30 days. Take no more than 3 doses in 15 minutes. 100 tablet 1    • nystatin (MYCOSTATIN) 837199 UNIT/GM powder Apply  topically to the appropriate area as directed 3 (Three) Times a Day. 60 g 5    • nystatin-triamcinolone (MYCOLOG) 673373-1.1 UNIT/GM-% ointment Apply 1 application topically to the appropriate area as directed 2 (Two) Times a Day. 60 g 5    • pantoprazole (PROTONIX) 40 MG EC tablet TAKE ONE TABLET DAILY 30 tablet 5    • potassium chloride (K-DUR,KLOR-CON) 10 MEQ CR tablet TAKE TWO TABLETS DAILY 60 tablet 5    • promethazine (PHENERGAN) 25 MG tablet TAKE ONE TABLET EVERY 6 HOURS AS NEEDED FOR NAUSEA 20 tablet 5    • simvastatin (ZOCOR) 20 MG tablet Take 1 tablet by mouth Every Night. 90 tablet 1    • tamsulosin (FLOMAX) 0.4 MG capsule 24 hr capsule TAKE ONE CAPSULE DAILY 30 capsule 5    • triamcinolone (KENALOG) 0.1 % cream Apply 1 application topically to the appropriate area as directed 2 (Two) Times a Day As Needed for Rash. 453 g 1        No Known Allergies    Objective    Objective     Vital Signs:   Temp:  [99.5 °F (37.5 °C)] 99.5 °F (37.5 °C)  Heart Rate:  [78-82] 78  Resp:  [20] 20  BP: (135-160)/(66-73) 135/66       Physical Exam   Constitutional: Awake, alert  Eyes: PERRLA, sclerae anicteric, no conjunctival injection  HENT: NCAT, mucous membranes moist  Neck: Supple, no thyromegaly, no lymphadenopathy, trachea midline  Respiratory: Scattered wheezes bilateral, nonlabored respirations   Cardiovascular: RRR, no murmurs, rubs, or gallops, palpable pedal pulses bilaterally  Gastrointestinal: Positive bowel sounds, soft, nontender, nondistended  Musculoskeletal: No bilateral ankle edema, no clubbing or cyanosis to extremities.  Extensive swelling, redness to left index finger.  Minimal flexion to left PIP, no flexion to MCP or DIP of left index finger  Psychiatric: Appropriate affect, cooperative  Neurologic: Oriented x 3, strength symmetric in  all extremities, Cranial Nerves grossly intact to confrontation, speech clear  Skin: Extensive swelling, discoloration to left index finger noted in pictures below          Result Review:  I have personally reviewed the results from the time of this admission to 5/20/2023 02:09 EDT and agree with these findings:  [x]  Laboratory list / accordion  [x]  Microbiology  [x]  Radiology  []  EKG/Telemetry   []  Cardiology/Vascular   []  Pathology  [x]  Old records  []  Other:        LAB RESULTS:      Lab 05/1950   WBC 9.85   HEMOGLOBIN 14.3   HEMATOCRIT 44.3   PLATELETS 262   NEUTROS ABS 7.41*   IMMATURE GRANS (ABS) 0.04   LYMPHS ABS 1.37   MONOS ABS 0.83   EOS ABS 0.17   .0*   SED RATE 83*   CRP 11.04*   PROCALCITONIN 0.10   LACTATE 1.7         Lab 05/1950   SODIUM 138   POTASSIUM 4.2   CHLORIDE 101   CO2 27.0   ANION GAP 10.0   BUN 19   CREATININE 1.17   EGFR 66.2   GLUCOSE 163*   CALCIUM 8.9         Lab 05/1950   TOTAL PROTEIN 7.2   ALBUMIN 3.6   GLOBULIN 3.6   ALT (SGPT) 10   AST (SGOT) 30   BILIRUBIN 1.0   ALK PHOS 113                     UA        5/27/2022    14:31   Urinalysis   Ketones, UA Negative     Leukocytes, UA Negative         Microbiology Results (last 10 days)     ** No results found for the last 240 hours. **          XR Hand 3+ View Left    Result Date: 5/19/2023  XR HAND 3+ VW LEFT Date of Exam: 5/19/2023 9:03 PM EDT Indication: Index finger pain and swelling Comparison: None available. Findings: There is diffuse soft tissue swelling of the digit. There are bony erosions along the radial side of the base of the proximal phalanx of the second digit. There is also some mild bony erosion along the radial side of the head of the second metacarpal. No  abnormal periosteal reaction. No acute fracture or dislocation. Carpal bones and radiocarpal joint are within normal limits. No radiopaque body identified.    Impression: Impression: Small bony erosions at the second metacarpal  phalangeal joint as detailed above. No periosteal. 2. Diffuse soft tissue swelling of the second digit. No radiopaque foreign body identified. Electronically Signed: Otf Fontaine  5/19/2023 9:35 PM EDT  Workstation ID: [id number]          Assessment & Plan   Assessment & Plan     Active Hospital Problems    Diagnosis  POA   • **Cellulitis of left hand [L03.114]  Yes   • Tobacco abuse [Z72.0]  Unknown   • CAD (coronary artery disease) [I25.10]  Unknown   • COPD (chronic obstructive pulmonary disease) [J44.9]  Unknown   • Type 2 diabetes mellitus [E11.9]  Unknown   • HTN (hypertension) [I10]  Unknown   • HLD (hyperlipidemia) [E78.5]  Unknown   • GERD without esophagitis [K21.9]  Unknown   • MARY ALICE (obstructive sleep apnea) [G47.33]  Unknown   • Infection of finger of left hand [L03.012]  Unknown   Dieter Christiansen is a 73 y.o. male with a PMH significant for diabetes mellitus type 2, CAD s/p remote stent, COPD, tobacco abuse, HTN, HLD, MARY ALICE who comes to the ED due to concerns for finger infection.      Cellulitis of left hand  Infection of left index finger  - Elevated CRP, ESR  - Radiographs noted above  - MRI left hand pending  - Concerns for aggressive infection  - Consult ID for a.m.  - Vancomycin, Zosyn, clindamycin  - IVFs  - Blood culture  - Wound culture  - WOC  - Dr. Hutton and Dr. Coley are not available for coverage this week  - Dr. Aviles at , orthopedic hand surgeon recommends trial of IV antibiotics for now.  If patient declines, they will reevaluate her transfer at that time  - [facility], declined transfer today and they have no beds. Patient could be place on list for possible transfer tomorrow if beds become available    COPD  - DuoNebs scheduled and as needed    CAD  HTN  HLD  -Holding home Lasix while giving IVF's, otherwise continue home meds with hold parameters    Diabetes mellitus type 2  - Hold home metformin  - SSI with Accu-Cheks    GERD  - Continue PPI    MARY ALICE  - CPAP    Chronic  tobacco use  -- on cessation  --nicotine patch    Home med list reviewed    DVT prophylaxis: SCDs      CODE STATUS: Full code  Code Status and Medical Interventions:   Ordered at: 05/20/23 0141     Level Of Support Discussed With:    Patient     Code Status (Patient has no pulse and is not breathing):    CPR (Attempt to Resuscitate)     Medical Interventions (Patient has pulse or is breathing):    Full Support       Expected Discharge  Expected Discharge Date: 5/22/2023; Expected Discharge Time:      Neeru Lazaro DO  05/20/23      Electronically signed by Neeru Lazaro DO at 05/20/23 9433        Sepsis secondary to UTI

## 2023-05-23 NOTE — PLAN OF CARE
Goal Outcome Evaluation:  Pt admitted with left hand cellulitis, pt does not endorse pain at this time.     Sinus rhythm per telemetry.

## 2023-05-23 NOTE — PROGRESS NOTES
INFECTIOUS DISEASE Progress note     Dieter Christiansen  1950  [id number]      Admission Date: 5/19/2023      Requesting Provider: Dr French  Evaluating Physician: Dr Chase Alfaro  Reason for Consultation: left index finger infection     History of present illness:    Patient is a 73 y.o. male, with PMH COPD, CAD, DM, MARY ALICE, seen today for left index finger infection.  He had developed a hangnail of the finger after which he injured his hand when he hit his hand on a wall approximately one week ago.  The redness and swelling progressively worsened prompting his presentation to the ED.  Xray of the hand with diffuse swelling of the index finger, with bony erosions along the radial side of the base of proximal phalanx of second digit. Admitting labs with WBC 9.85, plt 262, CRP 11, ESR 83, PCT 0.10, Scr 1.17, and tmax of 99.5.  Wound gram stain with moderate gram positive cocci in pairs, chains and clusters, no WBCs. Started on Vancomycin, Zosyn and Clindamycin and we were consulted for antibiotic management.  Plans made to transfer to [place] per d/w Dr French, however he refused per d/w Dr French.    5/20/23 He has pain at his left index finger/hand which is constant, sharp, nonradiating, worse with movement/pressure, better with pain meds and 4-5 out of 10 in severity.  He denied any other specific trauma aside from above.  No other blood force penetrating trauma.  No animal insect arthropod bite.  Denies any prior history MRSA VRE C. Difficile or ESBL/kpC/CRE organisms     Denies any other specific focal symptoms.  No headache photophobia or neck stiffness.  He is on nasal cannula oxygen but denies dyspnea cough or hemoptysis.  No nausea vomiting diarrhea or abdominal pain.  No dysuria hematuria or pyuria.    ON 5/20/23   Discussed very openly with patient risk of his hand infection including risk of digit loss/higher level amputation including loss of hand or arm in addition to further systemic spread of  infection with risk for other dire consequences/mortality.  Conversation in presence of nursing staff and similar conversation by Dr. french with him; we recommended evaluation by orthopedic surgery/hand surgery, and transfer to a center with hand surgery as no available hand surgeon at [facility],  in addition to ongoing IV antibiotics and other supportive measures.  Patient  refused transfer to the [facility] either [facility] or [facility] that he would rather lose his left index finger/hand/life  than to go to 1 of those hospitals.  He has refused transfer to [facility] with similar explanation.  Dr. French has been in contact with me on multiple occasions , She contacted hand surgery at [facility] and they refused transfer.  They have seen a picture of his hand per my discussion with Dr. French; they apparently agreed with Dr. French regarding the severity and the they stated  high risk  for amputation of  his left index finger; they asked that orthopedics see at [facility]; Dr. French has contacted orthopedics and they are not a hand surgeon and they have refused to see consult.  I d/w administration and they are unable to facilitate transfer to     5/21/23:   D/w Dr French again multiple occasions today  Discussed AGAIN very openly with patient risk of his hand infection including risk of digit loss/higher level amputation including loss of hand or arm in addition to further systemic spread of infection with risk for other dire consequences/mortality/death.  Conversation in presence of nursing staff ; we recommended evaluation by orthopedic surgery/hand surgery, and transfer to a center with hand surgery as no available hand surgeon at [facility],  in addition to ongoing IV antibiotics and other supportive measures.  Patient  Again has refused transfer to the [facility] either [facility] or [facility] that he would rather  "lose his left index finger/hand/life  than to go to 1 of those hospitals.  He has refused transfer to [facility] and the [facility] with similar explanation again.  Dr. French has been in contact with me on multiple occasions again today as above , She anticipates contacting  hand surgery at [facility].  Dr. French has contacted orthopedics at [facility] and they are not a hand surgeon and they have refused to see consult.  Patient is aware of the above limitations with respect to no orthopedic surgeon for consultation at [facility], he is aware of my recommendations and he says to me he would \" rather take his chances\" regarding loss of finger/hand/life, and stay  at [facility] at present rather than consider any alternatives to [facility] or     Refusing am labs per nursing staff.  MRsA screen positive. Blood cultures negative so far. He is unable to lie flat to take the MRI.  He remains afebrile.  He thinks his finger is less painful , and he is able to move it better but it appears more bruised along the finger with same redness at the hand.  No progressive redness or pain.  He is able to flex the finger a bit    5/22/23 d/w Dr Sutton; Discussed AGAIN with nursing present with him very openly the risk of his hand infection including risk of digit loss/higher level amputation including loss of hand or arm in addition to further systemic spread of infection with risk for other dire consequences/mortality/death.  We have  recommended evaluation by orthopedic surgery/hand surgery on each visit and on multiple occasions, and transfer to a center with hand surgery as no available hand surgeon at [facility],  in addition to ongoing IV antibiotics and other supportive measures.  Patient  Again has refused transfer to the [place] system either [facility] or [facility] as that he would rather lose his left index finger/hand/life  than to go to 1 of those hospitals.  He has refused " "transfer to [facility] with similar explanation again.  Dr. French has been in contact with me on multiple occasions in recent days as above and I have d/w Dr Sutton;  Dr. French  contacted orthopedics at [facility] and they are not a hand surgeon and they have refused to see consult.  Patient is aware of the above limitations with respect to no orthopedic surgeon for consultation at [facility], he is aware of my recommendations and he says to me he would \" rather take his chances\" regarding loss of finger/hand/life, and stay  at [facility] at present rather than consider any alternatives to [facility] or     Hand culture with MRSA.  MRsA screen positive. Blood cultures negative so far.      5/23/23 case management to try and arrange outpatient hand surgery consultation at  given current inability to arrange hand surgery consultation at [facility].  IV daptomycin to be arranged and close follow-up in our office if they can make those arrangements. Nursing reports afebrile.  He thinks his finger is less painful , still with restrictions flexion at the left index finger.  No progressive redness or pain.  He has significant slough of skin tissue at the  finger     pain at his left index finger/hand which is constant, sharp, nonradiating, worse with movement/pressure, better with pain meds and 3-4  out of 10 in severity and less intense.     Review of Systems:  Constitutional-- No Fever, chills or sweats.  Appetite good, and no malaise. +fatigue.  HEENT-- No new vision, hearing or throat complaints.  No epistaxis or oral sores.  Denies odynophagia or dysphagia. No headache, photophobia or neck stiffness.  CV-- No chest pain, palpitation or syncope  Resp-- No SOB/cough/Hemoptysis  GI- No nausea, vomiting, or diarrhea.  No hematochezia, melena, or hematemesis. Denies jaundice or chronic liver disease.  -- No dysuria, hematuria, or flank pain.  Denies hesitancy, urgency or flank " pain.  Lymph- no swollen lymph nodes in neck/axilla or groin.   Heme- No active bruising or bleeding; no Hx of DVT or PE.  MS-- no swelling or pain in the bones or joints of arms/legs.  No new back pain.  Neuro-- No acute focal weakness or numbness in the arms or legs.  No seizures.  Skin--No rashes   All other systems negative for acute complaints on a 12 system review of systems     Past Medical History:   Diagnosis Date   • Chronic back pain    • COPD (chronic obstructive pulmonary disease)    • Coronary artery disease involving native coronary artery of native heart without angina pectoris    • Diabetes mellitus    • GERD (gastroesophageal reflux disease)    • Heart attack    • Hyperlipidemia    • Hypertension    • Lung nodule    • Murmur    • MARY ALICE (obstructive sleep apnea)        Past Surgical History:   Procedure Laterality Date   • FEMORAL ARTERY STENT         Family History   Problem Relation Age of Onset   • Diabetes Father    • Diabetes Brother        Social History     Socioeconomic History   • Marital status:    Tobacco Use   • Smoking status: Every Day     Packs/day: 0.50     Years: 30.00     Pack years: 15.00     Types: Cigarettes   • Smokeless tobacco: Former   • Tobacco comments:     20 years ago   Vaping Use   • Vaping Use: Never used   Substance and Sexual Activity   • Alcohol use: Not Currently   • Drug use: Never   • Sexual activity: Defer       No Known Allergies         Physical Exam:   Vital Signs  Temp (24hrs), Av °F (36.7 °C), Min:97.5 °F (36.4 °C), Max:98.2 °F (36.8 °C)    Temp  Min: 97.5 °F (36.4 °C)  Max: 98.2 °F (36.8 °C)  BP  Min: 117/65  Max: 135/64  Pulse  Min: 57  Max: 80  Resp  Min: 18  Max: 22  SpO2  Min: 70 %  Max: 99 %    GENERAL: Awake and alert  HEENT: Normocephalic, atraumatic.  PERRL. EOMI. No conjunctival injection. No icterus. Oropharynx clear without evidence of thrush or exudate. No evidence of peridontal disease.    NECK: Supple   HEART: RRR; No murmur, rubs,  gallops.   LUNGS: Clear to auscultation bilaterally without wheezing, rales, rhonchi. Normal respiratory effort. Nonlabored.   ABDOMEN: Soft, nontender, nondistended. Positive bowel sounds. No rebound or guarding. NO mass or HSM.  EXT:  No cyanosis, clubbing or edema. No cord.  :  Without Torres catheter.  MSK: No joint effusions or erythema  SKIN:   Left index finger with ecchymosis extending to the dorsal hand,  past the MCP joint,with edema, stable redness extending over 1st 2nd fingers, and dorsum of hand. No progression and ecchymosis, increased slough at the finger itself. He has had slough of skin from the  index finger at the proximal phalanx, some bleeding there.   some decreased swelling.  No new fluctuance or crepitus   NEURO: Oriented to PPT.  Motor 5/5 strength  PSYCHIATRIC: Normal insight and judgment. Cooperative with PE    Laboratory Data    Results from last 7 days   Lab Units 05/23/23  0353 05/21/23  0617 05/20/23  0332   WBC 10*3/mm3 10.73 8.32 12.48*   HEMOGLOBIN g/dL 13.0 13.4 13.9   HEMATOCRIT % 40.3 41.4 43.7   PLATELETS 10*3/mm3 352 289 272     Results from last 7 days   Lab Units 05/23/23  0353   SODIUM mmol/L 138   POTASSIUM mmol/L 4.2   CHLORIDE mmol/L 100   CO2 mmol/L 27.0   BUN mg/dL 20   CREATININE mg/dL 1.26   GLUCOSE mg/dL 100*   CALCIUM mg/dL 9.3     Results from last 7 days   Lab Units 05/1950   ALK PHOS U/L 113   BILIRUBIN mg/dL 1.0   ALT (SGPT) U/L 10   AST (SGOT) U/L 30     Results from last 7 days   Lab Units 05/20/23  0332   SED RATE mm/hr 88*     Results from last 7 days   Lab Units 05/20/23  0332   CRP mg/dL 9.01*     Results from last 7 days   Lab Units 05/1950   LACTATE mmol/L 1.7     Results from last 7 days   Lab Units 05/20/23  0332   CK TOTAL U/L 71         Estimated Creatinine Clearance: 78 mL/min (by C-G formula based on SCr of 1.26 mg/dL).              Radiology:  Imaging Results (Last 72 Hours)     ** No results found for the last 72 hours. **             Impression:   - acute/Severe left index finger wound infection/cellulitis;  He has multifocal areas of slough/purulence/ecchymosis  with difficulty flexing the finger and pain limiting exam otherwise with ecchymosis extending past the MCP on the dorsal hand and vague/diffuse erythema extending towards the wrist at admission, subsequently with some slough of tissue at the more proximal aspect and culture with MRSA.  I have advised/recommended the patient be evaluated by surgery as previously noted.  I discussed with Dr. French and Dr Sutton directly and  medicine team had made arrangements to have him transferred to [place] but patient refused.  Patient refused transfer to the [facility] system.  has refused facilitating transfer on multiple occasions and administration at [facility] unable to facilitate that any further per my discussion with Dr Herrera.  Per discussion with orthopedics on call, no hand surgeon available at [facility] and orthopedic surgeon on-call has refused consultation.  I have reiterated my recommendation to patient that he have a surgical evaluation with ongoing risk for further serious morbidity and other serious sequela including functional/limb loss/amputation, persistence of progressive or recurrent infection including risk for systemic spread/sepsis and death.  Medicine team continues to look into options for transfer to a center with hand specialist, and they will get back to me regarding options and patient preferences if she finds out more information; general orthopedics has refused consultation as above, Dr French aware and will keep us informed; as noted above, patient refuses to consider transfer to another facility other than , he refuses to consider [facility] or [facility] and he refuses to be transferred to any facility in [place] or [place].  He knows the risks he is taking with his decisions as outlined  above; if you remain unable to transfer to a center with hand surgeon, you could consider outpatient appointment on same day of discharge with ongoing IV antibiotics as an alternative.  - COPD  - Diabetes mellitus, type 2  - Obesity       PLAN/RECOMMENDATIONS:     - Daptomycin 6mg/kg IV daily    --check/review labs cultures and scans  --Partial history per nursing staff  --Discussed with microbiology  --Discussed with medicine team, Dr Sutton as above  --I have discussed very directly  my recommendations with the patient and my concerns as outlined above.    --highly complex at of issues with high risk for further serious morbidity and other serious sequela    ** you should consider identifying outpatient hand surgeon for same day appointment and outpatient IV antibiotics; case management to look into options.  If they are able to arrange outpatient appointment, anticipate IV daptomycin daily at [facility] outpatient clinic.  Can follow-up with me tomorrow in clinic May 24    Copied text in this note has been reviewed and is accurate as of 05/23/23.      Chase Alfaro MD  5/23/2023  08:40 EDT

## 2023-05-23 NOTE — DISCHARGE INSTR - OTHER ORDERS
Clean with normal saline flush, applied square of silver hydrofiber to open wound for drainage absorption and antimicrobial. Then applied gauze and loose koban to keep dressing in place.     [facility]. Appointment Thursday, May 25, 2023 @ 1:10 PM with Dr. Chavis, located @ [address].

## 2023-05-24 LAB
BACTERIA SPEC AEROBE CULT: NORMAL
BACTERIA SPEC AEROBE CULT: NORMAL
GLUCOSE BLDC GLUCOMTR-MCNC: 121 MG/DL (ref 70–130)
GLUCOSE BLDC GLUCOMTR-MCNC: 122 MG/DL (ref 70–130)
GLUCOSE BLDC GLUCOMTR-MCNC: 137 MG/DL (ref 70–130)
GLUCOSE BLDC GLUCOMTR-MCNC: 96 MG/DL (ref 70–130)

## 2023-05-24 PROCEDURE — 25010000002 DAPTOMYCIN PER 1 MG: Performed by: INTERNAL MEDICINE

## 2023-05-24 PROCEDURE — 82948 REAGENT STRIP/BLOOD GLUCOSE: CPT

## 2023-05-24 PROCEDURE — 97535 SELF CARE MNGMENT TRAINING: CPT

## 2023-05-24 PROCEDURE — 94799 UNLISTED PULMONARY SVC/PX: CPT

## 2023-05-24 PROCEDURE — 97110 THERAPEUTIC EXERCISES: CPT | Performed by: PHYSICAL THERAPIST

## 2023-05-24 PROCEDURE — 99232 SBSQ HOSP IP/OBS MODERATE 35: CPT | Performed by: INTERNAL MEDICINE

## 2023-05-24 PROCEDURE — 97116 GAIT TRAINING THERAPY: CPT | Performed by: PHYSICAL THERAPIST

## 2023-05-24 RX ADMIN — IPRATROPIUM BROMIDE AND ALBUTEROL SULFATE 3 ML: 2.5; .5 SOLUTION RESPIRATORY (INHALATION) at 16:06

## 2023-05-24 RX ADMIN — LISINOPRIL 10 MG: 10 TABLET ORAL at 08:52

## 2023-05-24 RX ADMIN — Medication 10 ML: at 08:53

## 2023-05-24 RX ADMIN — Medication 81 MG: at 08:52

## 2023-05-24 RX ADMIN — TAMSULOSIN HYDROCHLORIDE 0.4 MG: 0.4 CAPSULE ORAL at 08:52

## 2023-05-24 RX ADMIN — Medication 10 ML: at 21:23

## 2023-05-24 RX ADMIN — FLUOXETINE 20 MG: 20 CAPSULE ORAL at 08:52

## 2023-05-24 RX ADMIN — FUROSEMIDE 40 MG: 40 TABLET ORAL at 08:52

## 2023-05-24 RX ADMIN — DAPTOMYCIN 600 MG: 500 INJECTION, POWDER, LYOPHILIZED, FOR SOLUTION INTRAVENOUS at 08:51

## 2023-05-24 RX ADMIN — PANTOPRAZOLE SODIUM 40 MG: 40 TABLET, DELAYED RELEASE ORAL at 06:29

## 2023-05-24 RX ADMIN — Medication 1 PATCH: at 08:57

## 2023-05-24 RX ADMIN — GABAPENTIN 600 MG: 300 CAPSULE ORAL at 21:23

## 2023-05-24 RX ADMIN — METOPROLOL SUCCINATE 50 MG: 50 TABLET, EXTENDED RELEASE ORAL at 08:52

## 2023-05-24 RX ADMIN — IPRATROPIUM BROMIDE AND ALBUTEROL SULFATE 3 ML: 2.5; .5 SOLUTION RESPIRATORY (INHALATION) at 19:25

## 2023-05-24 RX ADMIN — SENNOSIDES AND DOCUSATE SODIUM 2 TABLET: 50; 8.6 TABLET ORAL at 21:23

## 2023-05-24 RX ADMIN — CETIRIZINE HYDROCHLORIDE 10 MG: 10 TABLET, FILM COATED ORAL at 08:52

## 2023-05-24 RX ADMIN — GABAPENTIN 600 MG: 300 CAPSULE ORAL at 15:58

## 2023-05-24 RX ADMIN — GABAPENTIN 600 MG: 300 CAPSULE ORAL at 06:29

## 2023-05-24 RX ADMIN — FAMOTIDINE 20 MG: 20 TABLET ORAL at 08:52

## 2023-05-24 RX ADMIN — IPRATROPIUM BROMIDE AND ALBUTEROL SULFATE 3 ML: 2.5; .5 SOLUTION RESPIRATORY (INHALATION) at 09:17

## 2023-05-24 NOTE — THERAPY TREATMENT NOTE
Patient Name: Dieter Christiansen  : 1950    MRN: [id number]                              Today's Date: 2023       Admit Date: 2023    Visit Dx:     ICD-10-CM ICD-9-CM   1. Cellulitis of left hand  L03.114 682.4   2. Cellulitis of left index finger  L03.012 681.00   3. History of diabetes mellitus  Z86.39 V12.29   4. History of hypertension  Z86.79 V12.59   5. History of hyperlipidemia  Z86.39 V12.29   6. Tobacco abuse  Z72.0 305.1   7. Infection of finger of left hand  L03.012 681.02     Patient Active Problem List   Diagnosis   • Chronic back pain   • Cellulitis of left hand   • Tobacco abuse   • CAD (coronary artery disease)   • COPD (chronic obstructive pulmonary disease)   • Type 2 diabetes mellitus   • HTN (hypertension)   • HLD (hyperlipidemia)   • GERD without esophagitis   • MARY ALICE (obstructive sleep apnea)   • Infection of finger of left hand     Past Medical History:   Diagnosis Date   • Chronic back pain    • COPD (chronic obstructive pulmonary disease)    • Coronary artery disease involving native coronary artery of native heart without angina pectoris    • Diabetes mellitus    • GERD (gastroesophageal reflux disease)    • Heart attack    • Hyperlipidemia    • Hypertension    • Lung nodule    • Murmur    • MARY ALICE (obstructive sleep apnea)      Past Surgical History:   Procedure Laterality Date   • FEMORAL ARTERY STENT        General Information     Row Name 23 1135          Physical Therapy Time and Intention    Document Type therapy note (daily note)  -LM     Mode of Treatment individual therapy;physical therapy  -LM     Row Name 23 1135          General Information    Patient Profile Reviewed yes  -LM     Existing Precautions/Restrictions fall  -LM     Row Name 23 1135          Cognition    Orientation Status (Cognition) oriented x 4  -LM     Row Name 23 1135          Safety Issues, Functional Mobility    Safety Issues Affecting Function (Mobility) safety precaution  awareness;safety precautions follow-through/compliance  -LM     Impairments Affecting Function (Mobility) balance;endurance/activity tolerance;grasp;shortness of breath  -LM           User Key  (r) = Recorded By, (t) = Taken By, (c) = Cosigned By    Initials Name Provider Type    Aviva Lopez PT Physical Therapist               Mobility     Row Name 05/24/23 1138          Bed Mobility    Comment, (Bed Mobility) Sitting up in chair at initial and end of tx.  -LM     Row Name 05/24/23 1138          Sit-Stand Transfer    Sit-Stand [place] (Transfers) contact guard;1 person assist;verbal cues  -LM     Assistive Device (Sit-Stand Transfers) cane, straight  -LM     Comment, (Sit-Stand Transfer) Stood x 2 - first from recliner; second from low toilet.  -LM     Row Name 05/24/23 1138          Gait/Stairs (Locomotion)    [place] Level (Gait) contact guard;1 person assist;verbal cues  -LM     Assistive Device (Gait) cane, straight;walker, front-wheeled  -LM     Distance in Feet (Gait) 20' (using SC) + 150' (using rw)  -LM     Deviations/Abnormal Patterns (Gait) bilateral deviations;base of support, wide;bhumi decreased;gait speed decreased;stride length decreased  -LM     Bilateral Gait Deviations heel strike decreased  -LM     Comment, (Gait/Stairs) Pt mildly unsteady when using SC.  Pt used rw in hallway.  Pt is more steady when using rw but still mild instability noted.  Moderate SOA noted - pt educated on PLB.  Pt reports feeling weaker and more fatigued today.  -LM           User Key  (r) = Recorded By, (t) = Taken By, (c) = Cosigned By    Initials Name Provider Type    Aviva Lopez PT Physical Therapist               Obj/Interventions     Row Name 05/24/23 1142          Hip (Therapeutic Exercise)    Hip (Therapeutic Exercise) strengthening exercise  -LM     Hip AROM (Therapeutic Exercise) bilateral;aBduction;aDduction;sitting;10 repetitions  -LM     Hip Strengthening (Therapeutic Exercise)  bilateral;marching while seated;sitting;10 repetitions  -LM     Row Name 05/24/23 1142          Knee (Therapeutic Exercise)    Knee (Therapeutic Exercise) AROM (active range of motion)  -LM     Knee AROM (Therapeutic Exercise) bilateral;LAQ (long arc quad);sitting;10 repetitions  -LM     Row Name 05/24/23 1142          Ankle (Therapeutic Exercise)    Ankle (Therapeutic Exercise) AROM (active range of motion)  -LM     Ankle AROM (Therapeutic Exercise) bilateral;dorsiflexion;plantarflexion;sitting;10 repetitions  -LM           User Key  (r) = Recorded By, (t) = Taken By, (c) = Cosigned By    Initials Name Provider Type    LM Aviva Damon, PT Physical Therapist               Goals/Plan    No documentation.                Clinical Impression     Row Name 05/24/23 1144          Pain    Pretreatment Pain Rating 4/10  -LM     Posttreatment Pain Rating 4/10  -LM     Pain Location lower  -LM     Pain Location - back  -LM     Pain Intervention(s) Repositioned;Ambulation/increased activity  -LM     Row Name 05/24/23 1144          Plan of Care Review    Plan of Care Reviewed With patient  -LM     Progress improving  -LM     Outcome Evaluation Increased gait distance to 20' (using SC) + 150' (using rw) with CGA - mild instability noted.  Moderate SOA noted with activity.  Pt continues to be limited by weakness, decreased activity tolerance, and balance deficits.  Continue to recommend home with outpt PT.  -LM     Row Name 05/24/23 1144          Vital Signs    Pretreatment Heart Rate (beats/min) 66  -LM     Posttreatment Heart Rate (beats/min) 67  -LM     Post SpO2 (%) 95  -LM     O2 Delivery Post Treatment room air  -LM     Pre Patient Position Sitting  -LM     Post Patient Position Sitting  -LM     Row Name 05/24/23 1144          Positioning and Restraints    Pre-Treatment Position sitting in chair/recliner  -LM     Post Treatment Position chair  -LM     In Chair sitting;call light within reach;encouraged to call for  assist;notified nsg  -LM           User Key  (r) = Recorded By, (t) = Taken By, (c) = Cosigned By    Initials Name Provider Type    LM Aviva Damon, PT Physical Therapist               Outcome Measures     Row Name 05/24/23 1149 05/24/23 0850       How much help from another person do you currently need...    Turning from your back to your side while in flat bed without using bedrails? 3  -LM 3  -EH    Moving from lying on back to sitting on the side of a flat bed without bedrails? 3  -LM 3  -EH    Moving to and from a bed to a chair (including a wheelchair)? 3  -LM 3  -EH    Standing up from a chair using your arms (e.g., wheelchair, bedside chair)? 3  -LM 3  -EH    Climbing 3-5 steps with a railing? 3  -LM 3  -EH    To walk in hospital room? 3  -LM 3  -EH    AM-PAC 6 Clicks Score (PT) 18  -LM 18  -EH    Highest level of mobility 6 --> Walked 10 steps or more  -LM 6 --> Walked 10 steps or more  -EH    Row Name 05/24/23 1149 05/24/23 1043       Functional Assessment    Outcome Measure Options AM-PAC 6 Clicks Basic Mobility (PT)  -LM AM-PAC 6 Clicks Daily Activity (OT)  -          User Key  (r) = Recorded By, (t) = Taken By, (c) = Cosigned By    Initials Name Provider Type    Aviva Lopez, PT Physical Therapist     May Otoole OT Occupational Therapist     Crystal Petty, RN Registered Nurse                             Physical Therapy Education     Title: PT OT SLP Therapies (In Progress)     Topic: Physical Therapy (In Progress)     Point: Mobility training (Done)     Learning Progress Summary           Patient Acceptance, E, VU,NR by ML at 5/21/2023 1321                   Point: Home exercise program (Not Started)     Learner Progress:  Not documented in this visit.          Point: Body mechanics (Not Started)     Learner Progress:  Not documented in this visit.          Point: Precautions (Done)     Learning Progress Summary           Patient Acceptance, E, VU,NR by ML at 5/21/2023 1321                                User Key     Initials Effective Dates Name Provider Type Discipline    ML 04/22/21 -  Nathalia Daly Physical Therapist PT              PT Recommendation and Plan     Plan of Care Reviewed With: patient  Progress: improving  Outcome Evaluation: Increased gait distance to 20' (using SC) + 150' (using rw) with CGA - mild instability noted.  Moderate SOA noted with activity.  Pt continues to be limited by weakness, decreased activity tolerance, and balance deficits.  Continue to recommend home with outpt PT.     Time Calculation:    PT Charges     Row Name 05/24/23 1149             Time Calculation    Start Time 1108  -LM      PT Received On 05/24/23  -LM      PT Goal Re-Cert Due Date 05/31/23  -LM         Timed Charges    59655 - PT Therapeutic Exercise Minutes 8  -LM      31519 - Gait Training Minutes  15  -LM         Total Minutes    Timed Charges Total Minutes 23  -LM       Total Minutes 23  -LM            User Key  (r) = Recorded By, (t) = Taken By, (c) = Cosigned By    Initials Name Provider Type    LM Aviva Damon, PT Physical Therapist              Therapy Charges for Today     Code Description Service Date Service Provider Modifiers Qty    44041387199 HC GAIT TRAINING EA 15 MIN 5/24/2023 Aviva Damon, PT GP 1    43202789062 HC PT THER PROC EA 15 MIN 5/24/2023 Aviva Damon, PT GP 1          PT G-Codes  Outcome Measure Options: AM-PAC 6 Clicks Basic Mobility (PT)  AM-PAC 6 Clicks Score (PT): 18  AM-PAC 6 Clicks Score (OT): 19  PT Discharge Summary  Anticipated Discharge Disposition (PT): home with assist, home with outpatient therapy services    Aviva Damon PT  5/24/2023

## 2023-05-24 NOTE — PLAN OF CARE
Goal Outcome Evaluation:  Plan of Care Reviewed With: patient        Progress: improving  Outcome Evaluation: Pt continues to present below baseline with decreased strength, balance, coordination, activity tolerance and overall independence with ADLS. Pt requries Yousuf for hair care. Transfers and ambulates short distances with CGA and SPC. CGA for safety due to reaching with L hand. May benefit from RW if able to  with L hand. Recommend d/c home with OP services.      Evaluation Complexity (OT)  Review Occupational Profile/Medical/Therapy History Complexity: brief/low complexity  Assessment, Occupational Performance/Identification of Deficit Complexity: 1-3 performance deficits  Clinical Decision Making Complexity (OT): problem focused assessment/low complexity  Overall Complexity of Evaluation (OT): low complexity

## 2023-05-24 NOTE — CASE MANAGEMENT/SOCIAL WORK
Continued Stay Note  [facility]     Patient Name: Dieter Christiansen  MRN: [id number]  Today's Date: 5/24/2023    Admit Date: 5/19/2023    Plan: Home at DC   Discharge Plan     Row Name 05/24/23 1457       Plan    Plan Home at DC    Patient/Family in Agreement with Plan yes    Plan Comments [facility] out infusion is able to work the pt in for daily infusions starting 5- at 0800. The plan is for the pt to have his IV AB infusion in the am in the hospital before DC tomorrow. The pt will go over to the hand specialist at  on [address] after DC at 1310. I have spoken with the pts daughter who will be here around 1200 tomorrow to transport the pt to the appointment. She is aware of the infusion time on Friday at [facility]. The daughter states she can learn the wound care. Please send any wound care supplies home with the pt at [place]. I have updated Milana at the [facility] office VM with the plan.    Final Discharge Disposition Code 01 - home or self-care               Discharge Codes    No documentation.               Expected Discharge Date and Time     Expected Discharge Date Expected Discharge Time    May 24, 2023             Karen Reynolds RN

## 2023-05-24 NOTE — PROGRESS NOTES
[facility] Medicine Services  PROGRESS NOTE    Patient Name: Dieter Christiansen  : 1950  MRN: [id number]    Date of Admission: 2023  Primary Care Physician: Ian Sanchez APRN    Subjective   Subjective     CC:  Finger infection     HPI:  No acute events. States his finger looks and feels better. Reviewed plans for outpatient appt at  and outpatient antibiotics.     ROS:  Gen- No fevers, chills  CV- No chest pain, palpitations  Resp- No cough, dyspnea  GI- No N/V/D, abd pain    Objective   Objective     Vital Signs:   Temp:  [97.7 °F (36.5 °C)-98.1 °F (36.7 °C)] 97.8 °F (36.6 °C)  Heart Rate:  [59-80] 69  Resp:  [14-18] 14  BP: (101-134)/(47-90) 106/56  Flow (L/min):  [3] 3     Physical Exam:  Constitutional: No acute distress, awake, alert  HENT: NCAT, mucous membranes moist  Respiratory: Clear to auscultation bilaterally, respiratory effort normal   Cardiovascular: RRR, no murmurs, rubs, or gallops  Gastrointestinal: Positive bowel sounds, soft, nontender, nondistended  Musculoskeletal: No bilateral ankle edema; left finger/hand wrapped; improved ROM  Psychiatric: Appropriate affect, cooperative  Neurologic: Oriented x 3, strength symmetric in all extremities, Cranial Nerves grossly intact to confrontation, speech clear  Skin: No rashes    Results Reviewed:  LAB RESULTS:      Lab 23  0353 23  0617 23  0332 23   WBC 10.73 8.32 12.48* 9.85   HEMOGLOBIN 13.0 13.4 13.9 14.3   HEMATOCRIT 40.3 41.4 43.7 44.3   PLATELETS 352 289 272 262   NEUTROS ABS  --   --  9.63* 7.41*   IMMATURE GRANS (ABS)  --   --  0.08* 0.04   LYMPHS ABS  --   --  1.47 1.37   MONOS ABS  --   --  1.09* 0.83   EOS ABS  --   --  0.19 0.17   MCV 99.0* 100.5* 100.0* 100.0*   SED RATE  --   --  88* 83*   CRP  --   --  9.01* 11.04*   PROCALCITONIN  --   --  0.10 0.10   LACTATE  --   --   --  1.7   PROTIME  --   --  15.3*  --          Lab 23  0353 23  0617 23  0332  05/1950   SODIUM 138 138 135* 138   POTASSIUM 4.2 4.2 4.0 4.2   CHLORIDE 100 102 99 101   CO2 27.0 26.0 25.0 27.0   ANION GAP 11.0 10.0 11.0 10.0   BUN 20 20 19 19   CREATININE 1.26 1.08 1.14 1.17   EGFR 60.6 72.9 68.3 66.2   GLUCOSE 100* 97 182* 163*   CALCIUM 9.3 8.9 8.7 8.9   MAGNESIUM  --   --  2.1  --    HEMOGLOBIN A1C  --   --  5.80*  --          Lab 05/1950   TOTAL PROTEIN 7.2   ALBUMIN 3.6   GLOBULIN 3.6   ALT (SGPT) 10   AST (SGOT) 30   BILIRUBIN 1.0   ALK PHOS 113         Lab 05/20/23  0332   PROTIME 15.3*   INR 1.20*                 Brief Urine Lab Results  (Last result in the past 365 days)      Color   Clarity   Blood   Leuk Est   Nitrite   Protein   CREAT   Urine HCG        05/27/22 1431 Dark Yellow   Clear   3+   Negative   Negative   Negative                 Microbiology Results Abnormal     Procedure Component Value - Date/Time    Blood Culture - Blood, Arm, Right [[id number]]  (Normal) Collected: 05/19/23 1949    Lab Status: Preliminary result Specimen: Blood from Arm, Right Updated: 05/23/23 2015     Blood Culture No growth at 4 days    Blood Culture - Blood, Arm, Left [[id number]]  (Normal) Collected: 05/1950    Lab Status: Preliminary result Specimen: Blood from Arm, Left Updated: 05/23/23 2015     Blood Culture No growth at 4 days          No radiology results from the last 24 hrs        Current medications:  Scheduled Meds:aspirin, 81 mg, Oral, Daily  cetirizine, 10 mg, Oral, Daily  DAPTOmycin, 6 mg/kg (Adjusted), Intravenous, Q24H  famotidine, 20 mg, Oral, Daily  FLUoxetine, 20 mg, Oral, Daily  furosemide, 40 mg, Oral, Daily  gabapentin, 600 mg, Oral, Q8H  insulin lispro, 2-7 Units, Subcutaneous, 4x Daily AC & at Bedtime  ipratropium-albuterol, 3 mL, Nebulization, 4x Daily - RT  lisinopril, 10 mg, Oral, Daily  metoprolol succinate XL, 50 mg, Oral, Daily  nicotine, 1 patch, Transdermal, Q24H  pantoprazole, 40 mg, Oral, Daily  QUEtiapine, 12.5 mg, Oral, Once  senna-docusate  "sodium, 2 tablet, Oral, BID  sodium chloride, 10 mL, Intravenous, Q12H  tamsulosin, 0.4 mg, Oral, Daily      Continuous Infusions:   PRN Meds:.•  acetaminophen **OR** acetaminophen **OR** acetaminophen  •  albuterol  •  senna-docusate sodium **AND** polyethylene glycol **AND** bisacodyl **AND** bisacodyl  •  dextrose  •  dextrose  •  glucagon (human recombinant)  •  HYDROcodone-acetaminophen  •  Morphine **AND** naloxone  •  nitroglycerin  •  ondansetron  •  sodium chloride  •  sodium chloride    Assessment & Plan   Assessment & Plan     Active Hospital Problems    Diagnosis  POA   • **Cellulitis of left hand [L03.114]  Yes   • Tobacco abuse [Z72.0]  Unknown   • CAD (coronary artery disease) [I25.10]  Unknown   • COPD (chronic obstructive pulmonary disease) [J44.9]  Unknown   • Type 2 diabetes mellitus [E11.9]  Unknown   • HTN (hypertension) [I10]  Unknown   • HLD (hyperlipidemia) [E78.5]  Unknown   • GERD without esophagitis [K21.9]  Unknown   • MARY ALICE (obstructive sleep apnea) [G47.33]  Unknown   • Infection of finger of left hand [L03.012]  Unknown      Resolved Hospital Problems   No resolved problems to display.        Brief Hospital Course to date:  Dieter Christiansen is a 73 y.o. male w PMH COPD, CAD, DM, MARY ALICE.  Admitted for left index finger infection.  Began a week ago with a hangnail after minor trauma; has spread and worsened.  Tmax 99.5.       Cellulitis of left hand  Infection of left index finger  - Radiographs noted above; MRI left hand not obtainable due to pt cannot lie flat   -  Blood culture NGTD, Wound culture MRSA  - per partner:   Dr. Hutton and Dr. Coley are not available for coverage this week  - partner or ED staff reportedly spoke w Dr. Aviles at , orthopedic hand surgeon, who declined to accept in transfer,  recommended trial of IV antibiotics for now.    - [facility], declined transfer as they have no beds  - Patient states \"I will not go to [facility]\" and avers that he would rather " "lose his finger than go there.  He also states \"I will not go to [facility].\"  - My partner Called  transfer office  5/20  Spoke w Dr Kay, hand surgeon; sent photographs.  He declined to accept ( on divert) but noted that he would reconsider if pt progressed to being toxic or infection spread up arm.   - Called Dr Allan Coelho, sent photographs.  He refused the consult.  - Discussed with Dr Baez of [facility].  The hand surgeon in their group, Dr Loo, does not take hand call for [facility].   Additionally he is unavailable all this coming week  - My partner again discussed again 5/21 w Dr Alfaro.  He again spoke w patient about risk to limb and options for transfer to a hospital w a hand surgeon.  Pt again stated he wants to stay at [facility], does not want to go to [facility] or [facility] for care.    - Culture with MRSA  - Clinically stable. Continue dapto.   - Patient has appt with  hand surgeon Dr. Chavis at 1:10 PM on 5/25. Dapto prior to DC on 5/25. Outpatient antibiotics have been arranged at [facility] outpatient infusion  - Wound care to be taught to patient and daugther        COPD - DuoNebs scheduled and as needed     CAD  HTN  HLD  - Continue ASA, metoprolol, lisinopril, lasix      Diabetes mellitus type 2 , A1C 5.8   - Hold home metformin  - SSI with Accu-Cheks     GERD  - Continue PPI     MARY ALICE  - CPAP     Chronic tobacco use  -- on cessation  --nicotine patch    Expected Discharge Location and Transportation: home  Expected Discharge   Expected Discharge Date: 5/24/2023; Expected Discharge Time:      DVT prophylaxis:  Mechanical DVT prophylaxis orders are present.     AM-PAC 6 Clicks Score (PT): 18 (05/24/23 1376)    CODE STATUS:   Code Status and Medical Interventions:   Ordered at: 05/20/23 0144     Level Of Support Discussed With:    Patient     Code Status (Patient has no pulse and is not breathing):    CPR (Attempt to Resuscitate)     Medical Interventions (Patient has pulse or is breathing):    Full " Support       Maddie Sutton DO  05/24/23

## 2023-05-24 NOTE — PROGRESS NOTES
INFECTIOUS DISEASE Progress note     Dieter Christiansen  1950  [id number]      Admission Date: 5/19/2023      Requesting Provider: Dr French  Evaluating Physician: Dr Chase Alfaro  Reason for Consultation: left index finger infection     History of present illness:    Patient is a 73 y.o. male, with PMH COPD, CAD, DM, MARY ALICE, seen today for left index finger infection.  He had developed a hangnail of the finger after which he injured his hand when he hit his hand on a wall approximately one week ago.  The redness and swelling progressively worsened prompting his presentation to the ED.  Xray of the hand with diffuse swelling of the index finger, with bony erosions along the radial side of the base of proximal phalanx of second digit. Admitting labs with WBC 9.85, plt 262, CRP 11, ESR 83, PCT 0.10, Scr 1.17, and tmax of 99.5.  Wound gram stain with moderate gram positive cocci in pairs, chains and clusters, no WBCs. Started on Vancomycin, Zosyn and Clindamycin and we were consulted for antibiotic management.  Plans made to transfer to [place] per d/w Dr French, however he refused per d/w Dr French.    5/20/23 He has pain at his left index finger/hand which is constant, sharp, nonradiating, worse with movement/pressure, better with pain meds and 4-5 out of 10 in severity.  He denied any other specific trauma aside from above.  No other blood force penetrating trauma.  No animal insect arthropod bite.  Denies any prior history MRSA VRE C. Difficile or ESBL/kpC/CRE organisms     Denies any other specific focal symptoms.  No headache photophobia or neck stiffness.  He is on nasal cannula oxygen but denies dyspnea cough or hemoptysis.  No nausea vomiting diarrhea or abdominal pain.  No dysuria hematuria or pyuria.    ON 5/20/23   Discussed very openly with patient risk of his hand infection including risk of digit loss/higher level amputation including loss of hand or arm in addition to further systemic spread of  infection with risk for other dire consequences/mortality.  Conversation in presence of nursing staff and similar conversation by Dr. french with him; we recommended evaluation by orthopedic surgery/hand surgery, and transfer to a center with hand surgery as no available hand surgeon at [facility],  in addition to ongoing IV antibiotics and other supportive measures.  Patient  refused transfer to the [facility] either [facility] or [facility] that he would rather lose his left index finger/hand/life  than to go to 1 of those hospitals.  He has refused transfer to [facility] with similar explanation.  Dr. French has been in contact with me on multiple occasions , She contacted hand surgery at [facility] and they refused transfer.  They have seen a picture of his hand per my discussion with Dr. French; they apparently agreed with Dr. French regarding the severity and the they stated  high risk  for amputation of  his left index finger; they asked that orthopedics see at [facility]; Dr. French has contacted orthopedics and they are not a hand surgeon and they have refused to see consult.  I d/w administration and they are unable to facilitate transfer to     5/21/23:   D/w Dr French again multiple occasions today  Discussed AGAIN very openly with patient risk of his hand infection including risk of digit loss/higher level amputation including loss of hand or arm in addition to further systemic spread of infection with risk for other dire consequences/mortality/death.  Conversation in presence of nursing staff ; we recommended evaluation by orthopedic surgery/hand surgery, and transfer to a center with hand surgery as no available hand surgeon at [facility],  in addition to ongoing IV antibiotics and other supportive measures.  Patient  Again has refused transfer to the [facility] either [facility] or [facility] that he would rather  "lose his left index finger/hand/life  than to go to 1 of those hospitals.  He has refused transfer to [facility] with similar explanation again.  Dr. French has been in contact with me on multiple occasions again today as above , She anticipates contacting  hand surgery at [facility].  Dr. French has contacted orthopedics at [facility] and they are not a hand surgeon and they have refused to see consult.  Patient is aware of the above limitations with respect to no orthopedic surgeon for consultation at [facility], he is aware of my recommendations and he says to me he would \" rather take his chances\" regarding loss of finger/hand/life, and stay  at [facility] at present rather than consider any alternatives to [facility] or     Refusing am labs per nursing staff.  MRsA screen positive. Blood cultures negative so far. He is unable to lie flat to take the MRI.  He remains afebrile.  He thinks his finger is less painful , and he is able to move it better but it appears more bruised along the finger with same redness at the hand.  No progressive redness or pain.  He is able to flex the finger a bit    5/22/23 d/w Dr Sutton; Discussed AGAIN with nursing present with him very openly the risk of his hand infection including risk of digit loss/higher level amputation including loss of hand or arm in addition to further systemic spread of infection with risk for other dire consequences/mortality/death.  We have  recommended evaluation by orthopedic surgery/hand surgery on each visit and on multiple occasions, and transfer to a center with hand surgery as no available hand surgeon at [facility],  in addition to ongoing IV antibiotics and other supportive measures.  Patient  Again has refused transfer to the [place] system either [facility] or [facility] as that he would rather lose his left index finger/hand/life  than to go to 1 of those hospitals.  He has refused " "transfer to [facility] with similar explanation again.  Dr. French has been in contact with me on multiple occasions in recent days as above and I have d/w Dr Sutton;  Dr. French  contacted orthopedics at [facility] and they are not a hand surgeon and they have refused to see consult.  Patient is aware of the above limitations with respect to no orthopedic surgeon for consultation at [facility], he is aware of my recommendations and he says to me he would \" rather take his chances\" regarding loss of finger/hand/life, and stay  at [facility] at present rather than consider any alternatives to [facility] or     Hand culture with MRSA.  MRsA screen positive. Blood cultures negative so far.      5/23/23 see above regarding patient preferences, those are the same. case management to try and arrange outpatient hand surgery consultation at  given current inability to arrange hand surgery consultation at [facility] and they are working through challenges of arranging IV antibiotic as outpatient with the hope of administration.  IV daptomycin to be arranged and close follow-up in our office if they can make those arrangements. Nursing reports afebrile.  He thinks his finger is less painful , still with restrictions flexion at the left index finger.  No progressive redness or pain.  He has significant slough of skin tissue at the  finger     pain at his left index finger/hand which is constant, sharp, nonradiating, worse with movement/pressure, better with pain meds and 3-4  out of 10 in severity and less intense.     Review of Systems:    5/24/23    Constitutional-- No Fever, chills or sweats.  Appetite good, and no malaise. +fatigue.  HEENT-- No new vision, hearing or throat complaints.  No epistaxis or oral sores.  Denies odynophagia or dysphagia. No headache, photophobia or neck stiffness.  CV-- No chest pain, palpitation or syncope  Resp-- No SOB/cough/Hemoptysis  GI- No nausea, " vomiting, or diarrhea.  No hematochezia, melena, or hematemesis. Denies jaundice or chronic liver disease.  -- No dysuria, hematuria, or flank pain.  Denies hesitancy, urgency or flank pain.  Lymph- no swollen lymph nodes in neck/axilla or groin.   Heme- No active bruising or bleeding; no Hx of DVT or PE.  MS-- no swelling or pain in the bones or joints of arms/legs.  No new back pain.  Neuro-- No acute focal weakness or numbness in the arms or legs.  No seizures.  Skin--No rashes   All other systems negative for acute complaints on a 12 system review of systems     Past Medical History:   Diagnosis Date   • Chronic back pain    • COPD (chronic obstructive pulmonary disease)    • Coronary artery disease involving native coronary artery of native heart without angina pectoris    • Diabetes mellitus    • GERD (gastroesophageal reflux disease)    • Heart attack    • Hyperlipidemia    • Hypertension    • Lung nodule    • Murmur    • MARY ALICE (obstructive sleep apnea)        Past Surgical History:   Procedure Laterality Date   • FEMORAL ARTERY STENT         Family History   Problem Relation Age of Onset   • Diabetes Father    • Diabetes Brother        Social History     Socioeconomic History   • Marital status:    Tobacco Use   • Smoking status: Every Day     Packs/day: 0.50     Years: 30.00     Pack years: 15.00     Types: Cigarettes   • Smokeless tobacco: Former   • Tobacco comments:     20 years ago   Vaping Use   • Vaping Use: Never used   Substance and Sexual Activity   • Alcohol use: Not Currently   • Drug use: Never   • Sexual activity: Defer       No Known Allergies         Physical Exam:   Vital Signs  Temp (24hrs), Av.8 °F (36.6 °C), Min:97.5 °F (36.4 °C), Max:98.1 °F (36.7 °C)    Temp  Min: 97.5 °F (36.4 °C)  Max: 98.1 °F (36.7 °C)  BP  Min: 101/90  Max: 134/67  Pulse  Min: 59  Max: 80  Resp  Min: 15  Max: 18  SpO2  Min: 92 %  Max: 98 %    GENERAL: Awake and alert  HEENT: Normocephalic, atraumatic.   PERRL. EOMI. No conjunctival injection. No icterus. Oropharynx clear without evidence of thrush or exudate. No evidence of peridontal disease.    NECK: Supple   HEART: RRR; No murmur, rubs, gallops.   LUNGS: Clear to auscultation bilaterally without wheezing, rales, rhonchi. Normal respiratory effort. Nonlabored.   ABDOMEN: Soft, nontender, nondistended. Positive bowel sounds. No rebound or guarding. NO mass or HSM.  EXT:  No cyanosis, clubbing or edema. No cord.  :  Without Torres catheter.  MSK: No joint effusions or erythema  SKIN:   Left index finger with ecchymosis extending to the dorsal hand,  past the MCP joint,with edema, stable redness extending over 1st 2nd fingers, and dorsum of hand. No progression and ecchymosis, increased slough at the finger itself. He has had slough of skin from the  index finger at the proximal phalanx, some bleeding there.   some decreased swelling.  No new fluctuance or crepitus   NEURO: Oriented to PPT.  Motor 5/5 strength  PSYCHIATRIC: Normal insight and judgment. Cooperative with PE    Laboratory Data    Results from last 7 days   Lab Units 05/23/23  0353 05/21/23  0617 05/20/23  0332   WBC 10*3/mm3 10.73 8.32 12.48*   HEMOGLOBIN g/dL 13.0 13.4 13.9   HEMATOCRIT % 40.3 41.4 43.7   PLATELETS 10*3/mm3 352 289 272     Results from last 7 days   Lab Units 05/23/23  0353   SODIUM mmol/L 138   POTASSIUM mmol/L 4.2   CHLORIDE mmol/L 100   CO2 mmol/L 27.0   BUN mg/dL 20   CREATININE mg/dL 1.26   GLUCOSE mg/dL 100*   CALCIUM mg/dL 9.3     Results from last 7 days   Lab Units 05/1950   ALK PHOS U/L 113   BILIRUBIN mg/dL 1.0   ALT (SGPT) U/L 10   AST (SGOT) U/L 30     Results from last 7 days   Lab Units 05/20/23  0332   SED RATE mm/hr 88*     Results from last 7 days   Lab Units 05/20/23  0332   CRP mg/dL 9.01*     Results from last 7 days   Lab Units 05/1950   LACTATE mmol/L 1.7     Results from last 7 days   Lab Units 05/20/23  0332   CK TOTAL U/L 71         Estimated  Creatinine Clearance: 78 mL/min (by C-G formula based on SCr of 1.26 mg/dL).              Radiology:  Imaging Results (Last 72 Hours)     ** No results found for the last 72 hours. **            Impression:   - acute/Severe left index finger wound infection/cellulitis;  He has multifocal areas of slough/purulence/ecchymosis  with difficulty flexing the finger and pain limiting exam otherwise with ecchymosis extending past the MCP on the dorsal hand and vague/diffuse erythema extending towards the wrist at admission, subsequently with some slough of tissue at the more proximal aspect and culture with MRSA.  I have advised/recommended the patient be evaluated by surgery as previously noted.  I discussed with Dr. French and Dr Sutton directly and  medicine team had made arrangements to have him transferred to [place] but patient refused.  Patient refused transfer to the [facility] system.  has refused facilitating transfer on multiple occasions and administration at [facility] unable to facilitate that any further per my discussion with Dr Herrera.  Per discussion with orthopedics on call, no hand surgeon available at [facility] and orthopedic surgeon on-call has refused consultation.  I have reiterated my recommendation to patient that he have a surgical evaluation with ongoing risk for further serious morbidity and other serious sequela including functional/limb loss/amputation, persistence of progressive or recurrent infection including risk for systemic spread/sepsis and death.  Medicine team continues to look into options for transfer to a center with hand specialist, and they will get back to me regarding options and patient preferences if she finds out more information; general orthopedics has refused consultation as above, Dr French aware and will keep us informed; as noted above, patient refuses to consider transfer to another facility other than , he refuses to consider  [place] [facility] or [facility] and he refuses to be transferred to any facility in [place] or [place].  He knows the risks he is taking with his decisions as outlined above; Case management is trying to make arrangements for outpatient appointment on same day of discharge 5/25 with ongoing IV antibiotics as outpatient if they are able to make those arrangements as well.  - COPD  - Diabetes mellitus, type 2  - Obesity       PLAN/RECOMMENDATIONS:     - Daptomycin   IV daily    --check/review labs cultures and scans  --Partial history per nursing staff  --Discussed with microbiology  --Discussed with medicine team, Dr Sutton as above  --I have discussed very directly  my recommendations with the patient and my concerns as outlined above.    --highly complex at of issues with high risk for further serious morbidity and other serious sequela    **  Case management looking into outpatient arrangements, apparently with an appointment at  hand surgery Thursday in the afternoon.  They are trying to make arrangements for outpatient IV daptomycin such that he can be discharged on May 25, make it to his hand appointment and get   IV daptomycin daily at [facility] outpatient clinic.  Can follow-up with me  in clinic May 26; I d/w Dr Sutton and Dr Herrera    Copied text in this note has been reviewed and is accurate as of 05/24/23.      Chase Alfaro MD  5/24/2023  08:37 EDT     addendum regarding incorrect fallon in above HPI;  The following will reflect HPI for 5/24 visit:    5/24/23 see above regarding patient preferences, those are the same. case management to try and arrange outpatient hand surgery consultation at  given current inability to arrange hand surgery consultation at [facility] and they are working through challenges of arranging IV antibiotic as outpatient with the hope of administration.  IV daptomycin to be arranged and close follow-up in our office if they can make those  arrangements. Nursing reports afebrile.  He thinks his finger is less painful , still with restrictions flexion at the left index finger.  No progressive redness or pain.  He has significant slough of skin tissue at the  finger      pain at his left index finger/hand which is constant, sharp, nonradiating, worse with movement/pressure, better with pain meds and 3-4  out of 10 in severity and less intense.

## 2023-05-24 NOTE — PLAN OF CARE
Goal Outcome Evaluation:  Patient admitted with cellulitis of left hand, dressing c/d/I, and being changed as ordered.     Sinus rhythm per telemetry.

## 2023-05-24 NOTE — THERAPY TREATMENT NOTE
Patient Name: Dieter Christiansen  : 1950    MRN: [id number]                              Today's Date: 2023       Admit Date: 2023    Visit Dx:     ICD-10-CM ICD-9-CM   1. Cellulitis of left hand  L03.114 682.4   2. Cellulitis of left index finger  L03.012 681.00   3. History of diabetes mellitus  Z86.39 V12.29   4. History of hypertension  Z86.79 V12.59   5. History of hyperlipidemia  Z86.39 V12.29   6. Tobacco abuse  Z72.0 305.1   7. Infection of finger of left hand  L03.012 681.02     Patient Active Problem List   Diagnosis   • Chronic back pain   • Cellulitis of left hand   • Tobacco abuse   • CAD (coronary artery disease)   • COPD (chronic obstructive pulmonary disease)   • Type 2 diabetes mellitus   • HTN (hypertension)   • HLD (hyperlipidemia)   • GERD without esophagitis   • MARY ALICE (obstructive sleep apnea)   • Infection of finger of left hand     Past Medical History:   Diagnosis Date   • Chronic back pain    • COPD (chronic obstructive pulmonary disease)    • Coronary artery disease involving native coronary artery of native heart without angina pectoris    • Diabetes mellitus    • GERD (gastroesophageal reflux disease)    • Heart attack    • Hyperlipidemia    • Hypertension    • Lung nodule    • Murmur    • MRAY ALICE (obstructive sleep apnea)      Past Surgical History:   Procedure Laterality Date   • FEMORAL ARTERY STENT        General Information     Row Name 23 1033          OT Time and Intention    Document Type therapy note (daily note)  -     Mode of Treatment occupational therapy  -     Row Name 23 1033          General Information    Patient Profile Reviewed yes  -     Existing Precautions/Restrictions fall  -     Barriers to Rehab medically complex;previous functional deficit  -     Row Name 23 1033          Cognition    Orientation Status (Cognition) oriented x 4  -     Row Name 23 1033          Safety Issues, Functional Mobility    Safety Issues Affecting  Function (Mobility) safety precautions follow-through/compliance;safety precaution awareness  -     Impairments Affecting Function (Mobility) balance;endurance/activity tolerance;grasp;shortness of breath  -           User Key  (r) = Recorded By, (t) = Taken By, (c) = Cosigned By    Initials Name Provider Type     May Otoole OT Occupational Therapist                 Mobility/ADL's     Row Name 05/24/23 1035          Bed Mobility    Comment, (Bed Mobility) Received and left [facility].  -     Row Name 05/24/23 1035          Transfers    Transfers sit-stand transfer;toilet transfer  -[facility] Name 05/24/23 1035          Sit-Stand Transfer    Sit-Stand [place] (Transfers) contact guard;verbal cues  -     Assistive Device (Sit-Stand Transfers) cane, straight  -[facility] Name 05/24/23 1035          Toilet Transfer    Type (Toilet Transfer) sit-stand;stand-sit  -     [place] Level (Toilet Transfer) contact guard;verbal cues  -     Assistive Device (Toilet Transfer) cane, straight  -     Comment, (Toilet Transfer) sat on toilet for rest break did not void.  -     Row Name 05/24/23 1035          Functional Mobility    Functional Mobility- Ind. Level contact guard assist;verbal cues required  -     Functional Mobility- Device cane, straight  -     Functional Mobility-Distance (Feet) 30  -     Functional Mobility- Comment furniture surfing with L UE. May benefit from RW if can  with L hand.  -     Row Name 05/24/23 1035          Activities of Daily Living    BADL Assessment/Intervention grooming  -     Row Name 05/24/23 1035          Grooming Assessment/Training    [place] Level (Grooming) hair care, combing/brushing;minimum assist (75% patient effort);oral care regimen;wash face, hands;set up  -     Position (Grooming) unsupported sitting  -     Comment, (Grooming) Yousuf for hair care due to exessive knotting in back of hair.  -           User Key  (r) = Recorded By,  (t) = Taken By, (c) = Cosigned By    Initials Name Provider Type     May Otoole OT Occupational Therapist               Obj/Interventions     Row Name 05/24/23 1041          Sensory Assessment (Somatosensory)    Sensory Assessment (Somatosensory) sensation intact  -     Row Name 05/24/23 1041          Vision Assessment/Intervention    Visual Impairment/Limitations WFL  -     Row Name 05/24/23 1041          Balance    Balance Assessment sitting static balance;sitting dynamic balance;standing static balance;standing dynamic balance  -     Static Sitting Balance supervision  -     Dynamic Sitting Balance supervision  -     Position, Sitting Balance unsupported  sitting on toilet  -     Static Standing Balance contact guard  -     Dynamic Standing Balance contact guard  -     Position/Device Used, Standing Balance unsupported  -     Balance Interventions sitting;occupation based/functional task  -           User Key  (r) = Recorded By, (t) = Taken By, (c) = Cosigned By    Initials Name Provider Type     May Otoole OT Occupational Therapist               Goals/Plan    No documentation.                Clinical Impression     Row Name 05/24/23 1041          Pain Assessment    Pretreatment Pain Rating 0/10 - no pain  -     Posttreatment Pain Rating 0/10 - no pain  -     Row Name 05/24/23 1041          Plan of Care Review    Plan of Care Reviewed With patient  -     Progress improving  -     Outcome Evaluation Pt continues to present below baseline with decreased strength, balance, coordination, activity tolerance and overall independence with ADLS. Pt requries Yousuf for hair care. Transfers and ambulates short distances with CGA and SPC. CGA for safety due to reaching with L hand. May benefit from RW if able to  with L hand. Recommend d/c home with OP services.  -     Row Name 05/24/23 1041          Therapy Assessment/Plan (OT)    Rehab Potential (OT) good, to achieve  stated therapy goals  -     Criteria for Skilled Therapeutic Interventions Met (OT) yes;skilled treatment is necessary  -     Therapy Frequency (OT) daily  -     Row Name 05/24/23 1041          Therapy Plan Review/Discharge Plan (OT)    Anticipated Discharge Disposition (OT) home with outpatient therapy services  -     Row Name 05/24/23 1041          Vital Signs    Pre Systolic BP Rehab 134  -HM     Pre Treatment Diastolic BP 69  -HM     Post Systolic BP Rehab 135  -HM     Post Treatment Diastolic BP 59  -HM     O2 Delivery Pre Treatment room air  -HM     O2 Delivery Intra Treatment room air  -HM     O2 Delivery Post Treatment room air  -HM     Pre Patient Position Sitting  -HM     Intra Patient Position Standing  -HM     Post Patient Position Sitting  -HM     Row Name 05/24/23 1041          Positioning and Restraints    Pre-Treatment Position sitting in chair/recliner  -HM     Post Treatment Position chair  -HM     In Chair notified nsg;reclined;call light within reach;encouraged to call for assist  No alarm on OT entry  -           User Key  (r) = Recorded By, (t) = Taken By, (c) = Cosigned By    Initials Name Provider Type     May Otoole, OT Occupational Therapist               Outcome Measures     Row Name 05/24/23 1043          How much help from another is currently needed...    Putting on and taking off regular lower body clothing? 2  -HM     Bathing (including washing, rinsing, and drying) 3  -HM     Toileting (which includes using toilet bed pan or urinal) 3  -HM     Putting on and taking off regular upper body clothing 4  -HM     Taking care of personal grooming (such as brushing teeth) 3  -HM     Eating meals 4  -     AM-PAC 6 Clicks Score (OT) 19  -     Row Name 05/24/23 1043          Functional Assessment    Outcome Measure Options AM-PAC 6 Clicks Daily Activity (OT)  -           User Key  (r) = Recorded By, (t) = Taken By, (c) = Cosigned By    Initials Name Provider Type      May Otoole OT Occupational Therapist                Occupational Therapy Education     Title: PT OT SLP Therapies (In Progress)     Topic: Occupational Therapy (In Progress)     Point: ADL training (Done)     Description:   Instruct learner(s) on proper safety adaptation and remediation techniques during self care or transfers.   Instruct in proper use of assistive devices.              Learning Progress Summary           Patient Acceptance, E,TB,D, VU,NR by  at 5/24/2023 1046    Acceptance, TB,E,D, VU,NR by  at 5/21/2023 1417                   Point: Home exercise program (Not Started)     Description:   Instruct learner(s) on appropriate technique for monitoring, assisting and/or progressing therapeutic exercises/activities.              Learner Progress:  Not documented in this visit.          Point: Precautions (Done)     Description:   Instruct learner(s) on prescribed precautions during self-care and functional transfers.              Learning Progress Summary           Patient Acceptance, E,TB,D, VU,NR by  at 5/24/2023 1046    Acceptance, TB,E,D, VU,NR by  at 5/21/2023 1417                   Point: Body mechanics (Done)     Description:   Instruct learner(s) on proper positioning and spine alignment during self-care, functional mobility activities and/or exercises.              Learning Progress Summary           Patient Acceptance, E,TB,D, VU,NR by  at 5/24/2023 1046    Acceptance, TB,E,D, VU,NR by  at 5/21/2023 1417                               User Key     Initials Effective Dates Name Provider Type Discipline     10/25/22 -  May Otoole OT Occupational Therapist OT              OT Recommendation and Plan  Planned Therapy Interventions (OT): adaptive equipment training, BADL retraining, functional balance retraining, occupation/activity based interventions, ROM/therapeutic exercise, transfer/mobility retraining  Therapy Frequency (OT): daily  Plan of Care Review  Plan of  Care Reviewed With: patient  Progress: improving  Outcome Evaluation: Pt continues to present below baseline with decreased strength, balance, coordination, activity tolerance and overall independence with ADLS. Pt requries Yousuf for hair care. Transfers and ambulates short distances with CGA and SPC. CGA for safety due to reaching with L hand. May benefit from RW if able to  with L hand. Recommend d/c home with OP services.     Time Calculation:    Time Calculation- OT     Row Name 05/24/23 0937             Time Calculation- OT    OT Start Time 0937  -      OT Received On 05/24/23  -         Timed Charges    01759 - OT Self Care/Mgmt Minutes 25  -HM         Total Minutes    Timed Charges Total Minutes 25  -HM       Total Minutes 25  -HM            User Key  (r) = Recorded By, (t) = Taken By, (c) = Cosigned By    Initials Name Provider Type     May Otoole OT Occupational Therapist              Therapy Charges for Today     Code Description Service Date Service Provider Modifiers Qty    95162770181 HC OT SELF CARE/MGMT/TRAIN EA 15 MIN 5/24/2023 May Otoole OT GO 2               May Otoole OT  5/24/2023

## 2023-05-24 NOTE — PLAN OF CARE
Goal Outcome Evaluation:  Plan of Care Reviewed With: patient        Progress: improving  Outcome Evaluation: Increased gait distance to 20' (using SC) + 150' (using rw) with CGA - mild instability noted.  Moderate SOA noted with activity.  Pt continues to be limited by weakness, decreased activity tolerance, and balance deficits.  Continue to recommend home with outpt PT.

## 2023-05-25 ENCOUNTER — APPOINTMENT (OUTPATIENT)
Dept: ONCOLOGY | Facility: HOSPITAL | Age: 73
End: 2023-05-25
Payer: MEDICARE

## 2023-05-25 ENCOUNTER — READMISSION MANAGEMENT (OUTPATIENT)
Dept: CALL CENTER | Facility: HOSPITAL | Age: 73
End: 2023-05-25
Payer: MEDICARE

## 2023-05-25 VITALS
HEART RATE: 67 BPM | TEMPERATURE: 98.5 F | BODY MASS INDEX: 46.65 KG/M2 | HEIGHT: 69 IN | SYSTOLIC BLOOD PRESSURE: 128 MMHG | RESPIRATION RATE: 14 BRPM | DIASTOLIC BLOOD PRESSURE: 75 MMHG | OXYGEN SATURATION: 99 % | WEIGHT: 315 LBS

## 2023-05-25 LAB
GLUCOSE BLDC GLUCOMTR-MCNC: 121 MG/DL (ref 70–130)
GLUCOSE BLDC GLUCOMTR-MCNC: 126 MG/DL (ref 70–130)

## 2023-05-25 PROCEDURE — 25010000002 DAPTOMYCIN PER 1 MG: Performed by: INTERNAL MEDICINE

## 2023-05-25 PROCEDURE — 94799 UNLISTED PULMONARY SVC/PX: CPT

## 2023-05-25 PROCEDURE — 82948 REAGENT STRIP/BLOOD GLUCOSE: CPT

## 2023-05-25 PROCEDURE — 94664 DEMO&/EVAL PT USE INHALER: CPT

## 2023-05-25 PROCEDURE — 99239 HOSP IP/OBS DSCHRG MGMT >30: CPT | Performed by: HOSPITALIST

## 2023-05-25 RX ADMIN — FUROSEMIDE 40 MG: 40 TABLET ORAL at 08:16

## 2023-05-25 RX ADMIN — Medication 1 PATCH: at 08:20

## 2023-05-25 RX ADMIN — TAMSULOSIN HYDROCHLORIDE 0.4 MG: 0.4 CAPSULE ORAL at 08:15

## 2023-05-25 RX ADMIN — CETIRIZINE HYDROCHLORIDE 10 MG: 10 TABLET, FILM COATED ORAL at 08:16

## 2023-05-25 RX ADMIN — FLUOXETINE 20 MG: 20 CAPSULE ORAL at 08:15

## 2023-05-25 RX ADMIN — FAMOTIDINE 20 MG: 20 TABLET ORAL at 08:16

## 2023-05-25 RX ADMIN — Medication 10 ML: at 08:30

## 2023-05-25 RX ADMIN — IPRATROPIUM BROMIDE AND ALBUTEROL SULFATE 3 ML: 2.5; .5 SOLUTION RESPIRATORY (INHALATION) at 09:06

## 2023-05-25 RX ADMIN — Medication 81 MG: at 08:16

## 2023-05-25 RX ADMIN — GABAPENTIN 600 MG: 300 CAPSULE ORAL at 05:23

## 2023-05-25 RX ADMIN — DAPTOMYCIN 600 MG: 500 INJECTION, POWDER, LYOPHILIZED, FOR SOLUTION INTRAVENOUS at 08:22

## 2023-05-25 RX ADMIN — PANTOPRAZOLE SODIUM 40 MG: 40 TABLET, DELAYED RELEASE ORAL at 05:23

## 2023-05-25 NOTE — DISCHARGE SUMMARY
[facility] Medicine Services  DISCHARGE SUMMARY    Patient Name: Dieter Christiansen  : 1950  MRN: [id number]    Date of Admission: 2023  9:10 PM  Date of Discharge:  2023  Primary Care Physician: Ian Sanchez APRN    Consults     Date and Time Order Name Status Description    2023  1:51 AM Inpatient Infectious Diseases Consult Completed           Hospital Course     Presenting Problem:   Cellulitis of left hand [L03.114]    Active Hospital Problems    Diagnosis  POA   • **Cellulitis of left hand [L03.114]  Yes   • Tobacco abuse [Z72.0]  Unknown   • CAD (coronary artery disease) [I25.10]  Unknown   • COPD (chronic obstructive pulmonary disease) [J44.9]  Unknown   • Type 2 diabetes mellitus [E11.9]  Unknown   • HTN (hypertension) [I10]  Unknown   • HLD (hyperlipidemia) [E78.5]  Unknown   • GERD without esophagitis [K21.9]  Unknown   • MARY ALICE (obstructive sleep apnea) [G47.33]  Unknown   • Infection of finger of left hand [L03.012]  Unknown      Resolved Hospital Problems   No resolved problems to display.          Hospital Course:  Dieter Christiansen is a 73 y.o. male with history of DM, COPD, CAD, HTN, here with L hand cellulitis/L index finger cellulitis/infection.     L hand cellulitis  L index finger cellulitis/infection  -This began a week prior to admission with a hangnail following minor trauma and spread. [facility] had no availability for hand surgery coverage. He refused transfer to  and [facility]. He was treated with IV antibiotics by [facility] here and improved. Wound cultures grew MRSA. He has been referred to  and has follow up there today this afternoon, with Dr. Chavis reportedly at 1310. He will continue IV antibiotics and follow up with hand surgery.      Discharge Follow Up Recommendations for outpatient labs/diagnostics:  As written    Day of Discharge     HPI:   Pain better. Hand better. NO issues.    Review of Systems  As above    Vital Signs:   Temp:  [97.6  °F (36.4 °C)-97.8 °F (36.6 °C)] 97.6 °F (36.4 °C)  Heart Rate:  [] 63  Resp:  [14-24] 14  BP: (106-168)/(51-83) 108/74      Physical Exam:  NAD, alert and oriented  OP clear, MMM  Neck supple  No LAD  RRR  CTAB  +BS, ND, NT, soft  PAGAN  Normal affect  L hand wrapped/dressed    Pertinent  and/or Most Recent Results     LAB RESULTS:      Lab 05/23/23 0353 05/21/23 0617 05/20/23 0332 05/1950   WBC 10.73 8.32 12.48* 9.85   HEMOGLOBIN 13.0 13.4 13.9 14.3   HEMATOCRIT 40.3 41.4 43.7 44.3   PLATELETS 352 289 272 262   NEUTROS ABS  --   --  9.63* 7.41*   IMMATURE GRANS (ABS)  --   --  0.08* 0.04   LYMPHS ABS  --   --  1.47 1.37   MONOS ABS  --   --  1.09* 0.83   EOS ABS  --   --  0.19 0.17   MCV 99.0* 100.5* 100.0* 100.0*   SED RATE  --   --  88* 83*   CRP  --   --  9.01* 11.04*   PROCALCITONIN  --   --  0.10 0.10   LACTATE  --   --   --  1.7   PROTIME  --   --  15.3*  --          Lab 05/23/23 0353 05/21/23 0617 05/20/23 0332 05/1950   SODIUM 138 138 135* 138   POTASSIUM 4.2 4.2 4.0 4.2   CHLORIDE 100 102 99 101   CO2 27.0 26.0 25.0 27.0   ANION GAP 11.0 10.0 11.0 10.0   BUN 20 20 19 19   CREATININE 1.26 1.08 1.14 1.17   EGFR 60.6 72.9 68.3 66.2   GLUCOSE 100* 97 182* 163*   CALCIUM 9.3 8.9 8.7 8.9   MAGNESIUM  --   --  2.1  --    HEMOGLOBIN A1C  --   --  5.80*  --          Lab 05/1950   TOTAL PROTEIN 7.2   ALBUMIN 3.6   GLOBULIN 3.6   ALT (SGPT) 10   AST (SGOT) 30   BILIRUBIN 1.0   ALK PHOS 113         Lab 05/20/23  0332   PROTIME 15.3*   INR 1.20*                 Brief Urine Lab Results  (Last result in the past 365 days)      Color   Clarity   Blood   Leuk Est   Nitrite   Protein   CREAT   Urine HCG        05/27/22 1431 Dark Yellow   Clear   3+   Negative   Negative   Negative               Microbiology Results (last 10 days)     Procedure Component Value - Date/Time    MRSA Screen, PCR (Inpatient) - Swab, Nares [[id number]]  (Abnormal) Collected: 05/20/23 0411    Lab Status: Final result  Specimen: Swab from Nares Updated: 05/20/23 0919     MRSA PCR Positive    Narrative:      The negative predictive value of this diagnostic test is high and should only be used to consider de-escalating anti-MRSA therapy. A positive result may indicate colonization with MRSA and must be correlated clinically.    Wound Culture - Wound, Hand, Digit Left [[id number]]  (Abnormal)  (Susceptibility) Collected: 05/20/23 0236    Lab Status: Edited Result - FINAL Specimen: Wound from Hand, Digit Left Updated: 05/22/23 0923     Wound Culture Moderate growth (3+) Staphylococcus aureus, MRSA     Comment:   Methicillin resistant Staphylococcus aureus, Patient may be an isolation risk.        Gram Stain Moderate (3+) Gram positive cocci in pairs, chains and clusters      No WBCs seen    Susceptibility      Staphylococcus aureus, MRSA      KARIME      Clindamycin Susceptible      Daptomycin Susceptible (C)  [1]       Erythromycin Resistant     Inducible Clindamycin Resistance Negative      Oxacillin Resistant     Rifampin Susceptible      Tetracycline Susceptible      Trimethoprim + Sulfamethoxazole Susceptible      Vancomycin Susceptible                   [1]  Appended report. These results have been appended to a previously final verified report.             Susceptibility Comments     Staphylococcus aureus, MRSA    This isolate does not demonstrate inducible clindamycin resistance in vitro.               Blood Culture - Blood, Arm, Left [[id number]]  (Normal) Collected: 05/1950    Lab Status: Final result Specimen: Blood from Arm, Left Updated: 05/24/23 2015     Blood Culture No growth at 5 days    Blood Culture - Blood, Arm, Right [[id number]]  (Normal) Collected: 05/19/23 1949    Lab Status: Final result Specimen: Blood from Arm, Right Updated: 05/24/23 2015     Blood Culture No growth at 5 days          XR Hand 3+ View Left    Result Date: 5/19/2023  XR HAND 3+ VW LEFT Date of Exam: 5/19/2023 9:03 PM EDT Indication: Index  finger pain and swelling Comparison: None available. Findings: There is diffuse soft tissue swelling of the digit. There are bony erosions along the radial side of the base of the proximal phalanx of the second digit. There is also some mild bony erosion along the radial side of the head of the second metacarpal. No  abnormal periosteal reaction. No acute fracture or dislocation. Carpal bones and radiocarpal joint are within normal limits. No radiopaque body identified.    Impression: Small bony erosions at the second metacarpal phalangeal joint as detailed above. No periosteal. 2. Diffuse soft tissue swelling of the second digit. No radiopaque foreign body identified. Electronically Signed: Otf Fontaine  5/19/2023 9:35 PM EDT  Workstation ID: [id number]                  Plan for Follow-up of Pending Labs/Results:     Discharge Details        Discharge Medications      New Medications      Instructions Start Date   DAPTOmycin 600 mg in sodium chloride 0.9 % 50 mL   6 mg/kg (600 mg), Intravenous, Every 24 Hours         Continue These Medications      Instructions Start Date   albuterol (2.5 MG/3ML) 0.083% nebulizer solution  Commonly known as: PROVENTIL   No dose, route, or frequency recorded.      aspirin 81 MG EC tablet   aspirin 81 mg tablet,delayed release   Take 1 tablet every day by oral route.      cetirizine 10 MG tablet  Commonly known as: zyrTEC   10 mg, Oral, Daily      Combivent Respimat  MCG/ACT inhaler  Generic drug: ipratropium-albuterol   1 puff, Inhalation, 3 Times Daily      ergocalciferol 1.25 MG (39524 UT) capsule  Commonly known as: ERGOCALCIFEROL   50,000 Units, Oral, Weekly      famotidine 20 MG tablet  Commonly known as: PEPCID   TAKE ONE TABLET DAILY      FLUoxetine 20 MG capsule  Commonly known as: PROzac   TAKE ONE CAPSULE DAILY      furosemide 40 MG tablet  Commonly known as: LASIX   TAKE ONE TABLET EVERY 72 HOURS      gabapentin 600 MG tablet  Commonly known as: NEURONTIN   600 mg,  Oral, 3 Times Daily      HYDROcodone-acetaminophen  MG per tablet  Commonly known as: NORCO   1 tablet, Oral, Every 8 Hours PRN      lisinopril 10 MG tablet  Commonly known as: PRINIVIL,ZESTRIL   TAKE ONE TABLET DAILY      metFORMIN 500 MG tablet  Commonly known as: GLUCOPHAGE   500 mg, Oral, Daily With Breakfast      metoprolol succinate XL 50 MG 24 hr tablet  Commonly known as: TOPROL-XL   TAKE ONE TABLET DAILY      nitroglycerin 0.4 MG SL tablet  Commonly known as: NITROSTAT   0.4 mg, Sublingual, Every 5 Minutes PRN, Take no more than 3 doses in 15 minutes.      nystatin 839112 UNIT/GM powder  Commonly known as: MYCOSTATIN   Topical, 3 Times Daily      nystatin-triamcinolone 230395-5.1 UNIT/GM-% ointment  Commonly known as: MYCOLOG   1 application, Topical, 2 Times Daily      pantoprazole 40 MG EC tablet  Commonly known as: PROTONIX   TAKE ONE TABLET DAILY      potassium chloride 10 MEQ CR tablet  Commonly known as: K-DUR,KLOR-CON   TAKE TWO TABLETS DAILY      promethazine 25 MG tablet  Commonly known as: PHENERGAN   TAKE ONE TABLET EVERY 6 HOURS AS NEEDED FOR NAUSEA      simvastatin 20 MG tablet  Commonly known as: ZOCOR   20 mg, Oral, Nightly      tamsulosin 0.4 MG capsule 24 hr capsule  Commonly known as: FLOMAX   TAKE ONE CAPSULE DAILY      triamcinolone 0.1 % cream  Commonly known as: KENALOG   1 application, Topical, 2 Times Daily PRN         Stop These Medications    ibuprofen 600 MG tablet  Commonly known as: ADVIL,MOTRIN            No Known Allergies      Discharge Disposition:  Home or Self Care    Diet:  Hospital:  Diet Order   Procedures   • Diet: Cardiac Diets, Diabetic Diets; Healthy Heart (2-3 Na+); Consistent Carbohydrate; Texture: Regular Texture (IDDSI 7); Fluid Consistency: Thin (IDDSI 0)       Activity:      Restrictions or Other Recommendations:         CODE STATUS:    Code Status and Medical Interventions:   Ordered at: 05/20/23 0149     Level Of Support Discussed With:    Patient      Code Status (Patient has no pulse and is not breathing):    CPR (Attempt to Resuscitate)     Medical Interventions (Patient has pulse or is breathing):    Full Support       Future Appointments   Date Time Provider Department Center   5/26/2023  8:00 AM CHAIR 5 [facility]   9/6/2023  3:00 PM Ian Sanchez APRN [facility]       Additional Instructions for the Follow-ups that You Need to Schedule     Ambulatory Referral to Infusion Treatment   As directed      daptomycin 600 mg IV daily at [facility] outpatient infusion clinic by peripheral IV.    Order Comments: daptomycin 600 mg IV daily at [facility] outpatient infusion clinic by peripheral IV.     What is the duration of the infusion treatment?: 1 Hr         Discharge Follow-up with PCP   As directed       Currently Documented PCP:    Ian Sanchez APRN    PCP Phone Number:    [phone]     Follow Up Details: 1-2 weeks         Discharge Follow-up with Specified Provider: [facility] tomorrow for IV antibiotics and [place] follow up   As directed      To: [facility] tomorrow for IV antibiotics and [place] follow up         Discharge Follow-up with Specified Provider: [facility] hand surgery this afternoon   As directed      To: [facility] hand surgery this afternoon                     Scott Wadsworth MD  05/25/23      Time Spent on Discharge:  I spent  32  minutes on this discharge activity which included: face-to-face encounter with the patient, reviewing the data in the system, coordination of the care with the nursing staff as well as consultants, documentation, and entering orders.

## 2023-05-25 NOTE — PLAN OF CARE
Goal Outcome Evaluation:  Patient admitted with cellulitis of left hand, no c/o pain this shift.    Sinus rhythm per telemetry.

## 2023-05-25 NOTE — PROGRESS NOTES
INFECTIOUS DISEASE Progress note     Dieter Christiansen  1950  [id number]      Admission Date: 5/19/2023      Requesting Provider: Dr French  Evaluating Physician: Dr Chase Alfaro  Reason for Consultation: left index finger infection     History of present illness:    Patient is a 73 y.o. male, with PMH COPD, CAD, DM, MARY ALICE, seen today for left index finger infection.  He had developed a hangnail of the finger after which he injured his hand when he hit his hand on a wall approximately one week ago.  The redness and swelling progressively worsened prompting his presentation to the ED.  Xray of the hand with diffuse swelling of the index finger, with bony erosions along the radial side of the base of proximal phalanx of second digit. Admitting labs with WBC 9.85, plt 262, CRP 11, ESR 83, PCT 0.10, Scr 1.17, and tmax of 99.5.  Wound gram stain with moderate gram positive cocci in pairs, chains and clusters, no WBCs. Started on Vancomycin, Zosyn and Clindamycin and we were consulted for antibiotic management.  Plans made to transfer to [place] per d/w Dr French, however he refused per d/w Dr French.    5/20/23 He has pain at his left index finger/hand which is constant, sharp, nonradiating, worse with movement/pressure, better with pain meds and 4-5 out of 10 in severity.  He denied any other specific trauma aside from above.  No other blood force penetrating trauma.  No animal insect arthropod bite.  Denies any prior history MRSA VRE C. Difficile or ESBL/kpC/CRE organisms     Denies any other specific focal symptoms.  No headache photophobia or neck stiffness.  He is on nasal cannula oxygen but denies dyspnea cough or hemoptysis.  No nausea vomiting diarrhea or abdominal pain.  No dysuria hematuria or pyuria.    ON 5/20/23   Discussed very openly with patient risk of his hand infection including risk of digit loss/higher level amputation including loss of hand or arm in addition to further systemic spread of  infection with risk for other dire consequences/mortality.  Conversation in presence of nursing staff and similar conversation by Dr. french with him; we recommended evaluation by orthopedic surgery/hand surgery, and transfer to a center with hand surgery as no available hand surgeon at [facility],  in addition to ongoing IV antibiotics and other supportive measures.  Patient  refused transfer to the [facility] either [facility] or [facility] that he would rather lose his left index finger/hand/life  than to go to 1 of those hospitals.  He has refused transfer to [facility] with similar explanation.  Dr. French has been in contact with me on multiple occasions , She contacted hand surgery at [facility] and they refused transfer.  They have seen a picture of his hand per my discussion with Dr. French; they apparently agreed with Dr. French regarding the severity and the they stated  high risk  for amputation of  his left index finger; they asked that orthopedics see at [facility]; Dr. French has contacted orthopedics and they are not a hand surgeon and they have refused to see consult.  I d/w administration and they are unable to facilitate transfer to     5/21/23:   D/w Dr French again multiple occasions today  Discussed AGAIN very openly with patient risk of his hand infection including risk of digit loss/higher level amputation including loss of hand or arm in addition to further systemic spread of infection with risk for other dire consequences/mortality/death.  Conversation in presence of nursing staff ; we recommended evaluation by orthopedic surgery/hand surgery, and transfer to a center with hand surgery as no available hand surgeon at [facility],  in addition to ongoing IV antibiotics and other supportive measures.  Patient  Again has refused transfer to the [facility] either [facility] or [facility] that he would rather  "lose his left index finger/hand/life  than to go to 1 of those hospitals.  He has refused transfer to [facility] with similar explanation again.  Dr. French has been in contact with me on multiple occasions again today as above , She anticipates contacting  hand surgery at [facility].  Dr. French has contacted orthopedics at [facility] and they are not a hand surgeon and they have refused to see consult.  Patient is aware of the above limitations with respect to no orthopedic surgeon for consultation at [facility], he is aware of my recommendations and he says to me he would \" rather take his chances\" regarding loss of finger/hand/life, and stay  at [facility] at present rather than consider any alternatives to [facility] or     Refusing am labs per nursing staff.  MRsA screen positive. Blood cultures negative so far. He is unable to lie flat to take the MRI.  He remains afebrile.  He thinks his finger is less painful , and he is able to move it better but it appears more bruised along the finger with same redness at the hand.  No progressive redness or pain.  He is able to flex the finger a bit    5/22/23 d/w Dr Sutton; Discussed AGAIN with nursing present with him very openly the risk of his hand infection including risk of digit loss/higher level amputation including loss of hand or arm in addition to further systemic spread of infection with risk for other dire consequences/mortality/death.  We have  recommended evaluation by orthopedic surgery/hand surgery on each visit and on multiple occasions, and transfer to a center with hand surgery as no available hand surgeon at [facility],  in addition to ongoing IV antibiotics and other supportive measures.  Patient  Again has refused transfer to the [place] system either [facility] or [facility] as that he would rather lose his left index finger/hand/life  than to go to 1 of those hospitals.  He has refused " "transfer to [facility] with similar explanation again.  Dr. French has been in contact with me on multiple occasions in recent days as above and I have d/w Dr Sutton;  Dr. French  contacted orthopedics at [facility] and they are not a hand surgeon and they have refused to see consult.  Patient is aware of the above limitations with respect to no orthopedic surgeon for consultation at [facility], he is aware of my recommendations and he says to me he would \" rather take his chances\" regarding loss of finger/hand/life, and stay  at [facility] at present rather than consider any alternatives to [facility], [place] or     Hand culture with MRSA.  MRsA screen positive. Blood cultures negative so far.      5/25/23 see above regarding patient preferences, those are the same. case management has arrnaged outpatient hand surgery consultation at  given current inability to arrange hand surgery consultation at [facility] and they are working through challenges of arranging IV antibiotic as outpatient with the hope of administration to start 5/26 and f/u with me 5/26 in clinic.  Nursing reports afebrile.  He thinks his finger is less painful , still with restrictions flexion at the left index finger.  No progressive redness or pain.  He has significant slough of skin tissue at the  finger     pain at his left index finger/hand which is constant, sharp, nonradiating, worse with movement/pressure, better with pain meds and 3-4  out of 10 in severity and less intense.     Review of Systems:    5/25/23    Constitutional-- No Fever, chills or sweats.  Appetite good, and no malaise. +fatigue.  HEENT-- No new vision, hearing or throat complaints.  No epistaxis or oral sores.  Denies odynophagia or dysphagia. No headache, photophobia or neck stiffness.  CV-- No chest pain, palpitation or syncope  Resp-- No SOB/cough/Hemoptysis  GI- No nausea, vomiting, or diarrhea.  No hematochezia, melena, or " hematemesis. Denies jaundice or chronic liver disease.  -- No dysuria, hematuria, or flank pain.  Denies hesitancy, urgency or flank pain.  Lymph- no swollen lymph nodes in neck/axilla or groin.   Heme- No active bruising or bleeding; no Hx of DVT or PE.  MS-- no swelling or pain in the bones or joints of arms/legs.  No new back pain.  Neuro-- No acute focal weakness or numbness in the arms or legs.  No seizures.  Skin--No rashes   All other systems negative for acute complaints on a 12 system review of systems     Past Medical History:   Diagnosis Date   • Chronic back pain    • COPD (chronic obstructive pulmonary disease)    • Coronary artery disease involving native coronary artery of native heart without angina pectoris    • Diabetes mellitus    • GERD (gastroesophageal reflux disease)    • Heart attack    • Hyperlipidemia    • Hypertension    • Lung nodule    • Murmur    • MARY ALICE (obstructive sleep apnea)        Past Surgical History:   Procedure Laterality Date   • FEMORAL ARTERY STENT         Family History   Problem Relation Age of Onset   • Diabetes Father    • Diabetes Brother        Social History     Socioeconomic History   • Marital status:    Tobacco Use   • Smoking status: Every Day     Packs/day: 0.50     Years: 30.00     Pack years: 15.00     Types: Cigarettes   • Smokeless tobacco: Former   • Tobacco comments:     20 years ago   Vaping Use   • Vaping Use: Never used   Substance and Sexual Activity   • Alcohol use: Not Currently   • Drug use: Never   • Sexual activity: Defer       No Known Allergies         Physical Exam:   Vital Signs  Temp (24hrs), Av.8 °F (36.6 °C), Min:97.8 °F (36.6 °C), Max:98.1 °F (36.7 °C)    Temp  Min: 97.8 °F (36.6 °C)  Max: 98.1 °F (36.7 °C)  BP  Min: 106/60  Max: 168/83  Pulse  Min: 61  Max: 103  Resp  Min: 14  Max: 24  SpO2  Min: 91 %  Max: 99 %    GENERAL: sleepy  HEENT: Normocephalic, atraumatic.  No conjunctival injection. No icterus. Oropharynx clear  without evidence of thrush or exudate. No evidence of peridontal disease.    NECK: Supple   HEART: RRR; No murmur, rubs, gallops.   LUNGS: Clear to auscultation bilaterally without wheezing, rales, rhonchi. Normal respiratory effort. Nonlabored.   ABDOMEN: Soft, nontender, nondistended. Positive bowel sounds. No rebound or guarding. NO mass or HSM.  EXT:  No cyanosis, clubbing or edema. No cord.  MSK: No joint effusions or erythema  SKIN:   Left index finger with ecchymosis extending to the dorsal hand,  past the MCP joint,with edema, stable redness extending over 1st 2nd fingers, and dorsum of hand. No progression and ecchymosis, increased slough at the finger itself. He has had slough of skin from the  index finger at the proximal phalanx, some bleeding there.   some decreased swelling.  No new fluctuance or crepitus   NEURO:sleepy    Laboratory Data    Results from last 7 days   Lab Units 05/23/23  0353 05/21/23  0617 05/20/23  0332   WBC 10*3/mm3 10.73 8.32 12.48*   HEMOGLOBIN g/dL 13.0 13.4 13.9   HEMATOCRIT % 40.3 41.4 43.7   PLATELETS 10*3/mm3 352 289 272     Results from last 7 days   Lab Units 05/23/23  0353   SODIUM mmol/L 138   POTASSIUM mmol/L 4.2   CHLORIDE mmol/L 100   CO2 mmol/L 27.0   BUN mg/dL 20   CREATININE mg/dL 1.26   GLUCOSE mg/dL 100*   CALCIUM mg/dL 9.3     Results from last 7 days   Lab Units 05/1950   ALK PHOS U/L 113   BILIRUBIN mg/dL 1.0   ALT (SGPT) U/L 10   AST (SGOT) U/L 30     Results from last 7 days   Lab Units 05/20/23  0332   SED RATE mm/hr 88*     Results from last 7 days   Lab Units 05/20/23  0332   CRP mg/dL 9.01*     Results from last 7 days   Lab Units 05/1950   LACTATE mmol/L 1.7     Results from last 7 days   Lab Units 05/20/23  0332   CK TOTAL U/L 71         Estimated Creatinine Clearance: 77.2 mL/min (by C-G formula based on SCr of 1.26 mg/dL).              Radiology:  Imaging Results (Last 72 Hours)     ** No results found for the last 72 hours. **             Impression:   - acute/Severe left index finger wound infection/cellulitis;  He has multifocal areas of slough/purulence/ecchymosis  with difficulty flexing the finger and pain limiting exam otherwise with ecchymosis extending past the MCP on the dorsal hand and vague/diffuse erythema extending towards the wrist at admission, subsequently with some slough of tissue at the more proximal aspect and culture with MRSA.  I have advised/recommended the patient be evaluated by surgery as previously noted.  I discussed with Dr. French and Dr Sutton directly and  medicine team had made arrangements to have him transferred to [place] but patient refused.  Patient refused transfer to the [facility] system.  has refused facilitating transfer on multiple occasions and administration at [facility] unable to facilitate that any further per my discussion with Dr Herrera.  Per discussion with orthopedics on call, no hand surgeon available at [facility] and orthopedic surgeon on-call has refused consultation.  I have reiterated my recommendation to patient that he have a surgical evaluation with ongoing risk for further serious morbidity and other serious sequela including functional/limb loss/amputation, persistence of progressive or recurrent infection including risk for systemic spread/sepsis and death.  Medicine team continues to look into options for transfer to a center with hand specialist, and they will get back to me regarding options and patient preferences if she finds out more information; general orthopedics has refused consultation as above, Dr French aware and will keep us informed; as noted above, patient refuses to consider transfer to another facility other than , he refuses to consider [facility] or [facility] and he refuses to be transferred to any facility in [place] or [place].  He knows the risks he is taking with his decisions as outlined  above; Case management is trying to make arrangements for outpatient appointment on same day of discharge 5/25 with ongoing IV antibiotics as outpatient if they are able to make those arrangements as well.  - COPD  - Diabetes mellitus, type 2  - Obesity       PLAN/RECOMMENDATIONS:     - Daptomycin   IV daily    --check/review labs cultures and scans  --Partial history per nursing staff  --Discussed with microbiology  --Discussed with medicine team, Dr Sutton as above  --I have discussed very directly  my recommendations with the patient and my concerns as outlined above.    --highly complex at of issues with high risk for further serious morbidity and other serious sequela    **  Case management making outpatient arrangements, apparently with an appointment at  hand surgery Thursday in the afternoon.   outpatient IV daptomycin  To start at outpatient unit at [facility]  on May 26, ;  Can follow-up with me  in clinic May 26     Copied text in this note has been reviewed and is accurate as of 05/25/23.      Chase Alfaro MD  5/25/2023  06:39 EDT

## 2023-05-25 NOTE — OUTREACH NOTE
Prep Survey    Flowsheet Row Responses   [facility] patient discharged from? [place]   Is LACE score < 7 ? No   Eligibility [facility]   Date of Admission 05/19/23   Date of Discharge 05/25/23   Discharge Disposition Home or Self Care   Discharge diagnosis Cellulitis of left hand   Does the patient have one of the following disease processes/diagnoses(primary or secondary)? Other   Does the patient have Home health ordered? No   Is there a DME ordered? No   Prep survey completed? Yes          Shira WAKEFIELD - Registered Nurse

## 2023-05-25 NOTE — PLAN OF CARE
Problem: Adult Inpatient Plan of Care  Goal: Plan of Care Review  Outcome: Met  Goal: Patient-Specific Goal (Individualized)  Outcome: Met  Goal: Absence of Hospital-Acquired Illness or Injury  Outcome: Met  Intervention: Identify and Manage Fall Risk  Recent Flowsheet Documentation  Taken 5/25/2023 1000 by Crystal Trent RN  Safety Promotion/Fall Prevention:   assistive device/personal items within reach   activity supervised   clutter free environment maintained   fall prevention program maintained   nonskid shoes/slippers when out of bed   room organization consistent   safety round/check completed  Taken 5/25/2023 0800 by Crystal Trent RN  Safety Promotion/Fall Prevention:   assistive device/personal items within reach   activity supervised   clutter free environment maintained   fall prevention program maintained   nonskid shoes/slippers when out of bed   room organization consistent   safety round/check completed  Intervention: Prevent Skin Injury  Recent Flowsheet Documentation  Taken 5/25/2023 1000 by Crystal Trent RN  Body Position: position changed independently  Skin Protection:   adhesive use limited   incontinence pads utilized   tubing/devices free from skin contact  Taken 5/25/2023 0800 by Crystal Trent RN  Body Position: position changed independently  Skin Protection:   adhesive use limited   incontinence pads utilized   tubing/devices free from skin contact  Intervention: Prevent and Manage VTE (Venous Thromboembolism) Risk  Recent Flowsheet Documentation  Taken 5/25/2023 1000 by Crystal Trent RN  Activity Management: up ad naif  Taken 5/25/2023 0800 by Crystal Trent RN  Activity Management: up ad naif  Range of Motion: active ROM (range of motion) encouraged  Intervention: Prevent Infection  Recent Flowsheet Documentation  Taken 5/25/2023 1000 by Crystal Trent RN  Infection Prevention:   environmental surveillance performed   equipment surfaces disinfected   hand hygiene promoted    rest/sleep promoted   single patient room provided  Taken 5/25/2023 0800 by Crystal Trent RN  Infection Prevention:   equipment surfaces disinfected   environmental surveillance performed   hand hygiene promoted   rest/sleep promoted   single patient room provided  Goal: Optimal Comfort and Wellbeing  Outcome: Met  Intervention: Provide Person-Centered Care  Recent Flowsheet Documentation  Taken 5/25/2023 0800 by Crystal Trent RN  Trust Relationship/Rapport:   care explained   choices provided   empathic listening provided   questions answered   thoughts/feelings acknowledged  Goal: Readiness for Transition of Care  Outcome: Met     Problem: Fall Injury Risk  Goal: Absence of Fall and Fall-Related Injury  Outcome: Met  Intervention: Identify and Manage Contributors  Recent Flowsheet Documentation  Taken 5/25/2023 1000 by Crystal Trent RN  Medication Review/Management: medications reviewed  Self-Care Promotion: independence encouraged  Taken 5/25/2023 0800 by Crystal Trent RN  Medication Review/Management: medications reviewed  Self-Care Promotion: independence encouraged  Intervention: Promote Injury-Free Environment  Recent Flowsheet Documentation  Taken 5/25/2023 1000 by Crystal Trent RN  Safety Promotion/Fall Prevention:   assistive device/personal items within reach   activity supervised   clutter free environment maintained   fall prevention program maintained   nonskid shoes/slippers when out of bed   room organization consistent   safety round/check completed  Taken 5/25/2023 0800 by Crystal Trent RN  Safety Promotion/Fall Prevention:   assistive device/personal items within reach   activity supervised   clutter free environment maintained   fall prevention program maintained   nonskid shoes/slippers when out of bed   room organization consistent   safety round/check completed     Problem: Skin Injury Risk Increased  Goal: Skin Health and Integrity  Outcome: Met  Intervention: Optimize Skin  Protection  Recent Flowsheet Documentation  Taken 5/25/2023 1000 by Crystal Trent RN  Pressure Reduction Techniques:   frequent weight shift encouraged   pressure points protected   weight shift assistance provided  Head of Bed ([facility]) Positioning: [facility] elevated  Pressure Reduction Devices:   pressure-redistributing mattress utilized   positioning supports utilized   heel offloading device utilized  Skin Protection:   adhesive use limited   incontinence pads utilized   tubing/devices free from skin contact  Taken 5/25/2023 0800 by Crystal Trent RN  Pressure Reduction Techniques:   frequent weight shift encouraged   pressure points protected   weight shift assistance provided  Head of Bed ([facility]) Positioning: [facility] elevated  Pressure Reduction Devices:   pressure-redistributing mattress utilized   positioning supports utilized   heel offloading device utilized  Skin Protection:   adhesive use limited   incontinence pads utilized   tubing/devices free from skin contact   Goal Outcome Evaluation:

## 2023-05-26 ENCOUNTER — TRANSITIONAL CARE MANAGEMENT TELEPHONE ENCOUNTER (OUTPATIENT)
Dept: CALL CENTER | Facility: HOSPITAL | Age: 73
End: 2023-05-26
Payer: MEDICARE

## 2023-05-26 ENCOUNTER — HOSPITAL ENCOUNTER (OUTPATIENT)
Dept: ONCOLOGY | Facility: HOSPITAL | Age: 73
Discharge: HOME | End: 2023-05-26
Payer: MEDICARE

## 2023-05-26 VITALS
BODY MASS INDEX: 46.65 KG/M2 | RESPIRATION RATE: 16 BRPM | TEMPERATURE: 97.2 F | HEIGHT: 69 IN | HEART RATE: 70 BPM | WEIGHT: 315 LBS | DIASTOLIC BLOOD PRESSURE: 63 MMHG | SYSTOLIC BLOOD PRESSURE: 145 MMHG

## 2023-05-26 DIAGNOSIS — L03.114 CELLULITIS OF LEFT HAND: Primary | ICD-10-CM

## 2023-05-26 PROBLEM — B95.62 CELLULITIS DUE TO MRSA: Status: ACTIVE | Noted: 2023-05-26

## 2023-05-26 PROBLEM — L08.89 OTHER SPECIFIED LOCAL INFECTIONS OF THE SKIN AND SUBCUTANEOUS TISSUE: Status: ACTIVE | Noted: 2023-05-26

## 2023-05-26 PROBLEM — L03.012: Status: ACTIVE | Noted: 2023-05-26

## 2023-05-26 PROBLEM — L03.90 CELLULITIS DUE TO MRSA: Status: ACTIVE | Noted: 2023-05-26

## 2023-05-26 PROCEDURE — 96365 THER/PROPH/DIAG IV INF INIT: CPT

## 2023-05-26 PROCEDURE — 25010000002 DAPTOMYCIN PER 1 MG: Performed by: INTERNAL MEDICINE

## 2023-05-26 RX ADMIN — DAPTOMYCIN 600 MG: 500 INJECTION, POWDER, LYOPHILIZED, FOR SOLUTION INTRAVENOUS at 08:43

## 2023-05-26 NOTE — ADDENDUM NOTE
Encounter addended by: Osiris Landin RN on: 5/26/2023 11:35 AM   Actions taken: Order list changed, Diagnosis association updated

## 2023-05-26 NOTE — OUTREACH NOTE
Call Center TCM Note    Flowsheet Row Responses   [facility] patient discharged from? [place]   Does the patient have one of the following disease processes/diagnoses(primary or secondary)? Other   TCM attempt successful? No   Unsuccessful attempts Attempt 1          Lashonda De Anda RN    5/26/2023, 11:58 EDT

## 2023-05-26 NOTE — OUTREACH NOTE
Call Center TCM Note    Flowsheet Row Responses   [facility] patient discharged from? [place]   Does the patient have one of the following disease processes/diagnoses(primary or secondary)? Other   TCM attempt successful? No   Unsuccessful attempts Attempt 2          Lashonda De Anda RN    5/26/2023, 15:07 EDT

## 2023-05-26 NOTE — PROGRESS NOTES
Spoke to Dr. Murillo's nurse regarding duration of Dapto infusions. She states that patient is to come to see Dr. Murillo after he leaves infusion today. At that point they will discuss plan going forward. If patient is to continue dapto, they will fax the order and call to schedule patient. Patient educated and agreeable to plan. ERNESTO DUNBAR    Called and spoke to patient's daughter Rand. Informed her that if further antibiotics are ordered that patient will need a family or caregiver to stay with him for duration of infusion. Verbalized understanding. BETTINA RN

## 2023-05-27 ENCOUNTER — HOSPITAL ENCOUNTER (OUTPATIENT)
Dept: ONCOLOGY | Facility: HOSPITAL | Age: 73
Discharge: HOME | End: 2023-05-27
Payer: MEDICARE

## 2023-05-27 ENCOUNTER — TRANSITIONAL CARE MANAGEMENT TELEPHONE ENCOUNTER (OUTPATIENT)
Dept: CALL CENTER | Facility: HOSPITAL | Age: 73
End: 2023-05-27
Payer: MEDICARE

## 2023-05-27 VITALS
BODY MASS INDEX: 49.18 KG/M2 | HEART RATE: 60 BPM | DIASTOLIC BLOOD PRESSURE: 50 MMHG | TEMPERATURE: 97.7 F | RESPIRATION RATE: 18 BRPM | WEIGHT: 315 LBS | SYSTOLIC BLOOD PRESSURE: 126 MMHG

## 2023-05-27 DIAGNOSIS — B95.62 CELLULITIS DUE TO MRSA: ICD-10-CM

## 2023-05-27 DIAGNOSIS — L08.89 OTHER SPECIFIED LOCAL INFECTIONS OF THE SKIN AND SUBCUTANEOUS TISSUE: ICD-10-CM

## 2023-05-27 DIAGNOSIS — L03.90 CELLULITIS DUE TO MRSA: ICD-10-CM

## 2023-05-27 DIAGNOSIS — L03.012 CELLULITIS OF INDEX FINGER, LEFT: Primary | ICD-10-CM

## 2023-05-27 DIAGNOSIS — L03.114 CELLULITIS OF LEFT HAND: ICD-10-CM

## 2023-05-27 PROCEDURE — 25010000002 DAPTOMYCIN PER 1 MG: Performed by: INTERNAL MEDICINE

## 2023-05-27 PROCEDURE — 96365 THER/PROPH/DIAG IV INF INIT: CPT

## 2023-05-27 RX ADMIN — DAPTOMYCIN 600 MG: 500 INJECTION, POWDER, LYOPHILIZED, FOR SOLUTION INTRAVENOUS at 10:09

## 2023-05-27 NOTE — OUTREACH NOTE
Call Center TCM Note    Flowsheet Row Responses   [facility] patient discharged from? [place]   Does the patient have one of the following disease processes/diagnoses(primary or secondary)? Other   TCM attempt successful? No   Unsuccessful attempts Attempt 3          Meredith Carranza RN    5/27/2023, 11:56 EDT

## 2023-05-28 ENCOUNTER — HOSPITAL ENCOUNTER (OUTPATIENT)
Dept: ONCOLOGY | Facility: HOSPITAL | Age: 73
Discharge: HOME | End: 2023-05-28
Payer: MEDICARE

## 2023-05-28 VITALS
HEART RATE: 73 BPM | SYSTOLIC BLOOD PRESSURE: 133 MMHG | TEMPERATURE: 98.3 F | RESPIRATION RATE: 18 BRPM | DIASTOLIC BLOOD PRESSURE: 57 MMHG

## 2023-05-28 DIAGNOSIS — B95.62 CELLULITIS DUE TO MRSA: ICD-10-CM

## 2023-05-28 DIAGNOSIS — L03.012 CELLULITIS OF INDEX FINGER, LEFT: Primary | ICD-10-CM

## 2023-05-28 DIAGNOSIS — L03.90 CELLULITIS DUE TO MRSA: ICD-10-CM

## 2023-05-28 DIAGNOSIS — L03.114 CELLULITIS OF LEFT HAND: ICD-10-CM

## 2023-05-28 DIAGNOSIS — L08.89 OTHER SPECIFIED LOCAL INFECTIONS OF THE SKIN AND SUBCUTANEOUS TISSUE: ICD-10-CM

## 2023-05-28 PROCEDURE — 96365 THER/PROPH/DIAG IV INF INIT: CPT

## 2023-05-28 PROCEDURE — 25010000002 DAPTOMYCIN PER 1 MG: Performed by: INTERNAL MEDICINE

## 2023-05-28 RX ADMIN — DAPTOMYCIN 600 MG: 500 INJECTION, POWDER, LYOPHILIZED, FOR SOLUTION INTRAVENOUS at 09:13

## 2023-05-29 ENCOUNTER — HOSPITAL ENCOUNTER (OUTPATIENT)
Dept: ONCOLOGY | Facility: HOSPITAL | Age: 73
Discharge: HOME | End: 2023-05-29
Admitting: INTERNAL MEDICINE

## 2023-05-29 VITALS
SYSTOLIC BLOOD PRESSURE: 128 MMHG | TEMPERATURE: 97.6 F | DIASTOLIC BLOOD PRESSURE: 62 MMHG | HEART RATE: 77 BPM | RESPIRATION RATE: 18 BRPM

## 2023-05-29 DIAGNOSIS — L03.114 CELLULITIS OF LEFT HAND: ICD-10-CM

## 2023-05-29 DIAGNOSIS — L03.90 CELLULITIS DUE TO MRSA: ICD-10-CM

## 2023-05-29 DIAGNOSIS — B95.62 CELLULITIS DUE TO MRSA: ICD-10-CM

## 2023-05-29 DIAGNOSIS — L08.89 OTHER SPECIFIED LOCAL INFECTIONS OF THE SKIN AND SUBCUTANEOUS TISSUE: ICD-10-CM

## 2023-05-29 DIAGNOSIS — L03.012 CELLULITIS OF INDEX FINGER, LEFT: Primary | ICD-10-CM

## 2023-05-29 LAB
BASOPHILS # BLD AUTO: 0.04 10*3/MM3 (ref 0–0.2)
BASOPHILS NFR BLD AUTO: 0.4 % (ref 0–1.5)
DEPRECATED RDW RBC AUTO: 45.6 FL (ref 37–54)
EOSINOPHIL # BLD AUTO: 0.29 10*3/MM3 (ref 0–0.4)
EOSINOPHIL NFR BLD AUTO: 2.8 % (ref 0.3–6.2)
ERYTHROCYTE [DISTWIDTH] IN BLOOD BY AUTOMATED COUNT: 12.6 % (ref 12.3–15.4)
ERYTHROCYTE [SEDIMENTATION RATE] IN BLOOD: 68 MM/HR (ref 0–20)
HCT VFR BLD AUTO: 42 % (ref 37.5–51)
HGB BLD-MCNC: 13.5 G/DL (ref 13–17.7)
IMM GRANULOCYTES # BLD AUTO: 0.08 10*3/MM3 (ref 0–0.05)
IMM GRANULOCYTES NFR BLD AUTO: 0.8 % (ref 0–0.5)
LYMPHOCYTES # BLD AUTO: 2.31 10*3/MM3 (ref 0.7–3.1)
LYMPHOCYTES NFR BLD AUTO: 22 % (ref 19.6–45.3)
MCH RBC QN AUTO: 31.9 PG (ref 26.6–33)
MCHC RBC AUTO-ENTMCNC: 32.1 G/DL (ref 31.5–35.7)
MCV RBC AUTO: 99.3 FL (ref 79–97)
MONOCYTES # BLD AUTO: 0.74 10*3/MM3 (ref 0.1–0.9)
MONOCYTES NFR BLD AUTO: 7 % (ref 5–12)
NEUTROPHILS NFR BLD AUTO: 67 % (ref 42.7–76)
NEUTROPHILS NFR BLD AUTO: 7.05 10*3/MM3 (ref 1.7–7)
NRBC BLD AUTO-RTO: 0 /100 WBC (ref 0–0.2)
PLATELET # BLD AUTO: 357 10*3/MM3 (ref 140–450)
PMV BLD AUTO: 10.1 FL (ref 6–12)
RBC # BLD AUTO: 4.23 10*6/MM3 (ref 4.14–5.8)
WBC NRBC COR # BLD: 10.51 10*3/MM3 (ref 3.4–10.8)

## 2023-05-29 PROCEDURE — 85025 COMPLETE CBC W/AUTO DIFF WBC: CPT | Performed by: INTERNAL MEDICINE

## 2023-05-29 PROCEDURE — 85652 RBC SED RATE AUTOMATED: CPT | Performed by: INTERNAL MEDICINE

## 2023-05-29 PROCEDURE — 25010000002 DAPTOMYCIN PER 1 MG: Performed by: INTERNAL MEDICINE

## 2023-05-29 PROCEDURE — 96365 THER/PROPH/DIAG IV INF INIT: CPT

## 2023-05-29 RX ADMIN — DAPTOMYCIN 600 MG: 500 INJECTION, POWDER, LYOPHILIZED, FOR SOLUTION INTRAVENOUS at 09:36

## 2023-05-29 NOTE — PROGRESS NOTES
CRP, CK, CMP labs hemolyzed from IV site x 2 draws. Patient refused to be stuck with [facility] to obtain labs. Lab notified, orders cancelled. BETTINA RN

## 2023-05-30 ENCOUNTER — HOSPITAL ENCOUNTER (OUTPATIENT)
Dept: ONCOLOGY | Facility: HOSPITAL | Age: 73
Discharge: HOME | End: 2023-05-30
Admitting: INTERNAL MEDICINE

## 2023-05-30 VITALS
DIASTOLIC BLOOD PRESSURE: 78 MMHG | TEMPERATURE: 97.8 F | WEIGHT: 315 LBS | BODY MASS INDEX: 46.65 KG/M2 | SYSTOLIC BLOOD PRESSURE: 126 MMHG | HEART RATE: 64 BPM | RESPIRATION RATE: 16 BRPM | HEIGHT: 69 IN

## 2023-05-30 DIAGNOSIS — B95.62 CELLULITIS DUE TO MRSA: ICD-10-CM

## 2023-05-30 DIAGNOSIS — L08.89 OTHER SPECIFIED LOCAL INFECTIONS OF THE SKIN AND SUBCUTANEOUS TISSUE: ICD-10-CM

## 2023-05-30 DIAGNOSIS — L03.012 CELLULITIS OF INDEX FINGER, LEFT: ICD-10-CM

## 2023-05-30 DIAGNOSIS — L03.114 CELLULITIS OF LEFT HAND: Primary | ICD-10-CM

## 2023-05-30 DIAGNOSIS — L03.90 CELLULITIS DUE TO MRSA: ICD-10-CM

## 2023-05-30 LAB
ALBUMIN SERPL-MCNC: 3.4 G/DL (ref 3.5–5.2)
ALBUMIN/GLOB SERPL: 1 G/DL
ALP SERPL-CCNC: 87 U/L (ref 39–117)
ALT SERPL W P-5'-P-CCNC: 5 U/L (ref 1–41)
ANION GAP SERPL CALCULATED.3IONS-SCNC: 9 MMOL/L (ref 5–15)
AST SERPL-CCNC: 17 U/L (ref 1–40)
BILIRUB SERPL-MCNC: 0.5 MG/DL (ref 0–1.2)
BUN SERPL-MCNC: 14 MG/DL (ref 8–23)
BUN/CREAT SERPL: 11 (ref 7–25)
CALCIUM SPEC-SCNC: 8.6 MG/DL (ref 8.6–10.5)
CHLORIDE SERPL-SCNC: 106 MMOL/L (ref 98–107)
CK SERPL-CCNC: 161 U/L (ref 20–200)
CO2 SERPL-SCNC: 27 MMOL/L (ref 22–29)
CREAT SERPL-MCNC: 1.27 MG/DL (ref 0.76–1.27)
CRP SERPL-MCNC: 0.38 MG/DL (ref 0–0.5)
EGFRCR SERPLBLD CKD-EPI 2021: 60 ML/MIN/1.73
GLOBULIN UR ELPH-MCNC: 3.3 GM/DL
GLUCOSE SERPL-MCNC: 113 MG/DL (ref 65–99)
POTASSIUM SERPL-SCNC: 4.5 MMOL/L (ref 3.5–5.2)
PROT SERPL-MCNC: 6.7 G/DL (ref 6–8.5)
SODIUM SERPL-SCNC: 142 MMOL/L (ref 136–145)

## 2023-05-30 PROCEDURE — 82550 ASSAY OF CK (CPK): CPT | Performed by: INTERNAL MEDICINE

## 2023-05-30 PROCEDURE — 96365 THER/PROPH/DIAG IV INF INIT: CPT

## 2023-05-30 PROCEDURE — 25010000002 DAPTOMYCIN PER 1 MG: Performed by: INTERNAL MEDICINE

## 2023-05-30 PROCEDURE — 80053 COMPREHEN METABOLIC PANEL: CPT | Performed by: INTERNAL MEDICINE

## 2023-05-30 PROCEDURE — 86140 C-REACTIVE PROTEIN: CPT | Performed by: INTERNAL MEDICINE

## 2023-05-30 RX ADMIN — DAPTOMYCIN 600 MG: 500 INJECTION, POWDER, LYOPHILIZED, FOR SOLUTION INTRAVENOUS at 08:45

## 2023-05-30 NOTE — ADDENDUM NOTE
Encounter addended by: Osiris Landin RN on: 5/30/2023 7:21 AM   Actions taken: Order list changed, Diagnosis association updated

## 2023-05-30 NOTE — DISCHARGE INSTRUCTIONS
Patient to go to Dr. Alfaro's office, [address] [place] [address], [address] today, Tuesday, May 30th at 9:15 am. Patient verbalized understanding. No future antibiotic infusion appointments at this time. Patient to see Dr Alfaro today to see if any more infusion appointments are needed.

## 2023-05-31 ENCOUNTER — OFFICE VISIT (OUTPATIENT)
Dept: INTERNAL MEDICINE | Facility: CLINIC | Age: 73
End: 2023-05-31

## 2023-05-31 ENCOUNTER — HOSPITAL ENCOUNTER (OUTPATIENT)
Dept: ONCOLOGY | Facility: HOSPITAL | Age: 73
Discharge: HOME | End: 2023-05-31
Admitting: INTERNAL MEDICINE

## 2023-05-31 VITALS
HEART RATE: 69 BPM | SYSTOLIC BLOOD PRESSURE: 124 MMHG | TEMPERATURE: 98.4 F | BODY MASS INDEX: 46.65 KG/M2 | WEIGHT: 315 LBS | DIASTOLIC BLOOD PRESSURE: 68 MMHG | RESPIRATION RATE: 16 BRPM | HEIGHT: 69 IN

## 2023-05-31 VITALS
OXYGEN SATURATION: 94 % | BODY MASS INDEX: 46.65 KG/M2 | DIASTOLIC BLOOD PRESSURE: 78 MMHG | HEIGHT: 69 IN | HEART RATE: 73 BPM | WEIGHT: 315 LBS | TEMPERATURE: 98.3 F | SYSTOLIC BLOOD PRESSURE: 130 MMHG

## 2023-05-31 DIAGNOSIS — L03.114 CELLULITIS OF LEFT HAND: ICD-10-CM

## 2023-05-31 DIAGNOSIS — B95.62 CELLULITIS DUE TO MRSA: ICD-10-CM

## 2023-05-31 DIAGNOSIS — L08.89 OTHER SPECIFIED LOCAL INFECTIONS OF THE SKIN AND SUBCUTANEOUS TISSUE: ICD-10-CM

## 2023-05-31 DIAGNOSIS — L03.90 CELLULITIS DUE TO MRSA: ICD-10-CM

## 2023-05-31 DIAGNOSIS — Z09 HOSPITAL DISCHARGE FOLLOW-UP: Primary | ICD-10-CM

## 2023-05-31 DIAGNOSIS — L03.012 CELLULITIS OF INDEX FINGER, LEFT: Primary | ICD-10-CM

## 2023-05-31 DIAGNOSIS — L03.012 CELLULITIS OF INDEX FINGER, LEFT: ICD-10-CM

## 2023-05-31 PROCEDURE — 25010000002 DAPTOMYCIN PER 1 MG: Performed by: INTERNAL MEDICINE

## 2023-05-31 PROCEDURE — 96365 THER/PROPH/DIAG IV INF INIT: CPT

## 2023-05-31 RX ADMIN — DAPTOMYCIN 600 MG: 500 INJECTION, POWDER, LYOPHILIZED, FOR SOLUTION INTRAVENOUS at 15:21

## 2023-05-31 NOTE — PROGRESS NOTES
"Chief Complaint   Patient presents with   • MRSA     Left hand   • Hospital Follow Up Visit       HPI  Dieter Christiansen is a 73 y.o. male presents for a hospital follow-up.  Pt was admitted to [facility] 5/19-5/25/23 because of cellulitis of left hand.  He states this started with a hangnail.  He is being followed by Dr Alfaro.  Still getting daily infusions. Denies any fever or chills since he's been home.  Wound culture grew MRSA.  He has been referred to Dr Chavis at .  He potentially will need surgery to debrid the wound.    The following portions of the patient's history were reviewed and updated as appropriate: allergies, current medications, past family history, past medical history, past social history, past surgical history and problem list.    Subjective  Review of Systems   Constitutional: Negative for activity change, appetite change and fatigue.   HENT: Negative for congestion.    Respiratory: Negative for cough and shortness of breath.    Cardiovascular: Negative for chest pain and leg swelling.   Gastrointestinal: Negative for abdominal pain.   Musculoskeletal: Positive for arthralgias.   Skin: Positive for wound (left index finger). Negative for color change.   Neurological: Negative for dizziness, weakness and confusion.   Psychiatric/Behavioral: Negative for behavioral problems and decreased concentration.       Objective  Visit Vitals  /78 (BP Location: Left arm, Patient Position: Sitting)   Pulse 73   Temp 98.3 °F (36.8 °C)   Ht 175.3 cm (69\")   Wt (!) 151 kg (333 lb)   SpO2 94%   BMI 49.18 kg/m²        Physical Exam  Vitals and nursing note reviewed.   HENT:      Head: Normocephalic.   Eyes:      Pupils: Pupils are equal, round, and reactive to light.   Pulmonary:      Effort: Pulmonary effort is normal.   Skin:     General: Skin is warm and dry.      Capillary Refill: Capillary refill takes less than 2 seconds.      Comments: Mild swelling of left hand noted, L index finger without " redness, streaking, or drainage, large scabbed area noted around the nail and at the base of the finger   Neurological:      General: No focal deficit present.      Mental Status: He is alert and oriented to person, place, and time.      Gait: Gait is intact.   Psychiatric:         Attention and Perception: Attention normal.         Mood and Affect: Mood normal.         Behavior: Behavior normal.          Procedures     Assessment and Plan  Diagnoses and all orders for this visit:    1. Hospital discharge follow-up (Primary)    2. Cellulitis of left hand    3. Cellulitis of index finger, left    4. Cellulitis due to MRSA    D/C summary reviewed  Pt has been contacted by TCM nurse  Wound looks good, no active infection noted  Cont Abx infusions  Keep f/u with hand surgeon and ID    Return for Next scheduled follow up.        EUNICE Craft

## 2023-06-01 ENCOUNTER — HOSPITAL ENCOUNTER (OUTPATIENT)
Dept: ONCOLOGY | Facility: HOSPITAL | Age: 73
Discharge: HOME | End: 2023-06-01
Admitting: INTERNAL MEDICINE

## 2023-06-01 VITALS
HEIGHT: 69 IN | RESPIRATION RATE: 18 BRPM | BODY MASS INDEX: 46.65 KG/M2 | TEMPERATURE: 98.3 F | SYSTOLIC BLOOD PRESSURE: 146 MMHG | DIASTOLIC BLOOD PRESSURE: 64 MMHG | WEIGHT: 315 LBS | HEART RATE: 69 BPM

## 2023-06-01 DIAGNOSIS — L03.012 CELLULITIS OF INDEX FINGER, LEFT: Primary | ICD-10-CM

## 2023-06-01 DIAGNOSIS — L03.90 CELLULITIS DUE TO MRSA: ICD-10-CM

## 2023-06-01 DIAGNOSIS — L08.89 OTHER SPECIFIED LOCAL INFECTIONS OF THE SKIN AND SUBCUTANEOUS TISSUE: ICD-10-CM

## 2023-06-01 DIAGNOSIS — L03.114 CELLULITIS OF LEFT HAND: ICD-10-CM

## 2023-06-01 DIAGNOSIS — B95.62 CELLULITIS DUE TO MRSA: ICD-10-CM

## 2023-06-01 PROCEDURE — 96365 THER/PROPH/DIAG IV INF INIT: CPT

## 2023-06-01 PROCEDURE — 25010000002 DAPTOMYCIN PER 1 MG: Performed by: INTERNAL MEDICINE

## 2023-06-01 RX ADMIN — DAPTOMYCIN 600 MG: 500 INJECTION, POWDER, LYOPHILIZED, FOR SOLUTION INTRAVENOUS at 15:27

## 2023-06-02 ENCOUNTER — TELEPHONE (OUTPATIENT)
Dept: INTERNAL MEDICINE | Facility: CLINIC | Age: 73
End: 2023-06-02

## 2023-06-02 NOTE — TELEPHONE ENCOUNTER
Caller: FELY KIM- [facility]    Relationship: Other    Best call back number: [phone]    What form or medical record are you requesting: MEDICATION LIST FROM 5/5/23    Who is requesting this form or medical record from you: [facility]    How would you like to receive the form or medical records (pick-up, mail, fax): FAX  If fax, what is the fax number: [phone]       ATTN: FELY KIM    Timeframe paperwork needed: WITHIN 48 HOURS     Additional notes: [facility] IS REQUESTING A LIST OF THE MEDICATIONS THE PATIENT WAS TAKING AS OF 5/5/23

## 2023-06-03 ENCOUNTER — HOSPITAL ENCOUNTER (OUTPATIENT)
Dept: ONCOLOGY | Facility: HOSPITAL | Age: 73
Discharge: HOME | End: 2023-06-03
Admitting: INTERNAL MEDICINE

## 2023-06-03 VITALS
DIASTOLIC BLOOD PRESSURE: 81 MMHG | SYSTOLIC BLOOD PRESSURE: 177 MMHG | TEMPERATURE: 98.2 F | HEART RATE: 55 BPM | RESPIRATION RATE: 18 BRPM

## 2023-06-03 DIAGNOSIS — L03.012 CELLULITIS OF INDEX FINGER, LEFT: ICD-10-CM

## 2023-06-03 DIAGNOSIS — L08.89 OTHER SPECIFIED LOCAL INFECTIONS OF THE SKIN AND SUBCUTANEOUS TISSUE: ICD-10-CM

## 2023-06-03 DIAGNOSIS — L03.114 CELLULITIS OF LEFT HAND: Primary | ICD-10-CM

## 2023-06-03 DIAGNOSIS — B95.62 CELLULITIS DUE TO MRSA: ICD-10-CM

## 2023-06-03 DIAGNOSIS — L03.90 CELLULITIS DUE TO MRSA: ICD-10-CM

## 2023-06-03 PROCEDURE — 36415 COLL VENOUS BLD VENIPUNCTURE: CPT

## 2023-06-03 PROCEDURE — 25010000002 DAPTOMYCIN PER 1 MG: Performed by: INTERNAL MEDICINE

## 2023-06-03 PROCEDURE — 96365 THER/PROPH/DIAG IV INF INIT: CPT

## 2023-06-03 RX ADMIN — DAPTOMYCIN 600 MG: 500 INJECTION, POWDER, LYOPHILIZED, FOR SOLUTION INTRAVENOUS at 09:32

## 2023-06-05 ENCOUNTER — HOSPITAL ENCOUNTER (OUTPATIENT)
Dept: ONCOLOGY | Facility: HOSPITAL | Age: 73
Discharge: HOME | End: 2023-06-05
Payer: MEDICARE

## 2023-06-08 DIAGNOSIS — M54.41 CHRONIC RIGHT-SIDED LOW BACK PAIN WITH RIGHT-SIDED SCIATICA: ICD-10-CM

## 2023-06-08 DIAGNOSIS — M47.812 CERVICAL SPINE ARTHRITIS: ICD-10-CM

## 2023-06-08 DIAGNOSIS — G89.29 CHRONIC RIGHT-SIDED LOW BACK PAIN WITH RIGHT-SIDED SCIATICA: ICD-10-CM

## 2023-06-08 RX ORDER — HYDROCODONE BITARTRATE AND ACETAMINOPHEN 10; 325 MG/1; MG/1
1 TABLET ORAL EVERY 8 HOURS PRN
Qty: 90 TABLET | Refills: 0 | Status: SHIPPED | OUTPATIENT
Start: 2023-06-08

## 2023-06-08 NOTE — TELEPHONE ENCOUNTER
Caller: ELIEL COLEMAN    Relationship: Emergency Contact    Best call back number: [phone]     Requested Prescriptions:   Requested Prescriptions     Pending Prescriptions Disp Refills    HYDROcodone-acetaminophen (NORCO)  MG per tablet 90 tablet 0     Sig: Take 1 tablet by mouth Every 8 (Eight) Hours As Needed for Severe Pain.        Pharmacy where request should be sent: [place] [address] - [phone]  - [phone] FX     Last office visit with prescribing clinician: 5/31/2023   Last telemedicine visit with prescribing clinician: Visit date not found   Next office visit with prescribing clinician: 9/6/2023         Does the patient have less than a 3 day supply:  [x] Yes  [] No    Would you like a call back once the refill request has been completed: [] Yes [x] No    If the office needs to give you a call back, can they leave a voicemail: [] Yes [x] No    Olu Nicholson Rep   06/08/23 15:05 EDT

## 2023-08-08 DIAGNOSIS — M54.41 CHRONIC RIGHT-SIDED LOW BACK PAIN WITH RIGHT-SIDED SCIATICA: ICD-10-CM

## 2023-08-08 DIAGNOSIS — G89.29 CHRONIC RIGHT-SIDED LOW BACK PAIN WITH RIGHT-SIDED SCIATICA: ICD-10-CM

## 2023-08-08 DIAGNOSIS — M47.812 CERVICAL SPINE ARTHRITIS: ICD-10-CM

## 2023-08-08 RX ORDER — HYDROCODONE BITARTRATE AND ACETAMINOPHEN 10; 325 MG/1; MG/1
1 TABLET ORAL EVERY 8 HOURS PRN
Qty: 90 TABLET | Refills: 0 | Status: SHIPPED | OUTPATIENT
Start: 2023-08-08

## 2023-08-08 NOTE — TELEPHONE ENCOUNTER
Caller: ELIEL COLEMAN    Relationship: Emergency Contact    Best call back number: [phone]     Requested Prescriptions:   Requested Prescriptions     Pending Prescriptions Disp Refills    HYDROcodone-acetaminophen (NORCO)  MG per tablet 90 tablet 0     Sig: Take 1 tablet by mouth Every 8 (Eight) Hours As Needed for Severe Pain.        Pharmacy where request should be sent: [place] [address] - [phone]  - [phone] FX     Last office visit with prescribing clinician: 5/31/2023   Last telemedicine visit with prescribing clinician: Visit date not found   Next office visit with prescribing clinician: 9/6/2023     Additional details provided by patient: PATIENT IS OUT OF MEDICATION     Does the patient have less than a 3 day supply:  [x] Yes  [] No    Would you like a call back once the refill request has been completed: [] Yes [x] No    If the office needs to give you a call back, can they leave a voicemail: [] Yes [x] No    Olu Adkins   08/08/23 13:06 EDT

## 2023-08-21 DIAGNOSIS — I10 ESSENTIAL HYPERTENSION: ICD-10-CM

## 2023-08-21 RX ORDER — LISINOPRIL 10 MG/1
TABLET ORAL
Qty: 30 TABLET | Refills: 5 | Status: ON HOLD | OUTPATIENT
Start: 2023-08-21

## 2023-08-28 ENCOUNTER — APPOINTMENT (OUTPATIENT)
Dept: CT IMAGING | Facility: HOSPITAL | Age: 73
End: 2023-08-28
Payer: MEDICARE

## 2023-08-28 ENCOUNTER — APPOINTMENT (OUTPATIENT)
Dept: NEUROLOGY | Facility: HOSPITAL | Age: 73
End: 2023-08-28
Payer: MEDICARE

## 2023-08-28 ENCOUNTER — APPOINTMENT (OUTPATIENT)
Dept: GENERAL RADIOLOGY | Facility: HOSPITAL | Age: 73
End: 2023-08-28
Payer: MEDICARE

## 2023-08-28 ENCOUNTER — HOSPITAL ENCOUNTER (OUTPATIENT)
Facility: HOSPITAL | Age: 73
Setting detail: OBSERVATION
LOS: 2 days | Discharge: HOME | End: 2023-09-02
Attending: EMERGENCY MEDICINE | Admitting: INTERNAL MEDICINE
Payer: MEDICARE

## 2023-08-28 DIAGNOSIS — G93.40 ENCEPHALOPATHY: Primary | ICD-10-CM

## 2023-08-28 DIAGNOSIS — G89.29 CHRONIC RIGHT-SIDED LOW BACK PAIN WITH RIGHT-SIDED SCIATICA: ICD-10-CM

## 2023-08-28 DIAGNOSIS — R33.8 ACUTE URINARY RETENTION: ICD-10-CM

## 2023-08-28 DIAGNOSIS — I25.10 CORONARY ARTERY DISEASE INVOLVING NATIVE CORONARY ARTERY OF NATIVE HEART WITHOUT ANGINA PECTORIS: ICD-10-CM

## 2023-08-28 DIAGNOSIS — J96.01 ACUTE RESPIRATORY FAILURE WITH HYPOXIA: ICD-10-CM

## 2023-08-28 DIAGNOSIS — G89.29 CHRONIC BACK PAIN, UNSPECIFIED BACK LOCATION, UNSPECIFIED BACK PAIN LATERALITY: ICD-10-CM

## 2023-08-28 DIAGNOSIS — M54.9 CHRONIC BACK PAIN, UNSPECIFIED BACK LOCATION, UNSPECIFIED BACK PAIN LATERALITY: ICD-10-CM

## 2023-08-28 DIAGNOSIS — M54.41 CHRONIC RIGHT-SIDED LOW BACK PAIN WITH RIGHT-SIDED SCIATICA: ICD-10-CM

## 2023-08-28 DIAGNOSIS — E87.6 HYPOKALEMIA: ICD-10-CM

## 2023-08-28 DIAGNOSIS — M47.812 CERVICAL SPINE ARTHRITIS: ICD-10-CM

## 2023-08-28 PROBLEM — E66.01 OBESITY, CLASS III, BMI 40-49.9 (MORBID OBESITY): Status: ACTIVE | Noted: 2023-08-28

## 2023-08-28 PROBLEM — R41.82 AMS (ALTERED MENTAL STATUS): Status: ACTIVE | Noted: 2023-08-28

## 2023-08-28 LAB
ALBUMIN SERPL-MCNC: 3.8 G/DL (ref 3.5–5.2)
ALBUMIN/GLOB SERPL: 1.1 G/DL
ALP SERPL-CCNC: 112 U/L (ref 39–117)
ALT SERPL W P-5'-P-CCNC: 5 U/L (ref 1–41)
AMMONIA BLD-SCNC: 35 UMOL/L (ref 16–60)
AMPHET+METHAMPHET UR QL: NEGATIVE
AMPHETAMINES UR QL: NEGATIVE
ANION GAP SERPL CALCULATED.3IONS-SCNC: 14 MMOL/L (ref 5–15)
ARTERIAL PATENCY WRIST A: ABNORMAL
ARTERIAL PATENCY WRIST A: ABNORMAL
AST SERPL-CCNC: 18 U/L (ref 1–40)
ATMOSPHERIC PRESS: ABNORMAL MM[HG]
ATMOSPHERIC PRESS: ABNORMAL MM[HG]
B PARAPERT DNA SPEC QL NAA+PROBE: NOT DETECTED
B PERT DNA SPEC QL NAA+PROBE: NOT DETECTED
BACTERIA UR QL AUTO: NORMAL /HPF
BARBITURATES UR QL SCN: NEGATIVE
BASE EXCESS BLDA CALC-SCNC: 3.4 MMOL/L (ref 0–2)
BASE EXCESS BLDA CALC-SCNC: 3.4 MMOL/L (ref 0–2)
BASOPHILS # BLD AUTO: 0.05 10*3/MM3 (ref 0–0.2)
BASOPHILS NFR BLD AUTO: 0.5 % (ref 0–1.5)
BDY SITE: ABNORMAL
BDY SITE: ABNORMAL
BENZODIAZ UR QL SCN: NEGATIVE
BILIRUB SERPL-MCNC: 0.7 MG/DL (ref 0–1.2)
BILIRUB UR QL STRIP: NEGATIVE
BODY TEMPERATURE: 37 C
BODY TEMPERATURE: 37 C
BUN SERPL-MCNC: 14 MG/DL (ref 8–23)
BUN/CREAT SERPL: 10.9 (ref 7–25)
BUPRENORPHINE SERPL-MCNC: NEGATIVE NG/ML
C PNEUM DNA NPH QL NAA+NON-PROBE: NOT DETECTED
CALCIUM SPEC-SCNC: 9.1 MG/DL (ref 8.6–10.5)
CANNABINOIDS SERPL QL: NEGATIVE
CHLORIDE SERPL-SCNC: 100 MMOL/L (ref 98–107)
CLARITY UR: CLEAR
CO2 BLDA-SCNC: 30 MMOL/L (ref 22–33)
CO2 BLDA-SCNC: 30.6 MMOL/L (ref 22–33)
CO2 SERPL-SCNC: 25 MMOL/L (ref 22–29)
COCAINE UR QL: NEGATIVE
COHGB MFR BLD: 1.5 % (ref 0–2)
COHGB MFR BLD: 1.6 % (ref 0–2)
COLOR UR: YELLOW
CREAT SERPL-MCNC: 1.29 MG/DL (ref 0.76–1.27)
CRP SERPL-MCNC: 0.44 MG/DL (ref 0–0.5)
D-LACTATE SERPL-SCNC: 1.5 MMOL/L (ref 0.5–2)
D-LACTATE SERPL-SCNC: 3.1 MMOL/L (ref 0.5–2)
DEPRECATED RDW RBC AUTO: 46.8 FL (ref 37–54)
EGFRCR SERPLBLD CKD-EPI 2021: 58.9 ML/MIN/1.73
EOSINOPHIL # BLD AUTO: 0.17 10*3/MM3 (ref 0–0.4)
EOSINOPHIL NFR BLD AUTO: 1.6 % (ref 0.3–6.2)
EPAP: 0
EPAP: 0
ERYTHROCYTE [DISTWIDTH] IN BLOOD BY AUTOMATED COUNT: 12.5 % (ref 12.3–15.4)
ERYTHROCYTE [SEDIMENTATION RATE] IN BLOOD: 52 MM/HR (ref 0–20)
ETHANOL BLD-MCNC: <10 MG/DL (ref 0–10)
FENTANYL UR-MCNC: NEGATIVE NG/ML
FLUAV SUBTYP SPEC NAA+PROBE: NOT DETECTED
FLUBV RNA ISLT QL NAA+PROBE: NOT DETECTED
GLOBULIN UR ELPH-MCNC: 3.6 GM/DL
GLUCOSE SERPL-MCNC: 133 MG/DL (ref 65–99)
GLUCOSE UR STRIP-MCNC: NEGATIVE MG/DL
HADV DNA SPEC NAA+PROBE: NOT DETECTED
HCO3 BLDA-SCNC: 28.6 MMOL/L (ref 20–26)
HCO3 BLDA-SCNC: 29.1 MMOL/L (ref 20–26)
HCOV 229E RNA SPEC QL NAA+PROBE: NOT DETECTED
HCOV HKU1 RNA SPEC QL NAA+PROBE: NOT DETECTED
HCOV NL63 RNA SPEC QL NAA+PROBE: NOT DETECTED
HCOV OC43 RNA SPEC QL NAA+PROBE: NOT DETECTED
HCT VFR BLD AUTO: 43.9 % (ref 37.5–51)
HCT VFR BLD CALC: 43 % (ref 38–51)
HCT VFR BLD CALC: 43.2 % (ref 38–51)
HGB BLD-MCNC: 14.3 G/DL (ref 13–17.7)
HGB BLDA-MCNC: 14 G/DL (ref 13.5–17.5)
HGB BLDA-MCNC: 14.1 G/DL (ref 13.5–17.5)
HGB UR QL STRIP.AUTO: ABNORMAL
HMPV RNA NPH QL NAA+NON-PROBE: NOT DETECTED
HOLD SPECIMEN: NORMAL
HPIV1 RNA ISLT QL NAA+PROBE: NOT DETECTED
HPIV2 RNA SPEC QL NAA+PROBE: NOT DETECTED
HPIV3 RNA NPH QL NAA+PROBE: NOT DETECTED
HPIV4 P GENE NPH QL NAA+PROBE: NOT DETECTED
HYALINE CASTS UR QL AUTO: NORMAL /LPF
IMM GRANULOCYTES # BLD AUTO: 0.06 10*3/MM3 (ref 0–0.05)
IMM GRANULOCYTES NFR BLD AUTO: 0.6 % (ref 0–0.5)
INHALED O2 CONCENTRATION: 21 %
INHALED O2 CONCENTRATION: 28 %
IPAP: 0
IPAP: 0
KETONES UR QL STRIP: NEGATIVE
LEUKOCYTE ESTERASE UR QL STRIP.AUTO: NEGATIVE
LYMPHOCYTES # BLD AUTO: 1.64 10*3/MM3 (ref 0.7–3.1)
LYMPHOCYTES NFR BLD AUTO: 15.7 % (ref 19.6–45.3)
M PNEUMO IGG SER IA-ACNC: NOT DETECTED
MAGNESIUM SERPL-MCNC: 1.9 MG/DL (ref 1.6–2.4)
MCH RBC QN AUTO: 32.6 PG (ref 26.6–33)
MCHC RBC AUTO-ENTMCNC: 32.6 G/DL (ref 31.5–35.7)
MCV RBC AUTO: 100.2 FL (ref 79–97)
METHADONE UR QL SCN: NEGATIVE
METHGB BLD QL: 0.3 % (ref 0–1.5)
METHGB BLD QL: 0.3 % (ref 0–1.5)
MODALITY: ABNORMAL
MODALITY: ABNORMAL
MONOCYTES # BLD AUTO: 0.71 10*3/MM3 (ref 0.1–0.9)
MONOCYTES NFR BLD AUTO: 6.8 % (ref 5–12)
NEUTROPHILS NFR BLD AUTO: 7.82 10*3/MM3 (ref 1.7–7)
NEUTROPHILS NFR BLD AUTO: 74.8 % (ref 42.7–76)
NITRITE UR QL STRIP: NEGATIVE
NRBC BLD AUTO-RTO: 0 /100 WBC (ref 0–0.2)
NT-PROBNP SERPL-MCNC: 1195 PG/ML (ref 0–900)
OPIATES UR QL: NEGATIVE
OXYCODONE UR QL SCN: NEGATIVE
OXYHGB MFR BLDV: 91.2 % (ref 94–99)
OXYHGB MFR BLDV: 93 % (ref 94–99)
PAW @ PEAK INSP FLOW SETTING VENT: 0 CMH2O
PAW @ PEAK INSP FLOW SETTING VENT: 0 CMH2O
PCO2 BLDA: 44.8 MM HG (ref 35–45)
PCO2 BLDA: 47.4 MM HG (ref 35–45)
PCO2 TEMP ADJ BLD: 44.8 MM HG (ref 35–48)
PCO2 TEMP ADJ BLD: 47.4 MM HG (ref 35–48)
PCP UR QL SCN: NEGATIVE
PH BLDA: 7.4 PH UNITS (ref 7.35–7.45)
PH BLDA: 7.41 PH UNITS (ref 7.35–7.45)
PH UR STRIP.AUTO: 6 [PH] (ref 5–8)
PH, TEMP CORRECTED: 7.4 PH UNITS
PH, TEMP CORRECTED: 7.41 PH UNITS
PLATELET # BLD AUTO: 258 10*3/MM3 (ref 140–450)
PMV BLD AUTO: 10.5 FL (ref 6–12)
PO2 BLDA: 63.4 MM HG (ref 83–108)
PO2 BLDA: 71.7 MM HG (ref 83–108)
PO2 TEMP ADJ BLD: 63.4 MM HG (ref 83–108)
PO2 TEMP ADJ BLD: 71.7 MM HG (ref 83–108)
POTASSIUM SERPL-SCNC: 3.9 MMOL/L (ref 3.5–5.2)
PROCALCITONIN SERPL-MCNC: 0.08 NG/ML (ref 0–0.25)
PROPOXYPH UR QL: NEGATIVE
PROT SERPL-MCNC: 7.4 G/DL (ref 6–8.5)
PROT UR QL STRIP: NEGATIVE
RBC # BLD AUTO: 4.38 10*6/MM3 (ref 4.14–5.8)
RBC # UR STRIP: NORMAL /HPF
REF LAB TEST METHOD: NORMAL
RHINOVIRUS RNA SPEC NAA+PROBE: NOT DETECTED
RSV RNA NPH QL NAA+NON-PROBE: NOT DETECTED
SARS-COV-2 RNA NPH QL NAA+NON-PROBE: NOT DETECTED
SODIUM SERPL-SCNC: 139 MMOL/L (ref 136–145)
SP GR UR STRIP: 1.01 (ref 1–1.03)
SQUAMOUS #/AREA URNS HPF: NORMAL /HPF
TOTAL RATE: 0 BREATHS/MINUTE
TOTAL RATE: 0 BREATHS/MINUTE
TRICYCLICS UR QL SCN: NEGATIVE
TROPONIN T SERPL HS-MCNC: 21 NG/L
TSH SERPL DL<=0.05 MIU/L-ACNC: 1.92 UIU/ML (ref 0.27–4.2)
UROBILINOGEN UR QL STRIP: ABNORMAL
WBC # UR STRIP: NORMAL /HPF
WBC NRBC COR # BLD: 10.45 10*3/MM3 (ref 3.4–10.8)
WHOLE BLOOD HOLD COAG: NORMAL
WHOLE BLOOD HOLD SPECIMEN: NORMAL

## 2023-08-28 PROCEDURE — 82805 BLOOD GASES W/O2 SATURATION: CPT

## 2023-08-28 PROCEDURE — 70450 CT HEAD/BRAIN W/O DYE: CPT

## 2023-08-28 PROCEDURE — 0202U NFCT DS 22 TRGT SARS-COV-2: CPT | Performed by: EMERGENCY MEDICINE

## 2023-08-28 PROCEDURE — 25010000002 ZIPRASIDONE MESYLATE PER 10 MG: Performed by: INTERNAL MEDICINE

## 2023-08-28 PROCEDURE — 36600 WITHDRAWAL OF ARTERIAL BLOOD: CPT

## 2023-08-28 PROCEDURE — 96372 THER/PROPH/DIAG INJ SC/IM: CPT

## 2023-08-28 PROCEDURE — G0378 HOSPITAL OBSERVATION PER HR: HCPCS

## 2023-08-28 PROCEDURE — 82140 ASSAY OF AMMONIA: CPT | Performed by: EMERGENCY MEDICINE

## 2023-08-28 PROCEDURE — 81001 URINALYSIS AUTO W/SCOPE: CPT | Performed by: EMERGENCY MEDICINE

## 2023-08-28 PROCEDURE — 83880 ASSAY OF NATRIURETIC PEPTIDE: CPT | Performed by: EMERGENCY MEDICINE

## 2023-08-28 PROCEDURE — 25010000002 HALOPERIDOL LACTATE PER 5 MG: Performed by: EMERGENCY MEDICINE

## 2023-08-28 PROCEDURE — 83735 ASSAY OF MAGNESIUM: CPT | Performed by: EMERGENCY MEDICINE

## 2023-08-28 PROCEDURE — 84443 ASSAY THYROID STIM HORMONE: CPT | Performed by: EMERGENCY MEDICINE

## 2023-08-28 PROCEDURE — 94799 UNLISTED PULMONARY SVC/PX: CPT

## 2023-08-28 PROCEDURE — 80307 DRUG TEST PRSMV CHEM ANLYZR: CPT | Performed by: EMERGENCY MEDICINE

## 2023-08-28 PROCEDURE — 87040 BLOOD CULTURE FOR BACTERIA: CPT | Performed by: EMERGENCY MEDICINE

## 2023-08-28 PROCEDURE — 25010000002 MIDAZOLAM PER 1 MG: Performed by: EMERGENCY MEDICINE

## 2023-08-28 PROCEDURE — 83050 HGB METHEMOGLOBIN QUAN: CPT

## 2023-08-28 PROCEDURE — 83605 ASSAY OF LACTIC ACID: CPT | Performed by: EMERGENCY MEDICINE

## 2023-08-28 PROCEDURE — 86140 C-REACTIVE PROTEIN: CPT | Performed by: EMERGENCY MEDICINE

## 2023-08-28 PROCEDURE — 82375 ASSAY CARBOXYHB QUANT: CPT

## 2023-08-28 PROCEDURE — 95816 EEG AWAKE AND DROWSY: CPT | Performed by: PSYCHIATRY & NEUROLOGY

## 2023-08-28 PROCEDURE — 99223 1ST HOSP IP/OBS HIGH 75: CPT | Performed by: INTERNAL MEDICINE

## 2023-08-28 PROCEDURE — 99284 EMERGENCY DEPT VISIT MOD MDM: CPT

## 2023-08-28 PROCEDURE — 25010000002 ENOXAPARIN PER 10 MG: Performed by: INTERNAL MEDICINE

## 2023-08-28 PROCEDURE — 96376 TX/PRO/DX INJ SAME DRUG ADON: CPT

## 2023-08-28 PROCEDURE — 96375 TX/PRO/DX INJ NEW DRUG ADDON: CPT

## 2023-08-28 PROCEDURE — 36415 COLL VENOUS BLD VENIPUNCTURE: CPT

## 2023-08-28 PROCEDURE — 87150 DNA/RNA AMPLIFIED PROBE: CPT | Performed by: EMERGENCY MEDICINE

## 2023-08-28 PROCEDURE — 84145 PROCALCITONIN (PCT): CPT | Performed by: EMERGENCY MEDICINE

## 2023-08-28 PROCEDURE — 95816 EEG AWAKE AND DROWSY: CPT

## 2023-08-28 PROCEDURE — 96374 THER/PROPH/DIAG INJ IV PUSH: CPT

## 2023-08-28 PROCEDURE — 85025 COMPLETE CBC W/AUTO DIFF WBC: CPT | Performed by: EMERGENCY MEDICINE

## 2023-08-28 PROCEDURE — 80053 COMPREHEN METABOLIC PANEL: CPT | Performed by: EMERGENCY MEDICINE

## 2023-08-28 PROCEDURE — 87147 CULTURE TYPE IMMUNOLOGIC: CPT | Performed by: EMERGENCY MEDICINE

## 2023-08-28 PROCEDURE — 99214 OFFICE O/P EST MOD 30 MIN: CPT

## 2023-08-28 PROCEDURE — 84484 ASSAY OF TROPONIN QUANT: CPT | Performed by: EMERGENCY MEDICINE

## 2023-08-28 PROCEDURE — 85652 RBC SED RATE AUTOMATED: CPT | Performed by: EMERGENCY MEDICINE

## 2023-08-28 PROCEDURE — 93005 ELECTROCARDIOGRAM TRACING: CPT | Performed by: EMERGENCY MEDICINE

## 2023-08-28 PROCEDURE — 71045 X-RAY EXAM CHEST 1 VIEW: CPT

## 2023-08-28 PROCEDURE — 94660 CPAP INITIATION&MGMT: CPT

## 2023-08-28 PROCEDURE — P9612 CATHETERIZE FOR URINE SPEC: HCPCS

## 2023-08-28 PROCEDURE — 25010000002 HALOPERIDOL LACTATE PER 5 MG: Performed by: INTERNAL MEDICINE

## 2023-08-28 PROCEDURE — 82077 ASSAY SPEC XCP UR&BREATH IA: CPT | Performed by: EMERGENCY MEDICINE

## 2023-08-28 RX ORDER — HALOPERIDOL 5 MG/ML
5 INJECTION INTRAMUSCULAR ONCE
Status: COMPLETED | OUTPATIENT
Start: 2023-08-28 | End: 2023-08-28

## 2023-08-28 RX ORDER — HYDROCODONE BITARTRATE AND ACETAMINOPHEN 10; 325 MG/1; MG/1
1 TABLET ORAL EVERY 8 HOURS PRN
Status: DISCONTINUED | OUTPATIENT
Start: 2023-08-28 | End: 2023-09-02 | Stop reason: HOSPADM

## 2023-08-28 RX ORDER — ATORVASTATIN CALCIUM 10 MG/1
10 TABLET, FILM COATED ORAL NIGHTLY
Status: DISCONTINUED | OUTPATIENT
Start: 2023-08-28 | End: 2023-09-02 | Stop reason: HOSPADM

## 2023-08-28 RX ORDER — SODIUM CHLORIDE 9 MG/ML
40 INJECTION, SOLUTION INTRAVENOUS AS NEEDED
Status: DISCONTINUED | OUTPATIENT
Start: 2023-08-28 | End: 2023-09-02 | Stop reason: HOSPADM

## 2023-08-28 RX ORDER — FLUOXETINE HYDROCHLORIDE 20 MG/1
20 CAPSULE ORAL DAILY
Status: DISCONTINUED | OUTPATIENT
Start: 2023-08-28 | End: 2023-09-02 | Stop reason: HOSPADM

## 2023-08-28 RX ORDER — SODIUM CHLORIDE 0.9 % (FLUSH) 0.9 %
10 SYRINGE (ML) INJECTION EVERY 12 HOURS SCHEDULED
Status: DISCONTINUED | OUTPATIENT
Start: 2023-08-28 | End: 2023-09-02 | Stop reason: HOSPADM

## 2023-08-28 RX ORDER — GABAPENTIN 300 MG/1
300 CAPSULE ORAL EVERY 12 HOURS SCHEDULED
Status: DISCONTINUED | OUTPATIENT
Start: 2023-08-28 | End: 2023-08-30

## 2023-08-28 RX ORDER — LORAZEPAM 0.5 MG/1
0.5 TABLET ORAL EVERY 6 HOURS PRN
Status: DISCONTINUED | OUTPATIENT
Start: 2023-08-28 | End: 2023-09-02 | Stop reason: HOSPADM

## 2023-08-28 RX ORDER — HALOPERIDOL 5 MG/ML
1 INJECTION INTRAMUSCULAR EVERY 6 HOURS PRN
Status: DISCONTINUED | OUTPATIENT
Start: 2023-08-28 | End: 2023-09-02 | Stop reason: HOSPADM

## 2023-08-28 RX ORDER — ACETAMINOPHEN 325 MG/1
650 TABLET ORAL EVERY 4 HOURS PRN
Status: DISCONTINUED | OUTPATIENT
Start: 2023-08-28 | End: 2023-08-31

## 2023-08-28 RX ORDER — TAMSULOSIN HYDROCHLORIDE 0.4 MG/1
0.4 CAPSULE ORAL DAILY
Status: DISCONTINUED | OUTPATIENT
Start: 2023-08-28 | End: 2023-09-02 | Stop reason: HOSPADM

## 2023-08-28 RX ORDER — MIDAZOLAM HYDROCHLORIDE 1 MG/ML
5 INJECTION INTRAMUSCULAR; INTRAVENOUS ONCE
Status: COMPLETED | OUTPATIENT
Start: 2023-08-28 | End: 2023-08-28

## 2023-08-28 RX ORDER — PANTOPRAZOLE SODIUM 40 MG/1
40 TABLET, DELAYED RELEASE ORAL
Status: DISCONTINUED | OUTPATIENT
Start: 2023-08-29 | End: 2023-09-02 | Stop reason: HOSPADM

## 2023-08-28 RX ORDER — ALBUTEROL SULFATE 2.5 MG/3ML
2.5 SOLUTION RESPIRATORY (INHALATION) EVERY 6 HOURS PRN
Status: DISCONTINUED | OUTPATIENT
Start: 2023-08-28 | End: 2023-09-02 | Stop reason: HOSPADM

## 2023-08-28 RX ORDER — SODIUM CHLORIDE 0.9 % (FLUSH) 0.9 %
10 SYRINGE (ML) INJECTION AS NEEDED
Status: DISCONTINUED | OUTPATIENT
Start: 2023-08-28 | End: 2023-09-02 | Stop reason: HOSPADM

## 2023-08-28 RX ORDER — BISACODYL 5 MG/1
5 TABLET, DELAYED RELEASE ORAL DAILY PRN
Status: DISCONTINUED | OUTPATIENT
Start: 2023-08-28 | End: 2023-09-02 | Stop reason: HOSPADM

## 2023-08-28 RX ORDER — ONDANSETRON 2 MG/ML
4 INJECTION INTRAMUSCULAR; INTRAVENOUS EVERY 8 HOURS PRN
Status: DISCONTINUED | OUTPATIENT
Start: 2023-08-28 | End: 2023-09-02 | Stop reason: HOSPADM

## 2023-08-28 RX ORDER — AMOXICILLIN 250 MG
2 CAPSULE ORAL 2 TIMES DAILY
Status: DISCONTINUED | OUTPATIENT
Start: 2023-08-28 | End: 2023-09-02 | Stop reason: HOSPADM

## 2023-08-28 RX ORDER — CETIRIZINE HYDROCHLORIDE 10 MG/1
10 TABLET ORAL DAILY
Status: DISCONTINUED | OUTPATIENT
Start: 2023-08-28 | End: 2023-09-02 | Stop reason: HOSPADM

## 2023-08-28 RX ORDER — ZIPRASIDONE MESYLATE 20 MG/ML
10 INJECTION, POWDER, LYOPHILIZED, FOR SOLUTION INTRAMUSCULAR ONCE
Status: COMPLETED | OUTPATIENT
Start: 2023-08-28 | End: 2023-08-28

## 2023-08-28 RX ORDER — MIDAZOLAM HYDROCHLORIDE 1 MG/ML
2 INJECTION INTRAMUSCULAR; INTRAVENOUS ONCE
Status: COMPLETED | OUTPATIENT
Start: 2023-08-28 | End: 2023-08-28

## 2023-08-28 RX ORDER — BISACODYL 10 MG
10 SUPPOSITORY, RECTAL RECTAL DAILY PRN
Status: DISCONTINUED | OUTPATIENT
Start: 2023-08-28 | End: 2023-09-02 | Stop reason: HOSPADM

## 2023-08-28 RX ORDER — ENOXAPARIN SODIUM 100 MG/ML
40 INJECTION SUBCUTANEOUS EVERY 12 HOURS SCHEDULED
Status: DISCONTINUED | OUTPATIENT
Start: 2023-08-28 | End: 2023-08-30

## 2023-08-28 RX ORDER — SODIUM CHLORIDE 0.9 % (FLUSH) 0.9 %
10 SYRINGE (ML) INJECTION AS NEEDED
Status: DISCONTINUED | OUTPATIENT
Start: 2023-08-28 | End: 2023-08-28

## 2023-08-28 RX ORDER — POLYETHYLENE GLYCOL 3350 17 G/17G
17 POWDER, FOR SOLUTION ORAL DAILY PRN
Status: DISCONTINUED | OUTPATIENT
Start: 2023-08-28 | End: 2023-09-02 | Stop reason: HOSPADM

## 2023-08-28 RX ORDER — ASPIRIN 81 MG/1
81 TABLET ORAL DAILY
Status: DISCONTINUED | OUTPATIENT
Start: 2023-08-28 | End: 2023-09-02 | Stop reason: HOSPADM

## 2023-08-28 RX ADMIN — LORAZEPAM 0.5 MG: 0.5 TABLET ORAL at 16:26

## 2023-08-28 RX ADMIN — Medication 10 ML: at 23:08

## 2023-08-28 RX ADMIN — ZIPRASIDONE MESYLATE 10 MG: 20 INJECTION, POWDER, LYOPHILIZED, FOR SOLUTION INTRAMUSCULAR at 17:57

## 2023-08-28 RX ADMIN — ALBUTEROL SULFATE 2.5 MG: 2.5 SOLUTION RESPIRATORY (INHALATION) at 17:30

## 2023-08-28 RX ADMIN — MIDAZOLAM HYDROCHLORIDE 5 MG: 1 INJECTION, SOLUTION INTRAMUSCULAR; INTRAVENOUS at 06:59

## 2023-08-28 RX ADMIN — HALOPERIDOL LACTATE 5 MG: 5 INJECTION, SOLUTION INTRAMUSCULAR at 06:19

## 2023-08-28 RX ADMIN — ASPIRIN 81 MG: 81 TABLET, COATED ORAL at 14:19

## 2023-08-28 RX ADMIN — ENOXAPARIN SODIUM 40 MG: 100 INJECTION SUBCUTANEOUS at 14:19

## 2023-08-28 RX ADMIN — ENOXAPARIN SODIUM 40 MG: 100 INJECTION SUBCUTANEOUS at 23:08

## 2023-08-28 RX ADMIN — TAMSULOSIN HYDROCHLORIDE 0.4 MG: 0.4 CAPSULE ORAL at 14:19

## 2023-08-28 RX ADMIN — CETIRIZINE HYDROCHLORIDE 10 MG: 10 TABLET, FILM COATED ORAL at 14:19

## 2023-08-28 RX ADMIN — MIDAZOLAM HYDROCHLORIDE 2 MG: 1 INJECTION, SOLUTION INTRAMUSCULAR; INTRAVENOUS at 05:11

## 2023-08-28 RX ADMIN — FLUOXETINE 20 MG: 20 CAPSULE ORAL at 14:19

## 2023-08-28 RX ADMIN — HALOPERIDOL LACTATE 1 MG: 5 INJECTION, SOLUTION INTRAMUSCULAR at 17:04

## 2023-08-28 NOTE — CASE MANAGEMENT/SOCIAL WORK
Discharge Planning Assessment  [facility]     Patient Name: Dieter Christiansen  MRN: [id number]  Today's Date: 8/28/2023    Admit Date: 8/28/2023    Plan: IDP   Discharge Needs Assessment       Row Name 08/28/23 1236       Living Environment    People in Home child(nelli), adult    Name(s) of People in Home Rand Clemons    Current Living Arrangements home    Potentially Unsafe Housing Conditions unable to assess    Primary Care Provided by self;child(nelli)    Provides Primary Care For no one    Family Caregiver if Needed child(nelli), adult    Family Caregiver Names Rand Clemons    Quality of Family Relationships involved;helpful       Resource/Environmental Concerns    Transportation Concerns none       Transition Planning    Patient/Family Anticipates Transition to home with family    Transportation Anticipated family or friend will provide       Discharge Needs Assessment    Readmission Within the Last 30 Days no previous admission in last 30 days    Equipment Currently Used at Home cane, straight;shower chair;commode;oxygen;cpap                   Discharge Plan       Row Name 08/28/23 1238       Plan    Plan IDP    Plan Comments Pt. has been sedated and unable to answer questions. MSW spoke with pt.'s daughter Rand Clemons via phone. Pt. lives with his daughter Rand in [facility]. Pt.'s PCP is EUNICE Goodrich. Pt. gets his medication filled at [facility]. Pt. is independent at baseline. Pt. has a cane, shower chair, and BSC. Pt. uses 2.5-3L of O2 supplied by [employer]. Pt. has a CPAP machine but doesn't use it. No HH currently. Pt. has transportation back home when he is medically ready to d/c. Pt. doesn't have an advanced directive or ACP documentation on file. CM will continue to follow pt. throughout his stay.    Final Discharge Disposition Code 30 - still a patient                  Continued Care and Services - Admitted Since 8/28/2023    Coordination has not been started for this encounter.        Expected Discharge Date and Time       Expected Discharge Date Expected Discharge Time    Aug 30, 2023            Demographic Summary       Row Name 08/28/23 1230       General Information    Admission Type observation    Arrived From home    Referral Source admission list;emergency department    Reason for Consult discharge planning    Preferred Language English                   Functional Status       Row Name 08/28/23 1235       Functional Status, IADL    Medications independent    Meal Preparation independent    Housekeeping independent    Laundry independent    Shopping independent       Mental Status Summary    Recent Changes in Mental Status/Cognitive Functioning unable to assess       Employment/    Employment Status retired                   Psychosocial    No documentation.                  Abuse/Neglect    No documentation.                  Legal    No documentation.                  Substance Abuse    No documentation.                  Patient Forms    No documentation.                     SID Ellis

## 2023-08-28 NOTE — Clinical Note
Level of Care: Telemetry [5]   Diagnosis: AMS (altered mental status) [2419528]   Admitting Physician: MELLY OAKES [[id number]]   Attending Physician: MELLY OAKES [[id number]]

## 2023-08-28 NOTE — ED PROVIDER NOTES
Subjective   History of Present Illness  Is a 73-year-old male with past medical history of diabetes and dyslipidemia presented to the emergency department with some altered mental status.  Family called because the patient was acting odd when he woke up.  Apparently the patient went to bed around 9 PM and he was acting normally.  About an hour prior to arrival, the patient was very confused.  Family states that he was talking about things that did not make any sense.  They state that he has had this once before a long time ago.  On arrival, the patient is confused.  He does appear to have encephalopathy.  He follows our commands, however he is not making much sense when he is talking.  No other history could be obtained.    History provided by:  EMS personnel  History limited by:  Mental status change   used: No      Review of Systems   Unable to perform ROS: Mental status change     Past Medical History:   Diagnosis Date    Chronic back pain     COPD (chronic obstructive pulmonary disease)     Coronary artery disease involving native coronary artery of native heart without angina pectoris     Diabetes mellitus     GERD (gastroesophageal reflux disease)     Heart attack     Hyperlipidemia     Hypertension     Lung nodule     Murmur     MARY ALICE (obstructive sleep apnea)        No Known Allergies    Past Surgical History:   Procedure Laterality Date    FEMORAL ARTERY STENT         Family History   Problem Relation Age of Onset    Diabetes Father     Diabetes Brother        Social History     Socioeconomic History    Marital status:    Tobacco Use    Smoking status: Every Day     Packs/day: 0.50     Years: 30.00     Pack years: 15.00     Types: Cigarettes    Smokeless tobacco: Former    Tobacco comments:     20 years ago   Vaping Use    Vaping Use: Never used   Substance and Sexual Activity    Alcohol use: Not Currently    Drug use: Never    Sexual activity: Defer           Objective   Physical  Exam  HENT:      Head: Normocephalic and atraumatic.   Eyes:      Extraocular Movements: Extraocular movements intact.   Cardiovascular:      Rate and Rhythm: Normal rate.   Pulmonary:      Effort: Pulmonary effort is normal. No respiratory distress.   Abdominal:      General: There is no distension.      Palpations: Abdomen is soft. There is no mass.   Musculoskeletal:         General: No swelling.      Cervical back: No rigidity.   Skin:     Findings: No rash.   Neurological:      Mental Status: He is disoriented.      Comments: Appears to be moving all extremities       Procedures           ED Course  ED Course as of 08/28/23 0808   Mon Aug 28, 2023   0725 BP: 120/69 [JK]   0725 Temp: 98.2 °F (36.8 °C) [JK]   0725 Temp src: Axillary [JK]   0725 Heart Rate: 80 [JK]   0725 Resp: 22 [JK]   0725 SpO2: 92 % [JK]   0725 Device (Oxygen Therapy): room air  Patient's repeat vitals, telemetry tracing, and pulse oximetry tracing were directly viewed and interpreted by myself.  Patient hemodynamically stable [JK]   0725 Patient became severely agitated throughout the evaluation in the emergency department.  He was continually trying to get out of bed and threw his legs off the stretcher.  He did require multiple rounds of chemical sedation.  Patient is now resting comfortably. [JK]   0804 Blood Gas, Arterial With Co-Ox(!) [JK]   0805 Urinalysis With Culture If Indicated - Straight Cath(!) [JK]   0805 Urinalysis, Microscopic Only - Straight Cath [JK]   0805 Respiratory Panel PCR w/COVID-19(SARS-CoV-2) RAY/CHET/HEIDE/PAD/COR/MAD/EDD In-House, NP Swab in UTM/VTM, 3-4 HR TAT - Swab, Nasopharynx [JK]   0805 Sedimentation Rate(!) [JK]   0805 Lactic Acid, Plasma(!!) [JK]   0805 CBC & Differential(!) [JK]   0805 Procalcitonin [JK]   0805 BNP(!) [JK]   0805 TSH [JK]   0805 Magnesium [JK]   0805 Single High Sensitivity Troponin T(!) [JK]   0805 Comprehensive Metabolic Panel(!) [JK]   0805 Ethanol [JK]   0805 C-reactive Protein [JK]    0805 Ammonia  Laboratory studies were reviewed and interpreted directly by myself.  Initial ABG showed some hypoxia with a PO2 of 68, urinalysis unremarkable, respiratory panel normal, sed rate slightly elevated at 52, lactic acid faded slightly at 3.1, CBC normal, troponin slightly elevated 21, CMP shows mild acute kidney injury with creatinine 1.29, magnesium normal, BNP elevated 1195 [JK]   0805 CT Head Without Contrast [JK]   0805 XR Chest 1 View  Imaging was directly visualized by myself, per my interpretations, CT of the head was unremarkable.  Chest x-ray showed some cardiomegaly. [JK]   0806 On reevaluation, the patient is resting comfortably.  He appears to be sedated.  I do believe the patient likely became encephalopathic secondary to hypoxia.  He has no fever or obvious focus of infection at this time.  Patient be admitted to the hospital for further evaluation and treatment. [JK]   0806 Based on the patient's presentation, history and diffuse work-up in the emergency department, the patient is deemed appropriate for admission to the hospital for further evaluation and treatment.  This was discussed with the patient's family over the phone.  They are in agreement with the current medical management.    Admitting physician: Dr. Conde    Discussion was had with admitting physician regarding the laboratory and imaging findings.  We did discuss current therapeutics in the emergency department and progression of the patient.  Working diagnosis was conveyed to the admitting physician, as well as current status and prognosis for the patient.  They are in agreement with these findings and have accepted admission.   [JK]      ED Course User Index  [JK] Yoel Costa MD                                           Medical Decision Making  This is a this is a 73-year-old male with past medical history of diabetes and dyslipidemia presented to the emergency department with some altered mental status.   Patient's overall presentation appears concerning for encephalopathy.  He appears to be maintaining his airway and is alert, however he is very disoriented.  I do believe it could be related to the patient's hyperglycemia and possible hypercapnia or uremia.  Patient could also have infectious process like urinary tract infection causing his symptoms.  Overall the patient is hemodynamically stable.  Is nontoxic.  Afebrile.  IV access was established and patient.  Work-up initiated.      Differential diagnosis: Encephalopathy, hepatic encephalopathy, uremia, acute kidney injury, electrolyte abnormality, UTI, pneumonia      Amount and/or Complexity of Data Reviewed  External Data Reviewed: labs, radiology and notes.     Details: External laboratories, imaging as well as notes were reviewed personally by myself.  All relevant studies were used to guide decision making.     Date of previous record: 5/19/2023    Source of note: Admission record    Summary: Patient was seen and evaluated for hyperglycemia and cellulitis of the hand.  I did review the patient's laboratory study as well as radiology.  Labs: ordered. Decision-making details documented in ED Course.  Radiology: ordered and independent interpretation performed. Decision-making details documented in ED Course.  ECG/medicine tests: ordered and independent interpretation performed. Decision-making details documented in ED Course.    Risk  Prescription drug management.  Decision regarding hospitalization.    Critical Care  Total time providing critical care: 39 minutes (Authorized and performed by: Yoel Costa MD  I personally spent a total of 39 minutes of critical care time with the patient.  Due to the high probability of clinically significant, life-threatening deterioration, the patient required my highest level of care to intervene emergently.  These interventions, including, but not limited to, establishing IV access, continuous pulse oximeter and  telemetry monitoring, frequent monitoring and reevaluations, management the patient's airway and cardiovascular system, discussion with other consultants as needed, which bear directly on the management the patient.  This also includes obtaining history, examining the patient, frequent reevaluations and coordinating high level of care.  Failure to emergently initiate these interventions would carry a high probability of resulting in sudden, clinically significant or life threatening deterioration in the patient's condition.  This does not include time spent on separately reported billable procedures.)      Final diagnoses:   Encephalopathy   Acute respiratory failure with hypoxia       ED Disposition  ED Disposition       ED Disposition   Decision to Admit    Condition   --    Comment   Level of Care: Telemetry [5]   Diagnosis: AMS (altered mental status) [[id number]]   Admitting Physician: MELLY OAKES [[id number]]   Attending Physician: MELLY OAKES [[id number]]                 No follow-up provider specified.       Medication List      No changes were made to your prescriptions during this visit.            Yoel Costa MD  08/28/23 0808

## 2023-08-28 NOTE — H&P
[facility] Medicine Services  HISTORY AND PHYSICAL    Patient Name: Dieter Christiansen  : 1950  MRN: [id number]  Primary Care Physician: Ian Sanchez APRN    Subjective   Subjective     Chief Complaint:confusion and hypoxia    HPI:  Dieter Christiansen is a 73 y.o. male who  has a past medical history of Chronic back pain, COPD , Coronary artery disease , Diabetes mellitus, GERD, Obesity,  Hyperlipidemia, Hypertension, Lung nodule, Murmur, and MARY ALICE  who presented to the ED by EMS for confusion. Reported the patient was normal when he went to bed at 9pm but woke confused, talking crazy and hypoxic. Family is not present. ED had to give patient several rounds of sedation for combative behavior.  Of note patient had almost a liter of fluid in his bladder when catheter was placed.    Review of Systems   UTO due to patient confusion.    Otherwise complete ROS is negative except as mentioned in the HPI.    Personal History     PMH: He  has a past medical history of Chronic back pain, COPD (chronic obstructive pulmonary disease), Coronary artery disease involving native coronary artery of native heart without angina pectoris, Diabetes mellitus, GERD (gastroesophageal reflux disease), Heart attack, Hyperlipidemia, Hypertension, Lung nodule, Murmur, and MARY ALICE (obstructive sleep apnea).   PSxH: He  has a past surgical history that includes Femoral artery stent.         FH: His family history includes Diabetes in his brother and father.   SH: He  reports that he has been smoking cigarettes. He has a 15.00 pack-year smoking history. He has quit using smokeless tobacco. He reports that he does not currently use alcohol. He reports that he does not use drugs.     Medications:  FLUoxetine, HYDROcodone-acetaminophen, albuterol, aspirin, cetirizine, famotidine, furosemide, gabapentin, ipratropium-albuterol, lisinopril, metFORMIN, metoprolol succinate XL, nitroglycerin, nystatin, nystatin-triamcinolone,  pantoprazole, potassium chloride, promethazine, simvastatin, tamsulosin, triamcinolone, and vitamin D    No Known Allergies    Objective   Objective     Vital Signs:   Temp:  [98.2 °F (36.8 °C)] 98.2 °F (36.8 °C)  Heart Rate:  [76-80] 76  Resp:  [22] 22  BP: (110-120)/(69) 110/69    Constitutional: somnolent, will open eyes and is restless in the bed, obese, soiled feet.  Eyes: clear sclerae, no conjunctival injection  HENT: NCAT, mucous membranes moist  Neck: no masses or lymphadenopathy, trachea midline  Respiratory: shallow breath sounds bilaterally, respirations paradoxical requiring 2-3 LPM of oxygen  Cardiovascular: RRR, no murmurs appreciated, palpable peripheral pulses  Abdomen:  soft, obese, umbilical hernia,   Musculoskeletal: No peripheral edema, clubbing or cyanosis  Neurologic: Oriented only to name,                       Strength symmetric in all extremities                     Cranial Nerves grossly intact, speech clear  Skin: No rashes or jaundice  Psychiatric: Appropriate mood, insight      Result Review:  I have personally reviewed the results from the time of this admission   to 8/28/2023 07:55 EDT and agree with these findings:  [x]  Laboratory  [x]  Microbiology  [x]  Radiology  [x]  EKG/Telemetry   []  Cardiology/Vascular   []  Pathology  [x]  Old records  []  Other:  Most notable findings include: normal WBC, elevated sed rate, and MCV< as well as lactic acid, creatinine is chronically elevated, normal procal      LAB RESULTS:      Lab 08/28/23  0422   WBC 10.45   HEMOGLOBIN 14.3   HEMATOCRIT 43.9   PLATELETS 258   NEUTROS ABS 7.82*   IMMATURE GRANS (ABS) 0.06*   LYMPHS ABS 1.64   MONOS ABS 0.71   EOS ABS 0.17   .2*   SED RATE 52*   CRP 0.44   PROCALCITONIN 0.08   LACTATE 3.1*         Lab 08/28/23  0422   SODIUM 139   POTASSIUM 3.9   CHLORIDE 100   CO2 25.0   ANION GAP 14.0   BUN 14   CREATININE 1.29*   EGFR 58.9*   GLUCOSE 133*   CALCIUM 9.1   MAGNESIUM 1.9   TSH 1.920         Lab  08/28/23 0422   TOTAL PROTEIN 7.4   ALBUMIN 3.8   GLOBULIN 3.6   ALT (SGPT) 5   AST (SGOT) 18   BILIRUBIN 0.7   ALK PHOS 112         Lab 08/28/23 0422   PROBNP 1,195.0*   HSTROP T 21*                 Lab 08/28/23 0437   PH, ARTERIAL 7.414   PCO2, ARTERIAL 44.8   PO2 ART 63.4*   FIO2 21   HCO3 ART 28.6*   BASE EXCESS ART 3.4*   CARBOXYHEMOGLOBIN 1.6     Brief Urine Lab Results  (Last result in the past 365 days)        Color   Clarity   Blood   Leuk Est   Nitrite   Protein   CREAT   Urine HCG        08/28/23 0439 Yellow   Clear   Trace   Negative   Negative   Negative                 COVID19   Date Value Ref Range Status   08/28/2023 Not Detected Not Detected - Ref. Range Final       CT Head Without Contrast    Result Date: 8/28/2023  CT HEAD WO CONTRAST Date of Exam: 8/28/2023 7:08 AM EDT Indication: Mental status change, unknown cause. Comparison: None available. Technique: Axial CT images were obtained of the head without contrast administration.  Automated exposure control and iterative construction methods were used. Findings: No acute intracranial hemorrhage. No acute large territory infarct. There are scattered subcortical and periventricular white matter hypodensities which are nonspecific and can be seen in the setting of chronic small vessel ischemic change. No extra-axial collections. No midline shift or herniation. Normal size and configuration of the ventricles. Unremarkable appearance of the orbits. The paranasal sinuses are grossly clear. The mastoid air cells are grossly clear. No acute or suspicious bony  findings.     Impression: Impression: No acute intracranial findings. Chronic and senescent changes as above. Electronically Signed: Venu Carbone MD  8/28/2023 7:27 AM EDT  Workstation ID: [id number]    XR Chest 1 View    Result Date: 8/28/2023  XR CHEST 1 VW Date of Exam: 8/28/2023 5:21 AM EDT Indication: cough Comparison: 10/7/2022 chest CT. Findings: Exam limited by body habitus. Cardiac  silhouette is enlarged. There is likely cardiomegaly although accentuated by AP portable technique. The lungs appear clear. No pneumothorax or pleural effusion. No acute osseous abnormality. Pulmonary vasculature is within normal limits.     Impression: Impression: Cardiomegaly without acute cardiopulmonary abnormality. Electronically Signed: Suhail De Santiago MD  8/28/2023 5:46 AM EDT  Workstation ID: [id number]         The patient qualifies to receive the vaccine, but they have not yet received it.    Assessment & Plan   Assessment / Plan       AMS (altered mental status)    CAD (coronary artery disease)    COPD (chronic obstructive pulmonary disease)    Type 2 diabetes mellitus    HTN (hypertension)    HLD (hyperlipidemia)    MARY ALICE (obstructive sleep apnea)    Obesity, Class III, BMI 40-49.9 (morbid obesity)      Assessment & Plan:  Dieter Christiansen is a 73 y.o. male who  has a past medical history of Chronic back pain, COPD , Coronary artery disease , Diabetes mellitus, GERD, Obesity,  Hyperlipidemia, Hypertension, Lung nodule, Murmur, and MARY ALICE  who presented to the ED by EMS for confusion. Reported the patient was normal when he went to bed at 9pm but woke confused, talking crazy and hypoxic.    Altered mentation of unknown etiology  Chronic respiratory failure  MARY ALICE  Morbid obesity- urinary retention  -support with Bipap, oxygen  -prn benzos  -possibly neurontin toxicity or due to other medications?  -possible due to urinary retention-- cont catheter for now  -check EEG    HTN  HLP    Type 2 Diabetes  -insulin for now    DVT prophylaxis:lovenox      CODE STATUS:Full code for now     Expected Discharge  Expected discharge date/ time has not been documented.    Electronically signed by Lizzie Conde MD 08/28/23 07:55 EDT

## 2023-08-28 NOTE — CONSULTS
"[facility]  Consult Note    Patient Name: Dieter Christiansen  : 1950  MRN: [id number]  Primary Care Physician:  Ian Sanchez APRN  Referring Physician: No ref. provider found  Date of admission: 2023    Subjective     Reason for Consult/ Chief Complaint: Altered mental status    Dieter Christiansen is a 73 y.o. male with a past medical history of COPD, CAD, DM type II, GERD, obesity, HLD, HTN, lung nodule, chronic back pain and MARY ALICE presented to [facility] ED with complaint of confusion.  Patient did require a dose of Haldol and Ativan related to extreme agitation. Per patient report he does not remember waking up this morning.  He also does not recall if he took any medications this a.m. but states that \"he could have\".     He stated that he uses his CPAP on a regular basis but forgot to wear it last night.  He does endorse a change in diet over the recent interim related to nausea.  He denies nausea at the current time.  Patient denies any recent infection, change in medication, cough, or fever.  Patient denies any seizure history.    Review Of Systems   Constitutional: Well-developed male  Cardiovascular: Negative for chest pain or palpitations.  Respiratory: Negative for shortness of breath or cough.  Gastrointestinal: Negative for nausea and vomiting.  Genitourinary: Negative for bladder incontinence.  Musculoskeletal: Negative for aches and pains in the muscles or joints.  Dermatological: Negative for skin breakdown.   Neurological: Positive for altered mental status.    Personal History     Past Medical History:   Diagnosis Date    Chronic back pain     COPD (chronic obstructive pulmonary disease)     Coronary artery disease involving native coronary artery of native heart without angina pectoris     Diabetes mellitus     GERD (gastroesophageal reflux disease)     Heart attack     Hyperlipidemia     Hypertension     Lung nodule     Murmur     MARY ALICE (obstructive sleep apnea)        Past Surgical " History:   Procedure Laterality Date    FEMORAL ARTERY STENT         Family History: family history includes Diabetes in his brother and father. Otherwise pertinent FHx was reviewed and not pertinent to current issue.    Social History:  reports that he has been smoking cigarettes. He has a 15.00 pack-year smoking history. He has quit using smokeless tobacco. He reports that he does not currently use alcohol. He reports that he does not use drugs.    Home Medications:   FLUoxetine, HYDROcodone-acetaminophen, albuterol, aspirin, cetirizine, famotidine, furosemide, gabapentin, ipratropium-albuterol, lisinopril, metFORMIN, metoprolol succinate XL, nitroglycerin, nystatin, nystatin-triamcinolone, pantoprazole, potassium chloride, promethazine, simvastatin, tamsulosin, triamcinolone, and vitamin D    Current Medications:     Current Facility-Administered Medications:     acetaminophen (TYLENOL) tablet 650 mg, 650 mg, Oral, Q4H PRN, Lizzie Conde MD    albuterol (PROVENTIL) nebulizer solution 0.083% 2.5 mg/3mL, 2.5 mg, Nebulization, Q6H PRN, Lizzie Conde MD    aspirin EC tablet 81 mg, 81 mg, Oral, Daily, Lizzie Conde MD, 81 mg at 08/28/23 1419    atorvastatin (LIPITOR) tablet 10 mg, 10 mg, Oral, Nightly, Lizzie Conde MD    sennosides-docusate (PERICOLACE) 8.6-50 MG per tablet 2 tablet, 2 tablet, Oral, BID **AND** polyethylene glycol (MIRALAX) packet 17 g, 17 g, Oral, Daily PRN **AND** bisacodyl (DULCOLAX) EC tablet 5 mg, 5 mg, Oral, Daily PRN **AND** bisacodyl (DULCOLAX) suppository 10 mg, 10 mg, Rectal, Daily PRN, Lizzie Conde MD    cetirizine (zyrTEC) tablet 10 mg, 10 mg, Oral, Daily, Lizzie Conde MD, 10 mg at 08/28/23 1419    Enoxaparin Sodium (LOVENOX) syringe 40 mg, 40 mg, Subcutaneous, Q12H, Lizzie Conde MD, 40 mg at 08/28/23 1419    FLUoxetine (PROzac) capsule 20 mg, 20 mg, Oral, Daily, Lizzie Conde MD, 20 mg at 08/28/23 1419    gabapentin (NEURONTIN) capsule 300 mg, 300 mg,  Oral, Q12H, Lizzie Conde MD    HYDROcodone-acetaminophen (NORCO)  MG per tablet 1 tablet, 1 tablet, Oral, Q8H PRN, Lizzie Conde MD    Magnesium Low Dose Replacement - Follow Nurse / BPA Driven Protocol, , Does not apply, Elton CORONA Kathryn E, MD    [START ON 8/29/2023] pantoprazole (PROTONIX) EC tablet 40 mg, 40 mg, Oral, Q AM, Lizzie Conde MD    Potassium Replacement - Follow Nurse / BPA Driven Protocol, , Does not apply, Elton CORONA Kathryn E, MD    sodium chloride 0.9 % flush 10 mL, 10 mL, Intravenous, Q12H, Lizzie Conde MD    sodium chloride 0.9 % flush 10 mL, 10 mL, Intravenous, PRNElton Kathryn E, MD    sodium chloride 0.9 % infusion 40 mL, 40 mL, Intravenous, PRNElton Kathryn E, MD    tamsulosin (FLOMAX) 24 hr capsule 0.4 mg, 0.4 mg, Oral, Daily, Lizzie Conde MD, 0.4 mg at 08/28/23 1419     Allergies:  No Known Allergies    Objective     Physical Exam  Vitals and nursing note reviewed.   Constitutional:       General: He is not in acute distress.     Appearance: He is not ill-appearing.   Eyes:      Extraocular Movements: Extraocular movements intact.      Comments: Pupils 1 and slow to react.  No nystagmus noted   Neurological:      Mental Status: He is alert. He is disoriented.      Cranial Nerves: Cranial nerves 2-12 are intact.      Sensory: Sensation is intact.      Motor: No weakness or seizure activity.      Coordination: Finger-Nose-Finger Test normal.      Deep Tendon Reflexes: Reflexes are normal and symmetric. Babinski sign absent on the right side. Babinski sign absent on the left side.      Comments: Oriented to self and location disoriented to year.  Patient unable to follow complex commands.    Cranial Nerves   CN II: Pupils are equal, round, and reactive to light. Normal visual acuity and visual fields.    CN III IV VI: Extraocular movements are full without nystagmus.  CN V: Normal facial sensation and strength of muscles of mastication.  CN VII: Facial  movements are symmetric. No weakness.  CN VIII:  Auditory acuity is normal.  CN IX & X:  Symmetric palatal movement.  CN XI: Sternocleidomastoid and trapezius are normal.  No weakness.  CN XII: The tongue is midline.  No atrophy or fasciculations.       Vitals:  Temp:  [98.2 °F (36.8 °C)-98.6 °F (37 °C)] 98.6 °F (37 °C)  Heart Rate:  [66-80] 73  Resp:  [22-26] 26  BP: ()/(56-97) 98/65  Flow (L/min):  [2] 2    Laboratory Results:   Lab Results   Component Value Date    GLUCOSE 133 (H) 08/28/2023    CALCIUM 9.1 08/28/2023     08/28/2023    K 3.9 08/28/2023    CO2 25.0 08/28/2023     08/28/2023    BUN 14 08/28/2023    CREATININE 1.29 (H) 08/28/2023    BCR 10.9 08/28/2023    ANIONGAP 14.0 08/28/2023     Lab Results   Component Value Date    WBC 10.45 08/28/2023    HGB 14.3 08/28/2023    HCT 43.9 08/28/2023    .2 (H) 08/28/2023     08/28/2023     No results found for: CHOL  No results found for: HDL  No results found for: LDL  No results found for: TRIG  Lab Results   Component Value Date    HGBA1C 5.80 (H) 05/20/2023     Lab Results   Component Value Date    INR 1.20 (H) 05/20/2023    PROTIME 15.3 (H) 05/20/2023     No results found for: [id number]  No results found for: FOLATE          Assessment / Plan       Brief Patient Summary:  Dieter Christiansen is a 73 y.o. male with a past medical history of COPD, CAD, DM type II, GERD, obesity, HLD, HTN, lung nodule, chronic back pain and MARY ALICE presented to [facility] ED with complaint of confusion.    CTA was negative for any acute abnormalities.    EEG showed diffuse cerebral dysfunction of mild degree, findings most commonly found in toxic/metabolic encephalopathy or hypoxemia.     Plan:   Metabolic encephalopathy versus polypharmacy  COPD  MARY ALICE  MRI brain with without contrast  Vitamin B12 and folate pending  Zofran as needed for nausea  Continue gabapentin 300 mg twice daily  As needed IM Haldol for extreme agitation.  General neurology will continue to  follow      I have discussed the above with the patient, bedside RN Dr. Conde   Time spent with patient: 60 minutes in face-to-face evaluation and management of the patient.       Gertrude Mckinnon APRN

## 2023-08-29 ENCOUNTER — APPOINTMENT (OUTPATIENT)
Dept: MRI IMAGING | Facility: HOSPITAL | Age: 73
End: 2023-08-29
Payer: MEDICARE

## 2023-08-29 LAB
ALBUMIN SERPL-MCNC: 3.6 G/DL (ref 3.5–5.2)
ALBUMIN/GLOB SERPL: 1.4 G/DL
ALP SERPL-CCNC: 99 U/L (ref 39–117)
ALT SERPL W P-5'-P-CCNC: 6 U/L (ref 1–41)
ANION GAP SERPL CALCULATED.3IONS-SCNC: 9 MMOL/L (ref 5–15)
AST SERPL-CCNC: 35 U/L (ref 1–40)
BACTERIA BLD CULT: ABNORMAL
BASOPHILS # BLD AUTO: 0.04 10*3/MM3 (ref 0–0.2)
BASOPHILS NFR BLD AUTO: 0.4 % (ref 0–1.5)
BILIRUB SERPL-MCNC: 1.3 MG/DL (ref 0–1.2)
BOTTLE TYPE: ABNORMAL
BUN SERPL-MCNC: 24 MG/DL (ref 8–23)
BUN/CREAT SERPL: 18 (ref 7–25)
CALCIUM SPEC-SCNC: 8.6 MG/DL (ref 8.6–10.5)
CHLORIDE SERPL-SCNC: 101 MMOL/L (ref 98–107)
CO2 SERPL-SCNC: 29 MMOL/L (ref 22–29)
CREAT SERPL-MCNC: 1.33 MG/DL (ref 0.76–1.27)
DEPRECATED RDW RBC AUTO: 46.9 FL (ref 37–54)
EGFRCR SERPLBLD CKD-EPI 2021: 56.8 ML/MIN/1.73
EOSINOPHIL # BLD AUTO: 0.09 10*3/MM3 (ref 0–0.4)
EOSINOPHIL NFR BLD AUTO: 0.9 % (ref 0.3–6.2)
ERYTHROCYTE [DISTWIDTH] IN BLOOD BY AUTOMATED COUNT: 12.6 % (ref 12.3–15.4)
FOLATE SERPL-MCNC: 8.18 NG/ML (ref 4.78–24.2)
GLOBULIN UR ELPH-MCNC: 2.6 GM/DL
GLUCOSE BLDC GLUCOMTR-MCNC: 110 MG/DL (ref 70–130)
GLUCOSE SERPL-MCNC: 114 MG/DL (ref 65–99)
HCT VFR BLD AUTO: 40 % (ref 37.5–51)
HGB BLD-MCNC: 13 G/DL (ref 13–17.7)
IMM GRANULOCYTES # BLD AUTO: 0.04 10*3/MM3 (ref 0–0.05)
IMM GRANULOCYTES NFR BLD AUTO: 0.4 % (ref 0–0.5)
LYMPHOCYTES # BLD AUTO: 2.21 10*3/MM3 (ref 0.7–3.1)
LYMPHOCYTES NFR BLD AUTO: 20.9 % (ref 19.6–45.3)
MCH RBC QN AUTO: 32.6 PG (ref 26.6–33)
MCHC RBC AUTO-ENTMCNC: 32.5 G/DL (ref 31.5–35.7)
MCV RBC AUTO: 100.3 FL (ref 79–97)
MONOCYTES # BLD AUTO: 1.02 10*3/MM3 (ref 0.1–0.9)
MONOCYTES NFR BLD AUTO: 9.7 % (ref 5–12)
NEUTROPHILS NFR BLD AUTO: 67.7 % (ref 42.7–76)
NEUTROPHILS NFR BLD AUTO: 7.15 10*3/MM3 (ref 1.7–7)
NRBC BLD AUTO-RTO: 0 /100 WBC (ref 0–0.2)
PLATELET # BLD AUTO: 238 10*3/MM3 (ref 140–450)
PMV BLD AUTO: 10.8 FL (ref 6–12)
POTASSIUM SERPL-SCNC: 4.1 MMOL/L (ref 3.5–5.2)
PROT SERPL-MCNC: 6.2 G/DL (ref 6–8.5)
RBC # BLD AUTO: 3.99 10*6/MM3 (ref 4.14–5.8)
SODIUM SERPL-SCNC: 139 MMOL/L (ref 136–145)
VIT B12 BLD-MCNC: 248 PG/ML (ref 211–946)
WBC NRBC COR # BLD: 10.55 10*3/MM3 (ref 3.4–10.8)

## 2023-08-29 PROCEDURE — 96375 TX/PRO/DX INJ NEW DRUG ADDON: CPT

## 2023-08-29 PROCEDURE — 25010000002 LORAZEPAM PER 2 MG

## 2023-08-29 PROCEDURE — 97166 OT EVAL MOD COMPLEX 45 MIN: CPT

## 2023-08-29 PROCEDURE — 82607 VITAMIN B-12: CPT

## 2023-08-29 PROCEDURE — 94660 CPAP INITIATION&MGMT: CPT

## 2023-08-29 PROCEDURE — 80053 COMPREHEN METABOLIC PANEL: CPT | Performed by: INTERNAL MEDICINE

## 2023-08-29 PROCEDURE — 82746 ASSAY OF FOLIC ACID SERUM: CPT

## 2023-08-29 PROCEDURE — 25010000002 CYANOCOBALAMIN PER 1000 MCG

## 2023-08-29 PROCEDURE — 97530 THERAPEUTIC ACTIVITIES: CPT

## 2023-08-29 PROCEDURE — 99213 OFFICE O/P EST LOW 20 MIN: CPT

## 2023-08-29 PROCEDURE — 99232 SBSQ HOSP IP/OBS MODERATE 35: CPT | Performed by: INTERNAL MEDICINE

## 2023-08-29 PROCEDURE — 94799 UNLISTED PULMONARY SVC/PX: CPT

## 2023-08-29 PROCEDURE — 25010000002 HALOPERIDOL LACTATE PER 5 MG: Performed by: INTERNAL MEDICINE

## 2023-08-29 PROCEDURE — 96372 THER/PROPH/DIAG INJ SC/IM: CPT

## 2023-08-29 PROCEDURE — 25010000002 ENOXAPARIN PER 10 MG: Performed by: INTERNAL MEDICINE

## 2023-08-29 PROCEDURE — 82948 REAGENT STRIP/BLOOD GLUCOSE: CPT

## 2023-08-29 PROCEDURE — 85025 COMPLETE CBC W/AUTO DIFF WBC: CPT | Performed by: INTERNAL MEDICINE

## 2023-08-29 PROCEDURE — G0378 HOSPITAL OBSERVATION PER HR: HCPCS

## 2023-08-29 PROCEDURE — 97535 SELF CARE MNGMENT TRAINING: CPT

## 2023-08-29 RX ORDER — LORAZEPAM 2 MG/ML
0.5 INJECTION INTRAMUSCULAR ONCE
Status: DISCONTINUED | OUTPATIENT
Start: 2023-08-29 | End: 2023-08-29

## 2023-08-29 RX ORDER — LORAZEPAM 2 MG/ML
0.5 INJECTION INTRAMUSCULAR ONCE AS NEEDED
Status: COMPLETED | OUTPATIENT
Start: 2023-08-29 | End: 2023-08-29

## 2023-08-29 RX ORDER — CYANOCOBALAMIN 1000 UG/ML
1000 INJECTION, SOLUTION INTRAMUSCULAR; SUBCUTANEOUS
Status: DISCONTINUED | OUTPATIENT
Start: 2023-08-29 | End: 2023-09-02 | Stop reason: HOSPADM

## 2023-08-29 RX ORDER — QUETIAPINE FUMARATE 25 MG/1
25 TABLET, FILM COATED ORAL NIGHTLY
Status: DISCONTINUED | OUTPATIENT
Start: 2023-08-29 | End: 2023-08-31

## 2023-08-29 RX ADMIN — CETIRIZINE HYDROCHLORIDE 10 MG: 10 TABLET, FILM COATED ORAL at 08:37

## 2023-08-29 RX ADMIN — ATORVASTATIN CALCIUM 10 MG: 10 TABLET, FILM COATED ORAL at 20:06

## 2023-08-29 RX ADMIN — TAMSULOSIN HYDROCHLORIDE 0.4 MG: 0.4 CAPSULE ORAL at 08:37

## 2023-08-29 RX ADMIN — HALOPERIDOL LACTATE 1 MG: 5 INJECTION, SOLUTION INTRAMUSCULAR at 00:21

## 2023-08-29 RX ADMIN — ENOXAPARIN SODIUM 40 MG: 100 INJECTION SUBCUTANEOUS at 20:05

## 2023-08-29 RX ADMIN — ASPIRIN 81 MG: 81 TABLET, COATED ORAL at 08:38

## 2023-08-29 RX ADMIN — CYANOCOBALAMIN 1000 MCG: 1000 INJECTION, SOLUTION INTRAMUSCULAR; SUBCUTANEOUS at 14:47

## 2023-08-29 RX ADMIN — QUETIAPINE FUMARATE 25 MG: 25 TABLET ORAL at 20:05

## 2023-08-29 RX ADMIN — ACETAMINOPHEN 650 MG: 325 TABLET ORAL at 20:05

## 2023-08-29 RX ADMIN — SENNOSIDES AND DOCUSATE SODIUM 2 TABLET: 8.6; 5 TABLET ORAL at 08:37

## 2023-08-29 RX ADMIN — ENOXAPARIN SODIUM 40 MG: 100 INJECTION SUBCUTANEOUS at 08:36

## 2023-08-29 RX ADMIN — FLUOXETINE 20 MG: 20 CAPSULE ORAL at 08:37

## 2023-08-29 RX ADMIN — GABAPENTIN 300 MG: 300 CAPSULE ORAL at 08:37

## 2023-08-29 RX ADMIN — GABAPENTIN 300 MG: 300 CAPSULE ORAL at 20:05

## 2023-08-29 RX ADMIN — Medication 10 ML: at 08:37

## 2023-08-29 RX ADMIN — PANTOPRAZOLE SODIUM 40 MG: 40 TABLET, DELAYED RELEASE ORAL at 08:38

## 2023-08-29 RX ADMIN — SENNOSIDES AND DOCUSATE SODIUM 2 TABLET: 8.6; 5 TABLET ORAL at 20:06

## 2023-08-29 RX ADMIN — LORAZEPAM 0.5 MG: 2 INJECTION INTRAMUSCULAR; INTRAVENOUS at 21:34

## 2023-08-29 RX ADMIN — Medication 10 ML: at 20:06

## 2023-08-29 NOTE — PROGRESS NOTES
[facility]    Progress Note    Patient Name: Dieter Christiansen  : 1950  MRN: [id number]  Primary Care Physician:  Ian Sanchez APRN  Date of admission: 2023    Subjective     Chief Complaint: Altered mental status    History of Present Illness   Patient is resting comfortably in bed.  Per bedside RN report he had been up to chair most of the morning.  He was able to state his name, location but was disoriented to the year.  This is unchanged from previous assessment.  He did require as needed medications overnight for extreme agitation.    Review of Systems   General: Negative for fever, nausea, or vomiting.   Neurological: Negative for headache, pain, or weakness.     Objective     Physical Exam  Vitals and nursing note reviewed.   Eyes:      Extraocular Movements: Extraocular movements intact.      Pupils: Pupils are equal, round, and reactive to light.      Comments: No nystagmus noted    Cardiovascular:      Rate and Rhythm: Normal rate.   Pulmonary:      Effort: Pulmonary effort is normal.   Neurological:      Mental Status: He is alert and oriented to person, place, and time.      Cranial Nerves: Cranial nerves 2-12 are intact.      Sensory: Sensation is intact.      Motor: No weakness or seizure activity.      Deep Tendon Reflexes: Reflexes abnormal. Babinski sign absent on the right side. Babinski sign absent on the left side.      Reflex Scores:       Bicep reflexes are 2+ on the right side and 2+ on the left side.       Patellar reflexes are 1+ on the right side and 1+ on the left side.     Comments:   Cranial Nerves   CN II: Pupils are equal, round, and reactive to light. Normal visual acuity and visual fields.    CN III IV VI: Extraocular movements are full without nystagmus.  CN V: Normal facial sensation and strength of muscles of mastication.  CN VII: Facial movements are symmetric. No weakness.  CN VIII:  Auditory acuity is normal.    Patient oriented to self and  24-Hour Sepsis Risk Assessment    Vital signs and documented signs of respiratory distress since birth:  Patient Vitals for the past 30 hrs:   Temp Pulse Resp   01/31/19 0320 -- 142 44   01/31/19 0305 -- 116 50   01/31/19 0250 -- 157 38   01/31/19 0235 -- 131 44   01/31/19 0220 -- 141 61   01/31/19 0205 -- 125 61   01/31/19 0150 -- 152 31   01/30/19 1614 99.3 °F (37.4 °C) 140 41   01/30/19 0900 98.6 °F (37 °C) 140 44   01/30/19 0430 98.6 °F (37 °C) 142 30   01/30/19 0015 98.4 °F (36.9 °C) 140 52       Classification of Infant's Clinical Presentation:  Based on a review of the vital signs and documented signs of respiratory distress listed above, since birth, did the infant experience at least 2 of the following abnormal clinical findings in the SAME cagetory > 2 hours apart?    Heart rate >160  Respiratory rate > 60  Temperature > 100.4F or < 97.5F  Respiratory distress (grunting, flaring or retracting)    Classification: No.  Infant is classified as 'Well Appearing'  and IS NOT receiving antibiotics.  No further action at this time.   location.  Disoriented to current year.  Was able to state his birthday.        Vitals:   Temp:  [97.7 °F (36.5 °C)-99.7 °F (37.6 °C)] 98 °F (36.7 °C)  Heart Rate:  [57-83] 58  Resp:  [16-26] 22  BP: ()/(62-85) 110/62  Flow (L/min):  [2-3] 3    Current Medications    Current Facility-Administered Medications:     acetaminophen (TYLENOL) tablet 650 mg, 650 mg, Oral, Q4H PRN, Lizzie Conde MD    albuterol (PROVENTIL) nebulizer solution 0.083% 2.5 mg/3mL, 2.5 mg, Nebulization, Q6H PRN, Lizzie Conde MD, 2.5 mg at 08/28/23 1730    aspirin EC tablet 81 mg, 81 mg, Oral, Daily, Lizzie Conde MD, 81 mg at 08/29/23 0838    atorvastatin (LIPITOR) tablet 10 mg, 10 mg, Oral, Nightly, Lizzie Conde MD    sennosides-docusate (PERICOLACE) 8.6-50 MG per tablet 2 tablet, 2 tablet, Oral, BID, 2 tablet at 08/29/23 0837 **AND** polyethylene glycol (MIRALAX) packet 17 g, 17 g, Oral, Daily PRN **AND** bisacodyl (DULCOLAX) EC tablet 5 mg, 5 mg, Oral, Daily PRN **AND** bisacodyl (DULCOLAX) suppository 10 mg, 10 mg, Rectal, Daily PRN, Lizzie Conde MD    cetirizine (zyrTEC) tablet 10 mg, 10 mg, Oral, Daily, Lizzie Conde MD, 10 mg at 08/29/23 0837    Enoxaparin Sodium (LOVENOX) syringe 40 mg, 40 mg, Subcutaneous, Q12H, Lizzie Conde MD, 40 mg at 08/29/23 0836    FLUoxetine (PROzac) capsule 20 mg, 20 mg, Oral, Daily, Lizzie Conde MD, 20 mg at 08/29/23 0837    gabapentin (NEURONTIN) capsule 300 mg, 300 mg, Oral, Q12H, Lizzie Conde MD, 300 mg at 08/29/23 0837    haloperidol lactate (HALDOL) injection 1 mg, 1 mg, Intramuscular, Q6H PRN, Lizzie Conde MD, 1 mg at 08/29/23 0021    HYDROcodone-acetaminophen (NORCO)  MG per tablet 1 tablet, 1 tablet, Oral, Q8H PRN, Lizzie Conde MD    LORazepam (ATIVAN) injection 0.5 mg, 0.5 mg, Intravenous, Once, Ruben Thompson DO    LORazepam (ATIVAN) tablet 0.5 mg, 0.5 mg, Oral, Q6H PRN, Lizzie Conde MD, 0.5 mg at 08/28/23 1626    Magnesium Low Dose  Replacement - Follow Nurse / BPA Driven Protocol, , Does not apply, Elton CORONA Kathryn E, MD    ondansetron (ZOFRAN) injection 4 mg, 4 mg, Intravenous, Q8H PRN, Gertrude Mckinnon APRN    pantoprazole (PROTONIX) EC tablet 40 mg, 40 mg, Oral, Q AM, Lizzie Conde MD, 40 mg at 08/29/23 0838    Potassium Replacement - Follow Nurse / BPA Driven Protocol, , Does not apply, PRNElton Kathryn E, MD    sodium chloride 0.9 % flush 10 mL, 10 mL, Intravenous, Q12H, Lizzie Conde MD, 10 mL at 08/29/23 0837    sodium chloride 0.9 % flush 10 mL, 10 mL, Intravenous, PRN, Lizzie Conde MD    sodium chloride 0.9 % infusion 40 mL, 40 mL, Intravenous, PRN, Lizzie Conde MD    tamsulosin (FLOMAX) 24 hr capsule 0.4 mg, 0.4 mg, Oral, Daily, Lizzie Conde MD, 0.4 mg at 08/29/23 0837    Laboratory Results:   Lab Results   Component Value Date    GLUCOSE 114 (H) 08/29/2023    CALCIUM 8.6 08/29/2023     08/29/2023    K 4.1 08/29/2023    CO2 29.0 08/29/2023     08/29/2023    BUN 24 (H) 08/29/2023    CREATININE 1.33 (H) 08/29/2023    BCR 18.0 08/29/2023    ANIONGAP 9.0 08/29/2023     Lab Results   Component Value Date    WBC 10.55 08/29/2023    HGB 13.0 08/29/2023    HCT 40.0 08/29/2023    .3 (H) 08/29/2023     08/29/2023     No results found for: CHOL  No results found for: HDL  No results found for: LDL  No results found for: TRIG  Lab Results   Component Value Date    HGBA1C 5.80 (H) 05/20/2023     Lab Results   Component Value Date    INR 1.20 (H) 05/20/2023    PROTIME 15.3 (H) 05/20/2023     Lab Results   Component Value Date    FOLATE 8.18 08/29/2023     Lab Results   Component Value Date    [id number] 248 08/29/2023             Assessment / Plan       Brief Patient Summary:  Dieter Christiansen is a 73 y.o. male with a past medical history of COPD, CAD, DM type II, GERD, obesity, HLD, HTN, lung nodule, chronic back pain and MARY ALICE presented to [facility] ED with complaint of confusion.     CTA was negative  for any acute abnormalities.     EEG showed diffuse cerebral dysfunction of mild degree, findings most commonly found in toxic/metabolic encephalopathy or hypoxemia.      Plan:   Metabolic encephalopathy versus polypharmacy  COPD  MARY ALICE  MRI brain with and without contrast pending.  One-time dose of Ativan 0.5 mg for MRI.  Vitamin B12 low normal 248. IM Cyanocobalamin 1000 mcg.   Folate 8.18  IM Haldol for extreme agitation.  Continue gabapentin 300 mg twice daily.  Per bedside RN report patient required Ativan for extreme agitation overnight.  We will trial Seroquel 25 milligrams nightly with agitation.  Repeat EKG in a.m.  Continue work with PT/OT.  Up to chair twice daily as tolerated by patient.  General neurology will continue to follow    I have discussed the above with the patient, bedside RN Dr. French  Time spent with patient: 35 minutes in face-to-face evaluation and management of the patient.    Copied text in this note has been reviewed and is accurate as of 08/29/23.     EUNICE Watson

## 2023-08-29 NOTE — THERAPY EVALUATION
Patient Name: Dieter Christiansen  : 1950    MRN: [id number]                              Today's Date: 2023       Admit Date: 2023    Visit Dx:     ICD-10-CM ICD-9-CM   1. Encephalopathy  G93.40 348.30   2. Acute respiratory failure with hypoxia  J96.01 518.81     Patient Active Problem List   Diagnosis    Chronic back pain    Cellulitis of left hand    Tobacco abuse    CAD (coronary artery disease)    COPD (chronic obstructive pulmonary disease)    Type 2 diabetes mellitus    HTN (hypertension)    HLD (hyperlipidemia)    GERD without esophagitis    MARY ALICE (obstructive sleep apnea)    Infection of finger of left hand    Cellulitis of index finger, left    Other specified local infections of the skin and subcutaneous tissue    Cellulitis due to MRSA    AMS (altered mental status)    Obesity, Class III, BMI 40-49.9 (morbid obesity)     Past Medical History:   Diagnosis Date    Chronic back pain     COPD (chronic obstructive pulmonary disease)     Coronary artery disease involving native coronary artery of native heart without angina pectoris     Diabetes mellitus     GERD (gastroesophageal reflux disease)     Heart attack     Hyperlipidemia     Hypertension     Lung nodule     Murmur     MARY ALICE (obstructive sleep apnea)      Past Surgical History:   Procedure Laterality Date    FEMORAL ARTERY STENT        General Information       Row Name 23 0852          OT Time and Intention    Document Type evaluation  -CS     Mode of Treatment occupational therapy  -CS       Row Name 23 0852          General Information    Patient Profile Reviewed yes  -CS     Prior Level of Function independent:;all household mobility;ADL's;dependent:;driving  Pt reports use of SPC for mobility, shower chair and walk-in shower for bathing, owns BS. Reports spending a lot of time in his recliner. Cpap for sleep, 2-3Lnc daytime. Son or Dtr provide transport to appt.  -CS     Existing Precautions/Restrictions fall;oxygen therapy  device and L/min  -CS     Barriers to Rehab medically complex  -CS       Row Name 08/29/23 0852          Living Environment    People in Home child(nelli), adult  -CS       Row Name 08/29/23 0852          Home Main Entrance    Stair Railings, Main Entrance railings safe and in good condition  -CS       Row Name 08/29/23 0852          Stairs Within Home, Primary    Stairs, Within Home, Primary Pt remains on main floor for all needs  -CS       Row Name 08/29/23 0852          Cognition    Orientation Status (Cognition) disoriented to;person;place  -CS       Row Name 08/29/23 0852          Safety Issues, Functional Mobility    Safety Issues Affecting Function (Mobility) safety precaution awareness;insight into deficits/self-awareness;safety precautions follow-through/compliance;awareness of need for assistance  -CS     Impairments Affecting Function (Mobility) balance;cognition;endurance/activity tolerance;shortness of breath;strength  -CS     Cognitive Impairments, Mobility Safety/Performance insight into deficits/self-awareness;safety precaution follow-through;safety precaution awareness  -CS               User Key  (r) = Recorded By, (t) = Taken By, (c) = Cosigned By      Initials Name Provider Type    CS Yovany Candelario OT Occupational Therapist                     Mobility/ADL's       Row Name 08/29/23 0906          Bed Mobility    Bed Mobility rolling left;rolling right  -CS     Rolling Left [place] (Bed Mobility) moderate assist (50% patient effort);verbal cues;nonverbal cues (demo/gesture)  -CS     Rolling Right [place] (Bed Mobility) moderate assist (50% patient effort);verbal cues;nonverbal cues (demo/gesture)  -CS     Assistive Device (Bed Mobility) bed rails  -CS     Comment, (Bed Mobility) Pt unrestrained and calm, following commands. Rolled for jillian-care and sling placement, lifted to recliner for unknown performance level and safety  -CS       Row Name 08/29/23 0906          Transfers     Transfers sit-stand transfer;stand-sit transfer  -CS     Comment, (Transfers) CGA for STS x 2 from recliner, no dizziness reported, /62  -       Row Name 08/29/23 0906          Sit-Stand Transfer    Sit-Stand [place] (Transfers) contact guard;verbal cues  -CS     Assistive Device (Sit-Stand Transfers) walker, standard  -       Row Name 08/29/23 0906          Stand-Sit Transfer    Stand-Sit [place] (Transfers) contact guard;verbal cues  -CS     Comment, (Stand-Sit Transfer) cues for UE reachback and controlled lowering  -       Row Name 08/29/23 0906          Functional Mobility    Functional Mobility- Comment defer to PT for specifics  -       Row Name 08/29/23 0906          Activities of Daily Living    BADL Assessment/Intervention upper body dressing;lower body dressing;bathing;grooming;feeding;toileting  -       Row Name 08/29/23 0906          Upper Body Dressing Assessment/Training    [place] Level (Upper Body Dressing) don;front opening garment;standby assist  -CS     Position (Upper Body Dressing) unsupported sitting  -CS     Comment, (Upper Body Dressing) hosp gown, setup  -       Row Name 08/29/23 0906          Lower Body Dressing Assessment/Training    [place] Level (Lower Body Dressing) don;socks;maximum assist (25% patient effort)  -CS     Position (Lower Body Dressing) unsupported sitting  -CS     Comment, (Lower Body Dressing) reach to distal BLEs limited by body habitus, appropriate for AE training with improved cognition  -       Row Name 08/29/23 0906          Bathing Assessment/Intervention    [place] Level (Bathing) upper body;chest/trunk;set up;upper extremities;minimum assist (75% patient effort);distal lower extremities/feet;maximum assist (25% patient effort)  -CS     Position (Bathing) unsupported sitting  -CS       Row Name 08/29/23 0906          Grooming Assessment/Training    [place] Level (Grooming) wash face, hands;set up;hair care,  combing/brushing;minimum assist (75% patient effort)  -     Position (Grooming) edge of bed sitting  -       Row Name 08/29/23 0906          Self-Feeding Assessment/Training    [place] Level (Feeding) liquids to mouth;independent  -CS     Position (Self-Feeding) supported sitting  -       Row Name 08/29/23 0906          Toileting Assessment/Training    [place] Level (Toileting) adjust/manage clothing;perform perineal hygiene;moderate assist (50% patient effort)  -               User Key  (r) = Recorded By, (t) = Taken By, (c) = Cosigned By      Initials Name Provider Type    CS Yovany Candelario, OT Occupational Therapist                   Obj/Interventions       Row Name 08/29/23 0919          Sensory Assessment (Somatosensory)    Sensory Assessment (Somatosensory) UE sensation intact  -[facility] Name 08/29/23 0919          Vision Assessment/Intervention    Visual Impairment/Limitations WFL  -       Row Name 08/29/23 0919          Range of Motion Comprehensive    General Range of Motion bilateral upper extremity ROM WFL  -       Row Name 08/29/23 0919          Strength Comprehensive (MMT)    General Manual Muscle Testing (MMT) Assessment upper extremity strength deficits identified  -     Comment, General Manual Muscle Testing (MMT) Assessment BUE grossly 4/5  -       Row Name 08/29/23 0919          Motor Skills    Motor Skills coordination;functional endurance  -     Coordination bimanual skills;WFL  -     Functional Endurance poor, SOA, O2 sats remains stable on baseline on 3Lnc  -       Row Name 08/29/23 0919          Balance    Balance Assessment sitting static balance;sitting dynamic balance;standing static balance;standing dynamic balance  -     Static Sitting Balance standby assist  -     Dynamic Sitting Balance standby assist  -CS     Position, Sitting Balance sitting edge of bed;unsupported  -     Static Standing Balance standby assist  -     Dynamic Standing  Balance contact guard  -CS     Position/Device Used, Standing Balance supported;walker, front-wheeled  -CS     Balance Interventions sitting;sit to stand;standing;occupation based/functional task  -CS               User Key  (r) = Recorded By, (t) = Taken By, (c) = Cosigned By      Initials Name Provider Type    CS Yovany Candelario, OT Occupational Therapist                   Goals/Plan       Row Name 08/29/23 0922          Bed Mobility Goal 1 (OT)    Activity/Assistive Device (Bed Mobility Goal 1, OT) sit to supine;supine to sit;scooting  -CS     [place] Level/Cues Needed (Bed Mobility Goal 1, OT) standby assist  -CS     Time Frame (Bed Mobility Goal 1, OT) long term goal (LTG);10 days  -CS     Progress/Outcomes (Bed Mobility Goal 1, OT) new goal  -CS       Row Name 08/29/23 0922          Transfer Goal 1 (OT)    Activity/Assistive Device (Transfer Goal 1, OT) bed-to-chair/chair-to-bed;toilet  -CS     [place] Level/Cues Needed (Transfer Goal 1, OT) modified independence;supervision required  -CS     Time Frame (Transfer Goal 1, OT) long term goal (LTG);10 days  -CS     Progress/Outcome (Transfer Goal 1, OT) new goal  -CS       Row Name 08/29/23 0922          Dressing Goal 1 (OT)    Activity/Device (Dressing Goal 1, OT) lower body dressing  -CS     [place]/Cues Needed (Dressing Goal 1, OT) minimum assist (75% or more patient effort)  -CS     Time Frame (Dressing Goal 1, OT) long term goal (LTG);10 days  -CS     Strategies/Barriers (Dressing Goal 1, OT) AE prn  -CS     Progress/Outcome (Dressing Goal 1, OT) new goal  -CS       Row Name 08/29/23 0922          Toileting Goal 1 (OT)    Activity/Device (Toileting Goal 1, OT) adjust/manage clothing;perform perineal hygiene  -CS     [place] Level/Cues Needed (Toileting Goal 1, OT) supervision required  -CS     Time Frame (Toileting Goal 1, OT) long term goal (LTG);10 days  -CS     Progress/Outcome (Toileting Goal 1, OT) new goal  -CS       Row Name  08/29/23 0922          Strength Goal 1 (OT)    Strength Goal 1 (OT) Increase gross BUE strength 1/2 muscle grade to promote increased safety/Ind with ADL completion and related transfers  -CS     Time Frame (Strength Goal 1, OT) long term goal (LTG);10 days  -CS     Progress/Outcome (Strength Goal 1, OT) new goal  -CS       Row Name 08/29/23 0922          Therapy Assessment/Plan (OT)    Planned Therapy Interventions (OT) activity tolerance training;functional balance retraining;occupation/activity based interventions;ROM/therapeutic exercise;strengthening exercise;transfer/mobility retraining;patient/caregiver education/training;neuromuscular control/coordination retraining;adaptive equipment training  -CS               User Key  (r) = Recorded By, (t) = Taken By, (c) = Cosigned By      Initials Name Provider Type    CS Yovany Candelario, OT Occupational Therapist                   Clinical Impression       Row Name 08/29/23 0920          Pain Assessment    Additional Documentation Pain Scale: FACES Pre/Post-Treatment (Group)  -CS       Row Name 08/29/23 0920          Pain Scale: FACES Pre/Post-Treatment    Pain: FACES Scale, Pretreatment 2-->hurts little bit  -CS     Posttreatment Pain Rating 2-->hurts little bit  -CS     Pre/Posttreatment Pain Comment pain at Torres site - increases while urinating  -CS       Row Name 08/29/23 0920          Plan of Care Review    Plan of Care Reviewed With patient  -CS     Progress no change  -CS     Outcome Evaluation Baseline ADL completion limited by confusion, decreased functional endurance, mild standing balance deficit, and generalized weakness warranting skilled IP OT services to promote return to PLOF. Pt engaged in bathing and self grooming tasks, was calm and following commands, CGA for stand with BP of 110/62. Recommend d/c to home with assist.  -CS       Row Name 08/29/23 0920          Therapy Assessment/Plan (OT)    Rehab Potential (OT) good, to achieve stated therapy  goals  -CS     Criteria for Skilled Therapeutic Interventions Met (OT) meets criteria;skilled treatment is necessary;yes  -CS     Therapy Frequency (OT) daily  -CS       Row Name 08/29/23 0920          Therapy Plan Review/Discharge Plan (OT)    Anticipated Discharge Disposition (OT) home with assist  -CS       Row Name 08/29/23 0920          Vital Signs    Pre Systolic BP Rehab 129  RN cleared for eval  -CS     Pre Treatment Diastolic BP 72  -CS     Post Systolic BP Rehab 110  -CS     Post Treatment Diastolic BP 62  -CS     Posttreatment Heart Rate (beats/min) 66  -CS     Pre SpO2 (%) 99  -CS     O2 Delivery Pre Treatment nasal cannula  -CS     Intra SpO2 (%) 91  -CS     O2 Delivery Intra Treatment nasal cannula  -CS     Post SpO2 (%) 96  -CS     O2 Delivery Post Treatment nasal cannula  -CS     Pre Patient Position Supine  -CS     Intra Patient Position Standing  -CS     Post Patient Position Sitting  -CS       Row Name 08/29/23 0920          Positioning and Restraints    Pre-Treatment Position in bed  -CS     Post Treatment Position chair  -CS     In Chair notified nsg;reclined;sitting;call light within reach;encouraged to call for assist;exit alarm on;waffle cushion;on mechanical lift sling;legs elevated;with nsg;with other staff  w/ sitter  -CS     Restraints released:;soft limb;other (comment)  4pt, RN approved remaining unrestrained in recliner with sitter  -CS               User Key  (r) = Recorded By, (t) = Taken By, (c) = Cosigned By      Initials Name Provider Type    CS Yovany Candelario, OT Occupational Therapist                   Outcome Measures       Row Name 08/29/23 0928          How much help from another is currently needed...    Putting on and taking off regular lower body clothing? 2  -CS     Bathing (including washing, rinsing, and drying) 2  -CS     Toileting (which includes using toilet bed pan or urinal) 3  -CS     Putting on and taking off regular upper body clothing 3  -CS     Taking care  of personal grooming (such as brushing teeth) 3  -CS     Eating meals 4  -CS     AM-PAC 6 Clicks Score (OT) 17  -CS       Row Name 08/29/23 0747          How much help from another person do you currently need...    Turning from your back to your side while in flat bed without using bedrails? 3  -LC     Moving from lying on back to sitting on the side of a flat bed without bedrails? 3  -LC     Moving to and from a bed to a chair (including a wheelchair)? 3  -LC     Standing up from a chair using your arms (e.g., wheelchair, bedside chair)? 3  -LC     Climbing 3-5 steps with a railing? 3  -LC     To walk in hospital room? 3  -LC     AM-PAC 6 Clicks Score (PT) 18  -LC     Highest level of mobility 6 --> Walked 10 steps or more  -       Row Name 08/29/23 0928          Functional Assessment    Outcome Measure Options AM-PAC 6 Clicks Daily Activity (OT)  -               User Key  (r) = Recorded By, (t) = Taken By, (c) = Cosigned By      Initials Name Provider Type     Rand Rausch RN Registered Nurse    Yovany Kruger OT Occupational Therapist                    Occupational Therapy Education       Title: PT OT SLP Therapies (In Progress)       Topic: Occupational Therapy (In Progress)       Point: ADL training (Done)       Description:   Instruct learner(s) on proper safety adaptation and remediation techniques during self care or transfers.   Instruct in proper use of assistive devices.                  Learning Progress Summary             Patient Acceptance, E,D, VU,DU by  at 8/29/2023 0930                         Point: Home exercise program (Not Started)       Description:   Instruct learner(s) on appropriate technique for monitoring, assisting and/or progressing therapeutic exercises/activities.                  Learner Progress:  Not documented in this visit.              Point: Precautions (Done)       Description:   Instruct learner(s) on prescribed precautions during self-care and functional  transfers.                  Learning Progress Summary             Patient Acceptance, E,D, VU,DU by  at 8/29/2023 0930                         Point: Body mechanics (Done)       Description:   Instruct learner(s) on proper positioning and spine alignment during self-care, functional mobility activities and/or exercises.                  Learning Progress Summary             Patient Acceptance, E,D, VU,DU by  at 8/29/2023 0930                                         User Key       Initials Effective Dates Name Provider Type Discipline     06/16/21 -  Yovany Candelario, OT Occupational Therapist OT                  OT Recommendation and Plan  Planned Therapy Interventions (OT): activity tolerance training, functional balance retraining, occupation/activity based interventions, ROM/therapeutic exercise, strengthening exercise, transfer/mobility retraining, patient/caregiver education/training, neuromuscular control/coordination retraining, adaptive equipment training  Therapy Frequency (OT): daily  Plan of Care Review  Plan of Care Reviewed With: patient  Progress: no change  Outcome Evaluation: Baseline ADL completion limited by confusion, decreased functional endurance, mild standing balance deficit, and generalized weakness warranting skilled IP OT services to promote return to PLOF. Pt engaged in bathing and self grooming tasks, was calm and following commands, CGA for stand with BP of 110/62. Recommend d/c to home with assist.     Time Calculation:   Evaluation Complexity (OT)  Review Occupational Profile/Medical/Therapy History Complexity: expanded/moderate complexity  Assessment, Occupational Performance/Identification of Deficit Complexity: 3-5 performance deficits  Clinical Decision Making Complexity (OT): detailed assessment/moderate complexity  Overall Complexity of Evaluation (OT): moderate complexity     Time Calculation- OT       Row Name 08/29/23 0931             Time Calculation- OT    OT Start  Time 0750  -CS      OT Received On 08/29/23  -CS      OT Goal Re-Cert Due Date 09/08/23  -CS         Timed Charges    61765 - OT Therapeutic Activity Minutes 5  -CS      86213 - OT Self Care/Mgmt Minutes 20  -CS         Untimed Charges    OT Eval/Re-eval Minutes 46  -CS         Total Minutes    Timed Charges Total Minutes 25  -CS      Untimed Charges Total Minutes 46  -CS       Total Minutes 71  -CS                User Key  (r) = Recorded By, (t) = Taken By, (c) = Cosigned By      Initials Name Provider Type    CS Yovany Candelario, OT Occupational Therapist                  Therapy Charges for Today       Code Description Service Date Service Provider Modifiers Qty    91167314856 HC OT THERAPEUTIC ACT EA 15 MIN 8/29/2023 Yovany Candelario OT GO 1    20048830319 HC OT SELF CARE/MGMT/TRAIN EA 15 MIN 8/29/2023 Yovany Candelario, OT GO 1    48336713971 HC OT EVAL MOD COMPLEXITY 4 8/29/2023 Yovany Candelario OT GO 1    65209913075 HC OT THER SUPP EA 15 MIN 8/29/2023 Yovany Candelario OT GO 2                 Yovany Candelario OT  8/29/2023

## 2023-08-29 NOTE — PROGRESS NOTES
[facility] Medicine Services  PROGRESS NOTE    Patient Name: Dieter Christiansen  : 1950  MRN: [id number]    Date of Admission: 2023  Primary Care Physician: Ian Sanchez APRN    Subjective   Subjective     CC:  agitation, confusion    HPI:  Better today per RN, not combative, somewhat confused.  Currently sleeping and I did not wake him    ROS:  UTO    Objective   Objective     Vital Signs:   Temp:  [97.7 °F (36.5 °C)-99.7 °F (37.6 °C)] 98 °F (36.7 °C)  Heart Rate:  [57-83] 59  Resp:  [16-26] 16  BP: ()/(56-85) 129/72  Flow (L/min):  [2] 2     Physical Exam:  Asleep, in restraints  Reg resps  No restlessness  Torres     Results Reviewed:  LAB RESULTS:      Lab 23   WBC 10.55  --  10.45   HEMOGLOBIN 13.0  --  14.3   HEMATOCRIT 40.0  --  43.9   PLATELETS 238  --  258   NEUTROS ABS 7.15*  --  7.82*   IMMATURE GRANS (ABS) 0.04  --  0.06*   LYMPHS ABS 2.21  --  1.64   MONOS ABS 1.02*  --  0.71   EOS ABS 0.09  --  0.17   .3*  --  100.2*   SED RATE  --   --  52*   CRP  --   --  0.44   PROCALCITONIN  --   --  0.08   LACTATE  --  1.5 3.1*         Lab 23  0523   SODIUM 139 139   POTASSIUM 4.1 3.9   CHLORIDE 101 100   CO2 29.0 25.0   ANION GAP 9.0 14.0   BUN 24* 14   CREATININE 1.33* 1.29*   EGFR 56.8* 58.9*   GLUCOSE 114* 133*   CALCIUM 8.6 9.1   MAGNESIUM  --  1.9   TSH  --  1.920         Lab 23  0523   TOTAL PROTEIN 6.2 7.4   ALBUMIN 3.6 3.8   GLOBULIN 2.6 3.6   ALT (SGPT) 6 5   AST (SGOT) 35 18   BILIRUBIN 1.3* 0.7   ALK PHOS 99 112         Lab 23  0422   PROBNP 1,195.0*   HSTROP T 21*                 Lab 23  0800 23  0437   PH, ARTERIAL 7.397 7.414   PCO2, ARTERIAL 47.4* 44.8   PO2 ART 71.7* 63.4*   FIO2 28 21   HCO3 ART 29.1* 28.6*   BASE EXCESS ART 3.4* 3.4*   CARBOXYHEMOGLOBIN 1.5 1.6     Brief Urine Lab Results  (Last result in the past 365 days)        Color    Clarity   Blood   Leuk Est   Nitrite   Protein   CREAT   Urine HCG        08/28/23 0439 Yellow   Clear   Trace   Negative   Negative   Negative                   Microbiology Results Abnormal       Procedure Component Value - Date/Time    Blood Culture - Blood, Hand, Right [[id number]]  (Normal) Collected: 08/28/23 0415    Lab Status: Preliminary result Specimen: Blood from Hand, Right Updated: 08/29/23 0515     Blood Culture No growth at 24 hours    Respiratory Panel PCR w/COVID-19(SARS-CoV-2) RAY/CHET/HEIDE/PAD/COR/MAD/EDD In-House, NP Swab in UTM/VTM, 3-4 HR TAT - Swab, Nasopharynx [[id number]]  (Normal) Collected: 08/28/23 0423    Lab Status: Final result Specimen: Swab from Nasopharynx Updated: 08/28/23 0520     ADENOVIRUS, PCR Not Detected     Coronavirus 229E Not Detected     Coronavirus HKU1 Not Detected     Coronavirus NL63 Not Detected     Coronavirus OC43 Not Detected     COVID19 Not Detected     Human Metapneumovirus Not Detected     Human Rhinovirus/Enterovirus Not Detected     Influenza A PCR Not Detected     Influenza B PCR Not Detected     Parainfluenza Virus 1 Not Detected     Parainfluenza Virus 2 Not Detected     Parainfluenza Virus 3 Not Detected     Parainfluenza Virus 4 Not Detected     RSV, PCR Not Detected     Bordetella pertussis pcr Not Detected     Bordetella parapertussis PCR Not Detected     Chlamydophila pneumoniae PCR Not Detected     Mycoplasma pneumo by PCR Not Detected    Narrative:      In the setting of a positive respiratory panel with a viral infection PLUS a negative procalcitonin without other underlying concern for bacterial infection, consider observing off antibiotics or discontinuation of antibiotics and continue supportive care. If the respiratory panel is positive for atypical bacterial infection (Bordetella pertussis, Chlamydophila pneumoniae, or Mycoplasma pneumoniae), consider antibiotic de-escalation to target atypical bacterial infection.            EEG    Result Date:  8/28/2023  Reason for referral: 73 y.o.male with altered mental status Technical Summary:  A 19 channel digital EEG was performed using the international 10-20 placement system, including eye leads and EKG leads. Duration: 21 minutes Findings: The patient is awake and confused.  The background shows diffuse low amplitude 6-7 Hz theta which is present symmetrically over both hemispheres.  A clear posterior rhythm is not seen.  Low amplitude EMG artifact is seen over the anterior leads.  Stage II sleep is not clearly seen.  No focal features or epileptiform activity are seen.  Photic stimulation does not change the background.  Hyperventilation is not performed. Video: Available Technical quality: Superior EKG: Regular, 70 bpm SUMMARY: Mild generalized slow No focal features or epileptiform activity are seen     Impression: Diffuse cerebral dysfunction of mild degree but nonspecific.  This finding is most commonly seen with toxic/metabolic or hypoxemic and encephalopathy This report is transcribed using the Dragon dictation system.      CT Head Without Contrast    Result Date: 8/28/2023  CT HEAD WO CONTRAST Date of Exam: 8/28/2023 7:08 AM EDT Indication: Mental status change, unknown cause. Comparison: None available. Technique: Axial CT images were obtained of the head without contrast administration.  Automated exposure control and iterative construction methods were used. Findings: No acute intracranial hemorrhage. No acute large territory infarct. There are scattered subcortical and periventricular white matter hypodensities which are nonspecific and can be seen in the setting of chronic small vessel ischemic change. No extra-axial collections. No midline shift or herniation. Normal size and configuration of the ventricles. Unremarkable appearance of the orbits. The paranasal sinuses are grossly clear. The mastoid air cells are grossly clear. No acute or suspicious bony  findings.     Impression: Impression: No  acute intracranial findings. Chronic and senescent changes as above. Electronically Signed: Venu Carbone MD  8/28/2023 7:27 AM EDT  Workstation ID: [id number]    XR Chest 1 View    Result Date: 8/28/2023  XR CHEST 1 VW Date of Exam: 8/28/2023 5:21 AM EDT Indication: cough Comparison: 10/7/2022 chest CT. Findings: Exam limited by body habitus. Cardiac silhouette is enlarged. There is likely cardiomegaly although accentuated by AP portable technique. The lungs appear clear. No pneumothorax or pleural effusion. No acute osseous abnormality. Pulmonary vasculature is within normal limits.     Impression: Impression: Cardiomegaly without acute cardiopulmonary abnormality. Electronically Signed: Suhail De Santiago MD  8/28/2023 5:46 AM EDT  Workstation ID: [id number]         Current medications:  Scheduled Meds:aspirin, 81 mg, Oral, Daily  atorvastatin, 10 mg, Oral, Nightly  cetirizine, 10 mg, Oral, Daily  enoxaparin, 40 mg, Subcutaneous, Q12H  FLUoxetine, 20 mg, Oral, Daily  gabapentin, 300 mg, Oral, Q12H  pantoprazole, 40 mg, Oral, Q AM  senna-docusate sodium, 2 tablet, Oral, BID  sodium chloride, 10 mL, Intravenous, Q12H  tamsulosin, 0.4 mg, Oral, Daily      Continuous Infusions:   PRN Meds:.  acetaminophen    albuterol    senna-docusate sodium **AND** polyethylene glycol **AND** bisacodyl **AND** bisacodyl    haloperidol lactate    HYDROcodone-acetaminophen    LORazepam    Magnesium Low Dose Replacement - Follow Nurse / BPA Driven Protocol    ondansetron    Potassium Replacement - Follow Nurse / BPA Driven Protocol    sodium chloride    sodium chloride    Assessment & Plan   Assessment & Plan     Active Hospital Problems    Diagnosis  POA    **AMS (altered mental status) [R41.82]  Yes    Obesity, Class III, BMI 40-49.9 (morbid obesity) [E66.01]  Yes    CAD (coronary artery disease) [I25.10]  Yes    COPD (chronic obstructive pulmonary disease) [J44.9]  Yes    Type 2 diabetes mellitus [E11.9]  Yes    HTN (hypertension) [I10]   "Yes    HLD (hyperlipidemia) [E78.5]  Yes    MARY ALICE (obstructive sleep apnea) [G47.33]  Yes      Resolved Hospital Problems   No resolved problems to display.        Brief Hospital Course to date:  Dieter Christiansen is a 73 y.o. male  W PMH of CAD COPD DM HTN HL MARY ALICE chrnic back pain, lung nodule and obesity.  Woke up confused,hypoxic, \"talking crazy\".  Combative in ED and got sedatives.  Bladder scan shows 1000ml.      Acute confusion  - agitation, combativeness  - off CPAP overnight   - Ct head okay , await MRI  - polypharmacy :  takes Lortab 10s TID and gabapentin 600 tid  - note that his  UDS was negative so ? Of withdrawal, abuse, or diversion     Acute urinary retention  - Torres placed in ED    Pos blood cx  - CNS - presume contaminant; follow fever curve     Expected Discharge Location and Transportation: tbd  Expected Dischargetbd  Expected Discharge Date: 8/31/2023; Expected Discharge Time:      DVT prophylaxis:  Medical DVT prophylaxis orders are present.          CODE STATUS:   Code Status and Medical Interventions:   Ordered at: 08/28/23 1114     Code Status (Patient has no pulse and is not breathing):    CPR (Attempt to Resuscitate)     Medical Interventions (Patient has pulse or is breathing):    Full Support       Ronna French MD  08/29/23      "

## 2023-08-29 NOTE — NURSING NOTE
WOC consult for   masd, coccyx     Per [facility] protocol, MASD does not require WOC consult. Spoke with RN and sitter, confirmed not pressure related.     Cleanse with pH balanced no-rinse foam soap and blue barrier wipes, apply thin layer of z guard.    Patient on bariatric KELLEE.

## 2023-08-29 NOTE — PLAN OF CARE
Goal Outcome Evaluation:  Plan of Care Reviewed With: patient        Progress: no change  Outcome Evaluation: Baseline ADL completion limited by confusion, decreased functional endurance, mild standing balance deficit, and generalized weakness warranting skilled IP OT services to promote return to PLOF. Pt engaged in bathing and self grooming tasks, was calm and following commands, CGA for stand with BP of 110/62. Recommend d/c to home with assist.      Anticipated Discharge Disposition (OT): home with assist

## 2023-08-30 ENCOUNTER — APPOINTMENT (OUTPATIENT)
Dept: MRI IMAGING | Facility: HOSPITAL | Age: 73
End: 2023-08-30
Payer: MEDICARE

## 2023-08-30 LAB
BACTERIA SPEC AEROBE CULT: ABNORMAL
GRAM STN SPEC: ABNORMAL
ISOLATED FROM: ABNORMAL

## 2023-08-30 PROCEDURE — 99213 OFFICE O/P EST LOW 20 MIN: CPT

## 2023-08-30 PROCEDURE — 93005 ELECTROCARDIOGRAM TRACING: CPT

## 2023-08-30 PROCEDURE — 97162 PT EVAL MOD COMPLEX 30 MIN: CPT

## 2023-08-30 PROCEDURE — 96372 THER/PROPH/DIAG INJ SC/IM: CPT

## 2023-08-30 PROCEDURE — 25010000002 ENOXAPARIN PER 10 MG: Performed by: INTERNAL MEDICINE

## 2023-08-30 PROCEDURE — 99232 SBSQ HOSP IP/OBS MODERATE 35: CPT | Performed by: INTERNAL MEDICINE

## 2023-08-30 PROCEDURE — 93010 ELECTROCARDIOGRAM REPORT: CPT | Performed by: INTERNAL MEDICINE

## 2023-08-30 RX ORDER — GABAPENTIN 300 MG/1
300 CAPSULE ORAL NIGHTLY
Status: DISCONTINUED | OUTPATIENT
Start: 2023-08-30 | End: 2023-09-02 | Stop reason: HOSPADM

## 2023-08-30 RX ORDER — ENOXAPARIN SODIUM 100 MG/ML
60 INJECTION SUBCUTANEOUS EVERY 12 HOURS SCHEDULED
Status: DISCONTINUED | OUTPATIENT
Start: 2023-08-30 | End: 2023-09-02 | Stop reason: HOSPADM

## 2023-08-30 RX ADMIN — Medication 10 ML: at 08:43

## 2023-08-30 RX ADMIN — ATORVASTATIN CALCIUM 10 MG: 10 TABLET, FILM COATED ORAL at 20:05

## 2023-08-30 RX ADMIN — FLUOXETINE 20 MG: 20 CAPSULE ORAL at 08:43

## 2023-08-30 RX ADMIN — Medication 10 ML: at 20:05

## 2023-08-30 RX ADMIN — TAMSULOSIN HYDROCHLORIDE 0.4 MG: 0.4 CAPSULE ORAL at 08:43

## 2023-08-30 RX ADMIN — ENOXAPARIN SODIUM 60 MG: 60 INJECTION SUBCUTANEOUS at 20:05

## 2023-08-30 RX ADMIN — GABAPENTIN 300 MG: 300 CAPSULE ORAL at 20:05

## 2023-08-30 RX ADMIN — PANTOPRAZOLE SODIUM 40 MG: 40 TABLET, DELAYED RELEASE ORAL at 05:14

## 2023-08-30 RX ADMIN — QUETIAPINE FUMARATE 25 MG: 25 TABLET ORAL at 20:05

## 2023-08-30 RX ADMIN — ASPIRIN 81 MG: 81 TABLET, COATED ORAL at 08:43

## 2023-08-30 RX ADMIN — CETIRIZINE HYDROCHLORIDE 10 MG: 10 TABLET, FILM COATED ORAL at 08:43

## 2023-08-30 RX ADMIN — ENOXAPARIN SODIUM 40 MG: 100 INJECTION SUBCUTANEOUS at 08:43

## 2023-08-30 RX ADMIN — SENNOSIDES AND DOCUSATE SODIUM 2 TABLET: 8.6; 5 TABLET ORAL at 08:43

## 2023-08-30 RX ADMIN — SENNOSIDES AND DOCUSATE SODIUM 2 TABLET: 8.6; 5 TABLET ORAL at 20:05

## 2023-08-30 NOTE — PLAN OF CARE
Goal Outcome Evaluation:  Plan of Care Reviewed With: patient        Progress: no change  Outcome Evaluation: PT initial evluation complete. Pt appears to be near his baseline level of function, however demonstrating decreased functional activity tolerance, generalized weakness, and balance deficits warranting further skilled PT services. Pt ambulated 11ft + 16ft with RW and MinAx1, pt with safety awarenes deficits requiring frequent cues to correct. PT recommending inpatient rehab at [place].      Anticipated Discharge Disposition (PT): inpatient rehabilitation facility

## 2023-08-30 NOTE — PROGRESS NOTES
[facility] Neurology    Progress Note    Patient Name: Dieter Christiansen  : 1950  MRN: [id number]  Primary Care Physician:  Ian Sanchez APRN  Date of admission: 2023    Subjective     Chief Complaint: Altered mental status    History of Present Illness   Patient was resting comfortably in chair.  He was alert and oriented to self, location and city.  He was disoriented to year as he thought it was .  Overall his demeanor seems to be improved.  Per bedside RN report from yesterday he slept most of the day.  He did require dose of Haldol at  at midnight. Patient's demeanor seems to be overall improving.    Spoke with patient about how often he takes his gabapentin.  He stated he takes 1 in the morning and 1 in the afternoon.  He stated he had not changed up this regimen for quite some time.    Review of Systems   General: Negative for fever, nausea, or vomiting.   Neurological: Negative for headache, pain, or weakness.     Objective     Physical Exam  Vitals and nursing note reviewed.   Constitutional:       General: He is not in acute distress.  Eyes:      Extraocular Movements: Extraocular movements intact.      Pupils: Pupils are equal, round, and reactive to light.      Comments: No nystagmus noted   Cardiovascular:      Rate and Rhythm: Normal rate.   Pulmonary:      Effort: Pulmonary effort is normal.   Neurological:      Mental Status: He is alert. He is disoriented.      Motor: Motor function is intact.      Comments:   Cranial Nerves   CN II: Pupils are equal, round, and reactive to light. Normal visual acuity and visual fields.    CN III IV VI: Extraocular movements are full without nystagmus.  CN V: Normal facial sensation and strength of muscles of mastication.  CN VII: Facial movements are symmetric. No weakness.  CN VIII:  Auditory acuity is normal.    Patient oriented to self and location.  Disoriented to year   Psychiatric:      Comments: Calm and cooperative        Vitals:    Temp:  [97.5 °F (36.4 °C)-98.3 °F (36.8 °C)] 97.9 °F (36.6 °C)  Heart Rate:  [53-73] 61  Resp:  [18-20] 18  BP: (108-126)/(61-68) 126/64  Flow (L/min):  [2.5-3] 3    Current Medications    Current Facility-Administered Medications:     acetaminophen (TYLENOL) tablet 650 mg, 650 mg, Oral, Q4H PRN, Lizzie Conde MD, 650 mg at 08/29/23 2005    albuterol (PROVENTIL) nebulizer solution 0.083% 2.5 mg/3mL, 2.5 mg, Nebulization, Q6H PRN, Lizzie Conde MD, 2.5 mg at 08/28/23 1730    aspirin EC tablet 81 mg, 81 mg, Oral, Daily, Lizzie Conde MD, 81 mg at 08/30/23 0843    atorvastatin (LIPITOR) tablet 10 mg, 10 mg, Oral, Nightly, Lizzie Conde MD, 10 mg at 08/29/23 2006    sennosides-docusate (PERICOLACE) 8.6-50 MG per tablet 2 tablet, 2 tablet, Oral, BID, 2 tablet at 08/30/23 0843 **AND** polyethylene glycol (MIRALAX) packet 17 g, 17 g, Oral, Daily PRN **AND** bisacodyl (DULCOLAX) EC tablet 5 mg, 5 mg, Oral, Daily PRN **AND** bisacodyl (DULCOLAX) suppository 10 mg, 10 mg, Rectal, Daily PRN, Lizzie Conde MD    cetirizine (zyrTEC) tablet 10 mg, 10 mg, Oral, Daily, Lizzie Conde MD, 10 mg at 08/30/23 0843    cyanocobalamin injection 1,000 mcg, 1,000 mcg, Intramuscular, Q28 Days, Gertrude Mckinnon, APRN, 1,000 mcg at 08/29/23 1447    Enoxaparin Sodium (LOVENOX) syringe 60 mg, 60 mg, Subcutaneous, Q12H, Ronna French MD    FLUoxetine (PROzac) capsule 20 mg, 20 mg, Oral, Daily, Lizzie Conde MD, 20 mg at 08/30/23 0843    gabapentin (NEURONTIN) capsule 300 mg, 300 mg, Oral, Nightly, Gertrude Mckinnon, APRN    haloperidol lactate (HALDOL) injection 1 mg, 1 mg, Intramuscular, Q6H PRN, Lizzie Conde MD, 1 mg at 08/29/23 0021    HYDROcodone-acetaminophen (NORCO)  MG per tablet 1 tablet, 1 tablet, Oral, Q8H PRN, Lizzie Conde MD    LORazepam (ATIVAN) tablet 0.5 mg, 0.5 mg, Oral, Q6H PRN, Lizzie Conde MD, 0.5 mg at 08/28/23 1626    Magnesium Low Dose Replacement - Follow Nurse / BPA Driven  Protocol, , Does not apply, Elton CORONA Kathryn E, MD    ondansetron (ZOFRAN) injection 4 mg, 4 mg, Intravenous, Q8H PRN, Gertrude Mckinnon, APRN    pantoprazole (PROTONIX) EC tablet 40 mg, 40 mg, Oral, Q AM, Lizzie Conde MD, 40 mg at 08/30/23 0514    Potassium Replacement - Follow Nurse / BPA Driven Protocol, , Does not apply, Elton CORONA Kathryn E, MD    QUEtiapine (SEROquel) tablet 25 mg, 25 mg, Oral, Nightly, Gertrude Mckinnon, APRN, 25 mg at 08/29/23 2005    sodium chloride 0.9 % flush 10 mL, 10 mL, Intravenous, Q12H, Lizzie Conde MD, 10 mL at 08/30/23 0843    sodium chloride 0.9 % flush 10 mL, 10 mL, Intravenous, PRN, Lizzie Conde MD    sodium chloride 0.9 % infusion 40 mL, 40 mL, Intravenous, PRN, Lizzie Conde MD    tamsulosin (FLOMAX) 24 hr capsule 0.4 mg, 0.4 mg, Oral, Daily, Lizzie Conde MD, 0.4 mg at 08/30/23 0843    Laboratory Results:   Lab Results   Component Value Date    GLUCOSE 114 (H) 08/29/2023    CALCIUM 8.6 08/29/2023     08/29/2023    K 4.1 08/29/2023    CO2 29.0 08/29/2023     08/29/2023    BUN 24 (H) 08/29/2023    CREATININE 1.33 (H) 08/29/2023    BCR 18.0 08/29/2023    ANIONGAP 9.0 08/29/2023     Lab Results   Component Value Date    WBC 10.55 08/29/2023    HGB 13.0 08/29/2023    HCT 40.0 08/29/2023    .3 (H) 08/29/2023     08/29/2023     No results found for: CHOL  No results found for: HDL  No results found for: LDL  No results found for: TRIG  Lab Results   Component Value Date    HGBA1C 5.80 (H) 05/20/2023     Lab Results   Component Value Date    INR 1.20 (H) 05/20/2023    PROTIME 15.3 (H) 05/20/2023     Lab Results   Component Value Date    FOLATE 8.18 08/29/2023     Lab Results   Component Value Date    [id number] 08/29/2023             Assessment / Plan     Active Hospital Problems:    AMS (altered mental status)    CAD (coronary artery disease)    COPD (chronic obstructive pulmonary disease)    Type 2 diabetes mellitus    HTN  (hypertension)    HLD (hyperlipidemia)    MARY ALICE (obstructive sleep apnea)    Obesity, Class III, BMI 40-49.9 (morbid obesity)       Brief Patient Summary:  Dieter Christiansen is a 73 y.o. male with a past medical history of COPD, CAD, DM type II, GERD, obesity, HLD, HTN, lung nodule, chronic back pain and MARY ALICE presented to [facility] ED with complaint of confusion.     CTA was negative for any acute abnormalities.     EEG showed diffuse cerebral dysfunction of mild degree, findings most commonly found in toxic/metabolic encephalopathy or hypoxemia.      Plan:   Metabolic encephalopathy versus polypharmacy  Versus hospital delirium  COPD  MARY ALICE  MRI brain with without contrast still pending.  One-time dose of Ativan 0.5 mg for MRI.  IM Cyanocobalamin 1000 mcg on 8/28  Per nursing report patient slept most of the day yesterday.  And was awake most of the night.  Discontinue gabapentin a.m. dose.  Continue gabapentin 300 mg nightly.  EKG pending  Continue Seroquel 25 mg nightly  Continue to work with PT/OT patient chair twice daily as tolerated.  General neurology continue to follow    I have discussed the above with the patient, bedside RN Dr. French  Time spent with patient: 35 minutes in face-to-face evaluation and management of the patient.      EUNICE Watson

## 2023-08-30 NOTE — THERAPY EVALUATION
Patient Name: Dieter Christiansen  : 1950    MRN: [id number]                              Today's Date: 2023       Admit Date: 2023    Visit Dx:     ICD-10-CM ICD-9-CM   1. Encephalopathy  G93.40 348.30   2. Acute respiratory failure with hypoxia  J96.01 518.81     Patient Active Problem List   Diagnosis    Chronic back pain    Cellulitis of left hand    Tobacco abuse    CAD (coronary artery disease)    COPD (chronic obstructive pulmonary disease)    Type 2 diabetes mellitus    HTN (hypertension)    HLD (hyperlipidemia)    GERD without esophagitis    MARY ALICE (obstructive sleep apnea)    Infection of finger of left hand    Cellulitis of index finger, left    Other specified local infections of the skin and subcutaneous tissue    Cellulitis due to MRSA    AMS (altered mental status)    Obesity, Class III, BMI 40-49.9 (morbid obesity)     Past Medical History:   Diagnosis Date    Chronic back pain     COPD (chronic obstructive pulmonary disease)     Coronary artery disease involving native coronary artery of native heart without angina pectoris     Diabetes mellitus     GERD (gastroesophageal reflux disease)     Heart attack     Hyperlipidemia     Hypertension     Lung nodule     Murmur     MARY ALICE (obstructive sleep apnea)      Past Surgical History:   Procedure Laterality Date    FEMORAL ARTERY STENT        General Information       Row Name 23 1521          Physical Therapy Time and Intention    Document Type evaluation  -     Mode of Treatment physical therapy;individual therapy  -       Row Name 23 1521          General Information    Patient Profile Reviewed yes  -     Prior Level of Function independent:;all household mobility;gait;transfer;bed mobility;min assist:;using stairs  Pt reports he uses a cane for household mobility, has rollator. Requires assist for ascending/descending steps into home.  -     Existing Precautions/Restrictions fall;oxygen therapy device and L/min  -      Barriers to Rehab medically complex  -[facility] Name 08/30/23 1521          Living Environment    People in Home child(nelli), adult;other (see comments)  Lives with daughter  -[facility] Name 08/30/23 1521          Home Main Entrance    Number of Stairs, Main Entrance three  -     Stair Railings, Main Entrance none  -[facility] Name 08/30/23 1521          Stairs Within Home, Primary    Number of Stairs, Within Home, Primary none  -[facility] Name 08/30/23 1521          Cognition    Orientation Status (Cognition) oriented x 4  -[facility] Name 08/30/23 1521          Safety Issues, Functional Mobility    Safety Issues Affecting Function (Mobility) awareness of need for assistance;insight into deficits/self-awareness;positioning of assistive device;safety precaution awareness;safety precautions follow-through/compliance;sequencing abilities  -     Impairments Affecting Function (Mobility) balance;cognition;endurance/activity tolerance;shortness of breath;strength  -               User Key  (r) = Recorded By, (t) = Taken By, (c) = Cosigned By      Initials Name Provider Type     Terry Irene, PT Physical Therapist                   Mobility       [facility] Name 08/30/23 1522          Bed Mobility    Bed Mobility sit-supine  -     Sit-Supine [place] (Bed Mobility) minimum assist (75% patient effort);1 person assist;verbal cues  -     Assistive Device (Bed Mobility) bed rails  -     Comment, (Bed Mobility) Pt required physical assist for positioning of BLEs in bed.  -[facility] Name 08/30/23 1522          Sit-Stand Transfer    Sit-Stand [place] (Transfers) moderate assist (50% patient effort);1 person assist;verbal cues;nonverbal cues (demo/gesture)  -     Assistive Device (Sit-Stand Transfers) walker, front-wheeled  -     Comment, (Sit-Stand Transfer) Pt performed STS from lower surface of commode requiring modA to achieve upright standing, pt required Yousuf for STS from recliner.   -       Row Name 08/30/23 1522          Gait/Stairs (Locomotion)    [place] Level (Gait) minimum assist (75% patient effort);1 person assist;verbal cues;nonverbal cues (demo/gesture)  -     Assistive Device (Gait) walker, front-wheeled  -     Distance in Feet (Gait) 11 + 16  -     Deviations/Abnormal Patterns (Gait) bilateral deviations;base of support, wide;bhumi decreased;festinating/shuffling;gait speed decreased;stride length decreased  -     Bilateral Gait Deviations forward flexed posture;heel strike decreased  -     [place] Level (Stairs) not tested  -     Comment, (Gait/Stairs) Pt demonstrated significant short/shuffled gait pattern with decreased gait speed and increased lateral sway. Pt requried frequent cues to not pick the RW up and carry it while walking and to use BUEs for support on RW for safety. Pt declined further ambulation d/t complaints of fatigue.  -               User Key  (r) = Recorded By, (t) = Taken By, (c) = Cosigned By      Initials Name Provider Type     Terry Irene, PT Physical Therapist                   Obj/Interventions       Row Name 08/30/23 1526          Range of Motion Comprehensive    General Range of Motion bilateral lower extremity ROM WFL  -       Row Name 08/30/23 1526          Strength Comprehensive (MMT)    General Manual Muscle Testing (MMT) Assessment lower extremity strength deficits identified  -     Comment, General Manual Muscle Testing (MMT) Assessment BLE strength grossly 4/5  -       Row Name 08/30/23 1526          Balance    Balance Assessment sitting static balance;sitting dynamic balance;standing static balance;standing dynamic balance  -     Static Sitting Balance standby assist  -     Dynamic Sitting Balance standby assist  -     Position, Sitting Balance unsupported;sitting in chair;sitting edge of bed  -     Static Standing Balance contact guard  -     Dynamic Standing Balance minimal assist  -      Position/Device Used, Standing Balance supported;walker, front-wheeled  -     Comment, Balance Pt with mild instability during dynamic standing activities.  -       Row Name 08/30/23 1526          Sensory Assessment (Somatosensory)    Sensory Assessment (Somatosensory) LE sensation intact  -               User Key  (r) = Recorded By, (t) = Taken By, (c) = Cosigned By      Initials Name Provider Type     Terry Irene, PT Physical Therapist                   Goals/Plan       Row Name 08/30/23 1531          Bed Mobility Goal 1 (PT)    Activity/Assistive Device (Bed Mobility Goal 1, PT) sit to supine/supine to sit  -     [place] Level/Cues Needed (Bed Mobility Goal 1, PT) independent  -     Time Frame (Bed Mobility Goal 1, PT) long term goal (LTG);10 days  -[facility] Name 08/30/23 1531          Transfer Goal 1 (PT)    Activity/Assistive Device (Transfer Goal 1, PT) sit-to-stand/stand-to-sit;bed-to-chair/chair-to-bed;walker, rolling  -     [place] Level/Cues Needed (Transfer Goal 1, PT) standby assist  -     Time Frame (Transfer Goal 1, PT) long term goal (LTG);10 days  -[facility] Name 08/30/23 1531          Gait Training Goal 1 (PT)    Activity/Assistive Device (Gait Training Goal 1, PT) gait (walking locomotion);assistive device use;walker, rolling  -     [place] Level (Gait Training Goal 1, PT) standby assist  -     Distance (Gait Training Goal 1, PT) 50  -     Time Frame (Gait Training Goal 1, PT) long term goal (LTG);10 days  -[facility] Name 08/30/23 1531          Stairs Goal 1 (PT)    Activity/Assistive Device (Stairs Goal 1, PT) stairs, all skills;ascending stairs;descending stairs  -     [place] Level/Cues Needed (Stairs Goal 1, PT) minimum assist (75% or more patient effort)  -     Number of Stairs (Stairs Goal 1, PT) 3  -     Time Frame (Stairs Goal 1, PT) long term goal (LTG);10 days  -[facility] Name 08/30/23 1531          Therapy Assessment/Plan  (PT)    Planned Therapy Interventions (PT) balance training;bed mobility training;gait training;home exercise program;patient/family education;stair training;strengthening;transfer training  -               User Key  (r) = Recorded By, (t) = Taken By, (c) = Cosigned By      Initials Name Provider Type     Terry Irene, PT Physical Therapist                   Clinical Impression       Row Name 08/30/23 1527          Pain    Pretreatment Pain Rating 0/10 - no pain  -     Posttreatment Pain Rating 0/10 - no pain  -[facility] Name 08/30/23 1527          Plan of Care Review    Plan of Care Reviewed With patient  -     Progress no change  -     Outcome Evaluation PT initial evluation complete. Pt appears to be near his baseline level of function, however demonstrating decreased functional activity tolerance, generalized weakness, and balance deficits warranting further skilled PT services. Pt ambulated 11ft + 16ft with RW and MinAx1, pt with safety awarenes deficits requiring frequent cues to correct. PT recommending inpatient rehab at [place].  -[facility] Name 08/30/23 1527          Therapy Assessment/Plan (PT)    Patient/Family Therapy Goals Statement (PT) Get stronger to return home.  -     Rehab Potential (PT) good, to achieve stated therapy goals  -     Criteria for Skilled Interventions Met (PT) yes;meets criteria;skilled treatment is necessary  -     Therapy Frequency (PT) daily  -     Predicted Duration of Therapy Intervention (PT) 2 weeks  -[facility] Name 08/30/23 1527          Vital Signs    Pre Systolic BP Rehab 138  -LH     Pre Treatment Diastolic BP 67  -LH     Post Systolic BP Rehab 129  -LH     Post Treatment Diastolic BP 89  -LH     Pretreatment Heart Rate (beats/min) 84  -LH     Pre SpO2 (%) 95  -LH     O2 Delivery Pre Treatment nasal cannula  -     O2 Delivery Intra Treatment nasal cannula  -LH     Post SpO2 (%) 96  -LH     O2 Delivery Post Treatment nasal cannula  -     Pre  Patient Position Sitting  -     Intra Patient Position Standing  -     Post Patient Position Supine  -       Row Name 08/30/23 1527          Positioning and Restraints    Pre-Treatment Position bathroom  -     Post Treatment Position bed  -     In Bed fowlers;call light within reach;encouraged to call for assist;exit alarm on  -               User Key  (r) = Recorded By, (t) = Taken By, (c) = Cosigned By      Initials Name Provider Type     Terry Irene, PT Physical Therapist                   Outcome Measures       Row Name 08/30/23 1532 08/30/23 0820       How much help from another person do you currently need...    Turning from your back to your side while in flat bed without using bedrails? 3  - 3  -EO    Moving from lying on back to sitting on the side of a flat bed without bedrails? 3  - 3  -EO    Moving to and from a bed to a chair (including a wheelchair)? 2  - 3  -EO    Standing up from a chair using your arms (e.g., wheelchair, bedside chair)? 2  - 3  -EO    Climbing 3-5 steps with a railing? 2  - 3  -EO    To walk in hospital room? 3  - 3  -EO    AM-PAC 6 Clicks Score (PT) 15  - 18  -EO    Highest level of mobility 4 --> Transferred to chair/commode  - 6 --> Walked 10 steps or more  -      Row Name 08/30/23 1532          Functional Assessment    Outcome Measure Options AM-PAC 6 Clicks Basic Mobility (PT)  -               User Key  (r) = Recorded By, (t) = Taken By, (c) = Cosigned By      Initials Name Provider Type     Yvonne Ferreira RN Registered Nurse     Terry Irene, PT Physical Therapist                                 Physical Therapy Education       Title: PT OT SLP Therapies (In Progress)       Topic: Physical Therapy (Done)       Point: Mobility training (Done)       Learning Progress Summary             Patient Acceptance, E, VU,NR by  at 8/30/2023 1532                         Point: Home exercise program (Done)       Learning Progress Summary              Patient Acceptance, E, VU,NR by  at 8/30/2023 1532                         Point: Body mechanics (Done)       Learning Progress Summary             Patient Acceptance, E, VU,NR by  at 8/30/2023 1532                         Point: Precautions (Done)       Learning Progress Summary             Patient Acceptance, E, VU,NR by  at 8/30/2023 1532                                         User Key       Initials Effective Dates Name Provider Type Discipline     09/21/21 -  Terry Irene, PT Physical Therapist PT                  PT Recommendation and Plan  Planned Therapy Interventions (PT): balance training, bed mobility training, gait training, home exercise program, patient/family education, stair training, strengthening, transfer training  Plan of Care Reviewed With: patient  Progress: no change  Outcome Evaluation: PT initial evluation complete. Pt appears to be near his baseline level of function, however demonstrating decreased functional activity tolerance, generalized weakness, and balance deficits warranting further skilled PT services. Pt ambulated 11ft + 16ft with RW and MinAx1, pt with safety awarenes deficits requiring frequent cues to correct. PT recommending inpatient rehab at [place].     Time Calculation:   PT Evaluation Complexity  History, PT Evaluation Complexity: 1-2 personal factors and/or comorbidities  Examination of Body Systems (PT Eval Complexity): total of 3 or more elements  Clinical Presentation (PT Evaluation Complexity): evolving  Clinical Decision Making (PT Evaluation Complexity): moderate complexity  Overall Complexity (PT Evaluation Complexity): moderate complexity     PT Charges       Row Name 08/30/23 1532             Time Calculation    Start Time 1453  -      PT Received On 08/30/23  -      PT Goal Re-Cert Due Date 09/09/23  -         Untimed Charges    PT Eval/Re-eval Minutes 48  -LH         Total Minutes    Untimed Charges Total Minutes 48  -       Total  Minutes 48  -                User Key  (r) = Recorded By, (t) = Taken By, (c) = Cosigned By      Initials Name Provider Type     Terry Irene, PT Physical Therapist                  Therapy Charges for Today       Code Description Service Date Service Provider Modifiers Qty    42702050443 HC PT EVAL MOD COMPLEXITY 4 8/30/2023 Terry Irene, PT GP 1            PT G-Codes  Outcome Measure Options: AM-PAC 6 Clicks Basic Mobility (PT)  AM-PAC 6 Clicks Score (PT): 15  AM-PAC 6 Clicks Score (OT): 17  PT Discharge Summary  Anticipated Discharge Disposition (PT): inpatient rehabilitation facility    Terry Irene PT  8/30/2023

## 2023-08-30 NOTE — PROGRESS NOTES
"    [facility] Medicine Services  PROGRESS NOTE    Patient Name: Dieter Christiansen  : 1950  MRN: [id number]    Date of Admission: 2023  Primary Care Physician: Ian Sanchez APRN    Subjective   Subjective     CC:  agitation, confusion    HPI:  States he feels baseline.  Does not know why he was confused on admission.  Admits he does take extra pain meds sometimes, then ran out.  Denies illicit drugs including THC.     ROS:    States he does not usually have trouble voiding  Does have nocturia     Objective   Objective     Vital Signs:   Temp:  [97.5 °F (36.4 °C)-98.3 °F (36.8 °C)] 97.9 °F (36.6 °C)  Heart Rate:  [53-79] 61  Resp:  [18-22] 18  BP: (108-126)/(61-68) 126/64  Flow (L/min):  [2-3] 3     Physical Exam:  Awake in bed, conversant, answers questions appropriately  RRR  Nonlabored on [place] oxygen   Torres w clear urine  Abd - healed upper midline incision w induration across epigastrium.  Reducible umbilical hernia   Neuro - alert.  Says \"good gary\" then corrects - \"no, Sikh\".  Says \"2019\" for year.       Results Reviewed:  LAB RESULTS:      Lab 23  0531 23   WBC 10.55  --  10.45   HEMOGLOBIN 13.0  --  14.3   HEMATOCRIT 40.0  --  43.9   PLATELETS 238  --  258   NEUTROS ABS 7.15*  --  7.82*   IMMATURE GRANS (ABS) 0.04  --  0.06*   LYMPHS ABS 2.21  --  1.64   MONOS ABS 1.02*  --  0.71   EOS ABS 0.09  --  0.17   .3*  --  100.2*   SED RATE  --   --  52*   CRP  --   --  0.44   PROCALCITONIN  --   --  0.08   LACTATE  --  1.5 3.1*           Lab 23  0531 23   SODIUM 139 139   POTASSIUM 4.1 3.9   CHLORIDE 101 100   CO2 29.0 25.0   ANION GAP 9.0 14.0   BUN 24* 14   CREATININE 1.33* 1.29*   EGFR 56.8* 58.9*   GLUCOSE 114* 133*   CALCIUM 8.6 9.1   MAGNESIUM  --  1.9   TSH  --  1.920           Lab 23  0531 23  0422   TOTAL PROTEIN 6.2 7.4   ALBUMIN 3.6 3.8   GLOBULIN 2.6 3.6   ALT (SGPT) 6 5   AST (SGOT) 35 18 "   BILIRUBIN 1.3* 0.7   ALK PHOS 99 112           Lab 08/28/23  0422   PROBNP 1,195.0*   HSTROP T 21*               Lab 08/29/23  0531   FOLATE 8.18   VITAMIN B 12 248         Lab 08/28/23  0800 08/28/23  0437   PH, ARTERIAL 7.397 7.414   PCO2, ARTERIAL 47.4* 44.8   PO2 ART 71.7* 63.4*   FIO2 28 21   HCO3 ART 29.1* 28.6*   BASE EXCESS ART 3.4* 3.4*   CARBOXYHEMOGLOBIN 1.5 1.6       Brief Urine Lab Results  (Last result in the past 365 days)        Color   Clarity   Blood   Leuk Est   Nitrite   Protein   CREAT   Urine HCG        08/28/23 0439 Yellow   Clear   Trace   Negative   Negative   Negative                   Microbiology Results Abnormal       Procedure Component Value - Date/Time    Blood Culture - Blood, Hand, Right [[id number]]  (Normal) Collected: 08/28/23 0415    Lab Status: Preliminary result Specimen: Blood from Hand, Right Updated: 08/30/23 0515     Blood Culture No growth at 2 days    Respiratory Panel PCR w/COVID-19(SARS-CoV-2) RAY/CHET/HEIDE/PAD/COR/MAD/EDD In-House, NP Swab in UTM/VTM, 3-4 HR TAT - Swab, Nasopharynx [[id number]]  (Normal) Collected: 08/28/23 0423    Lab Status: Final result Specimen: Swab from Nasopharynx Updated: 08/28/23 0520     ADENOVIRUS, PCR Not Detected     Coronavirus 229E Not Detected     Coronavirus HKU1 Not Detected     Coronavirus NL63 Not Detected     Coronavirus OC43 Not Detected     COVID19 Not Detected     Human Metapneumovirus Not Detected     Human Rhinovirus/Enterovirus Not Detected     Influenza A PCR Not Detected     Influenza B PCR Not Detected     Parainfluenza Virus 1 Not Detected     Parainfluenza Virus 2 Not Detected     Parainfluenza Virus 3 Not Detected     Parainfluenza Virus 4 Not Detected     RSV, PCR Not Detected     Bordetella pertussis pcr Not Detected     Bordetella parapertussis PCR Not Detected     Chlamydophila pneumoniae PCR Not Detected     Mycoplasma pneumo by PCR Not Detected    Narrative:      In the setting of a positive respiratory panel with  a viral infection PLUS a negative procalcitonin without other underlying concern for bacterial infection, consider observing off antibiotics or discontinuation of antibiotics and continue supportive care. If the respiratory panel is positive for atypical bacterial infection (Bordetella pertussis, Chlamydophila pneumoniae, or Mycoplasma pneumoniae), consider antibiotic de-escalation to target atypical bacterial infection.            EEG    Result Date: 8/28/2023  Reason for referral: 73 y.o.male with altered mental status Technical Summary:  A 19 channel digital EEG was performed using the international 10-20 placement system, including eye leads and EKG leads. Duration: 21 minutes Findings: The patient is awake and confused.  The background shows diffuse low amplitude 6-7 Hz theta which is present symmetrically over both hemispheres.  A clear posterior rhythm is not seen.  Low amplitude EMG artifact is seen over the anterior leads.  Stage II sleep is not clearly seen.  No focal features or epileptiform activity are seen.  Photic stimulation does not change the background.  Hyperventilation is not performed. Video: Available Technical quality: Superior EKG: Regular, 70 bpm SUMMARY: Mild generalized slow No focal features or epileptiform activity are seen     Impression: Diffuse cerebral dysfunction of mild degree but nonspecific.  This finding is most commonly seen with toxic/metabolic or hypoxemic and encephalopathy This report is transcribed using the Dragon dictation system.           Current medications:  Scheduled Meds:aspirin, 81 mg, Oral, Daily  atorvastatin, 10 mg, Oral, Nightly  cetirizine, 10 mg, Oral, Daily  cyanocobalamin, 1,000 mcg, Intramuscular, Q28 Days  enoxaparin, 40 mg, Subcutaneous, Q12H  FLUoxetine, 20 mg, Oral, Daily  gabapentin, 300 mg, Oral, Q12H  pantoprazole, 40 mg, Oral, Q AM  QUEtiapine, 25 mg, Oral, Nightly  senna-docusate sodium, 2 tablet, Oral, BID  sodium chloride, 10 mL, Intravenous,  "Q12H  tamsulosin, 0.4 mg, Oral, Daily      Continuous Infusions:   PRN Meds:.  acetaminophen    albuterol    senna-docusate sodium **AND** polyethylene glycol **AND** bisacodyl **AND** bisacodyl    haloperidol lactate    HYDROcodone-acetaminophen    LORazepam    Magnesium Low Dose Replacement - Follow Nurse / BPA Driven Protocol    ondansetron    Potassium Replacement - Follow Nurse / BPA Driven Protocol    sodium chloride    sodium chloride    Assessment & Plan   Assessment & Plan     Active Hospital Problems    Diagnosis  POA    **AMS (altered mental status) [R41.82]  Yes    Obesity, Class III, BMI 40-49.9 (morbid obesity) [E66.01]  Yes    CAD (coronary artery disease) [I25.10]  Yes    COPD (chronic obstructive pulmonary disease) [J44.9]  Yes    Type 2 diabetes mellitus [E11.9]  Yes    HTN (hypertension) [I10]  Yes    HLD (hyperlipidemia) [E78.5]  Yes    MARY ALICE (obstructive sleep apnea) [G47.33]  Yes      Resolved Hospital Problems   No resolved problems to display.        Brief Hospital Course to date:  Dieter Christiansen is a 73 y.o. male  W PMH of CAD COPD DM HTN HL MARY ALICE chrnic back pain, lung nodule and obesity.  Woke up confused,hypoxic, \"talking crazy\".  Combative in ED and got sedatives.  Bladder scan shows 1000ml.      Acute confusional state  - agitation, combativeness, required restraints  - now better, seems back to baseline.   - continue CPAP QHS (uses at home)   - Ct head okay    - suspect abuse of meds and withdrawal:  he filled gabapentin and Norco 20 days ago and states he has run out; UDS negative.      Acute urinary retention  - Torres placed in ED  - DC today and monitor overnight.  If he voids well, home in AM     Pos blood cx  - CNS - presume contaminant; follow fever curve     Chronic pain  - states this is abdominal   - remote hernia surgery   - reviewed abd imaging on file.  No clear explanation of induration upper abd but most likely this is scar tissue +- mesh     Debility  - At baseline, lives w " dtr and gets from bed to BR with cane/walker.    - getting to BR with walker here.      Expected Discharge Location and Transportation:  home.  Lives w dtr   Expected Discharge   Expected Discharge Date: 8/31/2023; Expected Discharge Time:      DVT prophylaxis:  Medical DVT prophylaxis orders are present.     AM-PAC 6 Clicks Score (PT): 18 (08/29/23 0725)    CODE STATUS:   Code Status and Medical Interventions:   Ordered at: 08/28/23 1114     Code Status (Patient has no pulse and is not breathing):    CPR (Attempt to Resuscitate)     Medical Interventions (Patient has pulse or is breathing):    Full Support       Ronna French MD  08/30/23

## 2023-08-30 NOTE — PAYOR COMM NOTE
"Status is IP as of 23 1155   Silvia Christiansen (73 y.o. Male)       Date of Birth   1950    Social Security Number       Address   [address] [place] [address]  [place] [id number]    Home Phone   [phone]    MRN   [id number]       [facility]    Marital Status                               Admission Date   23    Admission Type   Emergency    Admitting Provider   Ronna French MD    Attending Provider   Ronna French MD    Department, Room/Bed   [facility] 3E, S341/1       Discharge Date       Discharge Disposition       Discharge Destination                                 Attending Provider: Ronna French MD    Allergies: No Known Allergies    Isolation: None   Infection: MRSA (23)   Code Status: CPR    Ht: 175.3 cm (69\")   Wt: 156 kg (344 lb 8 oz)    Admission Cmt: None   Principal Problem: AMS (altered mental status) [R41.82]                   Active Insurance as of 2023       Primary Coverage       Payor Plan Insurance Group Employer/Plan Group    [employer] [place] MEDICARE REPLACEMENT [facility]        Payor Plan Address Payor Plan Phone Number Payor Plan Fax Number Effective Dates    [address] [address] [phone]  2022 - None Entered    [facility] [id number]         Subscriber Name Subscriber Birth Date Member ID       SILVIA CHRISTIANSEN 1950 [id number]                     Emergency Contacts        (Rel.) Home Phone Work Phone Mobile Phone    ELIEL COLEMAN (Daughter) -- -- [phone]              [place]: NPI [id number] Tax ID [id number]     History & Physical        Lizzie Conde MD at 23 [facility]5              [facility] Services  HISTORY AND PHYSICAL    Patient Name: Silvia Christiansen  : 1950  MRN: [id number]  Primary Care Physician: Ian Sanchez APRN    Subjective  Subjective     Chief Complaint:confusion and hypoxia    HPI:  Silvia Christiansen is a 73 y.o. male who  has a " past medical history of Chronic back pain, COPD , Coronary artery disease , Diabetes mellitus, GERD, Obesity,  Hyperlipidemia, Hypertension, Lung nodule, Murmur, and MARY ALICE  who presented to the ED by EMS for confusion. Reported the patient was normal when he went to bed at 9pm but woke confused, talking crazy and hypoxic. Family is not present. ED had to give patient several rounds of sedation for combative behavior.  Of note patient had almost a liter of fluid in his bladder when catheter was placed.    Review of Systems   UTO due to patient confusion.    Otherwise complete ROS is negative except as mentioned in the HPI.    Personal History     PMH: He  has a past medical history of Chronic back pain, COPD (chronic obstructive pulmonary disease), Coronary artery disease involving native coronary artery of native heart without angina pectoris, Diabetes mellitus, GERD (gastroesophageal reflux disease), Heart attack, Hyperlipidemia, Hypertension, Lung nodule, Murmur, and MARY ALICE (obstructive sleep apnea).   PSxH: He  has a past surgical history that includes Femoral artery stent.         FH: His family history includes Diabetes in his brother and father.   SH: He  reports that he has been smoking cigarettes. He has a 15.00 pack-year smoking history. He has quit using smokeless tobacco. He reports that he does not currently use alcohol. He reports that he does not use drugs.     Medications:  FLUoxetine, HYDROcodone-acetaminophen, albuterol, aspirin, cetirizine, famotidine, furosemide, gabapentin, ipratropium-albuterol, lisinopril, metFORMIN, metoprolol succinate XL, nitroglycerin, nystatin, nystatin-triamcinolone, pantoprazole, potassium chloride, promethazine, simvastatin, tamsulosin, triamcinolone, and vitamin D    No Known Allergies    Objective  Objective     Vital Signs:   Temp:  [98.2 °F (36.8 °C)] 98.2 °F (36.8 °C)  Heart Rate:  [76-80] 76  Resp:  [22] 22  BP: (110-120)/(69) 110/69    Constitutional: somnolent, will  open eyes and is restless in the bed, obese, soiled feet.  Eyes: clear sclerae, no conjunctival injection  HENT: NCAT, mucous membranes moist  Neck: no masses or lymphadenopathy, trachea midline  Respiratory: shallow breath sounds bilaterally, respirations paradoxical requiring 2-3 LPM of oxygen  Cardiovascular: RRR, no murmurs appreciated, palpable peripheral pulses  Abdomen:  soft, obese, umbilical hernia,   Musculoskeletal: No peripheral edema, clubbing or cyanosis  Neurologic: Oriented only to name,                       Strength symmetric in all extremities                     Cranial Nerves grossly intact, speech clear  Skin: No rashes or jaundice  Psychiatric: Appropriate mood, insight      Result Review:  I have personally reviewed the results from the time of this admission   to 8/28/2023 07:55 EDT and agree with these findings:  [x]  Laboratory  [x]  Microbiology  [x]  Radiology  [x]  EKG/Telemetry   []  Cardiology/Vascular   []  Pathology  [x]  Old records  []  Other:  Most notable findings include: normal WBC, elevated sed rate, and MCV< as well as lactic acid, creatinine is chronically elevated, normal procal      LAB RESULTS:      Lab 08/28/23 0422   WBC 10.45   HEMOGLOBIN 14.3   HEMATOCRIT 43.9   PLATELETS 258   NEUTROS ABS 7.82*   IMMATURE GRANS (ABS) 0.06*   LYMPHS ABS 1.64   MONOS ABS 0.71   EOS ABS 0.17   .2*   SED RATE 52*   CRP 0.44   PROCALCITONIN 0.08   LACTATE 3.1*         Lab 08/28/23 0422   SODIUM 139   POTASSIUM 3.9   CHLORIDE 100   CO2 25.0   ANION GAP 14.0   BUN 14   CREATININE 1.29*   EGFR 58.9*   GLUCOSE 133*   CALCIUM 9.1   MAGNESIUM 1.9   TSH 1.920         Lab 08/28/23 0422   TOTAL PROTEIN 7.4   ALBUMIN 3.8   GLOBULIN 3.6   ALT (SGPT) 5   AST (SGOT) 18   BILIRUBIN 0.7   ALK PHOS 112         Lab 08/28/23 0422   PROBNP 1,195.0*   HSTROP T 21*                 Lab 08/28/23 0437   PH, ARTERIAL 7.414   PCO2, ARTERIAL 44.8   PO2 ART 63.4*   FIO2 21   HCO3 ART 28.6*   BASE EXCESS  ART 3.4*   CARBOXYHEMOGLOBIN 1.6     Brief Urine Lab Results  (Last result in the past 365 days)        Color   Clarity   Blood   Leuk Est   Nitrite   Protein   CREAT   Urine HCG        08/28/23 0439 Yellow   Clear   Trace   Negative   Negative   Negative                 COVID19   Date Value Ref Range Status   08/28/2023 Not Detected Not Detected - Ref. Range Final       CT Head Without Contrast    Result Date: 8/28/2023  CT HEAD WO CONTRAST Date of Exam: 8/28/2023 7:08 AM EDT Indication: Mental status change, unknown cause. Comparison: None available. Technique: Axial CT images were obtained of the head without contrast administration.  Automated exposure control and iterative construction methods were used. Findings: No acute intracranial hemorrhage. No acute large territory infarct. There are scattered subcortical and periventricular white matter hypodensities which are nonspecific and can be seen in the setting of chronic small vessel ischemic change. No extra-axial collections. No midline shift or herniation. Normal size and configuration of the ventricles. Unremarkable appearance of the orbits. The paranasal sinuses are grossly clear. The mastoid air cells are grossly clear. No acute or suspicious bony  findings.     Impression: Impression: No acute intracranial findings. Chronic and senescent changes as above. Electronically Signed: Venu Carbone MD  8/28/2023 7:27 AM EDT  Workstation ID: [id number]    XR Chest 1 View    Result Date: 8/28/2023  XR CHEST 1 VW Date of Exam: 8/28/2023 5:21 AM EDT Indication: cough Comparison: 10/7/2022 chest CT. Findings: Exam limited by body habitus. Cardiac silhouette is enlarged. There is likely cardiomegaly although accentuated by AP portable technique. The lungs appear clear. No pneumothorax or pleural effusion. No acute osseous abnormality. Pulmonary vasculature is within normal limits.     Impression: Impression: Cardiomegaly without acute cardiopulmonary abnormality.  Electronically Signed: Suhail De Santiago MD  2023 5:46 AM EDT  Workstation ID: [id number]         The patient qualifies to receive the vaccine, but they have not yet received it.    Assessment & Plan  Assessment / Plan       AMS (altered mental status)    CAD (coronary artery disease)    COPD (chronic obstructive pulmonary disease)    Type 2 diabetes mellitus    HTN (hypertension)    HLD (hyperlipidemia)    MARY ALICE (obstructive sleep apnea)    Obesity, Class III, BMI 40-49.9 (morbid obesity)      Assessment & Plan:  Dieter Christiansen is a 73 y.o. male who  has a past medical history of Chronic back pain, COPD , Coronary artery disease , Diabetes mellitus, GERD, Obesity,  Hyperlipidemia, Hypertension, Lung nodule, Murmur, and MARY ALICE  who presented to the ED by EMS for confusion. Reported the patient was normal when he went to bed at 9pm but woke confused, talking crazy and hypoxic.    Altered mentation of unknown etiology  Chronic respiratory failure  MARY ALICE  Morbid obesity- urinary retention  -support with Bipap, oxygen  -prn benzos  -possibly neurontin toxicity or due to other medications?  -possible due to urinary retention-- cont catheter for now  -check EEG    HTN  HLP    Type 2 Diabetes  -insulin for now    DVT prophylaxis:lovenox      CODE STATUS:Full code for now     Expected Discharge  Expected discharge date/ time has not been documented.    Electronically signed by Lizzie Conde MD 23 07:55 EDT            Electronically signed by Lizzie Conde MD at 23 1049          Physician Progress Notes (all)        Gertrude Mckinnon, APRN at 23 1051           [facility] Neurology    Progress Note    Patient Name: Dieter Christiansen  : 1950  MRN: [id number]  Primary Care Physician:  Ian Sanchez, EUNICE  Date of admission: 2023    Subjective     Chief Complaint: Altered mental status    History of Present Illness   Patient was resting comfortably in chair.  He was alert and oriented to self,  location and city.  He was disoriented to year as he thought it was 2019.  Overall his demeanor seems to be improved.  Per bedside RN report from yesterday he slept most of the day.  He did require dose of Haldol at  at midnight. Patient's demeanor seems to be overall improving.    Spoke with patient about how often he takes his gabapentin.  He stated he takes 1 in the morning and 1 in the afternoon.  He stated he had not changed up this regimen for quite some time.    Review of Systems   General: Negative for fever, nausea, or vomiting.   Neurological: Negative for headache, pain, or weakness.     Objective     Physical Exam  Vitals and nursing note reviewed.   Constitutional:       General: He is not in acute distress.  Eyes:      Extraocular Movements: Extraocular movements intact.      Pupils: Pupils are equal, round, and reactive to light.      Comments: No nystagmus noted   Cardiovascular:      Rate and Rhythm: Normal rate.   Pulmonary:      Effort: Pulmonary effort is normal.   Neurological:      Mental Status: He is alert. He is disoriented.      Motor: Motor function is intact.      Comments:   Cranial Nerves   CN II: Pupils are equal, round, and reactive to light. Normal visual acuity and visual fields.    CN III IV VI: Extraocular movements are full without nystagmus.  CN V: Normal facial sensation and strength of muscles of mastication.  CN VII: Facial movements are symmetric. No weakness.  CN VIII:  Auditory acuity is normal.    Patient oriented to self and location.  Disoriented to year   Psychiatric:      Comments: Calm and cooperative        Vitals:   Temp:  [97.5 °F (36.4 °C)-98.3 °F (36.8 °C)] 97.9 °F (36.6 °C)  Heart Rate:  [53-73] 61  Resp:  [18-20] 18  BP: (108-126)/(61-68) 126/64  Flow (L/min):  [2.5-3] 3    Current Medications    Current Facility-Administered Medications:     acetaminophen (TYLENOL) tablet 650 mg, 650 mg, Oral, Q4H PRN, Lizzie Conde MD, 650 mg at 08/29/23 2005     albuterol (PROVENTIL) nebulizer solution 0.083% 2.5 mg/3mL, 2.5 mg, Nebulization, Q6H PRN, Lizzie Conde MD, 2.5 mg at 08/28/23 1730    aspirin EC tablet 81 mg, 81 mg, Oral, Daily, Lizzie Conde MD, 81 mg at 08/30/23 0843    atorvastatin (LIPITOR) tablet 10 mg, 10 mg, Oral, Nightly, Lizzie Conde MD, 10 mg at 08/29/23 2006    sennosides-docusate (PERICOLACE) 8.6-50 MG per tablet 2 tablet, 2 tablet, Oral, BID, 2 tablet at 08/30/23 0843 **AND** polyethylene glycol (MIRALAX) packet 17 g, 17 g, Oral, Daily PRN **AND** bisacodyl (DULCOLAX) EC tablet 5 mg, 5 mg, Oral, Daily PRN **AND** bisacodyl (DULCOLAX) suppository 10 mg, 10 mg, Rectal, Daily PRN, Lizzie Conde MD    cetirizine (zyrTEC) tablet 10 mg, 10 mg, Oral, Daily, Lizzie Conde MD, 10 mg at 08/30/23 0843    cyanocobalamin injection 1,000 mcg, 1,000 mcg, Intramuscular, Q28 Days, Gertrude Mckinnon, APRN, 1,000 mcg at 08/29/23 1447    Enoxaparin Sodium (LOVENOX) syringe 60 mg, 60 mg, Subcutaneous, Q12H, Ronna French MD    FLUoxetine (PROzac) capsule 20 mg, 20 mg, Oral, Daily, Lizzie Conde MD, 20 mg at 08/30/23 0843    gabapentin (NEURONTIN) capsule 300 mg, 300 mg, Oral, Nightly, Gertrude Mckinnon, APRN    haloperidol lactate (HALDOL) injection 1 mg, 1 mg, Intramuscular, Q6H PRN, Lizzie Conde MD, 1 mg at 08/29/23 0021    HYDROcodone-acetaminophen (NORCO)  MG per tablet 1 tablet, 1 tablet, Oral, Q8H PRN, Lizzie Conde MD    LORazepam (ATIVAN) tablet 0.5 mg, 0.5 mg, Oral, Q6H PRN, Lizzie Conde MD, 0.5 mg at 08/28/23 1626    Magnesium Low Dose Replacement - Follow Nurse / BPA Driven Protocol, , Does not apply, Elton CORONA Kathryn E, MD    ondansetron (ZOFRAN) injection 4 mg, 4 mg, Intravenous, Q8H PRN, Gertrude Mckinnon, APRN    pantoprazole (PROTONIX) EC tablet 40 mg, 40 mg, Oral, Q AM, Lizzie Conde MD, 40 mg at 08/30/23 0514    Potassium Replacement - Follow Nurse / BPA Driven Protocol, , Does not apply, PRN, Woody,  Lizzie ALMODOVAR MD    QUEtiapine (SEROquel) tablet 25 mg, 25 mg, Oral, Nightly, Gertrude Mckinnon K, APRN, 25 mg at 08/29/23 2005    sodium chloride 0.9 % flush 10 mL, 10 mL, Intravenous, Q12H, Lizzie Conde MD, 10 mL at 08/30/23 0843    sodium chloride 0.9 % flush 10 mL, 10 mL, Intravenous, PRN, Lizzie Conde MD    sodium chloride 0.9 % infusion 40 mL, 40 mL, Intravenous, PRN, Lizzie Conde MD    tamsulosin (FLOMAX) 24 hr capsule 0.4 mg, 0.4 mg, Oral, Daily, Lizzie Conde MD, 0.4 mg at 08/30/23 0843    Laboratory Results:   Lab Results   Component Value Date    GLUCOSE 114 (H) 08/29/2023    CALCIUM 8.6 08/29/2023     08/29/2023    K 4.1 08/29/2023    CO2 29.0 08/29/2023     08/29/2023    BUN 24 (H) 08/29/2023    CREATININE 1.33 (H) 08/29/2023    BCR 18.0 08/29/2023    ANIONGAP 9.0 08/29/2023     Lab Results   Component Value Date    WBC 10.55 08/29/2023    HGB 13.0 08/29/2023    HCT 40.0 08/29/2023    .3 (H) 08/29/2023     08/29/2023     No results found for: CHOL  No results found for: HDL  No results found for: LDL  No results found for: TRIG  Lab Results   Component Value Date    HGBA1C 5.80 (H) 05/20/2023     Lab Results   Component Value Date    INR 1.20 (H) 05/20/2023    PROTIME 15.3 (H) 05/20/2023     Lab Results   Component Value Date    FOLATE 8.18 08/29/2023     Lab Results   Component Value Date    [id number] 248 08/29/2023             Assessment / Plan     Active Hospital Problems:    AMS (altered mental status)    CAD (coronary artery disease)    COPD (chronic obstructive pulmonary disease)    Type 2 diabetes mellitus    HTN (hypertension)    HLD (hyperlipidemia)    MARY ALICE (obstructive sleep apnea)    Obesity, Class III, BMI 40-49.9 (morbid obesity)       Brief Patient Summary:  Dieter Christiansen is a 73 y.o. male with a past medical history of COPD, CAD, DM type II, GERD, obesity, HLD, HTN, lung nodule, chronic back pain and MARY ALICE presented to [facility] ED with complaint of  confusion.     CTA was negative for any acute abnormalities.     EEG showed diffuse cerebral dysfunction of mild degree, findings most commonly found in toxic/metabolic encephalopathy or hypoxemia.      Plan:   Metabolic encephalopathy versus polypharmacy  Versus hospital delirium  COPD  MARY ALICE  MRI brain with without contrast still pending.  One-time dose of Ativan 0.5 mg for MRI.  IM Cyanocobalamin 1000 mcg on   Per nursing report patient slept most of the day yesterday.  And was awake most of the night.  Discontinue gabapentin a.m. dose.  Continue gabapentin 300 mg nightly.  EKG pending  Continue Seroquel 25 mg nightly  Continue to work with PT/OT patient chair twice daily as tolerated.  General neurology continue to follow    I have discussed the above with the patient, bedside RN Dr. French  Time spent with patient: 35 minutes in face-to-face evaluation and management of the patient.      EUNICE Watson              Electronically signed by Gertrude Mckinnon APRN at 23 1057       Gertrude Mckinnon APRN at 23 1117           [facility] Neurology    Progress Note    Patient Name: Dieter Christiansen  : 1950  MRN: [id number]  Primary Care Physician:  Ian Sanchez APRN  Date of admission: 2023    Subjective     Chief Complaint: Altered mental status    History of Present Illness   Patient is resting comfortably in bed.  Per bedside RN report he had been up to chair most of the morning.  He was able to state his name, location but was disoriented to the year.  This is unchanged from previous assessment.  He did require as needed medications overnight for extreme agitation.    Review of Systems   General: Negative for fever, nausea, or vomiting.   Neurological: Negative for headache, pain, or weakness.     Objective     Physical Exam  Vitals and nursing note reviewed.   Eyes:      Extraocular Movements: Extraocular movements intact.      Pupils: Pupils are equal, round, and reactive  to light.      Comments: No nystagmus noted    Cardiovascular:      Rate and Rhythm: Normal rate.   Pulmonary:      Effort: Pulmonary effort is normal.   Neurological:      Mental Status: He is alert and oriented to person, place, and time.      Cranial Nerves: Cranial nerves 2-12 are intact.      Sensory: Sensation is intact.      Motor: No weakness or seizure activity.      Deep Tendon Reflexes: Reflexes abnormal. Babinski sign absent on the right side. Babinski sign absent on the left side.      Reflex Scores:       Bicep reflexes are 2+ on the right side and 2+ on the left side.       Patellar reflexes are 1+ on the right side and 1+ on the left side.     Comments:   Cranial Nerves   CN II: Pupils are equal, round, and reactive to light. Normal visual acuity and visual fields.    CN III IV VI: Extraocular movements are full without nystagmus.  CN V: Normal facial sensation and strength of muscles of mastication.  CN VII: Facial movements are symmetric. No weakness.  CN VIII:  Auditory acuity is normal.    Patient oriented to self and location.  Disoriented to current year.  Was able to state his birthday.        Vitals:   Temp:  [97.7 °F (36.5 °C)-99.7 °F (37.6 °C)] 98 °F (36.7 °C)  Heart Rate:  [57-83] 58  Resp:  [16-26] 22  BP: ()/(62-85) 110/62  Flow (L/min):  [2-3] 3    Current Medications    Current Facility-Administered Medications:     acetaminophen (TYLENOL) tablet 650 mg, 650 mg, Oral, Q4H PRN, Lizzie Conde MD    albuterol (PROVENTIL) nebulizer solution 0.083% 2.5 mg/3mL, 2.5 mg, Nebulization, Q6H PRN, Lizzie Conde MD, 2.5 mg at 08/28/23 1730    aspirin EC tablet 81 mg, 81 mg, Oral, Daily, Lizzie Conde MD, 81 mg at 08/29/23 0838    atorvastatin (LIPITOR) tablet 10 mg, 10 mg, Oral, Nightly, Lizzie Conde MD    sennosides-docusate (PERICOLACE) 8.6-50 MG per tablet 2 tablet, 2 tablet, Oral, BID, 2 tablet at 08/29/23 0837 **AND** polyethylene glycol (MIRALAX) packet 17 g, 17 g,  Oral, Daily PRN **AND** bisacodyl (DULCOLAX) EC tablet 5 mg, 5 mg, Oral, Daily PRN **AND** bisacodyl (DULCOLAX) suppository 10 mg, 10 mg, Rectal, Daily PRN, Lizzie Conde MD    cetirizine (zyrTEC) tablet 10 mg, 10 mg, Oral, Daily, Lzizie Conde MD, 10 mg at 08/29/23 0837    Enoxaparin Sodium (LOVENOX) syringe 40 mg, 40 mg, Subcutaneous, Q12H, Lizzie Conde MD, 40 mg at 08/29/23 0836    FLUoxetine (PROzac) capsule 20 mg, 20 mg, Oral, Daily, Lizzie Conde MD, 20 mg at 08/29/23 0837    gabapentin (NEURONTIN) capsule 300 mg, 300 mg, Oral, Q12H, Lizzie Conde MD, 300 mg at 08/29/23 0837    haloperidol lactate (HALDOL) injection 1 mg, 1 mg, Intramuscular, Q6H PRN, Lizzie Conde MD, 1 mg at 08/29/23 0021    HYDROcodone-acetaminophen (NORCO)  MG per tablet 1 tablet, 1 tablet, Oral, Q8H PRN, Lizzie Conde MD    LORazepam (ATIVAN) injection 0.5 mg, 0.5 mg, Intravenous, Once, Ruben Thompson DO    LORazepam (ATIVAN) tablet 0.5 mg, 0.5 mg, Oral, Q6H PRN, Lizzie Conde MD, 0.5 mg at 08/28/23 1626    Magnesium Low Dose Replacement - Follow Nurse / BPA Driven Protocol, , Does not apply, PRN, Lizzie Conde MD    ondansetron (ZOFRAN) injection 4 mg, 4 mg, Intravenous, Q8H PRN, Gertrude Mckinnon, APRN    pantoprazole (PROTONIX) EC tablet 40 mg, 40 mg, Oral, Q AM, Lizzie Conde MD, 40 mg at 08/29/23 0838    Potassium Replacement - Follow Nurse / BPA Driven Protocol, , Does not apply, PRN, Lizzie Conde MD    sodium chloride 0.9 % flush 10 mL, 10 mL, Intravenous, Q12H, Lizzei Conde MD, 10 mL at 08/29/23 0837    sodium chloride 0.9 % flush 10 mL, 10 mL, Intravenous, PRN, Lizzie Conde MD    sodium chloride 0.9 % infusion 40 mL, 40 mL, Intravenous, Elton CORONA Kathryn E, MD    tamsulosin (FLOMAX) 24 hr capsule 0.4 mg, 0.4 mg, Oral, Daily, Lizzie Conde MD, 0.4 mg at 08/29/23 0837    Laboratory Results:   Lab Results   Component Value Date    GLUCOSE 114 (H) 08/29/2023     CALCIUM 8.6 08/29/2023     08/29/2023    K 4.1 08/29/2023    CO2 29.0 08/29/2023     08/29/2023    BUN 24 (H) 08/29/2023    CREATININE 1.33 (H) 08/29/2023    BCR 18.0 08/29/2023    ANIONGAP 9.0 08/29/2023     Lab Results   Component Value Date    WBC 10.55 08/29/2023    HGB 13.0 08/29/2023    HCT 40.0 08/29/2023    .3 (H) 08/29/2023     08/29/2023     No results found for: CHOL  No results found for: HDL  No results found for: LDL  No results found for: TRIG  Lab Results   Component Value Date    HGBA1C 5.80 (H) 05/20/2023     Lab Results   Component Value Date    INR 1.20 (H) 05/20/2023    PROTIME 15.3 (H) 05/20/2023     Lab Results   Component Value Date    FOLATE 8.18 08/29/2023     Lab Results   Component Value Date    [id number] 248 08/29/2023             Assessment / Plan       Brief Patient Summary:  Dieter Christiansen is a 73 y.o. male with a past medical history of COPD, CAD, DM type II, GERD, obesity, HLD, HTN, lung nodule, chronic back pain and MARY ALICE presented to [facility] ED with complaint of confusion.     CTA was negative for any acute abnormalities.     EEG showed diffuse cerebral dysfunction of mild degree, findings most commonly found in toxic/metabolic encephalopathy or hypoxemia.      Plan:   Metabolic encephalopathy versus polypharmacy  COPD  MARY ALICE  MRI brain with and without contrast pending.  One-time dose of Ativan 0.5 mg for MRI.  Vitamin B12 low normal 248. IM Cyanocobalamin 1000 mcg.   Folate 8.18  IM Haldol for extreme agitation.  Continue gabapentin 300 mg twice daily.  Per bedside RN report patient required Ativan for extreme agitation overnight.  We will trial Seroquel 25 milligrams nightly with agitation.  Repeat EKG in a.m.  Continue work with PT/OT.  Up to chair twice daily as tolerated by patient.  General neurology will continue to follow    I have discussed the above with the patient, bedside RN Dr. French  Time spent with patient: 35 minutes in face-to-face evaluation  and management of the patient.    Copied text in this note has been reviewed and is accurate as of 23.     EUNICE Watson              Electronically signed by Gertrude Mckinnon APRN at 23 1202       Ronna French MD at 23 0841              [facility] Medicine Services  PROGRESS NOTE    Patient Name: Dieter Christiansen  : 1950  MRN: [id number]    Date of Admission: 2023  Primary Care Physician: Ian Sanchez APRN    Subjective   Subjective     CC:  agitation, confusion    HPI:  Better today per RN, not combative, somewhat confused.  Currently sleeping and I did not wake him    ROS:  UTO    Objective   Objective     Vital Signs:   Temp:  [97.7 °F (36.5 °C)-99.7 °F (37.6 °C)] 98 °F (36.7 °C)  Heart Rate:  [57-83] 59  Resp:  [16-26] 16  BP: ()/(56-85) 129/72  Flow (L/min):  [2] 2     Physical Exam:  Asleep, in restraints  Reg resps  No restlessness  Melissa     Results Reviewed:  LAB RESULTS:      Lab 23  0531 23   WBC 10.55  --  10.45   HEMOGLOBIN 13.0  --  14.3   HEMATOCRIT 40.0  --  43.9   PLATELETS 238  --  258   NEUTROS ABS 7.15*  --  7.82*   IMMATURE GRANS (ABS) 0.04  --  0.06*   LYMPHS ABS 2.21  --  1.64   MONOS ABS 1.02*  --  0.71   EOS ABS 0.09  --  0.17   .3*  --  100.2*   SED RATE  --   --  52*   CRP  --   --  0.44   PROCALCITONIN  --   --  0.08   LACTATE  --  1.5 3.1*         Lab 23  0531 23   SODIUM 139 139   POTASSIUM 4.1 3.9   CHLORIDE 101 100   CO2 29.0 25.0   ANION GAP 9.0 14.0   BUN 24* 14   CREATININE 1.33* 1.29*   EGFR 56.8* 58.9*   GLUCOSE 114* 133*   CALCIUM 8.6 9.1   MAGNESIUM  --  1.9   TSH  --  1.920         Lab 23  0531 23  0422   TOTAL PROTEIN 6.2 7.4   ALBUMIN 3.6 3.8   GLOBULIN 2.6 3.6   ALT (SGPT) 6 5   AST (SGOT) 35 18   BILIRUBIN 1.3* 0.7   ALK PHOS 99 112         Lab 23  0422   PROBNP 1,195.0*   HSTROP T 21*                 Lab 23  0800  08/28/23  0437   PH, ARTERIAL 7.397 7.414   PCO2, ARTERIAL 47.4* 44.8   PO2 ART 71.7* 63.4*   FIO2 28 21   HCO3 ART 29.1* 28.6*   BASE EXCESS ART 3.4* 3.4*   CARBOXYHEMOGLOBIN 1.5 1.6     Brief Urine Lab Results  (Last result in the past 365 days)        Color   Clarity   Blood   Leuk Est   Nitrite   Protein   CREAT   Urine HCG        08/28/23 0439 Yellow   Clear   Trace   Negative   Negative   Negative                   Microbiology Results Abnormal       Procedure Component Value - Date/Time    Blood Culture - Blood, Hand, Right [[id number]]  (Normal) Collected: 08/28/23 0415    Lab Status: Preliminary result Specimen: Blood from Hand, Right Updated: 08/29/23 0515     Blood Culture No growth at 24 hours    Respiratory Panel PCR w/COVID-19(SARS-CoV-2) RAY/CHET/HEIDE/PAD/COR/MAD/EDD In-House, NP Swab in UTM/VTM, 3-4 HR TAT - Swab, Nasopharynx [[id number]]  (Normal) Collected: 08/28/23 0423    Lab Status: Final result Specimen: Swab from Nasopharynx Updated: 08/28/23 0520     ADENOVIRUS, PCR Not Detected     Coronavirus 229E Not Detected     Coronavirus HKU1 Not Detected     Coronavirus NL63 Not Detected     Coronavirus OC43 Not Detected     COVID19 Not Detected     Human Metapneumovirus Not Detected     Human Rhinovirus/Enterovirus Not Detected     Influenza A PCR Not Detected     Influenza B PCR Not Detected     Parainfluenza Virus 1 Not Detected     Parainfluenza Virus 2 Not Detected     Parainfluenza Virus 3 Not Detected     Parainfluenza Virus 4 Not Detected     RSV, PCR Not Detected     Bordetella pertussis pcr Not Detected     Bordetella parapertussis PCR Not Detected     Chlamydophila pneumoniae PCR Not Detected     Mycoplasma pneumo by PCR Not Detected    Narrative:      In the setting of a positive respiratory panel with a viral infection PLUS a negative procalcitonin without other underlying concern for bacterial infection, consider observing off antibiotics or discontinuation of antibiotics and continue  supportive care. If the respiratory panel is positive for atypical bacterial infection (Bordetella pertussis, Chlamydophila pneumoniae, or Mycoplasma pneumoniae), consider antibiotic de-escalation to target atypical bacterial infection.            EEG    Result Date: 8/28/2023  Reason for referral: 73 y.o.male with altered mental status Technical Summary:  A 19 channel digital EEG was performed using the international 10-20 placement system, including eye leads and EKG leads. Duration: 21 minutes Findings: The patient is awake and confused.  The background shows diffuse low amplitude 6-7 Hz theta which is present symmetrically over both hemispheres.  A clear posterior rhythm is not seen.  Low amplitude EMG artifact is seen over the anterior leads.  Stage II sleep is not clearly seen.  No focal features or epileptiform activity are seen.  Photic stimulation does not change the background.  Hyperventilation is not performed. Video: Available Technical quality: Superior EKG: Regular, 70 bpm SUMMARY: Mild generalized slow No focal features or epileptiform activity are seen     Impression: Diffuse cerebral dysfunction of mild degree but nonspecific.  This finding is most commonly seen with toxic/metabolic or hypoxemic and encephalopathy This report is transcribed using the Dragon dictation system.      CT Head Without Contrast    Result Date: 8/28/2023  CT HEAD WO CONTRAST Date of Exam: 8/28/2023 7:08 AM EDT Indication: Mental status change, unknown cause. Comparison: None available. Technique: Axial CT images were obtained of the head without contrast administration.  Automated exposure control and iterative construction methods were used. Findings: No acute intracranial hemorrhage. No acute large territory infarct. There are scattered subcortical and periventricular white matter hypodensities which are nonspecific and can be seen in the setting of chronic small vessel ischemic change. No extra-axial collections. No  midline shift or herniation. Normal size and configuration of the ventricles. Unremarkable appearance of the orbits. The paranasal sinuses are grossly clear. The mastoid air cells are grossly clear. No acute or suspicious bony  findings.     Impression: Impression: No acute intracranial findings. Chronic and senescent changes as above. Electronically Signed: Venu Carbone MD  8/28/2023 7:27 AM EDT  Workstation ID: [id number]    XR Chest 1 View    Result Date: 8/28/2023  XR CHEST 1 VW Date of Exam: 8/28/2023 5:21 AM EDT Indication: cough Comparison: 10/7/2022 chest CT. Findings: Exam limited by body habitus. Cardiac silhouette is enlarged. There is likely cardiomegaly although accentuated by AP portable technique. The lungs appear clear. No pneumothorax or pleural effusion. No acute osseous abnormality. Pulmonary vasculature is within normal limits.     Impression: Impression: Cardiomegaly without acute cardiopulmonary abnormality. Electronically Signed: Suhail De Santiago MD  8/28/2023 5:46 AM EDT  Workstation ID: [id number]         Current medications:  Scheduled Meds:aspirin, 81 mg, Oral, Daily  atorvastatin, 10 mg, Oral, Nightly  cetirizine, 10 mg, Oral, Daily  enoxaparin, 40 mg, Subcutaneous, Q12H  FLUoxetine, 20 mg, Oral, Daily  gabapentin, 300 mg, Oral, Q12H  pantoprazole, 40 mg, Oral, Q AM  senna-docusate sodium, 2 tablet, Oral, BID  sodium chloride, 10 mL, Intravenous, Q12H  tamsulosin, 0.4 mg, Oral, Daily      Continuous Infusions:   PRN Meds:.  acetaminophen    albuterol    senna-docusate sodium **AND** polyethylene glycol **AND** bisacodyl **AND** bisacodyl    haloperidol lactate    HYDROcodone-acetaminophen    LORazepam    Magnesium Low Dose Replacement - Follow Nurse / BPA Driven Protocol    ondansetron    Potassium Replacement - Follow Nurse / BPA Driven Protocol    sodium chloride    sodium chloride    Assessment & Plan   Assessment & Plan     Active Hospital Problems    Diagnosis  POA    **AMS (altered  "mental status) [R41.82]  Yes    Obesity, Class III, BMI 40-49.9 (morbid obesity) [E66.01]  Yes    CAD (coronary artery disease) [I25.10]  Yes    COPD (chronic obstructive pulmonary disease) [J44.9]  Yes    Type 2 diabetes mellitus [E11.9]  Yes    HTN (hypertension) [I10]  Yes    HLD (hyperlipidemia) [E78.5]  Yes    MARY ALICE (obstructive sleep apnea) [G47.33]  Yes      Resolved Hospital Problems   No resolved problems to display.        Brief Hospital Course to date:  Dieter Christiansen is a 73 y.o. male  W PMH of CAD COPD DM HTN HL MARY ALICE chrnic back pain, lung nodule and obesity.  Woke up confused,hypoxic, \"talking crazy\".  Combative in ED and got sedatives.  Bladder scan shows 1000ml.      Acute confusion  - agitation, combativeness  - off CPAP overnight   - Ct head okay , await MRI  - polypharmacy :  takes Lortab 10s TID and gabapentin 600 tid  - note that his  UDS was negative so ? Of withdrawal, abuse, or diversion     Acute urinary retention  - Torres placed in ED    Pos blood cx  - CNS - presume contaminant; follow fever curve     Expected Discharge Location and Transportation: tbd  Expected Dischargetbd  Expected Discharge Date: 2023; Expected Discharge Time:      DVT prophylaxis:  Medical DVT prophylaxis orders are present.          CODE STATUS:   Code Status and Medical Interventions:   Ordered at: 23 1114     Code Status (Patient has no pulse and is not breathing):    CPR (Attempt to Resuscitate)     Medical Interventions (Patient has pulse or is breathing):    Full Support       Ronna French MD  23        Electronically signed by Ronna French MD at 23 1527          Consult Notes (all)        Gertrude Mckinnon, APRN at 23 1431        Consult Orders    1. Inpatient Neurology Consult General [[id number]] ordered by Lizzie Conde MD at 23 1115                 [facility] Neurology  Consult Note    Patient Name: Dieter Christiansen  : 1950  MRN: [id number]  Primary Care " "Physician:  Ian Sanchez APRN  Referring Physician: No ref. provider found  Date of admission: 8/28/2023    Subjective     Reason for Consult/ Chief Complaint: Altered mental status    Dieter Christiansen is a 73 y.o. male with a past medical history of COPD, CAD, DM type II, GERD, obesity, HLD, HTN, lung nodule, chronic back pain and MARY ALICE presented to [facility] ED with complaint of confusion.  Patient did require a dose of Haldol and Ativan related to extreme agitation. Per patient report he does not remember waking up this morning.  He also does not recall if he took any medications this a.m. but states that \"he could have\".     He stated that he uses his CPAP on a regular basis but forgot to wear it last night.  He does endorse a change in diet over the recent interim related to nausea.  He denies nausea at the current time.  Patient denies any recent infection, change in medication, cough, or fever.  Patient denies any seizure history.    Review Of Systems   Constitutional: Well-developed male  Cardiovascular: Negative for chest pain or palpitations.  Respiratory: Negative for shortness of breath or cough.  Gastrointestinal: Negative for nausea and vomiting.  Genitourinary: Negative for bladder incontinence.  Musculoskeletal: Negative for aches and pains in the muscles or joints.  Dermatological: Negative for skin breakdown.   Neurological: Positive for altered mental status.    Personal History     Past Medical History:   Diagnosis Date    Chronic back pain     COPD (chronic obstructive pulmonary disease)     Coronary artery disease involving native coronary artery of native heart without angina pectoris     Diabetes mellitus     GERD (gastroesophageal reflux disease)     Heart attack     Hyperlipidemia     Hypertension     Lung nodule     Murmur     MARY ALICE (obstructive sleep apnea)        Past Surgical History:   Procedure Laterality Date    FEMORAL ARTERY STENT         Family History: family history includes Diabetes " in his brother and father. Otherwise pertinent FHx was reviewed and not pertinent to current issue.    Social History:  reports that he has been smoking cigarettes. He has a 15.00 pack-year smoking history. He has quit using smokeless tobacco. He reports that he does not currently use alcohol. He reports that he does not use drugs.    Home Medications:   FLUoxetine, HYDROcodone-acetaminophen, albuterol, aspirin, cetirizine, famotidine, furosemide, gabapentin, ipratropium-albuterol, lisinopril, metFORMIN, metoprolol succinate XL, nitroglycerin, nystatin, nystatin-triamcinolone, pantoprazole, potassium chloride, promethazine, simvastatin, tamsulosin, triamcinolone, and vitamin D    Current Medications:     Current Facility-Administered Medications:     acetaminophen (TYLENOL) tablet 650 mg, 650 mg, Oral, Q4H PRN, Lizzie Conde MD    albuterol (PROVENTIL) nebulizer solution 0.083% 2.5 mg/3mL, 2.5 mg, Nebulization, Q6H PRN, Lizzie Conde MD    aspirin EC tablet 81 mg, 81 mg, Oral, Daily, Lizzie Conde MD, 81 mg at 08/28/23 1419    atorvastatin (LIPITOR) tablet 10 mg, 10 mg, Oral, Nightly, Lizzie Conde MD    sennosides-docusate (PERICOLACE) 8.6-50 MG per tablet 2 tablet, 2 tablet, Oral, BID **AND** polyethylene glycol (MIRALAX) packet 17 g, 17 g, Oral, Daily PRN **AND** bisacodyl (DULCOLAX) EC tablet 5 mg, 5 mg, Oral, Daily PRN **AND** bisacodyl (DULCOLAX) suppository 10 mg, 10 mg, Rectal, Daily PRN, Lizzie Conde MD    cetirizine (zyrTEC) tablet 10 mg, 10 mg, Oral, Daily, Lizzie Conde MD, 10 mg at 08/28/23 1419    Enoxaparin Sodium (LOVENOX) syringe 40 mg, 40 mg, Subcutaneous, Q12H, Lizzie Conde MD, 40 mg at 08/28/23 1419    FLUoxetine (PROzac) capsule 20 mg, 20 mg, Oral, Daily, Lizzie Conde MD, 20 mg at 08/28/23 1419    gabapentin (NEURONTIN) capsule 300 mg, 300 mg, Oral, Q12H, Lizzie Conde MD    HYDROcodone-acetaminophen (NORCO)  MG per tablet 1 tablet, 1 tablet, Oral,  Q8H Elton CORONA Kathryn E, MD    Magnesium Low Dose Replacement - Follow Nurse / BPA Driven Protocol, , Does not apply, Elton CORONA Kathryn E, MD    [START ON 8/29/2023] pantoprazole (PROTONIX) EC tablet 40 mg, 40 mg, Oral, Q AM, Lizzie Conde MD    Potassium Replacement - Follow Nurse / BPA Driven Protocol, , Does not apply, Elton CORONA Kathryn E, MD    sodium chloride 0.9 % flush 10 mL, 10 mL, Intravenous, Q12H, Lizzie Conde MD    sodium chloride 0.9 % flush 10 mL, 10 mL, Intravenous, PRN, Lizzie Conde MD    sodium chloride 0.9 % infusion 40 mL, 40 mL, Intravenous, Elton CORONA Kathryn E, MD    tamsulosin (FLOMAX) 24 hr capsule 0.4 mg, 0.4 mg, Oral, Daily, Lizzie Conde MD, 0.4 mg at 08/28/23 1419     Allergies:  No Known Allergies    Objective     Physical Exam  Vitals and nursing note reviewed.   Constitutional:       General: He is not in acute distress.     Appearance: He is not ill-appearing.   Eyes:      Extraocular Movements: Extraocular movements intact.      Comments: Pupils 1 and slow to react.  No nystagmus noted   Neurological:      Mental Status: He is alert. He is disoriented.      Cranial Nerves: Cranial nerves 2-12 are intact.      Sensory: Sensation is intact.      Motor: No weakness or seizure activity.      Coordination: Finger-Nose-Finger Test normal.      Deep Tendon Reflexes: Reflexes are normal and symmetric. Babinski sign absent on the right side. Babinski sign absent on the left side.      Comments: Oriented to self and location disoriented to year.  Patient unable to follow complex commands.    Cranial Nerves   CN II: Pupils are equal, round, and reactive to light. Normal visual acuity and visual fields.    CN III IV VI: Extraocular movements are full without nystagmus.  CN V: Normal facial sensation and strength of muscles of mastication.  CN VII: Facial movements are symmetric. No weakness.  CN VIII:  Auditory acuity is normal.  CN IX & X:  Symmetric palatal  movement.  CN XI: Sternocleidomastoid and trapezius are normal.  No weakness.  CN XII: The tongue is midline.  No atrophy or fasciculations.       Vitals:  Temp:  [98.2 °F (36.8 °C)-98.6 °F (37 °C)] 98.6 °F (37 °C)  Heart Rate:  [66-80] 73  Resp:  [22-26] 26  BP: ()/(56-97) 98/65  Flow (L/min):  [2] 2    Laboratory Results:   Lab Results   Component Value Date    GLUCOSE 133 (H) 08/28/2023    CALCIUM 9.1 08/28/2023     08/28/2023    K 3.9 08/28/2023    CO2 25.0 08/28/2023     08/28/2023    BUN 14 08/28/2023    CREATININE 1.29 (H) 08/28/2023    BCR 10.9 08/28/2023    ANIONGAP 14.0 08/28/2023     Lab Results   Component Value Date    WBC 10.45 08/28/2023    HGB 14.3 08/28/2023    HCT 43.9 08/28/2023    .2 (H) 08/28/2023     08/28/2023     No results found for: CHOL  No results found for: HDL  No results found for: LDL  No results found for: TRIG  Lab Results   Component Value Date    HGBA1C 5.80 (H) 05/20/2023     Lab Results   Component Value Date    INR 1.20 (H) 05/20/2023    PROTIME 15.3 (H) 05/20/2023     No results found for: [id number]  No results found for: FOLATE          Assessment / Plan       Brief Patient Summary:  Dieter Christiansen is a 73 y.o. male with a past medical history of COPD, CAD, DM type II, GERD, obesity, HLD, HTN, lung nodule, chronic back pain and MARY ALICE presented to [facility] ED with complaint of confusion.    CTA was negative for any acute abnormalities.    EEG showed diffuse cerebral dysfunction of mild degree, findings most commonly found in toxic/metabolic encephalopathy or hypoxemia.     Plan:   Metabolic encephalopathy versus polypharmacy  COPD  MARY ALICE  MRI brain with without contrast  Vitamin B12 and folate pending  Zofran as needed for nausea  Continue gabapentin 300 mg twice daily  As needed IM Haldol for extreme agitation.  General neurology will continue to follow      I have discussed the above with the patient, bedside RN Dr. Conde   Time spent with patient: 60  minutes in face-to-face evaluation and management of the patient.       EUNICE Watson           Electronically signed by Gertrude Mckinnon APRN at 08/28/23 4678

## 2023-08-31 LAB
QT INTERVAL: 384 MS
QTC INTERVAL: 405 MS

## 2023-08-31 PROCEDURE — 96372 THER/PROPH/DIAG INJ SC/IM: CPT

## 2023-08-31 PROCEDURE — 99232 SBSQ HOSP IP/OBS MODERATE 35: CPT | Performed by: INTERNAL MEDICINE

## 2023-08-31 PROCEDURE — 25010000002 ENOXAPARIN PER 10 MG: Performed by: INTERNAL MEDICINE

## 2023-08-31 PROCEDURE — 99213 OFFICE O/P EST LOW 20 MIN: CPT

## 2023-08-31 RX ORDER — QUETIAPINE FUMARATE 25 MG/1
50 TABLET, FILM COATED ORAL NIGHTLY
Status: DISCONTINUED | OUTPATIENT
Start: 2023-08-31 | End: 2023-09-02 | Stop reason: HOSPADM

## 2023-08-31 RX ORDER — ACETAMINOPHEN 325 MG/1
650 TABLET ORAL EVERY 4 HOURS PRN
Status: DISCONTINUED | OUTPATIENT
Start: 2023-08-31 | End: 2023-09-02 | Stop reason: HOSPADM

## 2023-08-31 RX ORDER — CHOLECALCIFEROL (VITAMIN D3) 125 MCG
5 CAPSULE ORAL NIGHTLY
Status: DISCONTINUED | OUTPATIENT
Start: 2023-08-31 | End: 2023-09-02 | Stop reason: HOSPADM

## 2023-08-31 RX ADMIN — PANTOPRAZOLE SODIUM 40 MG: 40 TABLET, DELAYED RELEASE ORAL at 05:39

## 2023-08-31 RX ADMIN — QUETIAPINE FUMARATE 50 MG: 25 TABLET ORAL at 20:50

## 2023-08-31 RX ADMIN — ACETAMINOPHEN 650 MG: 325 TABLET ORAL at 15:48

## 2023-08-31 RX ADMIN — ATORVASTATIN CALCIUM 10 MG: 10 TABLET, FILM COATED ORAL at 20:50

## 2023-08-31 RX ADMIN — GABAPENTIN 300 MG: 300 CAPSULE ORAL at 20:50

## 2023-08-31 RX ADMIN — ASPIRIN 81 MG: 81 TABLET, COATED ORAL at 08:57

## 2023-08-31 RX ADMIN — CETIRIZINE HYDROCHLORIDE 10 MG: 10 TABLET, FILM COATED ORAL at 08:57

## 2023-08-31 RX ADMIN — Medication 10 ML: at 20:51

## 2023-08-31 RX ADMIN — SENNOSIDES AND DOCUSATE SODIUM 2 TABLET: 8.6; 5 TABLET ORAL at 20:50

## 2023-08-31 RX ADMIN — Medication 5 MG: at 20:50

## 2023-08-31 RX ADMIN — ENOXAPARIN SODIUM 60 MG: 60 INJECTION SUBCUTANEOUS at 08:58

## 2023-08-31 RX ADMIN — FLUOXETINE 20 MG: 20 CAPSULE ORAL at 08:57

## 2023-08-31 RX ADMIN — TAMSULOSIN HYDROCHLORIDE 0.4 MG: 0.4 CAPSULE ORAL at 08:57

## 2023-08-31 RX ADMIN — ENOXAPARIN SODIUM 60 MG: 60 INJECTION SUBCUTANEOUS at 20:50

## 2023-08-31 NOTE — PROGRESS NOTES
[facility] Medicine Services  PROGRESS NOTE    Patient Name: Dieter Christiansen  : 1950  MRN: [id number]    Date of Admission: 2023  Primary Care Physician: Ian Sanchez APRN    Subjective   Subjective     CC:  F/u encephalopathy    HPI:  Denies acute complaints this morning. Not sure what he will do about being out of pain medications after discharge, not sure his PCP will refill them for him    ROS:  Gen- No fevers, chills  CV- No chest pain, palpitations  Resp- No cough, dyspnea  GI- No N/V/D, abd pain     Objective   Objective     Vital Signs:   Temp:  [97.9 °F (36.6 °C)-98.5 °F (36.9 °C)] 97.9 °F (36.6 °C)  Heart Rate:  [69-85] 82  Resp:  [18] 18  BP: (134-158)/(66-90) 145/66  Flow (L/min):  [3] 3     Physical Exam:  Constitutional: Awake, slightly lethargic, obese male sitting in bedside chair in NAD  HENT: NCAT, mucous membranes moist  Respiratory: Clear to auscultation bilaterally, respiratory effort normal   Cardiovascular: RRR, palpable radial pulses  Gastrointestinal: Positive bowel sounds, soft, nontender, nondistended  Psychiatric: Appropriate affect, cooperative  Neurologic: Speech clear, answering questions appropriately    Results Reviewed:  LAB RESULTS:      Lab 23  0531 23   WBC 10.55  --  10.45   HEMOGLOBIN 13.0  --  14.3   HEMATOCRIT 40.0  --  43.9   PLATELETS 238  --  258   NEUTROS ABS 7.15*  --  7.82*   IMMATURE GRANS (ABS) 0.04  --  0.06*   LYMPHS ABS 2.21  --  1.64   MONOS ABS 1.02*  --  0.71   EOS ABS 0.09  --  0.17   .3*  --  100.2*   SED RATE  --   --  52*   CRP  --   --  0.44   PROCALCITONIN  --   --  0.08   LACTATE  --  1.5 3.1*         Lab 23  0531 23   SODIUM 139 139   POTASSIUM 4.1 3.9   CHLORIDE 101 100   CO2 29.0 25.0   ANION GAP 9.0 14.0   BUN 24* 14   CREATININE 1.33* 1.29*   EGFR 56.8* 58.9*   GLUCOSE 114* 133*   CALCIUM 8.6 9.1   MAGNESIUM  --  1.9   TSH  --  1.920         Lab  08/29/23  0531 08/28/23 0422   TOTAL PROTEIN 6.2 7.4   ALBUMIN 3.6 3.8   GLOBULIN 2.6 3.6   ALT (SGPT) 6 5   AST (SGOT) 35 18   BILIRUBIN 1.3* 0.7   ALK PHOS 99 112         Lab 08/28/23  0422   PROBNP 1,195.0*   HSTROP T 21*             Lab 08/29/23  0531   FOLATE 8.18   VITAMIN B 12 248         Lab 08/28/23  0800 08/28/23  0437   PH, ARTERIAL 7.397 7.414   PCO2, ARTERIAL 47.4* 44.8   PO2 ART 71.7* 63.4*   FIO2 28 21   HCO3 ART 29.1* 28.6*   BASE EXCESS ART 3.4* 3.4*   CARBOXYHEMOGLOBIN 1.5 1.6     Brief Urine Lab Results  (Last result in the past 365 days)        Color   Clarity   Blood   Leuk Est   Nitrite   Protein   CREAT   Urine HCG        08/28/23 0439 Yellow   Clear   Trace   Negative   Negative   Negative                   Microbiology Results Abnormal       Procedure Component Value - Date/Time    Blood Culture - Blood, Hand, Right [[id number]]  (Normal) Collected: 08/28/23 0415    Lab Status: Preliminary result Specimen: Blood from Hand, Right Updated: 08/31/23 0515     Blood Culture No growth at 3 days    Respiratory Panel PCR w/COVID-19(SARS-CoV-2) RAY/CHET/HEIDE/PAD/COR/MAD/EDD In-House, NP Swab in UTM/VTM, 3-4 HR TAT - Swab, Nasopharynx [[id number]]  (Normal) Collected: 08/28/23 0423    Lab Status: Final result Specimen: Swab from Nasopharynx Updated: 08/28/23 0520     ADENOVIRUS, PCR Not Detected     Coronavirus 229E Not Detected     Coronavirus HKU1 Not Detected     Coronavirus NL63 Not Detected     Coronavirus OC43 Not Detected     COVID19 Not Detected     Human Metapneumovirus Not Detected     Human Rhinovirus/Enterovirus Not Detected     Influenza A PCR Not Detected     Influenza B PCR Not Detected     Parainfluenza Virus 1 Not Detected     Parainfluenza Virus 2 Not Detected     Parainfluenza Virus 3 Not Detected     Parainfluenza Virus 4 Not Detected     RSV, PCR Not Detected     Bordetella pertussis pcr Not Detected     Bordetella parapertussis PCR Not Detected     Chlamydophila pneumoniae PCR Not  Detected     Mycoplasma pneumo by PCR Not Detected    Narrative:      In the setting of a positive respiratory panel with a viral infection PLUS a negative procalcitonin without other underlying concern for bacterial infection, consider observing off antibiotics or discontinuation of antibiotics and continue supportive care. If the respiratory panel is positive for atypical bacterial infection (Bordetella pertussis, Chlamydophila pneumoniae, or Mycoplasma pneumoniae), consider antibiotic de-escalation to target atypical bacterial infection.            No radiology results from the last 24 hrs        Current medications:  Scheduled Meds:aspirin, 81 mg, Oral, Daily  atorvastatin, 10 mg, Oral, Nightly  cetirizine, 10 mg, Oral, Daily  cyanocobalamin, 1,000 mcg, Intramuscular, Q28 Days  enoxaparin, 60 mg, Subcutaneous, Q12H  FLUoxetine, 20 mg, Oral, Daily  gabapentin, 300 mg, Oral, Nightly  melatonin, 5 mg, Oral, Nightly  pantoprazole, 40 mg, Oral, Q AM  QUEtiapine, 50 mg, Oral, Nightly  senna-docusate sodium, 2 tablet, Oral, BID  sodium chloride, 10 mL, Intravenous, Q12H  tamsulosin, 0.4 mg, Oral, Daily      Continuous Infusions:   PRN Meds:.  acetaminophen    albuterol    senna-docusate sodium **AND** polyethylene glycol **AND** bisacodyl **AND** bisacodyl    haloperidol lactate    HYDROcodone-acetaminophen    LORazepam    Magnesium Low Dose Replacement - Follow Nurse / BPA Driven Protocol    ondansetron    Potassium Replacement - Follow Nurse / BPA Driven Protocol    sodium chloride    sodium chloride    Assessment & Plan   Assessment & Plan     Active Hospital Problems    Diagnosis  POA    **AMS (altered mental status) [R41.82]  Yes    Obesity, Class III, BMI 40-49.9 (morbid obesity) [E66.01]  Yes    CAD (coronary artery disease) [I25.10]  Yes    COPD (chronic obstructive pulmonary disease) [J44.9]  Yes    Type 2 diabetes mellitus [E11.9]  Yes    HTN (hypertension) [I10]  Yes    HLD (hyperlipidemia) [E78.5]  Yes     MARY ALICE (obstructive sleep apnea) [G47.33]  Yes      Resolved Hospital Problems   No resolved problems to display.        Brief Hospital Course to date:  Dieter Christiansen is a 73 y.o. male w/ CAD, COPD, chronic O2 use, HTN, HLD, MARY ALICE, chronic pain, morbid obesity, who presented w/ alteration in mentation (nonsensical speech/confusion), hypoxia; workup largely unrevealing, neuro has followed, symptoms felt to be related to overuse of prescribed controlled substances w/ likely withdrawal syndrome    The following problems are new to me today    Assessment/Plan    Acute toxic-metabolic encephalopathy  Chronic pain  Misuse of prescribed controlled substances  Debility  -infectious workup unrevealing  -CT head w/o acute process  -EEG w/ diffuse slowing c/w TME  -reports taking more than Rx'ed of home meds, ran out of the same, UDS noted negative, suspected withdrawal syndrome  -reported back to approx baseline  -d/w patient, I will only send a maximum of 3 days of his home meds, he does not have a plan at home on how to bridge the >/= 1 wk gap between his next refill w/ his PCP, discussed IPR as rec'd by PT which will afford him his pain meds and bridge him to his refill; he is declining at this time; plan is to discharge in 1-2 days with maximum 3 day Rx of medications and he will need to f/u w/ his PCP    Acute urinary retention  -reported >/= 1L urine drained on arrival  -miramontes removed yesterday, cont to monitor for retention  -may have also contributed to encephalopathy  -cont flomax    COPD w/ chronic O2 use  -reported 3lpm baseline    CAD  HTN  HLD  -asa, statin    Morbid obesity, BMI 50.87 kg/m2  MARY ALICE  -nippv w/ sleep  -complicates care    Abnormal blood culture  -CoNS on single blood culture, no evidence for sepsis, likely contaminant      Expected Discharge Location and Transportation: home w/ HH  Expected Discharge   Expected Discharge Date: 9/2/2023; Expected Discharge Time:      DVT prophylaxis:  Medical DVT  prophylaxis orders are present.     AM-PAC 6 Clicks Score (PT): 17 (08/31/23 0825)    CODE STATUS:   Code Status and Medical Interventions:   Ordered at: 08/28/23 1114     Code Status (Patient has no pulse and is not breathing):    CPR (Attempt to Resuscitate)     Medical Interventions (Patient has pulse or is breathing):    Full Support       Suhail Obrien, DO  08/31/23

## 2023-08-31 NOTE — PROGRESS NOTES
"                  Clinical Nutrition   Nutrition Assessment  Reason for Visit: MST score 2+, \"Unsure\" unintentional weight loss    Patient Name: Dieter Christiansen  YOB: 1950  MRN: [id number]  Date of Encounter: 08/31/23 14:29 EDT  Admission date: 8/28/2023    Comments:    Pt does not meet criteria for malnutrition at this time. No nutrition related concerns at this time.     Nutrition Assessment   Admission Diagnosis:  AMS (altered mental status) [R41.82]    Problem List:    AMS (altered mental status)    CAD (coronary artery disease)    COPD (chronic obstructive pulmonary disease)    Type 2 diabetes mellitus    HTN (hypertension)    HLD (hyperlipidemia)    MARY ALICE (obstructive sleep apnea)    Obesity, Class III, BMI 40-49.9 (morbid obesity)      PMH:   He  has a past medical history of Chronic back pain, COPD (chronic obstructive pulmonary disease), Coronary artery disease involving native coronary artery of native heart without angina pectoris, Diabetes mellitus, GERD (gastroesophageal reflux disease), Heart attack, Hyperlipidemia, Hypertension, Lung nodule, Murmur, and MARY ALICE (obstructive sleep apnea).    PSH:  He  has a past surgical history that includes Femoral artery stent.    Applicable Nutrition Concerns:   Skin: MASD to coccyx  Oral:  GI:    Applicable Interval History:       Reported/Observed/Food/Nutrition Related History:     Pt up in chair at time of visit, able to provide weight/nutrition hx. Pt lives at home with family, reports daughter primarily prepares meals. Endorsed a good appetite however reports eating small meals and snacks. Pt is fully edentulous - does not wear dentures but denies mastication difficulty. Denies weight changes, N/V/D. NKFA.       Anthropometrics     Flowsheet Rows      Flowsheet Row First Filed Value   Admission Height 175.3 cm (69.02\") Documented at 08/28/2023 0407   Admission Weight 151 kg (333 lb) Documented at 08/28/2023 0407            Height: Height: 175.3 cm " "(69\")  Last Filed Weight: Weight: (!) 156 kg (344 lb 8 oz) (08/28/23 1408)  Method: Weight Method: Bed scale  BMI: BMI (Calculated): 50.9  BMI classification: Obese Class III extreme obesity: > or equal to 40kg/m2  IBW:   155lbs    UBW:     Weight       Weight (kg) Weight (lbs) Weight Method Visit Report   10/19/2022 152.409 kg (H)  336 lb (H)   --    12/6/2022 149.687 kg (H)  330 lb (H)   --    1/4/2023 151.048 kg (H)  333 lb (H)   --    5/5/2023 152.182 kg (H)  335 lb 8 oz (H)   --    5/19/2023 153.769 kg (H)  339 lb (H)  Stated     5/25/2023 151.048 kg (H)  333 lb (H)  Bed scale     5/26/2023 151.048 kg (H)  333 lb (H)      5/27/2023 151.048 kg (H)  333 lb (H)      5/28/2023 --  --      5/30/2023 150.594 kg (H)  332 lb (H)      5/31/2023 151.048 kg (H)  333 lb (H)   --     151.048 kg (H)  333 lb (H)   --    6/1/2023 151.048 kg (H)  333 lb (H)      8/28/2023 156.264 kg (H)  344 lb 8 oz (H)  Bed scale      151.048 kg (H)  333 lb (H)         Weight change: unchanged    Nutrition Focused Physical Exam     Date:  8/31       Pt does not meet criteria for malnutrition diagnosis, at this time.  Difficulty evaluating for muscle wasting 2/2 body habitus.     Current Nutrition Prescription   PO: Diet: Regular/House Diet; Texture: Regular Texture (IDDSI 7); Fluid Consistency: Thin (IDDSI 0)  Oral Nutrition Supplement: N/A  Intake:  80% x last 5 meals documented - observed 00% of lunch meal    Nutrition Diagnosis   Date:  8/31            Updated:    Problem No nutrition diagnosis at this time    Etiology    Signs/Symptoms    Status:     Goal:   General: Nutrition to support treatment  PO: Maintain intake  EN/PN: N/A    Nutrition Intervention      Follow treatment progress, Care plan reviewed, Encourage intake        Monitoring/Evaluation:   Per protocol      Soha Falk MS,RD,LD  Time Spent: 25min    "

## 2023-08-31 NOTE — PROGRESS NOTES
[facility]    Progress Note    Patient Name: Dieter Christiansen  : 1950  MRN: [id number]  Primary Care Physician:  Ian Sanchez APRN  Date of admission: 2023    Subjective     Chief Complaint: Altered mental status    History of Present Illness   Patient was alert and oriented x4.  Per bedside RN report he did not sleep overnight.  He did fall asleep early this morning was drowsy upon awakening.    Review of Systems   General: Negative for fever, nausea, or vomiting.   Neurological: Negative for headache, pain, or weakness.     Objective     Physical Exam  Vitals and nursing note reviewed.   Eyes:      Extraocular Movements: Extraocular movements intact.      Pupils: Pupils are equal, round, and reactive to light.      Comments: No nystagmus noted   Cardiovascular:      Rate and Rhythm: Normal rate.   Pulmonary:      Effort: Pulmonary effort is normal.   Neurological:      Mental Status: He is alert and oriented to person, place, and time.      Cranial Nerves: Cranial nerves 2-12 are intact.      Sensory: Sensation is intact.      Motor: No weakness or seizure activity.   Psychiatric:         Attention and Perception: Attention normal.         Mood and Affect: Mood normal.        Vitals:   Temp:  [97.9 °F (36.6 °C)-98.5 °F (36.9 °C)] 97.9 °F (36.6 °C)  Heart Rate:  [69-85] 82  Resp:  [18] 18  BP: (129-158)/(66-90) 145/66  Flow (L/min):  [3] 3    Current Medications    Current Facility-Administered Medications:     acetaminophen (TYLENOL) tablet 650 mg, 650 mg, Oral, Q4H PRN, Lizzie Conde MD, 650 mg at 23    albuterol (PROVENTIL) nebulizer solution 0.083% 2.5 mg/3mL, 2.5 mg, Nebulization, Q6H PRN, Lizzie Conde MD, 2.5 mg at 23 1730    aspirin EC tablet 81 mg, 81 mg, Oral, Daily, Lizzie Conde MD, 81 mg at 23 0857    atorvastatin (LIPITOR) tablet 10 mg, 10 mg, Oral, Nightly, Lizzie Conde MD, 10 mg at 23    sennosides-docusate  (PERICOLACE) 8.6-50 MG per tablet 2 tablet, 2 tablet, Oral, BID, 2 tablet at 08/30/23 2005 **AND** polyethylene glycol (MIRALAX) packet 17 g, 17 g, Oral, Daily PRN **AND** bisacodyl (DULCOLAX) EC tablet 5 mg, 5 mg, Oral, Daily PRN **AND** bisacodyl (DULCOLAX) suppository 10 mg, 10 mg, Rectal, Daily PRN, Lizzie Conde MD    cetirizine (zyrTEC) tablet 10 mg, 10 mg, Oral, Daily, Lizzie Conde MD, 10 mg at 08/31/23 0857    cyanocobalamin injection 1,000 mcg, 1,000 mcg, Intramuscular, Q28 Days, Gertrude Mckinnon, EUNICE, 1,000 mcg at 08/29/23 1447    Enoxaparin Sodium (LOVENOX) syringe 60 mg, 60 mg, Subcutaneous, Q12H, Ronna French MD, 60 mg at 08/31/23 0858    FLUoxetine (PROzac) capsule 20 mg, 20 mg, Oral, Daily, Lizzie Conde MD, 20 mg at 08/31/23 0857    gabapentin (NEURONTIN) capsule 300 mg, 300 mg, Oral, Nightly, Gertrude Mckinnon, APRN, 300 mg at 08/30/23 2005    haloperidol lactate (HALDOL) injection 1 mg, 1 mg, Intramuscular, Q6H PRN, Lizzie Conde MD, 1 mg at 08/29/23 0021    HYDROcodone-acetaminophen (NORCO)  MG per tablet 1 tablet, 1 tablet, Oral, Q8H PRN, Lizzie Conde MD    LORazepam (ATIVAN) tablet 0.5 mg, 0.5 mg, Oral, Q6H PRN, Lizzie Conde MD, 0.5 mg at 08/28/23 1626    Magnesium Low Dose Replacement - Follow Nurse / BPA Driven Protocol, , Does not apply, PRN, Lizzie Conde MD    ondansetron (ZOFRAN) injection 4 mg, 4 mg, Intravenous, Q8H PRN, Mckinnon, April K, APRN    pantoprazole (PROTONIX) EC tablet 40 mg, 40 mg, Oral, Q AM, Lizzie Conde MD, 40 mg at 08/31/23 0539    Potassium Replacement - Follow Nurse / BPA Driven Protocol, , Does not apply, PRMISBAH, Lizzie Conde MD    QUEtiapine (SEROquel) tablet 25 mg, 25 mg, Oral, Nightly, Pratibha April K, APRN, 25 mg at 08/30/23 2005    sodium chloride 0.9 % flush 10 mL, 10 mL, Intravenous, Q12H, Lizzie Conde MD, 10 mL at 08/30/23 2005    sodium chloride 0.9 % flush 10 mL, 10 mL, Intravenous, PRN, Lizzie Conde MD     sodium chloride 0.9 % infusion 40 mL, 40 mL, Intravenous, PRN, Lizzie Conde MD    tamsulosin (FLOMAX) 24 hr capsule 0.4 mg, 0.4 mg, Oral, Daily, Lizzie Conde MD, 0.4 mg at 08/31/23 0857    Laboratory Results:   Lab Results   Component Value Date    GLUCOSE 114 (H) 08/29/2023    CALCIUM 8.6 08/29/2023     08/29/2023    K 4.1 08/29/2023    CO2 29.0 08/29/2023     08/29/2023    BUN 24 (H) 08/29/2023    CREATININE 1.33 (H) 08/29/2023    BCR 18.0 08/29/2023    ANIONGAP 9.0 08/29/2023     Lab Results   Component Value Date    WBC 10.55 08/29/2023    HGB 13.0 08/29/2023    HCT 40.0 08/29/2023    .3 (H) 08/29/2023     08/29/2023     No results found for: CHOL  No results found for: HDL  No results found for: LDL  No results found for: TRIG  Lab Results   Component Value Date    HGBA1C 5.80 (H) 05/20/2023     Lab Results   Component Value Date    INR 1.20 (H) 05/20/2023    PROTIME 15.3 (H) 05/20/2023     Lab Results   Component Value Date    FOLATE 8.18 08/29/2023     Lab Results   Component Value Date    [id number] 248 08/29/2023             Assessment / Plan      Brief Patient Summary:  Dieter Christiansen is a 73 y.o. male with a past medical history of COPD, CAD, DM type II, GERD, obesity, HLD, HTN, lung nodule, chronic back pain and MARY ALICE presented to [facility] ED with complaint of confusion.     CTA was negative for any acute abnormalities.     EEG showed diffuse cerebral dysfunction of mild degree, findings most commonly found in toxic/metabolic encephalopathy or hypoxemia.      Plan:   Metabolic encephalopathy versus polypharmacy  Versus hospital delirium  COPD  MARY ALICE  Discontinue MRI brain without contrast this patient is not able to tolerate the exam.  Will repeat CT head if patient's condition worsens.  IM Cyanocobalamin 1000 mcg on 8/28  Per bedside RN report patient was awake most the night.  We will continue gabapentin 300 mg nightly and continue to hold his home a.m. dose.  QTc  415  Increase Seroquel to 50 mg nightly  Melatonin 5 mg nightly  Patient to continue to work with PT/OT.  Up to chair as tolerated.  Overall patient seems to be improving.  General neurology will continue to follow.    I have discussed the above with the patient, bedside RN Dr. Obrien  Time spent with patient: 35 minutes in face-to-face evaluation and management of the patient.      Gertrude Mckinnon, APRN

## 2023-08-31 NOTE — CASE MANAGEMENT/SOCIAL WORK
Continued Stay Note  [facility]     Patient Name: Dietre Christiansen  MRN: [id number]  Today's Date: 8/31/2023    Admit Date: 8/28/2023    Plan: Home   Discharge Plan       Row Name 08/31/23 1146       Plan    Plan Home    Patient/Family in Agreement with Plan yes    Plan Comments Spoke with patient's daughter, Rand, by phone to discuss disposition.  Plan is for patient to return home with Rand and she will transport.  No need voiced at this time.  CM will continue to follow.    Final Discharge Disposition Code 01 - home or self-care                   Discharge Codes    No documentation.                   Alecia Mccord RN

## 2023-09-01 LAB
ANION GAP SERPL CALCULATED.3IONS-SCNC: 8 MMOL/L (ref 5–15)
BUN SERPL-MCNC: 13 MG/DL (ref 8–23)
BUN/CREAT SERPL: 13 (ref 7–25)
CALCIUM SPEC-SCNC: 8.4 MG/DL (ref 8.6–10.5)
CHLORIDE SERPL-SCNC: 102 MMOL/L (ref 98–107)
CO2 SERPL-SCNC: 31 MMOL/L (ref 22–29)
CREAT SERPL-MCNC: 1 MG/DL (ref 0.76–1.27)
DEPRECATED RDW RBC AUTO: 47.4 FL (ref 37–54)
EGFRCR SERPLBLD CKD-EPI 2021: 80 ML/MIN/1.73
ERYTHROCYTE [DISTWIDTH] IN BLOOD BY AUTOMATED COUNT: 12.8 % (ref 12.3–15.4)
GLUCOSE SERPL-MCNC: 95 MG/DL (ref 65–99)
HCT VFR BLD AUTO: 38.9 % (ref 37.5–51)
HGB BLD-MCNC: 12.6 G/DL (ref 13–17.7)
MCH RBC QN AUTO: 32.6 PG (ref 26.6–33)
MCHC RBC AUTO-ENTMCNC: 32.4 G/DL (ref 31.5–35.7)
MCV RBC AUTO: 100.5 FL (ref 79–97)
PLATELET # BLD AUTO: 239 10*3/MM3 (ref 140–450)
PMV BLD AUTO: 10.5 FL (ref 6–12)
POTASSIUM SERPL-SCNC: 4.2 MMOL/L (ref 3.5–5.2)
QT INTERVAL: 388 MS
QTC INTERVAL: 415 MS
RBC # BLD AUTO: 3.87 10*6/MM3 (ref 4.14–5.8)
SODIUM SERPL-SCNC: 141 MMOL/L (ref 136–145)
WBC NRBC COR # BLD: 6.61 10*3/MM3 (ref 3.4–10.8)

## 2023-09-01 PROCEDURE — G0378 HOSPITAL OBSERVATION PER HR: HCPCS

## 2023-09-01 PROCEDURE — 97535 SELF CARE MNGMENT TRAINING: CPT

## 2023-09-01 PROCEDURE — 99232 SBSQ HOSP IP/OBS MODERATE 35: CPT | Performed by: INTERNAL MEDICINE

## 2023-09-01 PROCEDURE — 85027 COMPLETE CBC AUTOMATED: CPT | Performed by: INTERNAL MEDICINE

## 2023-09-01 PROCEDURE — 96372 THER/PROPH/DIAG INJ SC/IM: CPT

## 2023-09-01 PROCEDURE — 99213 OFFICE O/P EST LOW 20 MIN: CPT

## 2023-09-01 PROCEDURE — 25010000002 ENOXAPARIN PER 10 MG: Performed by: INTERNAL MEDICINE

## 2023-09-01 PROCEDURE — 97530 THERAPEUTIC ACTIVITIES: CPT

## 2023-09-01 PROCEDURE — 80048 BASIC METABOLIC PNL TOTAL CA: CPT | Performed by: INTERNAL MEDICINE

## 2023-09-01 PROCEDURE — 51701 INSERT BLADDER CATHETER: CPT

## 2023-09-01 PROCEDURE — 51798 US URINE CAPACITY MEASURE: CPT

## 2023-09-01 RX ORDER — FINASTERIDE 5 MG/1
5 TABLET, FILM COATED ORAL DAILY
Status: DISCONTINUED | OUTPATIENT
Start: 2023-09-01 | End: 2023-09-02 | Stop reason: HOSPADM

## 2023-09-01 RX ADMIN — CETIRIZINE HYDROCHLORIDE 10 MG: 10 TABLET, FILM COATED ORAL at 08:30

## 2023-09-01 RX ADMIN — SENNOSIDES AND DOCUSATE SODIUM 2 TABLET: 8.6; 5 TABLET ORAL at 08:30

## 2023-09-01 RX ADMIN — QUETIAPINE FUMARATE 50 MG: 25 TABLET ORAL at 22:40

## 2023-09-01 RX ADMIN — ASPIRIN 81 MG: 81 TABLET, COATED ORAL at 08:30

## 2023-09-01 RX ADMIN — ENOXAPARIN SODIUM 60 MG: 60 INJECTION SUBCUTANEOUS at 08:28

## 2023-09-01 RX ADMIN — PANTOPRAZOLE SODIUM 40 MG: 40 TABLET, DELAYED RELEASE ORAL at 06:02

## 2023-09-01 RX ADMIN — Medication 10 ML: at 08:30

## 2023-09-01 RX ADMIN — ENOXAPARIN SODIUM 60 MG: 60 INJECTION SUBCUTANEOUS at 22:40

## 2023-09-01 RX ADMIN — GABAPENTIN 300 MG: 300 CAPSULE ORAL at 22:40

## 2023-09-01 RX ADMIN — Medication 5 MG: at 22:40

## 2023-09-01 RX ADMIN — FLUOXETINE 20 MG: 20 CAPSULE ORAL at 08:30

## 2023-09-01 RX ADMIN — FINASTERIDE 5 MG: 5 TABLET, FILM COATED ORAL at 13:00

## 2023-09-01 RX ADMIN — Medication 10 ML: at 22:41

## 2023-09-01 RX ADMIN — ATORVASTATIN CALCIUM 10 MG: 10 TABLET, FILM COATED ORAL at 22:40

## 2023-09-01 RX ADMIN — TAMSULOSIN HYDROCHLORIDE 0.4 MG: 0.4 CAPSULE ORAL at 08:30

## 2023-09-01 NOTE — CASE MANAGEMENT/SOCIAL WORK
Continued Stay Note  [facility]     Patient Name: Dieter Christiansen  MRN: [id number]  Today's Date: 9/1/2023    Admit Date: 8/28/2023    Plan: Home   Discharge Plan       Row Name 09/01/23 1531       Plan    Plan Home    Patient/Family in Agreement with Plan yes    Plan Comments Discussed patient in MDR.  Mr Christiansen is still not sleeping, and is now requiring in and out cath.  The plan remains for Mr Christiansen to be discharged tomorrow.  He may have to go home with an indwelling catheter.  I spoke with Mr Christiansen at bedside.  He denies any discharge needs, stating that he has all of his DME at home, including oxygen.  He did request an incentive spirometer to take home prior to discharge and I have left a note for the nurses.  The plan is for Rand, Mr Christiansen's daughter, to transport. CM will continue to follow.    Final Discharge Disposition Code 01 - home or self-care                   Discharge Codes    No documentation.                 Expected Discharge Date and Time       Expected Discharge Date Expected Discharge Time    Sep 2, 2023               Isa Okeefe RN

## 2023-09-01 NOTE — PROGRESS NOTES
[facility] Medicine Services  PROGRESS NOTE    Patient Name: Dieter Christiansen  : 1950  MRN: [id number]    Date of Admission: 2023  Primary Care Physician: Ian Sanchez APRN    Subjective   Subjective     CC:  F/u encephalopathy    HPI:  Denies complaints, per nursing was retaining urine and requited I&O cath w/ 900ml drained.    Daughter called and updated, agreeable to discharge tomorrow, with a miramontes if required    ROS:  Gen- No fevers, chills  CV- No chest pain, palpitations  Resp- No cough, dyspnea  GI- No N/V/D, abd pain     Objective   Objective     Vital Signs:   Temp:  [97.6 °F (36.4 °C)-98.4 °F (36.9 °C)] 97.6 °F (36.4 °C)  Heart Rate:  [70-84] 71  Resp:  [18-22] 20  BP: (131-153)/(58-73) 147/72  Flow (L/min):  [2-3] 2     Physical Exam:  Constitutional: Awake, alert, obese male sitting in bedside chair in NAD  HENT: NCAT, mucous membranes moist  Respiratory: Clear to auscultation bilaterally, respiratory effort normal   Cardiovascular: RRR, palpable radial pulses  Gastrointestinal: Positive bowel sounds, soft, nontender, nondistended  Psychiatric: Appropriate affect, cooperative  Neurologic: Speech clear, answering questions appropriately    Results Reviewed:  LAB RESULTS:      Lab 23  0626 23  0531 23  0422   WBC 6.61 10.55  --  10.45   HEMOGLOBIN 12.6* 13.0  --  14.3   HEMATOCRIT 38.9 40.0  --  43.9   PLATELETS 239 238  --  258   NEUTROS ABS  --  7.15*  --  7.82*   IMMATURE GRANS (ABS)  --  0.04  --  0.06*   LYMPHS ABS  --  2.21  --  1.64   MONOS ABS  --  1.02*  --  0.71   EOS ABS  --  0.09  --  0.17   .5* 100.3*  --  100.2*   SED RATE  --   --   --  52*   CRP  --   --   --  0.44   PROCALCITONIN  --   --   --  0.08   LACTATE  --   --  1.5 3.1*           Lab 23  0626 23  0531 23  0422   SODIUM 141 139 139   POTASSIUM 4.2 4.1 3.9   CHLORIDE 102 101 100   CO2 31.0* 29.0 25.0   ANION GAP 8.0 9.0 14.0   BUN  13 24* 14   CREATININE 1.00 1.33* 1.29*   EGFR 80.0 56.8* 58.9*   GLUCOSE 95 114* 133*   CALCIUM 8.4* 8.6 9.1   MAGNESIUM  --   --  1.9   TSH  --   --  1.920           Lab 08/29/23  0531 08/28/23 0422   TOTAL PROTEIN 6.2 7.4   ALBUMIN 3.6 3.8   GLOBULIN 2.6 3.6   ALT (SGPT) 6 5   AST (SGOT) 35 18   BILIRUBIN 1.3* 0.7   ALK PHOS 99 112           Lab 08/28/23  0422   PROBNP 1,195.0*   HSTROP T 21*               Lab 08/29/23  0531   FOLATE 8.18   VITAMIN B 12 248           Lab 08/28/23  0800 08/28/23  0437   PH, ARTERIAL 7.397 7.414   PCO2, ARTERIAL 47.4* 44.8   PO2 ART 71.7* 63.4*   FIO2 28 21   HCO3 ART 29.1* 28.6*   BASE EXCESS ART 3.4* 3.4*   CARBOXYHEMOGLOBIN 1.5 1.6       Brief Urine Lab Results  (Last result in the past 365 days)        Color   Clarity   Blood   Leuk Est   Nitrite   Protein   CREAT   Urine HCG        08/28/23 0439 Yellow   Clear   Trace   Negative   Negative   Negative                   Microbiology Results Abnormal       Procedure Component Value - Date/Time    Blood Culture - Blood, Hand, Right [[id number]]  (Normal) Collected: 08/28/23 0415    Lab Status: Preliminary result Specimen: Blood from Hand, Right Updated: 09/01/23 0515     Blood Culture No growth at 4 days    Respiratory Panel PCR w/COVID-19(SARS-CoV-2) [facility]/M[facility]/EDD In-House, NP Swab in UTM/VTM, 3-4 HR TAT - Swab, Nasopharynx [[id number]]  (Normal) Collected: 08/28/23 0423    Lab Status: Final result Specimen: Swab from Nasopharynx Updated: 08/28/23 0520     ADENOVIRUS, PCR Not Detected     Coronavirus 229E Not Detected     Coronavirus HKU1 Not Detected     Coronavirus NL63 Not Detected     Coronavirus OC43 Not Detected     COVID19 Not Detected     Human Metapneumovirus Not Detected     Human Rhinovirus/Enterovirus Not Detected     Influenza A PCR Not Detected     Influenza B PCR Not Detected     Parainfluenza Virus 1 Not Detected     Parainfluenza Virus 2 Not Detected     Parainfluenza Virus 3 Not Detected      Parainfluenza Virus 4 Not Detected     RSV, PCR Not Detected     Bordetella pertussis pcr Not Detected     Bordetella parapertussis PCR Not Detected     Chlamydophila pneumoniae PCR Not Detected     Mycoplasma pneumo by PCR Not Detected    Narrative:      In the setting of a positive respiratory panel with a viral infection PLUS a negative procalcitonin without other underlying concern for bacterial infection, consider observing off antibiotics or discontinuation of antibiotics and continue supportive care. If the respiratory panel is positive for atypical bacterial infection (Bordetella pertussis, Chlamydophila pneumoniae, or Mycoplasma pneumoniae), consider antibiotic de-escalation to target atypical bacterial infection.            No radiology results from the last 24 hrs        Current medications:  Scheduled Meds:aspirin, 81 mg, Oral, Daily  atorvastatin, 10 mg, Oral, Nightly  cetirizine, 10 mg, Oral, Daily  cyanocobalamin, 1,000 mcg, Intramuscular, Q28 Days  enoxaparin, 60 mg, Subcutaneous, Q12H  finasteride, 5 mg, Oral, Daily  FLUoxetine, 20 mg, Oral, Daily  gabapentin, 300 mg, Oral, Nightly  melatonin, 5 mg, Oral, Nightly  pantoprazole, 40 mg, Oral, Q AM  QUEtiapine, 50 mg, Oral, Nightly  senna-docusate sodium, 2 tablet, Oral, BID  sodium chloride, 10 mL, Intravenous, Q12H  tamsulosin, 0.4 mg, Oral, Daily      Continuous Infusions:   PRN Meds:.  acetaminophen    albuterol    senna-docusate sodium **AND** polyethylene glycol **AND** bisacodyl **AND** bisacodyl    haloperidol lactate    HYDROcodone-acetaminophen    LORazepam    Magnesium Low Dose Replacement - Follow Nurse / BPA Driven Protocol    ondansetron    Potassium Replacement - Follow Nurse / BPA Driven Protocol    sodium chloride    sodium chloride    Assessment & Plan   Assessment & Plan     Active Hospital Problems    Diagnosis  POA    **AMS (altered mental status) [R41.82]  Yes    Obesity, Class III, BMI 40-49.9 (morbid obesity) [E66.01]  Yes     CAD (coronary artery disease) [I25.10]  Yes    COPD (chronic obstructive pulmonary disease) [J44.9]  Yes    Type 2 diabetes mellitus [E11.9]  Yes    HTN (hypertension) [I10]  Yes    HLD (hyperlipidemia) [E78.5]  Yes    MARY ALICE (obstructive sleep apnea) [G47.33]  Yes      Resolved Hospital Problems   No resolved problems to display.        Brief Hospital Course to date:  Dieter Christiansen is a 73 y.o. male w/ CAD, COPD, chronic O2 use, HTN, HLD, MARY ALICE, chronic pain, morbid obesity, who presented w/ alteration in mentation (nonsensical speech/confusion), hypoxia; workup largely unrevealing, neuro has followed, symptoms felt to be related to overuse of prescribed controlled substances w/ likely withdrawal syndrome    Assessment/Plan    Acute toxic-metabolic encephalopathy  Chronic pain  Misuse of prescribed controlled substances  Debility  -infectious workup unrevealing  -CT head w/o acute process  -EEG w/ diffuse slowing c/w TME  -reports taking more than Rx'ed of home meds, ran out of the same, UDS noted negative, suspected withdrawal syndrome  -reported back to approx baseline  -d/w patient's daughter via telephone, she plans to monitor his pain medications closer at home to prevent future issues    Acute urinary retention  -reported >/= 1L urine drained on arrival  -may have also contributed to encephalopathy  -cont flomax  -miramontes removed 8/30, per nursing required I&O cath x2, largest volume 900ml, added proscar; d/w dtr via phone and she says she can manage a miramontes at home if needed    COPD w/ chronic O2 use  -reported 3lpm baseline    CAD  HTN  HLD  -asa, statin    Morbid obesity, BMI 50.87 kg/m2  MARY ALICE  -nippv w/ sleep  -complicates care    Abnormal blood culture  -CoNS on single blood culture, no evidence for sepsis, likely contaminant      Expected Discharge Location and Transportation: home w/ HH  Expected Discharge   Expected Discharge Date: 9/2/2023; Expected Discharge Time:      DVT prophylaxis:  Medical DVT  prophylaxis orders are present.     AM-PAC 6 Clicks Score (PT): 18 (09/01/23 0800)    CODE STATUS:   Code Status and Medical Interventions:   Ordered at: 08/28/23 1114     Code Status (Patient has no pulse and is not breathing):    CPR (Attempt to Resuscitate)     Medical Interventions (Patient has pulse or is breathing):    Full Support       Suhail Obrien, DO  09/01/23

## 2023-09-01 NOTE — PROGRESS NOTES
[facility] Neurology    Progress Note    Patient Name: Dieter Christiansen  : 1950  MRN: [id number]  Primary Care Physician:  Ian Sanchez APRN  Date of admission: 2023    Subjective     Chief Complaint: Altered mental status    History of Present Illness   Patient up to chair upon assessment.  He was able to state his name, location and the month.  He continued to state that it was 2019; which has been consistent throughout hospital stay.  He did state that he got approximately 6 to 7 hours of sleep overnight.  He did awake few times due to hospital care.  Patient appears to be back to baseline.    Review of Systems   General: Negative for fever, nausea, or vomiting.   Neurological: Negative for headache, pain, or weakness.     Objective     Physical Exam  Vitals and nursing note reviewed.   Constitutional:       General: He is not in acute distress.  Eyes:      Extraocular Movements: Extraocular movements intact.      Pupils: Pupils are equal, round, and reactive to light.      Comments: No nystagmus noted   Cardiovascular:      Rate and Rhythm: Normal rate.   Pulmonary:      Effort: Pulmonary effort is normal.   Neurological:      Mental Status: He is alert and oriented to person, place, and time.      Cranial Nerves: Cranial nerves 2-12 are intact.      Sensory: Sensation is intact.      Motor: No weakness or seizure activity.      Coordination: Finger-Nose-Finger Test normal.        Vitals:   Temp:  [97.6 °F (36.4 °C)-98.4 °F (36.9 °C)] 98.2 °F (36.8 °C)  Heart Rate:  [70-84] 73  Resp:  [18-22] 22  BP: (131-153)/(58-73) 146/66  Flow (L/min):  [2-3] 2    Current Medications    Current Facility-Administered Medications:     acetaminophen (TYLENOL) tablet 650 mg, 650 mg, Oral, Q4H PRN, Suhail Obrien DO, 650 mg at 23 1548    albuterol (PROVENTIL) nebulizer solution 0.083% 2.5 mg/3mL, 2.5 mg, Nebulization, Q6H PRN, Lizzie Conde MD, 2.5 mg at 23 1730    aspirin EC tablet  81 mg, 81 mg, Oral, Daily, Lizzie Conde MD, 81 mg at 09/01/23 0830    atorvastatin (LIPITOR) tablet 10 mg, 10 mg, Oral, Nightly, Lizzie Conde MD, 10 mg at 08/31/23 2050    sennosides-docusate (PERICOLACE) 8.6-50 MG per tablet 2 tablet, 2 tablet, Oral, BID, 2 tablet at 09/01/23 0830 **AND** polyethylene glycol (MIRALAX) packet 17 g, 17 g, Oral, Daily PRN **AND** bisacodyl (DULCOLAX) EC tablet 5 mg, 5 mg, Oral, Daily PRN **AND** bisacodyl (DULCOLAX) suppository 10 mg, 10 mg, Rectal, Daily PRN, Lizzie Conde MD    cetirizine (zyrTEC) tablet 10 mg, 10 mg, Oral, Daily, Lizzie Conde MD, 10 mg at 09/01/23 0830    cyanocobalamin injection 1,000 mcg, 1,000 mcg, Intramuscular, Q28 Days, Gertrude Mckinnon, APRN, 1,000 mcg at 08/29/23 1447    Enoxaparin Sodium (LOVENOX) syringe 60 mg, 60 mg, Subcutaneous, Q12H, Ronna French MD, 60 mg at 09/01/23 0828    FLUoxetine (PROzac) capsule 20 mg, 20 mg, Oral, Daily, Lizzie Conde MD, 20 mg at 09/01/23 0830    gabapentin (NEURONTIN) capsule 300 mg, 300 mg, Oral, Nightly, Gertrude Mckinnon, APRN, 300 mg at 08/31/23 2050    haloperidol lactate (HALDOL) injection 1 mg, 1 mg, Intramuscular, Q6H PRN, Lizzie Conde MD, 1 mg at 08/29/23 0021    HYDROcodone-acetaminophen (NORCO)  MG per tablet 1 tablet, 1 tablet, Oral, Q8H PRN, Lizzie Coned MD    LORazepam (ATIVAN) tablet 0.5 mg, 0.5 mg, Oral, Q6H PRN, Lizzie Conde MD, 0.5 mg at 08/28/23 1626    Magnesium Low Dose Replacement - Follow Nurse / BPA Driven Protocol, , Does not apply, CHLOE, Lizzie Conde MD    melatonin tablet 5 mg, 5 mg, Oral, Nightly, Gertrude Mckinnon, APRN, 5 mg at 08/31/23 2050    ondansetron (ZOFRAN) injection 4 mg, 4 mg, Intravenous, Q8H PRN, Gertrude Mckinnon, APRN    pantoprazole (PROTONIX) EC tablet 40 mg, 40 mg, Oral, Q AM, Lizzie Conde MD, 40 mg at 09/01/23 0602    Potassium Replacement - Follow Nurse / BPA Driven Protocol, , Does not apply, Elton CORONA Kathryn E, MD     QUEtiapine (SEROquel) tablet 50 mg, 50 mg, Oral, Nightly, Mckinnon, April K, APRN, 50 mg at 08/31/23 2050    sodium chloride 0.9 % flush 10 mL, 10 mL, Intravenous, Q12H, Lizzie Conde MD, 10 mL at 09/01/23 0830    sodium chloride 0.9 % flush 10 mL, 10 mL, Intravenous, PRN, Lizzie Conde MD    sodium chloride 0.9 % infusion 40 mL, 40 mL, Intravenous, PRN, Lizzie Conde MD    tamsulosin (FLOMAX) 24 hr capsule 0.4 mg, 0.4 mg, Oral, Daily, Lizzie Conde MD, 0.4 mg at 09/01/23 0830    Laboratory Results:   Lab Results   Component Value Date    GLUCOSE 95 09/01/2023    CALCIUM 8.4 (L) 09/01/2023     09/01/2023    K 4.2 09/01/2023    CO2 31.0 (H) 09/01/2023     09/01/2023    BUN 13 09/01/2023    CREATININE 1.00 09/01/2023    BCR 13.0 09/01/2023    ANIONGAP 8.0 09/01/2023     Lab Results   Component Value Date    WBC 6.61 09/01/2023    HGB 12.6 (L) 09/01/2023    HCT 38.9 09/01/2023    .5 (H) 09/01/2023     09/01/2023     No results found for: CHOL  No results found for: HDL  No results found for: LDL  No results found for: TRIG  Lab Results   Component Value Date    HGBA1C 5.80 (H) 05/20/2023     Lab Results   Component Value Date    INR 1.20 (H) 05/20/2023    PROTIME 15.3 (H) 05/20/2023     Lab Results   Component Value Date    FOLATE 8.18 08/29/2023     Lab Results   Component Value Date    [id number] 248 08/29/2023             Assessment / Plan     Active Hospital Problems:    AMS (altered mental status)    CAD (coronary artery disease)    COPD (chronic obstructive pulmonary disease)    Type 2 diabetes mellitus    HTN (hypertension)    HLD (hyperlipidemia)    MARY ALICE (obstructive sleep apnea)    Obesity, Class III, BMI 40-49.9 (morbid obesity)       Brief Patient Summary:  Dieter Christiansen is a 73 y.o. male with a past medical history of COPD, CAD, DM type II, GERD, obesity, HLD, HTN, lung nodule, chronic back pain and MARY ALICE presented to [facility] ED with complaint of confusion.     CTA was  negative for any acute abnormalities.     EEG showed diffuse cerebral dysfunction of mild degree, findings most commonly found in toxic/metabolic encephalopathy or hypoxemia.      Plan:   Metabolic encephalopathy versus polypharmacy  Versus hospital delirium  COPD  MARY ALICE  IM Cyanocobalamin 1000 mcg on 8/28, repeat monthly  Continue gabapentin 300 mg nightly  Continue Seroquel 50 mg nightly  Continue melatonin 5 mg nightly  Patient in chair as tolerated.  Per patient report he states he slept approximately 6 to 7 hours last night which is an improvement.  Patient to follow-up with primary care provider after hospitalization.  He is reportedly back to his baseline.  Alert and oriented x3.  Patient is okay for discharge once deemed medically appropriate by the hospitalist.  General neurology will sign off, please call with any questions.      I have discussed the above with the patient, bedside RN Dr. Obrien  Time spent with patient: 35 minutes in face-to-face evaluation and management of the patient.      Gertrude Mckinnon, APRN

## 2023-09-01 NOTE — PLAN OF CARE
Goal Outcome Evaluation:  Plan of Care Reviewed With: patient        Progress: improving  Outcome Evaluation: Pt tolerated OT intervention well this date. Pt performed STS from chair with CGA, completing in room ambulation for ADLs using a RWx with CGA. Pt required Yousuf to stand from low surfaced commode before returning to the bedside chair. Pt continues to present below his functional baseline with deficits in strength, activity tolerance, and overall safety awareness. Pt will benefit from continued OT services to assist in restoring PLOF. Recommend a d/c to home with assist and HH.      Anticipated Discharge Disposition (OT): home with assist, home with home health

## 2023-09-01 NOTE — THERAPY TREATMENT NOTE
Patient Name: Dieter Christiansen  : 1950    MRN: [id number]                              Today's Date: 2023       Admit Date: 2023    Visit Dx:     ICD-10-CM ICD-9-CM   1. Encephalopathy  G93.40 348.30   2. Acute respiratory failure with hypoxia  J96.01 518.81     Patient Active Problem List   Diagnosis    Chronic back pain    Cellulitis of left hand    Tobacco abuse    CAD (coronary artery disease)    COPD (chronic obstructive pulmonary disease)    Type 2 diabetes mellitus    HTN (hypertension)    HLD (hyperlipidemia)    GERD without esophagitis    MARY ALICE (obstructive sleep apnea)    Infection of finger of left hand    Cellulitis of index finger, left    Other specified local infections of the skin and subcutaneous tissue    Cellulitis due to MRSA    AMS (altered mental status)    Obesity, Class III, BMI 40-49.9 (morbid obesity)     Past Medical History:   Diagnosis Date    Chronic back pain     COPD (chronic obstructive pulmonary disease)     Coronary artery disease involving native coronary artery of native heart without angina pectoris     Diabetes mellitus     GERD (gastroesophageal reflux disease)     Heart attack     Hyperlipidemia     Hypertension     Lung nodule     Murmur     MARY ALICE (obstructive sleep apnea)      Past Surgical History:   Procedure Laterality Date    FEMORAL ARTERY STENT        General Information       Row Name 23 1439          OT Time and Intention    Document Type therapy note (daily note)  -KF     Mode of Treatment occupational therapy;individual therapy  -KF       Row Name 23 1439          General Information    Patient Profile Reviewed yes  -KF     Existing Precautions/Restrictions fall;oxygen therapy device and L/min  -KF     Barriers to Rehab medically complex  -KF       Row Name 23 143          Cognition    Orientation Status (Cognition) oriented x 4  -KF       Row Name 23 1439          Safety Issues, Functional Mobility    Safety Issues Affecting  Function (Mobility) awareness of need for assistance;insight into deficits/self-awareness;positioning of assistive device;problem-solving;safety precaution awareness;safety precautions follow-through/compliance;sequencing abilities  -KF     Impairments Affecting Function (Mobility) balance;endurance/activity tolerance;pain;shortness of breath;strength  -KF     Cognitive Impairments, Mobility Safety/Performance awareness, need for assistance;insight into deficits/self-awareness;safety precaution follow-through;safety precaution awareness;problem-solving/reasoning  -               User Key  (r) = Recorded By, (t) = Taken By, (c) = Cosigned By      Initials Name Provider Type    KF Marie Zambrano OT Occupational Therapist                     Mobility/ADL's       Row Name 09/01/23 1441          Bed Mobility    Comment, (Bed Mobility) Pt found and left up in the chair.  -       Row Name 09/01/23 1441          Transfers    Transfers sit-stand transfer;stand-sit transfer;toilet transfer  -       Row Name 09/01/23 1441          Sit-Stand Transfer    Sit-Stand [place] (Transfers) contact guard;verbal cues  -     Assistive Device (Sit-Stand Transfers) walker, front-wheeled  -     Comment, (Sit-Stand Transfer) VCs for safe hand placement from chair arm  -       Row Name 09/01/23 1441          Stand-Sit Transfer    Stand-Sit [place] (Transfers) contact guard;verbal cues  -     Comment, (Stand-Sit Transfer) VCs for hand placement on grab bar > chair arm  -       Row Name 09/01/23 1441          Toilet Transfer    Type (Toilet Transfer) sit-stand;stand-sit  -     [place] Level (Toilet Transfer) minimum assist (75% patient effort);verbal cues  -     Assistive Device (Toilet Transfer) commode  -       Row Name 09/01/23 1441          Functional Mobility    Functional Mobility- Ind. Level contact guard assist;1 person  -     Functional Mobility- Device walker, front-wheeled  -     Functional  Mobility-Distance (Feet) --  In room ambulation for ADLs  -KF       Row Name 09/01/23 1441          Activities of Daily Living    BADL Assessment/Intervention upper body dressing;grooming;toileting  -KF       Row Name 09/01/23 1441          Upper Body Dressing Assessment/Training    [place] Level (Upper Body Dressing) doff;don;minimum assist (75% patient effort);other (see comments)  Hospital gown  -KF     Position (Upper Body Dressing) supported sitting  -KF       Row Name 09/01/23 1441          Grooming Assessment/Training    [place] Level (Grooming) wash face, hands;set up  -KF     Position (Grooming) supported sitting  -KF       Row Name 09/01/23 1441          Toileting Assessment/Training    [place] Level (Toileting) toileting skills;adjust/manage clothing;perform perineal hygiene;minimum assist (75% patient effort)  -KF     Position (Toileting) unsupported sitting  -KF               User Key  (r) = Recorded By, (t) = Taken By, (c) = Cosigned By      Initials Name Provider Type    KF Marie Zambrano OT Occupational Therapist                   Obj/Interventions       Row Name 09/01/23 1444          Motor Skills    Functional Endurance SpO2 remained >90% on 3L NC  -KF       [facility] Name 09/01/23 1444          Balance    Balance Assessment sitting static balance;standing static balance;standing dynamic balance  -KF     Static Sitting Balance independent  -KF     Position, Sitting Balance supported;sitting in chair  -KF     Static Standing Balance contact guard  -KF     Dynamic Standing Balance contact guard;minimal assist;1-person assist;other (see comments)  Initialy CGA, progressed to slight Yousuf as pt fatigued  -KF     Position/Device Used, Standing Balance supported;walker, front-wheeled  -KF     Balance Interventions sitting;standing;sit to stand;supported;static;dynamic;occupation based/functional task  -KF     Comment, Balance No overt LOB this date; short steps taken at slow pace  -KF                User Key  (r) = Recorded By, (t) = Taken By, (c) = Cosigned By      Initials Name Provider Type    Marie Winter OT Occupational Therapist                   Goals/Plan    No documentation.                  Clinical Impression       Row Name 09/01/23 1447          Pain Assessment    Pretreatment Pain Rating 0/10 - no pain  -KF     Posttreatment Pain Rating 0/10 - no pain  -KF     Pain Intervention(s) Repositioned;Ambulation/increased activity  -       Row Name 09/01/23 1447          Plan of Care Review    Plan of Care Reviewed With patient  -KF     Progress improving  -     Outcome Evaluation Pt tolerated OT intervention well this date. Pt performed STS from chair with CGA, completing in room ambulation for ADLs using a RWx with CGA. Pt required Yousuf to stand from low surfaced commode before returning to the bedside chair. Pt continues to present below his functional baseline with deficits in strength, activity tolerance, and overall safety awareness. Pt will benefit from continued OT services to assist in restoring PLOF. Recommend a d/c to home with assist and HH.  -       Row Name 09/01/23 1447          Therapy Assessment/Plan (OT)    Rehab Potential (OT) good, to achieve stated therapy goals  -     Criteria for Skilled Therapeutic Interventions Met (OT) yes;skilled treatment is necessary  -KF     Therapy Frequency (OT) daily  -       Row Name 09/01/23 1447          Therapy Plan Review/Discharge Plan (OT)    Anticipated Discharge Disposition (OT) home with assist;home with home health  -       Row Name 09/01/23 1447          Vital Signs    Pre Systolic BP Rehab 147  -KF     Pre Treatment Diastolic BP 72  -KF     Pretreatment Heart Rate (beats/min) 85  -KF     Pre SpO2 (%) 96  -KF     O2 Delivery Pre Treatment nasal cannula  -KF     Pre Patient Position Sitting  -KF     Intra Patient Position Standing  -KF     Post Patient Position Sitting  -KF       Row Name 09/01/23 1444          Positioning  and Restraints    Pre-Treatment Position sitting in chair/recliner  -KF     Post Treatment Position chair  -KF     In Chair reclined;call light within reach;encouraged to call for assist;exit alarm on;legs elevated;waffle cushion  -KF               User Key  (r) = Recorded By, (t) = Taken By, (c) = Cosigned By      Initials Name Provider Type    Marie Winter OT Occupational Therapist                   Outcome Measures       Row Name 09/01/23 7424          How much help from another is currently needed...    Putting on and taking off regular lower body clothing? 2  -KF     Bathing (including washing, rinsing, and drying) 3  -KF     Toileting (which includes using toilet bed pan or urinal) 3  -KF     Putting on and taking off regular upper body clothing 3  -KF     Taking care of personal grooming (such as brushing teeth) 3  -KF     Eating meals 4  -KF     AM-PAC 6 Clicks Score (OT) 18  -KF       Row Name 09/01/23 0800          How much help from another person do you currently need...    Turning from your back to your side while in flat bed without using bedrails? 3  -HS     Moving from lying on back to sitting on the side of a flat bed without bedrails? 3  -HS     Moving to and from a bed to a chair (including a wheelchair)? 3  -HS     Standing up from a chair using your arms (e.g., wheelchair, bedside chair)? 3  -HS     Climbing 3-5 steps with a railing? 3  -HS     To walk in hospital room? 3  -HS     AM-PAC 6 Clicks Score (PT) 18  -HS     Highest level of mobility 6 --> Walked 10 steps or more  -       Row Name 09/01/23 4471          Functional Assessment    Outcome Measure Options AM-PAC 6 Clicks Daily Activity (OT)  -               User Key  (r) = Recorded By, (t) = Taken By, (c) = Cosigned By      Initials Name Provider Type    Sushma Solis RN Registered Nurse    Marie Winter OT Occupational Therapist                    Occupational Therapy Education       Title: PT OT SLP Therapies (In  Progress)       Topic: Occupational Therapy (In Progress)       Point: ADL training (Done)       Description:   Instruct learner(s) on proper safety adaptation and remediation techniques during self care or transfers.   Instruct in proper use of assistive devices.                  Learning Progress Summary             Patient Acceptance, E, VU by KF at 9/1/2023 1330    Acceptance, E,D, VU,DU by  at 8/29/2023 0930                         Point: Home exercise program (Not Started)       Description:   Instruct learner(s) on appropriate technique for monitoring, assisting and/or progressing therapeutic exercises/activities.                  Learner Progress:  Not documented in this visit.              Point: Precautions (Done)       Description:   Instruct learner(s) on prescribed precautions during self-care and functional transfers.                  Learning Progress Summary             Patient Acceptance, E, VU by KF at 9/1/2023 1330    Acceptance, E,D, VU,DU by  at 8/29/2023 0930                         Point: Body mechanics (Done)       Description:   Instruct learner(s) on proper positioning and spine alignment during self-care, functional mobility activities and/or exercises.                  Learning Progress Summary             Patient Acceptance, E, VU by KF at 9/1/2023 1330    Acceptance, E,D, VU,DU by  at 8/29/2023 0930                                         User Key       Initials Effective Dates Name Provider Type Discipline     06/16/21 -  Yovany Candelario OT Occupational Therapist OT     08/09/23 -  Marie Zambrano OT Occupational Therapist OT                  OT Recommendation and Plan  Therapy Frequency (OT): daily  Plan of Care Review  Plan of Care Reviewed With: patient  Progress: improving  Outcome Evaluation: Pt tolerated OT intervention well this date. Pt performed STS from chair with CGA, completing in room ambulation for ADLs using a RWx with CGA. Pt required Yousuf to stand from low  surfaced commode before returning to the bedside chair. Pt continues to present below his functional baseline with deficits in strength, activity tolerance, and overall safety awareness. Pt will benefit from continued OT services to assist in restoring PLOF. Recommend a d/c to home with assist and HH.     Time Calculation:         Time Calculation- OT       Row Name 09/01/23 1456             Time Calculation- OT    OT Start Time 1330  -KF      OT Received On 09/01/23  -KF      OT Goal Re-Cert Due Date 09/08/23  -KF         Timed Charges    96574 - OT Therapeutic Activity Minutes 6  -KF      96999 - OT Self Care/Mgmt Minutes 18  -KF         Total Minutes    Timed Charges Total Minutes 24  -KF       Total Minutes 24  -KF                User Key  (r) = Recorded By, (t) = Taken By, (c) = Cosigned By      Initials Name Provider Type    KF Marie Zambrano OT Occupational Therapist                  Therapy Charges for Today       Code Description Service Date Service Provider Modifiers Qty    97132738239  OT THERAPEUTIC ACT EA 15 MIN 9/1/2023 Marie Zambrano OT GO 1    [id number] HC OT SELF CARE/MGMT/TRAIN EA 15 MIN 9/1/2023 Marie Zambrano OT GO 1                 Marie Zambrano OT  9/1/2023

## 2023-09-02 ENCOUNTER — READMISSION MANAGEMENT (OUTPATIENT)
Dept: CALL CENTER | Facility: HOSPITAL | Age: 73
End: 2023-09-02
Payer: MEDICARE

## 2023-09-02 VITALS
BODY MASS INDEX: 46.65 KG/M2 | RESPIRATION RATE: 20 BRPM | DIASTOLIC BLOOD PRESSURE: 82 MMHG | OXYGEN SATURATION: 95 % | HEART RATE: 75 BPM | WEIGHT: 315 LBS | HEIGHT: 69 IN | SYSTOLIC BLOOD PRESSURE: 142 MMHG | TEMPERATURE: 97.4 F

## 2023-09-02 LAB
BACTERIA SPEC AEROBE CULT: NORMAL
BASOPHILS # BLD AUTO: 0.03 10*3/MM3 (ref 0–0.2)
BASOPHILS NFR BLD AUTO: 0.5 % (ref 0–1.5)
DEPRECATED RDW RBC AUTO: 46.6 FL (ref 37–54)
EOSINOPHIL # BLD AUTO: 0.29 10*3/MM3 (ref 0–0.4)
EOSINOPHIL NFR BLD AUTO: 4.4 % (ref 0.3–6.2)
ERYTHROCYTE [DISTWIDTH] IN BLOOD BY AUTOMATED COUNT: 12.6 % (ref 12.3–15.4)
HCT VFR BLD AUTO: 35.8 % (ref 37.5–51)
HGB BLD-MCNC: 11.4 G/DL (ref 13–17.7)
IMM GRANULOCYTES # BLD AUTO: 0.04 10*3/MM3 (ref 0–0.05)
IMM GRANULOCYTES NFR BLD AUTO: 0.6 % (ref 0–0.5)
LYMPHOCYTES # BLD AUTO: 1.88 10*3/MM3 (ref 0.7–3.1)
LYMPHOCYTES NFR BLD AUTO: 28.3 % (ref 19.6–45.3)
MCH RBC QN AUTO: 31.8 PG (ref 26.6–33)
MCHC RBC AUTO-ENTMCNC: 31.8 G/DL (ref 31.5–35.7)
MCV RBC AUTO: 100 FL (ref 79–97)
MONOCYTES # BLD AUTO: 0.65 10*3/MM3 (ref 0.1–0.9)
MONOCYTES NFR BLD AUTO: 9.8 % (ref 5–12)
NEUTROPHILS NFR BLD AUTO: 3.75 10*3/MM3 (ref 1.7–7)
NEUTROPHILS NFR BLD AUTO: 56.4 % (ref 42.7–76)
NRBC BLD AUTO-RTO: 0 /100 WBC (ref 0–0.2)
PLATELET # BLD AUTO: 247 10*3/MM3 (ref 140–450)
PMV BLD AUTO: 10.1 FL (ref 6–12)
RBC # BLD AUTO: 3.58 10*6/MM3 (ref 4.14–5.8)
WBC NRBC COR # BLD: 6.64 10*3/MM3 (ref 3.4–10.8)

## 2023-09-02 PROCEDURE — 96372 THER/PROPH/DIAG INJ SC/IM: CPT

## 2023-09-02 PROCEDURE — 25010000002 ENOXAPARIN PER 10 MG: Performed by: INTERNAL MEDICINE

## 2023-09-02 PROCEDURE — 94640 AIRWAY INHALATION TREATMENT: CPT

## 2023-09-02 PROCEDURE — 51701 INSERT BLADDER CATHETER: CPT

## 2023-09-02 PROCEDURE — 94799 UNLISTED PULMONARY SVC/PX: CPT

## 2023-09-02 PROCEDURE — 51798 US URINE CAPACITY MEASURE: CPT

## 2023-09-02 PROCEDURE — 99239 HOSP IP/OBS DSCHRG MGMT >30: CPT | Performed by: INTERNAL MEDICINE

## 2023-09-02 PROCEDURE — 85025 COMPLETE CBC W/AUTO DIFF WBC: CPT | Performed by: NURSE PRACTITIONER

## 2023-09-02 PROCEDURE — G0378 HOSPITAL OBSERVATION PER HR: HCPCS

## 2023-09-02 RX ORDER — HYDROCODONE BITARTRATE AND ACETAMINOPHEN 10; 325 MG/1; MG/1
1 TABLET ORAL EVERY 8 HOURS PRN
Qty: 9 TABLET | Refills: 0 | Status: SHIPPED | OUTPATIENT
Start: 2023-09-02 | End: 2023-09-02 | Stop reason: SDUPTHER

## 2023-09-02 RX ORDER — FINASTERIDE 5 MG/1
5 TABLET, FILM COATED ORAL DAILY
Qty: 30 TABLET | Refills: 0 | Status: SHIPPED | OUTPATIENT
Start: 2023-09-03

## 2023-09-02 RX ORDER — METOPROLOL SUCCINATE 50 MG/1
25 TABLET, EXTENDED RELEASE ORAL DAILY
Qty: 30 TABLET | Refills: 5
Start: 2023-09-02

## 2023-09-02 RX ORDER — HYDROCODONE BITARTRATE AND ACETAMINOPHEN 10; 325 MG/1; MG/1
1 TABLET ORAL EVERY 8 HOURS PRN
Qty: 9 TABLET | Refills: 0 | Status: SHIPPED | OUTPATIENT
Start: 2023-09-02

## 2023-09-02 RX ORDER — IPRATROPIUM BROMIDE AND ALBUTEROL SULFATE 2.5; .5 MG/3ML; MG/3ML
3 SOLUTION RESPIRATORY (INHALATION) ONCE
Status: COMPLETED | OUTPATIENT
Start: 2023-09-02 | End: 2023-09-02

## 2023-09-02 RX ORDER — QUETIAPINE FUMARATE 50 MG/1
50 TABLET, FILM COATED ORAL NIGHTLY
Qty: 30 TABLET | Refills: 0 | Status: SHIPPED | OUTPATIENT
Start: 2023-09-02

## 2023-09-02 RX ORDER — GABAPENTIN 300 MG/1
300 CAPSULE ORAL NIGHTLY
Qty: 3 CAPSULE | Refills: 0 | Status: SHIPPED | OUTPATIENT
Start: 2023-09-02 | End: 2023-09-02 | Stop reason: SDUPTHER

## 2023-09-02 RX ORDER — CYANOCOBALAMIN 1000 UG/ML
1000 INJECTION, SOLUTION INTRAMUSCULAR; SUBCUTANEOUS
Start: 2023-09-26 | End: 2023-09-02 | Stop reason: SDUPTHER

## 2023-09-02 RX ORDER — FINASTERIDE 5 MG/1
5 TABLET, FILM COATED ORAL DAILY
Qty: 30 TABLET | Refills: 0 | Status: SHIPPED | OUTPATIENT
Start: 2023-09-03 | End: 2023-09-02 | Stop reason: SDUPTHER

## 2023-09-02 RX ORDER — CYANOCOBALAMIN 1000 UG/ML
1000 INJECTION, SOLUTION INTRAMUSCULAR; SUBCUTANEOUS
Qty: 1 ML | Refills: 0 | Status: SHIPPED | OUTPATIENT
Start: 2023-09-26

## 2023-09-02 RX ORDER — QUETIAPINE FUMARATE 50 MG/1
50 TABLET, FILM COATED ORAL NIGHTLY
Qty: 30 TABLET | Refills: 0 | Status: SHIPPED | OUTPATIENT
Start: 2023-09-02 | End: 2023-09-02 | Stop reason: SDUPTHER

## 2023-09-02 RX ORDER — GABAPENTIN 300 MG/1
300 CAPSULE ORAL NIGHTLY
Qty: 3 CAPSULE | Refills: 0 | Status: SHIPPED | OUTPATIENT
Start: 2023-09-02

## 2023-09-02 RX ADMIN — CETIRIZINE HYDROCHLORIDE 10 MG: 10 TABLET, FILM COATED ORAL at 08:51

## 2023-09-02 RX ADMIN — LORAZEPAM 0.5 MG: 0.5 TABLET ORAL at 08:51

## 2023-09-02 RX ADMIN — IPRATROPIUM BROMIDE AND ALBUTEROL SULFATE 3 ML: 2.5; .5 SOLUTION RESPIRATORY (INHALATION) at 12:04

## 2023-09-02 RX ADMIN — TAMSULOSIN HYDROCHLORIDE 0.4 MG: 0.4 CAPSULE ORAL at 08:50

## 2023-09-02 RX ADMIN — PANTOPRAZOLE SODIUM 40 MG: 40 TABLET, DELAYED RELEASE ORAL at 07:00

## 2023-09-02 RX ADMIN — FINASTERIDE 5 MG: 5 TABLET, FILM COATED ORAL at 08:51

## 2023-09-02 RX ADMIN — ASPIRIN 81 MG: 81 TABLET, COATED ORAL at 08:50

## 2023-09-02 RX ADMIN — Medication 10 ML: at 08:51

## 2023-09-02 RX ADMIN — FLUOXETINE 20 MG: 20 CAPSULE ORAL at 08:51

## 2023-09-02 RX ADMIN — ENOXAPARIN SODIUM 60 MG: 60 INJECTION SUBCUTANEOUS at 08:51

## 2023-09-02 NOTE — PLAN OF CARE
Problem: Fall Injury Risk  Goal: Absence of Fall and Fall-Related Injury  Outcome: Ongoing, Progressing  Intervention: Promote Injury-Free Environment  Recent Flowsheet Documentation  Taken 9/2/2023 0000 by Shira Black RN  Safety Promotion/Fall Prevention:   activity supervised   clutter free environment maintained   nonskid shoes/slippers when out of bed   safety round/check completed   room organization consistent  Taken 9/1/2023 2200 by Shira Black, RN  Safety Promotion/Fall Prevention:   activity supervised   clutter free environment maintained   nonskid shoes/slippers when out of bed   room organization consistent   safety round/check completed  Taken 9/1/2023 2000 by Shira Black, RN  Safety Promotion/Fall Prevention: activity supervised   Goal Outcome Evaluation:

## 2023-09-02 NOTE — DISCHARGE SUMMARY
[facility] Medicine Services  DISCHARGE SUMMARY    Patient Name: Dieter Christiansen  : 1950  MRN: [id number]    Date of Admission: 2023  4:05 AM  Date of Discharge: 2023  Primary Care Physician: Ian Sanchez APRN    Consults       Date and Time Order Name Status Description    2023 11:15 AM Inpatient Neurology Consult General Completed             Hospital Course       Active Hospital Problems    Diagnosis  POA    **AMS (altered mental status) [R41.82]  Yes    Obesity, Class III, BMI 40-49.9 (morbid obesity) [E66.01]  Yes    CAD (coronary artery disease) [I25.10]  Yes    COPD (chronic obstructive pulmonary disease) [J44.9]  Yes    Type 2 diabetes mellitus [E11.9]  Yes    HTN (hypertension) [I10]  Yes    HLD (hyperlipidemia) [E78.5]  Yes    MARY ALICE (obstructive sleep apnea) [G47.33]  Yes      Resolved Hospital Problems   No resolved problems to display.          Hospital Course:  Dieter Christiansen is a 73 y.o. male with CAD, COPD, chronic O2 use, HTN, HLD, MARY ALICE, chronic pain on narcotics and gabapentin, morbid obesity, who presented to hospital for alteration in mentation (nonsensical speech, confusion), and hypoxia.  Work-up was largely unrevealing, neurology was consulted for further evaluations.  Ultimately his symptoms were felt to be related to overuse of prescribed controlled substances (reported using a 30-day supply in 20 days or less, UDS negative on arrival) with suspected withdrawal symptom.  Additionally on arrival he was noted to be retaining urine and had greater than 1 L drained with Torres catheter placement.  Neurology has adjusted medications, including starting Seroquel, to orient his sleep-wake cycle and help with nighttime sleeping.  His Torres catheter was removed , he has required intermittent I&O cath, yesterday draining 900 mL, I have placed an order for Torres catheter and urology referral, at the time of discharge she is refusing Torres catheter  despite 2 separate discussions of the same.  PT/OT are recommending inpatient rehab, at this time patient and daughter who is his caregiver are refusing the same.  He is discharging home today with a 3-day bridge for his pain medications, he will need to follow-up with his PCP for further refills if felt appropriate.    Acute toxic-metabolic encephalopathy  Chronic pain  Misuse of prescribed controlled substances  Debility  -infectious workup unrevealing  -CT head w/o acute process  -EEG w/ diffuse slowing c/w TME  -reports taking more than Rx'ed of home meds, ran out of the same, UDS noted negative on arrival, suspected withdrawal syndrome  -reported back to approx baseline  -Gabapentin dose decreased, Seroquel was added to assist with sleep at night, neurology recommends B12 injections monthly; I discussed with his daughter regarding his misuse of his medications and she intends to close her monitor them at home     Acute urinary retention  -reported >/= 1L urine drained on arrival  -may have also contributed to encephalopathy  -cont flomax  -miramontes removed 8/30, per nursing required I&O cath x2, largest volume 900ml, added proscar; urology referral placed at discharge, patient is refusing Miramontes catheter at this time despite discussions regarding urinary retention, risk of bladder rupture, repeat hospitalization, etc.     COPD w/ chronic O2 use  -reported 3lpm baseline     CAD  HTN  HLD  -asa, statin     Morbid obesity, BMI 50.87 kg/m2  MARY ALICE  -nippv w/ sleep  -complicates care     Abnormal blood culture  -CoNS on single blood culture, no evidence for sepsis, likely contaminant      Discharge Follow Up Recommendations for outpatient labs/diagnostics:  PCP in 1 week  Ambulatory urology referral placed    Day of Discharge     HPI:   Denies complaints, says that he has had a Miramontes before and they are uncomfortable, therefore he is refusing to have one placed.    Review of Systems  Gen- No fevers, chills  CV- No chest  pain, palpitations  Resp- No cough, dyspnea  GI- No N/V/D, abd pain    Vital Signs:   Temp:  [96.7 °F (35.9 °C)-98.6 °F (37 °C)] 97.4 °F (36.3 °C)  Heart Rate:  [62-97] 75  Resp:  [18-20] 20  BP: (120-149)/(53-82) 142/82  Flow (L/min):  [2] 2      Physical Exam:  Constitutional: Awake, alert, morbidly obese male laying in bed in no acute distress  HENT: NCAT, mucous membranes moist  Respiratory: Clear to auscultation bilaterally, respiratory effort normal   Cardiovascular: RRR, palpable radial pulses  Gastrointestinal: Positive bowel sounds, soft, nontender, nondistended  Psychiatric: Appropriate affect, cooperative  Neurologic: Speech clear, answering questions appropriately    Pertinent  and/or Most Recent Results     LAB RESULTS:      Lab 09/02/23 0525 09/01/23 0626 08/29/23 0531 08/28/23 2014 08/28/23 0422   WBC 6.64 6.61 10.55  --  10.45   HEMOGLOBIN 11.4* 12.6* 13.0  --  14.3   HEMATOCRIT 35.8* 38.9 40.0  --  43.9   PLATELETS 247 239 238  --  258   NEUTROS ABS 3.75  --  7.15*  --  7.82*   IMMATURE GRANS (ABS) 0.04  --  0.04  --  0.06*   LYMPHS ABS 1.88  --  2.21  --  1.64   MONOS ABS 0.65  --  1.02*  --  0.71   EOS ABS 0.29  --  0.09  --  0.17   .0* 100.5* 100.3*  --  100.2*   SED RATE  --   --   --   --  52*   CRP  --   --   --   --  0.44   PROCALCITONIN  --   --   --   --  0.08   LACTATE  --   --   --  1.5 3.1*         Lab 09/01/23 0626 08/29/23 0531 08/28/23 0422   SODIUM 141 139 139   POTASSIUM 4.2 4.1 3.9   CHLORIDE 102 101 100   CO2 31.0* 29.0 25.0   ANION GAP 8.0 9.0 14.0   BUN 13 24* 14   CREATININE 1.00 1.33* 1.29*   EGFR 80.0 56.8* 58.9*   GLUCOSE 95 114* 133*   CALCIUM 8.4* 8.6 9.1   MAGNESIUM  --   --  1.9   TSH  --   --  1.920         Lab 08/29/23  0531 08/28/23  0422   TOTAL PROTEIN 6.2 7.4   ALBUMIN 3.6 3.8   GLOBULIN 2.6 3.6   ALT (SGPT) 6 5   AST (SGOT) 35 18   BILIRUBIN 1.3* 0.7   ALK PHOS 99 112         Lab 08/28/23  0422   PROBNP 1,195.0*   HSTROP T 21*             Lab  08/29/23  0531   FOLATE 8.18   VITAMIN B 12 248         Lab 08/28/23  0800 08/28/23  0437   PH, ARTERIAL 7.397 7.414   PCO2, ARTERIAL 47.4* 44.8   PO2 ART 71.7* 63.4*   FIO2 28 21   HCO3 ART 29.1* 28.6*   BASE EXCESS ART 3.4* 3.4*   CARBOXYHEMOGLOBIN 1.5 1.6     Brief Urine Lab Results  (Last result in the past 365 days)        Color   Clarity   Blood   Leuk Est   Nitrite   Protein   CREAT   Urine HCG        08/28/23 0439 Yellow   Clear   Trace   Negative   Negative   Negative                 Microbiology Results (last 10 days)       Procedure Component Value - Date/Time    Blood Culture - Blood, Arm, Right [[id number]]  (Abnormal) Collected: 08/28/23 0749    Lab Status: Final result Specimen: Blood from Arm, Right Updated: 08/30/23 0615     Blood Culture Staphylococcus, coagulase negative     Isolated from Aerobic Bottle     Gram Stain Aerobic Bottle Gram positive cocci in pairs and clusters    Narrative:      Probable contaminant requires clinical correlation, susceptibility not performed unless requested by physician.      Blood Culture ID, PCR - Blood, Arm, Right [[id number]]  (Abnormal) Collected: 08/28/23 0749    Lab Status: Final result Specimen: Blood from Arm, Right Updated: 08/29/23 1249     BCID, PCR Staph spp, not aureus or lugdunensis. Identification by BCID2 PCR.     BOTTLE TYPE Aerobic Bottle    Respiratory Panel PCR w/COVID-19(SARS-CoV-2) RAY/CHET/HEIDE/PAD/COR/MAD/EDD In-House, NP Swab in UTM/VTM, 3-4 HR TAT - Swab, Nasopharynx [[id number]]  (Normal) Collected: 08/28/23 0423    Lab Status: Final result Specimen: Swab from Nasopharynx Updated: 08/28/23 0520     ADENOVIRUS, PCR Not Detected     Coronavirus 229E Not Detected     Coronavirus HKU1 Not Detected     Coronavirus NL63 Not Detected     Coronavirus OC43 Not Detected     COVID19 Not Detected     Human Metapneumovirus Not Detected     Human Rhinovirus/Enterovirus Not Detected     Influenza A PCR Not Detected     Influenza B PCR Not Detected      Parainfluenza Virus 1 Not Detected     Parainfluenza Virus 2 Not Detected     Parainfluenza Virus 3 Not Detected     Parainfluenza Virus 4 Not Detected     RSV, PCR Not Detected     Bordetella pertussis pcr Not Detected     Bordetella parapertussis PCR Not Detected     Chlamydophila pneumoniae PCR Not Detected     Mycoplasma pneumo by PCR Not Detected    Narrative:      In the setting of a positive respiratory panel with a viral infection PLUS a negative procalcitonin without other underlying concern for bacterial infection, consider observing off antibiotics or discontinuation of antibiotics and continue supportive care. If the respiratory panel is positive for atypical bacterial infection (Bordetella pertussis, Chlamydophila pneumoniae, or Mycoplasma pneumoniae), consider antibiotic de-escalation to target atypical bacterial infection.    Blood Culture - Blood, Hand, Right [[id number]]  (Normal) Collected: 08/28/23 0415    Lab Status: Final result Specimen: Blood from Hand, Right Updated: 09/02/23 0516     Blood Culture No growth at 5 days            EEG    Result Date: 8/28/2023  Reason for referral: 73 y.o.male with altered mental status Technical Summary:  A 19 channel digital EEG was performed using the international 10-20 placement system, including eye leads and EKG leads. Duration: 21 minutes Findings: The patient is awake and confused.  The background shows diffuse low amplitude 6-7 Hz theta which is present symmetrically over both hemispheres.  A clear posterior rhythm is not seen.  Low amplitude EMG artifact is seen over the anterior leads.  Stage II sleep is not clearly seen.  No focal features or epileptiform activity are seen.  Photic stimulation does not change the background.  Hyperventilation is not performed. Video: Available Technical quality: Superior EKG: Regular, 70 bpm SUMMARY: Mild generalized slow No focal features or epileptiform activity are seen     Diffuse cerebral dysfunction of mild  degree but nonspecific.  This finding is most commonly seen with toxic/metabolic or hypoxemic and encephalopathy This report is transcribed using the Dragon dictation system.      CT Head Without Contrast    Result Date: 8/28/2023  CT HEAD WO CONTRAST Date of Exam: 8/28/2023 7:08 AM EDT Indication: Mental status change, unknown cause. Comparison: None available. Technique: Axial CT images were obtained of the head without contrast administration.  Automated exposure control and iterative construction methods were used. Findings: No acute intracranial hemorrhage. No acute large territory infarct. There are scattered subcortical and periventricular white matter hypodensities which are nonspecific and can be seen in the setting of chronic small vessel ischemic change. No extra-axial collections. No midline shift or herniation. Normal size and configuration of the ventricles. Unremarkable appearance of the orbits. The paranasal sinuses are grossly clear. The mastoid air cells are grossly clear. No acute or suspicious bony  findings.     Impression: No acute intracranial findings. Chronic and senescent changes as above. Electronically Signed: Venu Carbone MD  8/28/2023 7:27 AM EDT  Workstation ID: [id number]    XR Chest 1 View    Result Date: 8/28/2023  XR CHEST 1 VW Date of Exam: 8/28/2023 5:21 AM EDT Indication: cough Comparison: 10/7/2022 chest CT. Findings: Exam limited by body habitus. Cardiac silhouette is enlarged. There is likely cardiomegaly although accentuated by AP portable technique. The lungs appear clear. No pneumothorax or pleural effusion. No acute osseous abnormality. Pulmonary vasculature is within normal limits.     Impression: Cardiomegaly without acute cardiopulmonary abnormality. Electronically Signed: Suhail De Santiago MD  8/28/2023 5:46 AM EDT  Workstation ID: [id number]         Discharge Details        Discharge Medications        New Medications        Instructions Start Date   cyanocobalamin 1000  MCG/ML injection   1,000 mcg, Intramuscular, Every 28 Days   Start Date: September 26, 2023     finasteride 5 MG tablet  Commonly known as: PROSCAR   5 mg, Oral, Daily   Start Date: September 3, 2023     gabapentin 300 MG capsule  Commonly known as: NEURONTIN  Replaces: gabapentin 600 MG tablet   300 mg, Oral, Nightly      QUEtiapine 50 MG tablet  Commonly known as: SEROquel   50 mg, Oral, Nightly             Changes to Medications        Instructions Start Date   metoprolol succinate XL 50 MG 24 hr tablet  Commonly known as: TOPROL-XL  What changed: how much to take   25 mg, Oral, Daily             Continue These Medications        Instructions Start Date   albuterol (2.5 MG/3ML) 0.083% nebulizer solution  Commonly known as: PROVENTIL   No dose, route, or frequency recorded.      aspirin 81 MG EC tablet   aspirin 81 mg tablet,delayed release   Take 1 tablet every day by oral route.      cetirizine 10 MG tablet  Commonly known as: zyrTEC   10 mg, Oral, Daily      Combivent Respimat  MCG/ACT inhaler  Generic drug: ipratropium-albuterol   1 puff, Inhalation, 3 Times Daily      FLUoxetine 20 MG capsule  Commonly known as: PROzac   TAKE ONE CAPSULE DAILY      furosemide 40 MG tablet  Commonly known as: LASIX   TAKE ONE TABLET EVERY 72 HOURS      HYDROcodone-acetaminophen  MG per tablet  Commonly known as: NORCO   1 tablet, Oral, Every 8 Hours PRN      lisinopril 10 MG tablet  Commonly known as: PRINIVIL,ZESTRIL   TAKE ONE TABLET DAILY      metFORMIN 500 MG tablet  Commonly known as: GLUCOPHAGE   500 mg, Oral, Daily With Breakfast      nitroglycerin 0.4 MG SL tablet  Commonly known as: NITROSTAT   0.4 mg, Sublingual, Every 5 Minutes PRN, Take no more than 3 doses in 15 minutes.      nystatin 715882 UNIT/GM powder  Commonly known as: MYCOSTATIN   Topical, 3 Times Daily      nystatin-triamcinolone 620853-8.1 UNIT/GM-% ointment  Commonly known as: MYCOLOG   1 application , Topical, 2 Times Daily       pantoprazole 40 MG EC tablet  Commonly known as: PROTONIX   TAKE ONE TABLET DAILY      promethazine 25 MG tablet  Commonly known as: PHENERGAN   TAKE ONE TABLET EVERY 6 HOURS AS NEEDED FOR NAUSEA      simvastatin 20 MG tablet  Commonly known as: ZOCOR   TAKE ONE TABLET AT BEDTIME      tamsulosin 0.4 MG capsule 24 hr capsule  Commonly known as: FLOMAX   TAKE ONE CAPSULE DAILY      triamcinolone 0.1 % cream  Commonly known as: KENALOG   1 application , Topical, 2 Times Daily PRN      vitamin D 1.25 MG (94653 UT) capsule capsule  Commonly known as: ERGOCALCIFEROL   TAKE ONE CAPSULE ONCE A WEEK             Stop These Medications      famotidine 20 MG tablet  Commonly known as: PEPCID     gabapentin 600 MG tablet  Commonly known as: NEURONTIN  Replaced by: gabapentin 300 MG capsule     potassium chloride 10 MEQ CR tablet  Commonly known as: K-DUR,KLOR-CON              No Known Allergies      Discharge Disposition:  Home or Self Care    Diet:  Hospital:  Diet Order   Procedures    Diet: Regular/House Diet; Texture: Regular Texture (IDDSI 7); Fluid Consistency: Thin (IDDSI 0)            CODE STATUS:    Code Status and Medical Interventions:   Ordered at: 08/28/23 1114     Code Status (Patient has no pulse and is not breathing):    CPR (Attempt to Resuscitate)     Medical Interventions (Patient has pulse or is breathing):    Full Support       Future Appointments   Date Time Provider Department Center   9/6/2023  3:00 PM Ian Sanchez APRN [facility]  [facility]       Additional Instructions for the Follow-ups that You Need to Schedule       Discharge Follow-up with PCP   As directed       Currently Documented PCP:    Ian Sanchez APRN    PCP Phone Number:    [phone]     Follow Up Details: 1 week                      Suhail Obrien DO  09/02/23      Time Spent on Discharge:  I spent  35  minutes on this discharge activity which included: face-to-face encounter with the patient x2, reviewing the data in  the system, coordination of the care with the nursing staff as well as consultants, documentation, and entering orders.

## 2023-09-02 NOTE — CASE MANAGEMENT/SOCIAL WORK
Continued Stay Note  [facility]     Patient Name: Dieter Christiansen  MRN: [id number]  Today's Date: 9/2/2023    Admit Date: 8/28/2023    Plan: Home with daughter   Discharge Plan       Row Name 09/02/23 1256       Plan    Plan Home with daughter    Patient/Family in Agreement with Plan yes    Plan Comments CM called the answering service for [facility] to get the patient a portable oxygen tank to go home with. CM is waiting on a return call from the . CM will continue to follow.    Final Discharge Disposition Code 01 - home or self-care      Row Name 09/02/23 1223       Plan    Plan Home with daughter's assist    Patient/Family in Agreement with Plan yes    Final Discharge Disposition Code 01 - home or self-care    Final Note Plan is home with daughter Rand and she will transport.  No discharge needs identified.                   Discharge Codes    No documentation.                 Expected Discharge Date and Time       Expected Discharge Date Expected Discharge Time    Sep 2, 2023               Cruz Uribe RN

## 2023-09-02 NOTE — OUTREACH NOTE
Prep Survey      Flowsheet Row Responses   [facility] patient discharged from? [place]   Is LACE score < 7 ? No   Eligibility [facility]   Date of Admission 08/28/23   Date of Discharge 09/02/23   Discharge Disposition Home-Health Care [facility]   Discharge diagnosis *AMS (altered mental status   Does the patient have one of the following disease processes/diagnoses(primary or secondary)? Other   Does the patient have Home health ordered? No   Is there a DME ordered? Yes   What DME was ordered? [facility]   Prep survey completed? Yes            LEIGH HYMAN - Registered Nurse

## 2023-09-02 NOTE — CASE MANAGEMENT/SOCIAL WORK
Case Management Discharge Note      Final Note: Plan is home with daughter Rand and she will transport.  No discharge needs identified.         Selected Continued Care - Admitted Since 8/28/2023       Destination    No services have been selected for the patient.                Durable Medical Equipment    No services have been selected for the patient.                Dialysis/Infusion    No services have been selected for the patient.                Home Medical Care    No services have been selected for the patient.                Therapy    No services have been selected for the patient.                Community Resources    No services have been selected for the patient.                Community & DME    No services have been selected for the patient.                         Final Discharge Disposition Code: 01 - home or self-care

## 2023-09-04 ENCOUNTER — TRANSITIONAL CARE MANAGEMENT TELEPHONE ENCOUNTER (OUTPATIENT)
Dept: CALL CENTER | Facility: HOSPITAL | Age: 73
End: 2023-09-04
Payer: MEDICARE

## 2023-09-04 NOTE — OUTREACH NOTE
Call Center TCM Note      Flowsheet Row Responses   [facility] patient discharged from? [place]   Does the patient have one of the following disease processes/diagnoses(primary or secondary)? Other   TCM attempt successful? Yes   Call start time 1329   Call end time 1345   Discharge diagnosis Altered Mental Status, Acute urinary retention, COPD with chronic O2 use   Is patient permission given to speak with other caregiver? Yes   List who call center can speak with Patient gave verbal permission on phone today to talk to Rand   Person spoke with today (if not patient) and relationship Rand, daughter   Meds reviewed with patient/caregiver? Yes   Does the patient have all medications ordered at discharge? No   What is keeping the patient from filling the prescriptions? --  [Pharmacy closed due to holiday. She reports will get needed prescriptions tomorrow. ]   Nursing Interventions Nurse provided patient education  [Reviewed new meds, changed meds and stopped meds. ]   Is the patient taking all medications as directed (includes completed medication regime)? No   What is preventing the patient from taking all medications as directed? Other, Desires to consult PCP first  [see above. Also wants to speak to Ian or her nurse tomorrow regarding medications. ]   Comments PCP Ian DAWSON. No Hospital follow up appt in place, but has a subs Medicare Wellness this week on 9/6 3pm with PCP. Message routed to office.   Does the patient have an appointment with their PCP within 7-14 days of discharge? Other  [Patient has a [employer]s [employer] Wellness appt this week, 9/6/23  3pm]   Nursing Interventions Confirmed date/time of appointment, Routed TCM call to PCP office, PCP office requested to make appointment - message sent   Has home health visited the patient within 72 hours of discharge? N/A   DME comments Uses O2 continuous at home.   Psychosocial issues? Yes   Psychosocial comments Rand reports that they have current  "transportation issues and difficult to help patient move around due to his weight.   Did the patient receive a copy of their discharge instructions? Yes   Nursing interventions Reviewed instructions with patient   What is the patient's perception of their health status since discharge? Improving  [Rand reports patient urinating frequently and a \"good amount\". Reports mental status improved with being home, moving around more. ]   Is the patient/caregiver able to teach back signs and symptoms related to disease process for when to call PCP? Yes   Is the patient/caregiver able to teach back signs and symptoms related to disease process for when to call 911? Yes   Is the patient/caregiver able to teach back the hierarchy of who to call/visit for symptoms/problems? PCP, Specialist, Home health nurse, Urgent Care, ED, 911 Yes   TCM call completed? Yes   Call end time 1345   Would this patient benefit from a Referral to Amb Social Work? No   Is the patient interested in additional calls from an ambulatory ? No            Alissa Mayes RN    9/4/2023, 13:46 EDT        "

## 2023-09-04 NOTE — OUTREACH NOTE
Call Center TCM Note      Flowsheet Row Responses   [facility] [place] [facility] [place]   Does the patient have one of the following disease processes/diagnoses(primary or secondary)? Other   TCM attempt successful? No   Unsuccessful attempts Attempt 1            Alissa Mayes RN    9/4/2023, 10:24 EDT

## 2023-09-13 ENCOUNTER — READMISSION MANAGEMENT (OUTPATIENT)
Dept: CALL CENTER | Facility: HOSPITAL | Age: 73
End: 2023-09-13
Payer: MEDICARE

## 2023-09-13 NOTE — OUTREACH NOTE
Medical Week 2 Survey      Flowsheet Row Responses   [facility] patient discharged from? [place]   Does the patient have one of the following disease processes/diagnoses(primary or secondary)? Other   Week 2 attempt successful? No   Unsuccessful attempts Attempt 1            Johnny KIM - Registered Nurse

## 2023-09-22 DIAGNOSIS — G89.29 CHRONIC RIGHT-SIDED LOW BACK PAIN WITH RIGHT-SIDED SCIATICA: ICD-10-CM

## 2023-09-22 DIAGNOSIS — M47.812 CERVICAL SPINE ARTHRITIS: ICD-10-CM

## 2023-09-22 DIAGNOSIS — M54.41 CHRONIC RIGHT-SIDED LOW BACK PAIN WITH RIGHT-SIDED SCIATICA: ICD-10-CM

## 2023-09-22 RX ORDER — HYDROCODONE BITARTRATE AND ACETAMINOPHEN 10; 325 MG/1; MG/1
1 TABLET ORAL EVERY 8 HOURS PRN
Qty: 90 TABLET | Refills: 0 | Status: SHIPPED | OUTPATIENT
Start: 2023-09-22

## 2023-09-22 NOTE — TELEPHONE ENCOUNTER
Caller: ELIEL COLEMAN    Relationship: Emergency Contact    Best call back number: [phone]    Requested Prescriptions:   Requested Prescriptions     Pending Prescriptions Disp Refills    HYDROcodone-acetaminophen (NORCO)  MG per tablet 9 tablet 0     Sig: Take 1 tablet by mouth Every 8 (Eight) Hours As Needed for Severe Pain.        Pharmacy where request should be sent: [place] [address] - [phone]  - [phone] FX     Last office visit with prescribing clinician: 5/31/2023   Last telemedicine visit with prescribing clinician: Visit date not found   Next office visit with prescribing clinician: 10/17/2023     Additional details provided by patient:     Does the patient have less than a 3 day supply:  [] Yes  [] No    Would you like a call back once the refill request has been completed: [x] Yes [] No    If the office needs to give you a call back, can they leave a voicemail: [x] Yes [] No    Olu Tyson Rep   09/22/23 13:59 EDT

## 2023-10-27 DIAGNOSIS — M47.812 CERVICAL SPINE ARTHRITIS: ICD-10-CM

## 2023-10-27 DIAGNOSIS — M54.41 CHRONIC RIGHT-SIDED LOW BACK PAIN WITH RIGHT-SIDED SCIATICA: ICD-10-CM

## 2023-10-27 DIAGNOSIS — G89.29 CHRONIC RIGHT-SIDED LOW BACK PAIN WITH RIGHT-SIDED SCIATICA: ICD-10-CM

## 2023-10-27 RX ORDER — HYDROCODONE BITARTRATE AND ACETAMINOPHEN 10; 325 MG/1; MG/1
1 TABLET ORAL EVERY 8 HOURS PRN
Qty: 90 TABLET | Refills: 0 | Status: SHIPPED | OUTPATIENT
Start: 2023-10-27

## 2023-10-27 NOTE — TELEPHONE ENCOUNTER
Caller: ELIEL COLEMAN    Relationship: Emergency Contact    Best call back number: [phone]    Requested Prescriptions:   Requested Prescriptions     Pending Prescriptions Disp Refills    HYDROcodone-acetaminophen (NORCO)  MG per tablet 90 tablet 0     Sig: Take 1 tablet by mouth Every 8 (Eight) Hours As Needed for Severe Pain.        Pharmacy where request should be sent: [place] [address] - [phone]  - [phone] FX     Last office visit with prescribing clinician: 5/31/2023   Last telemedicine visit with prescribing clinician: Visit date not found   Next office visit with prescribing clinician: Visit date not found     Additional details provided by patient: PT HAS HAD TO CANCEL APPTS DUE TO NO CAR BUT HE NEEDS THE REFILL TODAY BECAUSE HE IS OUT    Does the patient have less than a 3 day supply:  [x] Yes  [] No    Would you like a call back once the refill request has been completed: [x] Yes [] No    If the office needs to give you a call back, can they leave a voicemail: [] Yes [] No    Olu Hopkins Rep   10/27/23 14:59 EDT

## 2023-10-30 NOTE — TELEPHONE ENCOUNTER
Attempted to call Rand to inform her that patient has to be seen for any further refills. No answer and no vm.   Ok for hub to relay message if she returns call.

## 2023-11-14 DIAGNOSIS — K21.9 GASTROESOPHAGEAL REFLUX DISEASE WITHOUT ESOPHAGITIS: ICD-10-CM

## 2023-11-14 RX ORDER — PANTOPRAZOLE SODIUM 40 MG/1
TABLET, DELAYED RELEASE ORAL
Qty: 30 TABLET | Refills: 5 | Status: SHIPPED | OUTPATIENT
Start: 2023-11-14

## 2023-11-28 DIAGNOSIS — M47.812 CERVICAL SPINE ARTHRITIS: ICD-10-CM

## 2023-11-28 DIAGNOSIS — G89.29 CHRONIC RIGHT-SIDED LOW BACK PAIN WITH RIGHT-SIDED SCIATICA: ICD-10-CM

## 2023-11-28 DIAGNOSIS — M54.41 CHRONIC RIGHT-SIDED LOW BACK PAIN WITH RIGHT-SIDED SCIATICA: ICD-10-CM

## 2023-11-28 RX ORDER — HYDROCODONE BITARTRATE AND ACETAMINOPHEN 10; 325 MG/1; MG/1
1 TABLET ORAL EVERY 8 HOURS PRN
Qty: 90 TABLET | Refills: 0 | OUTPATIENT
Start: 2023-11-28

## 2023-11-28 NOTE — TELEPHONE ENCOUNTER
Caller: ELIEL COLEMAN    Relationship: Emergency Contact    Best call back number: [phone]     Requested Prescriptions:   Requested Prescriptions     Pending Prescriptions Disp Refills    HYDROcodone-acetaminophen (NORCO)  MG per tablet 90 tablet 0     Sig: Take 1 tablet by mouth Every 8 (Eight) Hours As Needed for Severe Pain.        Pharmacy where request should be sent: [place] [address] [address] - [phone]  - [phone] FX     Last office visit with prescribing clinician: 5/31/2023   Last telemedicine visit with prescribing clinician: Visit date not found   Next office visit with prescribing clinician: Visit date not found     Additional details provided by patient:     Does the patient have less than a 3 day supply:  [x] Yes  [] No    Would you like a call back once the refill request has been completed: [] Yes [] No    If the office needs to give you a call back, can they leave a voicemail: [] Yes [] No    Olu Govea Rep   11/28/23 15:36 EST

## 2023-12-01 DIAGNOSIS — G89.29 CHRONIC RIGHT-SIDED LOW BACK PAIN WITH RIGHT-SIDED SCIATICA: ICD-10-CM

## 2023-12-01 DIAGNOSIS — M54.41 CHRONIC RIGHT-SIDED LOW BACK PAIN WITH RIGHT-SIDED SCIATICA: ICD-10-CM

## 2023-12-01 DIAGNOSIS — M47.812 CERVICAL SPINE ARTHRITIS: ICD-10-CM

## 2023-12-01 RX ORDER — HYDROCODONE BITARTRATE AND ACETAMINOPHEN 10; 325 MG/1; MG/1
1 TABLET ORAL EVERY 8 HOURS PRN
Qty: 90 TABLET | Refills: 0 | Status: CANCELLED | OUTPATIENT
Start: 2023-12-01

## 2023-12-01 NOTE — TELEPHONE ENCOUNTER
Caller: ELIEL COLEMAN    Relationship: Emergency Contact    Best call back number: [phone]    Requested Prescriptions:   Requested Prescriptions     Pending Prescriptions Disp Refills    HYDROcodone-acetaminophen (NORCO)  MG per tablet 90 tablet 0     Sig: Take 1 tablet by mouth Every 8 (Eight) Hours As Needed for Severe Pain.        Pharmacy where request should be sent: [facility] [address] - [phone]  - [phone] FX     Last office visit with prescribing clinician: 5/31/2023   Last telemedicine visit with prescribing clinician: Visit date not found   Next office visit with prescribing clinician: Visit date not found     Additional details provided by patient: PT IS OUT AND PHARMACY HAS NOT RECVD ANY REFILLS. PLEASE SEND TODAY    Does the patient have less than a 3 day supply:  [x] Yes  [] No    Would you like a call back once the refill request has been completed: [x] Yes [] No    If the office needs to give you a call back, can they leave a voicemail: [] Yes [] No    Olu Hopkins Rep   12/01/23 10:36 EST

## 2023-12-04 ENCOUNTER — OFFICE VISIT (OUTPATIENT)
Dept: INTERNAL MEDICINE | Facility: CLINIC | Age: 73
End: 2023-12-04
Payer: MEDICARE

## 2023-12-04 VITALS
HEART RATE: 76 BPM | DIASTOLIC BLOOD PRESSURE: 80 MMHG | OXYGEN SATURATION: 99 % | SYSTOLIC BLOOD PRESSURE: 142 MMHG | TEMPERATURE: 98 F | BODY MASS INDEX: 46.65 KG/M2 | WEIGHT: 315 LBS | HEIGHT: 69 IN

## 2023-12-04 DIAGNOSIS — Z23 NEEDS FLU SHOT: ICD-10-CM

## 2023-12-04 DIAGNOSIS — M54.9 CHRONIC BACK PAIN, UNSPECIFIED BACK LOCATION, UNSPECIFIED BACK PAIN LATERALITY: ICD-10-CM

## 2023-12-04 DIAGNOSIS — E53.8 VITAMIN B12 DEFICIENCY: ICD-10-CM

## 2023-12-04 DIAGNOSIS — R73.09 HEMOGLOBIN A1C LESS THAN 7.0%: ICD-10-CM

## 2023-12-04 DIAGNOSIS — G89.29 CHRONIC RIGHT-SIDED LOW BACK PAIN WITH RIGHT-SIDED SCIATICA: ICD-10-CM

## 2023-12-04 DIAGNOSIS — Z79.899 MEDICATION MANAGEMENT: ICD-10-CM

## 2023-12-04 DIAGNOSIS — R11.0 NAUSEA: ICD-10-CM

## 2023-12-04 DIAGNOSIS — I25.10 CORONARY ARTERY DISEASE INVOLVING NATIVE CORONARY ARTERY OF NATIVE HEART WITHOUT ANGINA PECTORIS: ICD-10-CM

## 2023-12-04 DIAGNOSIS — F33.0 MAJOR DEPRESSIVE DISORDER, RECURRENT, MILD: ICD-10-CM

## 2023-12-04 DIAGNOSIS — B37.2 CANDIDAL SKIN INFECTION: ICD-10-CM

## 2023-12-04 DIAGNOSIS — K21.9 GASTROESOPHAGEAL REFLUX DISEASE WITHOUT ESOPHAGITIS: ICD-10-CM

## 2023-12-04 DIAGNOSIS — G89.29 CHRONIC BACK PAIN, UNSPECIFIED BACK LOCATION, UNSPECIFIED BACK PAIN LATERALITY: ICD-10-CM

## 2023-12-04 DIAGNOSIS — M47.812 CERVICAL SPINE ARTHRITIS: ICD-10-CM

## 2023-12-04 DIAGNOSIS — R60.0 PEDAL EDEMA: ICD-10-CM

## 2023-12-04 DIAGNOSIS — Z99.81 SUPPLEMENTAL OXYGEN DEPENDENT: ICD-10-CM

## 2023-12-04 DIAGNOSIS — N40.0 BENIGN PROSTATIC HYPERPLASIA WITHOUT LOWER URINARY TRACT SYMPTOMS: ICD-10-CM

## 2023-12-04 DIAGNOSIS — I10 ESSENTIAL HYPERTENSION: ICD-10-CM

## 2023-12-04 DIAGNOSIS — M54.41 CHRONIC RIGHT-SIDED LOW BACK PAIN WITH RIGHT-SIDED SCIATICA: ICD-10-CM

## 2023-12-04 DIAGNOSIS — E11.43 TYPE 2 DIABETES MELLITUS WITH DIABETIC AUTONOMIC NEUROPATHY, WITHOUT LONG-TERM CURRENT USE OF INSULIN: Primary | ICD-10-CM

## 2023-12-04 LAB
EXPIRATION DATE: NORMAL
HBA1C MFR BLD: 5.6 % (ref 4.5–5.7)
Lab: NORMAL

## 2023-12-04 RX ORDER — NYSTATIN 100000 [USP'U]/G
POWDER TOPICAL 3 TIMES DAILY
Qty: 60 G | Refills: 5 | Status: SHIPPED | OUTPATIENT
Start: 2023-12-04

## 2023-12-04 RX ORDER — FINASTERIDE 5 MG/1
5 TABLET, FILM COATED ORAL DAILY
Qty: 30 TABLET | Refills: 5 | Status: SHIPPED | OUTPATIENT
Start: 2023-12-04

## 2023-12-04 RX ORDER — HYDROCODONE BITARTRATE AND ACETAMINOPHEN 10; 325 MG/1; MG/1
1 TABLET ORAL EVERY 8 HOURS PRN
Qty: 90 TABLET | Refills: 0 | Status: SHIPPED | OUTPATIENT
Start: 2023-12-04

## 2023-12-04 RX ORDER — FUROSEMIDE 40 MG/1
40 TABLET ORAL
Qty: 30 TABLET | Refills: 5 | Status: SHIPPED | OUTPATIENT
Start: 2023-12-04

## 2023-12-04 RX ORDER — LISINOPRIL 10 MG/1
10 TABLET ORAL DAILY
Qty: 30 TABLET | Refills: 5 | Status: SHIPPED | OUTPATIENT
Start: 2023-12-04

## 2023-12-04 RX ORDER — PROMETHAZINE HYDROCHLORIDE 25 MG/1
12.5 TABLET ORAL EVERY 8 HOURS PRN
Qty: 20 TABLET | Refills: 5 | Status: SHIPPED | OUTPATIENT
Start: 2023-12-04

## 2023-12-04 RX ORDER — GABAPENTIN 300 MG/1
300 CAPSULE ORAL NIGHTLY
Qty: 3 CAPSULE | Refills: 5 | Status: SHIPPED | OUTPATIENT
Start: 2023-12-04 | End: 2023-12-08 | Stop reason: SDUPTHER

## 2023-12-04 RX ORDER — SYRINGE W-NEEDLE,DISPOSAB,3 ML 25GX5/8"
1 SYRINGE, EMPTY DISPOSABLE MISCELLANEOUS
Qty: 4 EACH | Refills: 5 | Status: SHIPPED | OUTPATIENT
Start: 2023-12-04

## 2023-12-04 RX ORDER — CYANOCOBALAMIN 1000 UG/ML
1000 INJECTION, SOLUTION INTRAMUSCULAR; SUBCUTANEOUS
Status: SHIPPED | OUTPATIENT
Start: 2023-12-04

## 2023-12-04 RX ORDER — CYANOCOBALAMIN 1000 UG/ML
1000 INJECTION, SOLUTION INTRAMUSCULAR; SUBCUTANEOUS
Qty: 1 ML | Refills: 11 | Status: SHIPPED | OUTPATIENT
Start: 2023-12-04

## 2023-12-04 RX ORDER — METOPROLOL SUCCINATE 50 MG/1
25 TABLET, EXTENDED RELEASE ORAL DAILY
Start: 2023-12-04

## 2023-12-04 RX ORDER — TAMSULOSIN HYDROCHLORIDE 0.4 MG/1
1 CAPSULE ORAL DAILY
Qty: 30 CAPSULE | Refills: 5 | Status: SHIPPED | OUTPATIENT
Start: 2023-12-04

## 2023-12-04 RX ORDER — PANTOPRAZOLE SODIUM 40 MG/1
40 TABLET, DELAYED RELEASE ORAL DAILY
Qty: 30 TABLET | Refills: 5 | Status: SHIPPED | OUTPATIENT
Start: 2023-12-04

## 2023-12-04 RX ORDER — FLUOXETINE HYDROCHLORIDE 20 MG/1
20 CAPSULE ORAL DAILY
Qty: 30 CAPSULE | Refills: 5 | Status: SHIPPED | OUTPATIENT
Start: 2023-12-04

## 2023-12-04 RX ORDER — NYSTATIN AND TRIAMCINOLONE ACETONIDE 100000; 1 [USP'U]/G; MG/G
1 OINTMENT TOPICAL 2 TIMES DAILY
Qty: 60 G | Refills: 5 | Status: SHIPPED | OUTPATIENT
Start: 2023-12-04

## 2023-12-04 RX ADMIN — CYANOCOBALAMIN 1000 MCG: 1000 INJECTION, SOLUTION INTRAMUSCULAR; SUBCUTANEOUS at 16:59

## 2023-12-04 NOTE — PROGRESS NOTES
"Chief Complaint   Patient presents with    Diabetes    Follow-up       HPI  Dieter Christiansen is a 73 y.o. male presents for follow-up on diabetes and chronic pain.  Accompanied by his caregiver.  States he's doing well.  Has been out of his inhalers for a week or two - [facility].  Stopped smoking about 6 months ago, but is vaping.  Wears O2.  His caregiver states that he is suppose to be on O2 all the time now.  Pt denies any SOB.      The following portions of the patient's history were reviewed and updated as appropriate: allergies, current medications, past family history, past medical history, past social history, past surgical history, and problem list.    Subjective  Review of Systems   Constitutional:  Negative for activity change, appetite change and fatigue.   HENT:  Negative for congestion.    Respiratory:  Positive for cough. Negative for shortness of breath.    Cardiovascular:  Negative for chest pain and leg swelling.   Gastrointestinal:  Negative for abdominal pain.   Musculoskeletal:  Positive for arthralgias, back pain and gait problem.   Neurological:  Negative for dizziness, weakness and confusion.   Psychiatric/Behavioral:  Negative for behavioral problems, decreased concentration and depressed mood.        Objective  Visit Vitals  /80 (BP Location: Left arm, Patient Position: Sitting)   Pulse 76   Temp 98 °F (36.7 °C)   Ht 175.3 cm (69\")   Wt (!) 156 kg (345 lb)   SpO2 99%   BMI 50.95 kg/m²        Physical Exam  Vitals and nursing note reviewed.   HENT:      Head: Normocephalic.   Eyes:      Pupils: Pupils are equal, round, and reactive to light.   Cardiovascular:      Rate and Rhythm: Normal rate and regular rhythm.      Pulses: Normal pulses.      Heart sounds: Normal heart sounds. Murmur heard.   Pulmonary:      Effort: Pulmonary effort is normal. Tachypnea present.      Breath sounds: Decreased breath sounds present.   Musculoskeletal:      Comments: " "Gait slowed, ambulating with a walker   Skin:     General: Skin is warm and dry.      Capillary Refill: Capillary refill takes less than 2 seconds.   Neurological:      General: No focal deficit present.      Mental Status: He is alert and oriented to person, place, and time.   Psychiatric:         Attention and Perception: Attention normal.         Mood and Affect: Mood normal.         Behavior: Behavior normal.         Thought Content: Thought content normal.         Judgment: Judgment normal.          Procedures       Results for orders placed or performed in visit on 12/04/23   POC Glycosylated Hemoglobin (Hb A1C)    Specimen: Blood   Result Value Ref Range    Hemoglobin A1C 5.6 4.5 - 5.7 %    Lot Number [id number]     Expiration Date 07/02/2025             Assessment and Plan  Diagnoses and all orders for this visit:    1. Type 2 diabetes mellitus with diabetic autonomic neuropathy, without long-term current use of insulin (Primary)  -     POC Glycosylated Hemoglobin (Hb A1C)  -     metFORMIN (GLUCOPHAGE) 500 MG tablet; Take 1 tablet by mouth Daily With Breakfast.  Dispense: 30 tablet; Refill: 5    2. Needs flu shot  -     Fluzone High-Dose 65+yrs (8967-5796)    3. Cervical spine arthritis  -     HYDROcodone-acetaminophen (NORCO)  MG per tablet; Take 1 tablet by mouth Every 8 (Eight) Hours As Needed for Severe Pain.  Dispense: 90 tablet; Refill: 0    4. Medication management  -     Urine Drug Screen - Urine, Clean Catch; Future    5. Chronic right-sided low back pain with right-sided sciatica  -     HYDROcodone-acetaminophen (NORCO)  MG per tablet; Take 1 tablet by mouth Every 8 (Eight) Hours As Needed for Severe Pain.  Dispense: 90 tablet; Refill: 0    6. Vitamin B12 deficiency  -     cyanocobalamin 1000 MCG/ML injection; Inject 1 mL into the appropriate muscle as directed by prescriber Every 28 (Twenty-Eight) Days.  Dispense: 1 mL; Refill: 11  -     Syringe 21G X 1\" 3 ML misc; Use 1 each Every 28 " (Twenty-Eight) Days.  Dispense: 4 each; Refill: 5  -     cyanocobalamin injection 1,000 mcg    7. Pedal edema  -     furosemide (LASIX) 40 MG tablet; Take 1 tablet by mouth Every 72 (Seventy-Two) Hours.  Dispense: 30 tablet; Refill: 5    8. Chronic back pain, unspecified back location, unspecified back pain laterality  -     gabapentin (NEURONTIN) 300 MG capsule; Take 1 capsule by mouth Every Night.  Dispense: 3 capsule; Refill: 5    9. Essential hypertension  -     lisinopril (PRINIVIL,ZESTRIL) 10 MG tablet; Take 1 tablet by mouth Daily.  Dispense: 30 tablet; Refill: 5    10. Coronary artery disease involving native coronary artery of native heart without angina pectoris  -     metoprolol succinate XL (TOPROL-XL) 50 MG 24 hr tablet; Take 0.5 tablets by mouth Daily.    11. Candidal skin infection  -     nystatin (MYCOSTATIN) 281948 UNIT/GM powder; Apply  topically to the appropriate area as directed 3 (Three) Times a Day.  Dispense: 60 g; Refill: 5  -     nystatin-triamcinolone (MYCOLOG) 724843-1.1 UNIT/GM-% ointment; Apply 1 application  topically to the appropriate area as directed 2 (Two) Times a Day.  Dispense: 60 g; Refill: 5    12. Gastroesophageal reflux disease without esophagitis  -     pantoprazole (PROTONIX) 40 MG EC tablet; Take 1 tablet by mouth Daily.  Dispense: 30 tablet; Refill: 5    13. Nausea  -     promethazine (PHENERGAN) 25 MG tablet; Take 0.5 tablets by mouth Every 8 (Eight) Hours As Needed for Nausea or Vomiting.  Dispense: 20 tablet; Refill: 5    14. Supplemental oxygen dependent    15. Benign prostatic hyperplasia without lower urinary tract symptoms  -     finasteride (PROSCAR) 5 MG tablet; Take 1 tablet by mouth Daily.  Dispense: 30 tablet; Refill: 5  -     tamsulosin (FLOMAX) 0.4 MG capsule 24 hr capsule; Take 1 capsule by mouth Daily.  Dispense: 30 capsule; Refill: 5    16. Hemoglobin A1c less than 7.0%    17. Major depressive disorder, recurrent, mild  -     FLUoxetine (PROzac) 20 MG  capsule; Take 1 capsule by mouth Daily.  Dispense: 30 capsule; Refill: 5    A1c well controlled on Metformin  Pt RR 24, he denies any SOB, suppose to be wearing O2 - sample of Trelegy due to pt not being able [facility]  BP mildly elevated, will cont to monitor  CAD stable on Metoprolol  Update UDS  Hunter appropriate  Depression controlled on Prozac  Start B12 shots  GERD controlled on Protonix    Return in about 4 months (around 4/4/2024) for Diabetes, chronic pain.    Ian Sanchez, APRN

## 2023-12-08 ENCOUNTER — TELEPHONE (OUTPATIENT)
Dept: INTERNAL MEDICINE | Facility: CLINIC | Age: 73
End: 2023-12-08

## 2023-12-08 DIAGNOSIS — G89.29 CHRONIC BACK PAIN, UNSPECIFIED BACK LOCATION, UNSPECIFIED BACK PAIN LATERALITY: ICD-10-CM

## 2023-12-08 DIAGNOSIS — M54.9 CHRONIC BACK PAIN, UNSPECIFIED BACK LOCATION, UNSPECIFIED BACK PAIN LATERALITY: ICD-10-CM

## 2023-12-08 RX ORDER — GABAPENTIN 300 MG/1
300 CAPSULE ORAL NIGHTLY
Qty: 30 CAPSULE | Refills: 5 | Status: SHIPPED | OUTPATIENT
Start: 2023-12-08

## 2023-12-08 NOTE — TELEPHONE ENCOUNTER
Caller: ELIEL COLEMAN    Relationship: Emergency Contact    Best call back number: [phone]     Which medication are you concerned about: GABAPENTIN    Who prescribed you this medication: PROVIDER    What are your concerns: ELIEL CALLED BECAUSE THE PHARMACY INFORMED HER THAT THE PRESCRIPTION FOR GABAPENTIN ONLY HAD 3 PILLS ON IT. PLEASE UPDATE TO THE FULL PRESCRIPTION.    How long have you had these concerns: TODAY

## 2023-12-14 ENCOUNTER — LAB (OUTPATIENT)
Dept: INTERNAL MEDICINE | Facility: CLINIC | Age: 73
End: 2023-12-14
Payer: MEDICARE

## 2023-12-14 DIAGNOSIS — Z79.899 MEDICATION MANAGEMENT: ICD-10-CM

## 2023-12-14 DIAGNOSIS — K21.9 GASTROESOPHAGEAL REFLUX DISEASE WITHOUT ESOPHAGITIS: ICD-10-CM

## 2023-12-14 DIAGNOSIS — I25.10 CORONARY ARTERY DISEASE INVOLVING NATIVE CORONARY ARTERY OF NATIVE HEART WITHOUT ANGINA PECTORIS: ICD-10-CM

## 2023-12-14 LAB
AMPHET+METHAMPHET UR QL: NEGATIVE
AMPHETAMINES UR QL: NEGATIVE
BARBITURATES UR QL SCN: NEGATIVE
BENZODIAZ UR QL SCN: NEGATIVE
BUPRENORPHINE SERPL-MCNC: NEGATIVE NG/ML
CANNABINOIDS SERPL QL: NEGATIVE
COCAINE UR QL: NEGATIVE
FENTANYL UR-MCNC: NEGATIVE NG/ML
METHADONE UR QL SCN: NEGATIVE
OPIATES UR QL: POSITIVE
OXYCODONE UR QL SCN: NEGATIVE
PCP UR QL SCN: NEGATIVE
TRICYCLICS UR QL SCN: NEGATIVE

## 2023-12-14 PROCEDURE — 80307 DRUG TEST PRSMV CHEM ANLYZR: CPT | Performed by: NURSE PRACTITIONER

## 2023-12-14 RX ORDER — METOPROLOL SUCCINATE 50 MG/1
50 TABLET, EXTENDED RELEASE ORAL DAILY
Qty: 30 TABLET | Refills: 5 | Status: SHIPPED | OUTPATIENT
Start: 2023-12-14

## 2023-12-14 RX ORDER — FAMOTIDINE 20 MG/1
TABLET, FILM COATED ORAL
Qty: 30 TABLET | Refills: 5 | Status: SHIPPED | OUTPATIENT
Start: 2023-12-14

## 2024-01-01 ENCOUNTER — APPOINTMENT (OUTPATIENT)
Dept: CT IMAGING | Facility: HOSPITAL | Age: 74
DRG: 871 | End: 2024-01-01
Payer: MEDICARE

## 2024-01-01 ENCOUNTER — APPOINTMENT (OUTPATIENT)
Dept: CARDIOLOGY | Facility: HOSPITAL | Age: 74
DRG: 871 | End: 2024-01-01
Payer: MEDICARE

## 2024-01-01 ENCOUNTER — HOSPITAL ENCOUNTER (INPATIENT)
Facility: HOSPITAL | Age: 74
LOS: 1 days | DRG: 871 | End: 2024-12-08
Attending: EMERGENCY MEDICINE | Admitting: INTERNAL MEDICINE
Payer: MEDICARE

## 2024-01-01 ENCOUNTER — APPOINTMENT (OUTPATIENT)
Dept: GENERAL RADIOLOGY | Facility: HOSPITAL | Age: 74
DRG: 871 | End: 2024-01-01
Payer: MEDICARE

## 2024-01-01 VITALS
WEIGHT: 275.35 LBS | OXYGEN SATURATION: 89 % | BODY MASS INDEX: 40.78 KG/M2 | TEMPERATURE: 100 F | DIASTOLIC BLOOD PRESSURE: 41 MMHG | HEIGHT: 69 IN | HEART RATE: 111 BPM | RESPIRATION RATE: 22 BRPM | SYSTOLIC BLOOD PRESSURE: 102 MMHG

## 2024-01-01 DIAGNOSIS — N17.9 SEPSIS WITH ACUTE RENAL FAILURE AND SEPTIC SHOCK, DUE TO UNSPECIFIED ORGANISM, UNSPECIFIED ACUTE RENAL FAILURE TYPE: ICD-10-CM

## 2024-01-01 DIAGNOSIS — R65.21 SEPTIC SHOCK: Primary | ICD-10-CM

## 2024-01-01 DIAGNOSIS — A41.9 SEPTIC SHOCK: Primary | ICD-10-CM

## 2024-01-01 DIAGNOSIS — J18.9 MULTIFOCAL PNEUMONIA: ICD-10-CM

## 2024-01-01 DIAGNOSIS — R79.89 ELEVATED TROPONIN: ICD-10-CM

## 2024-01-01 DIAGNOSIS — E86.0 ACUTE DEHYDRATION: ICD-10-CM

## 2024-01-01 DIAGNOSIS — A41.9 SEPSIS WITH ACUTE RENAL FAILURE AND SEPTIC SHOCK, DUE TO UNSPECIFIED ORGANISM, UNSPECIFIED ACUTE RENAL FAILURE TYPE: ICD-10-CM

## 2024-01-01 DIAGNOSIS — E87.20 LACTIC ACIDOSIS: ICD-10-CM

## 2024-01-01 DIAGNOSIS — N17.9 ACUTE RENAL FAILURE, UNSPECIFIED ACUTE RENAL FAILURE TYPE: ICD-10-CM

## 2024-01-01 DIAGNOSIS — R65.21 SEPSIS WITH ACUTE RENAL FAILURE AND SEPTIC SHOCK, DUE TO UNSPECIFIED ORGANISM, UNSPECIFIED ACUTE RENAL FAILURE TYPE: ICD-10-CM

## 2024-01-01 LAB
ALBUMIN SERPL-MCNC: 2.6 G/DL (ref 3.5–5.2)
ALBUMIN SERPL-MCNC: 2.8 G/DL (ref 3.5–5.2)
ALBUMIN SERPL-MCNC: 3.2 G/DL (ref 3.5–5.2)
ALBUMIN/GLOB SERPL: 0.8 G/DL
ALBUMIN/GLOB SERPL: 0.8 G/DL
ALP SERPL-CCNC: 105 U/L (ref 39–117)
ALP SERPL-CCNC: 127 U/L (ref 39–117)
ALT SERPL W P-5'-P-CCNC: 6 U/L (ref 1–41)
ALT SERPL W P-5'-P-CCNC: <5 U/L (ref 1–41)
AMPHET+METHAMPHET UR QL: NEGATIVE
AMPHETAMINES UR QL: NEGATIVE
ANION GAP SERPL CALCULATED.3IONS-SCNC: 20 MMOL/L (ref 5–15)
ANION GAP SERPL CALCULATED.3IONS-SCNC: 21 MMOL/L (ref 5–15)
ANION GAP SERPL CALCULATED.3IONS-SCNC: 21 MMOL/L (ref 5–15)
ANION GAP SERPL CALCULATED.3IONS-SCNC: 26 MMOL/L (ref 5–15)
AST SERPL-CCNC: 18 U/L (ref 1–40)
AST SERPL-CCNC: 27 U/L (ref 1–40)
ATMOSPHERIC PRESS: ABNORMAL MM[HG]
B PARAPERT DNA SPEC QL NAA+PROBE: NOT DETECTED
B PERT DNA SPEC QL NAA+PROBE: NOT DETECTED
BACTERIA BLD CULT: ABNORMAL
BACTERIA UR QL AUTO: ABNORMAL /HPF
BARBITURATES UR QL SCN: NEGATIVE
BASE EXCESS BLDV CALC-SCNC: -9.8 MMOL/L (ref -2–2)
BASOPHILS # BLD AUTO: 0.03 10*3/MM3 (ref 0–0.2)
BASOPHILS NFR BLD AUTO: 0.2 % (ref 0–1.5)
BDY SITE: ABNORMAL
BENZODIAZ UR QL SCN: NEGATIVE
BH CV ECHO MEAS - AO MAX PG: 38.2 MMHG
BH CV ECHO MEAS - AO MEAN PG: 20.5 MMHG
BH CV ECHO MEAS - AO ROOT DIAM: 3.9 CM
BH CV ECHO MEAS - AO V2 MAX: 309 CM/SEC
BH CV ECHO MEAS - AO V2 VTI: 58.7 CM
BH CV ECHO MEAS - AVA(I,D): 0.95 CM2
BH CV ECHO MEAS - EDV(MOD-SP2): 139 ML
BH CV ECHO MEAS - EDV(MOD-SP4): 225 ML
BH CV ECHO MEAS - EF(MOD-BP): 20.3 %
BH CV ECHO MEAS - EF(MOD-SP2): 21.6 %
BH CV ECHO MEAS - EF(MOD-SP4): 20.4 %
BH CV ECHO MEAS - ESV(MOD-SP2): 109 ML
BH CV ECHO MEAS - ESV(MOD-SP4): 179 ML
BH CV ECHO MEAS - LA DIMENSION: 4.1 CM
BH CV ECHO MEAS - LAT PEAK E' VEL: 14.6 CM/SEC
BH CV ECHO MEAS - LV MAX PG: 2.7 MMHG
BH CV ECHO MEAS - LV MEAN PG: 1 MMHG
BH CV ECHO MEAS - LV V1 MAX: 82.3 CM/SEC
BH CV ECHO MEAS - LV V1 VTI: 13.5 CM
BH CV ECHO MEAS - LVOT AREA: 4.2 CM2
BH CV ECHO MEAS - LVOT DIAM: 2.3 CM
BH CV ECHO MEAS - MED PEAK E' VEL: 10.2 CM/SEC
BH CV ECHO MEAS - MR MAX PG: 60.8 MMHG
BH CV ECHO MEAS - MR MAX VEL: 390 CM/SEC
BH CV ECHO MEAS - MV MAX PG: 6.4 MMHG
BH CV ECHO MEAS - MV MEAN PG: 3 MMHG
BH CV ECHO MEAS - MV V2 VTI: 24.2 CM
BH CV ECHO MEAS - MVA(VTI): 2.31 CM2
BH CV ECHO MEAS - PA ACC TIME: 0.05 SEC
BH CV ECHO MEAS - RAP SYSTOLE: 3 MMHG
BH CV ECHO MEAS - RVSP: 18 MMHG
BH CV ECHO MEAS - SV(LVOT): 55.9 ML
BH CV ECHO MEAS - SV(MOD-SP2): 30 ML
BH CV ECHO MEAS - SV(MOD-SP4): 46 ML
BH CV ECHO MEAS - TAPSE (>1.6): 1.53 CM
BH CV ECHO MEAS - TR MAX PG: 15 MMHG
BH CV ECHO MEAS - TR MAX VEL: 191 CM/SEC
BH CV XLRA - RV BASE: 3.4 CM
BH CV XLRA - RV LENGTH: 8.8 CM
BH CV XLRA - RV MID: 3 CM
BH CV XLRA - TDI S': 10.3 CM/SEC
BILIRUB SERPL-MCNC: 0.5 MG/DL (ref 0–1.2)
BILIRUB SERPL-MCNC: 0.7 MG/DL (ref 0–1.2)
BILIRUB UR QL STRIP: NEGATIVE
BODY TEMPERATURE: 37
BOTTLE TYPE: ABNORMAL
BUN SERPL-MCNC: 102 MG/DL (ref 8–23)
BUN SERPL-MCNC: 104 MG/DL (ref 8–23)
BUN SERPL-MCNC: 114 MG/DL (ref 8–23)
BUN SERPL-MCNC: 133 MG/DL (ref 8–23)
BUN/CREAT SERPL: 10.5 (ref 7–25)
BUN/CREAT SERPL: 10.5 (ref 7–25)
BUN/CREAT SERPL: 11.5 (ref 7–25)
BUN/CREAT SERPL: 12.1 (ref 7–25)
BUPRENORPHINE SERPL-MCNC: NEGATIVE NG/ML
C PNEUM DNA NPH QL NAA+NON-PROBE: NOT DETECTED
CALCIUM SPEC-SCNC: 6.8 MG/DL (ref 8.6–10.5)
CALCIUM SPEC-SCNC: 7.4 MG/DL (ref 8.6–10.5)
CALCIUM SPEC-SCNC: 7.7 MG/DL (ref 8.6–10.5)
CALCIUM SPEC-SCNC: 8.9 MG/DL (ref 8.6–10.5)
CANNABINOIDS SERPL QL: NEGATIVE
CHLORIDE SERPL-SCNC: 108 MMOL/L (ref 98–107)
CHLORIDE SERPL-SCNC: 110 MMOL/L (ref 98–107)
CHLORIDE SERPL-SCNC: 114 MMOL/L (ref 98–107)
CHLORIDE SERPL-SCNC: 98 MMOL/L (ref 98–107)
CLARITY UR: ABNORMAL
CO2 BLDA-SCNC: 19 MMOL/L (ref 22–33)
CO2 SERPL-SCNC: 14 MMOL/L (ref 22–29)
CO2 SERPL-SCNC: 14 MMOL/L (ref 22–29)
CO2 SERPL-SCNC: 17 MMOL/L (ref 22–29)
CO2 SERPL-SCNC: 20 MMOL/L (ref 22–29)
COCAINE UR QL: POSITIVE
COHGB MFR BLD: 0.9 %
COLOR UR: YELLOW
CORTIS SERPL-MCNC: 17.86 MCG/DL
CREAT SERPL-MCNC: 10.89 MG/DL (ref 0.76–1.27)
CREAT SERPL-MCNC: 12.71 MG/DL (ref 0.76–1.27)
CREAT SERPL-MCNC: 8.4 MG/DL (ref 0.76–1.27)
CREAT SERPL-MCNC: 9.03 MG/DL (ref 0.76–1.27)
D-LACTATE SERPL-SCNC: 1.6 MMOL/L (ref 0.5–2)
D-LACTATE SERPL-SCNC: 3.4 MMOL/L (ref 0.5–2)
D-LACTATE SERPL-SCNC: 4.2 MMOL/L (ref 0.5–2)
DEPRECATED RDW RBC AUTO: 51 FL (ref 37–54)
DEPRECATED RDW RBC AUTO: 52 FL (ref 37–54)
EGFRCR SERPLBLD CKD-EPI 2021: 3.8 ML/MIN/1.73
EGFRCR SERPLBLD CKD-EPI 2021: 4.5 ML/MIN/1.73
EGFRCR SERPLBLD CKD-EPI 2021: 5.7 ML/MIN/1.73
EGFRCR SERPLBLD CKD-EPI 2021: 6.2 ML/MIN/1.73
EOSINOPHIL # BLD AUTO: 0.03 10*3/MM3 (ref 0–0.4)
EOSINOPHIL NFR BLD AUTO: 0.2 % (ref 0.3–6.2)
EPAP: 0
ERYTHROCYTE [DISTWIDTH] IN BLOOD BY AUTOMATED COUNT: 13.9 % (ref 12.3–15.4)
ERYTHROCYTE [DISTWIDTH] IN BLOOD BY AUTOMATED COUNT: 14.4 % (ref 12.3–15.4)
ETHANOL BLD-MCNC: <10 MG/DL (ref 0–10)
FENTANYL UR-MCNC: NEGATIVE NG/ML
FLUAV SUBTYP SPEC NAA+PROBE: NOT DETECTED
FLUBV RNA ISLT QL NAA+PROBE: NOT DETECTED
GEN 5 1HR TROPONIN T REFLEX: 81 NG/L
GLOBULIN UR ELPH-MCNC: 3.3 GM/DL
GLOBULIN UR ELPH-MCNC: 4.1 GM/DL
GLUCOSE BLDC GLUCOMTR-MCNC: 153 MG/DL (ref 70–130)
GLUCOSE BLDC GLUCOMTR-MCNC: 163 MG/DL (ref 70–130)
GLUCOSE BLDC GLUCOMTR-MCNC: 178 MG/DL (ref 70–130)
GLUCOSE BLDC GLUCOMTR-MCNC: 206 MG/DL (ref 70–130)
GLUCOSE BLDC GLUCOMTR-MCNC: 242 MG/DL (ref 70–130)
GLUCOSE BLDC GLUCOMTR-MCNC: 267 MG/DL (ref 70–130)
GLUCOSE SERPL-MCNC: 148 MG/DL (ref 65–99)
GLUCOSE SERPL-MCNC: 186 MG/DL (ref 65–99)
GLUCOSE SERPL-MCNC: 259 MG/DL (ref 65–99)
GLUCOSE SERPL-MCNC: 97 MG/DL (ref 65–99)
GLUCOSE UR STRIP-MCNC: ABNORMAL MG/DL
HADV DNA SPEC NAA+PROBE: NOT DETECTED
HBA1C MFR BLD: 5.4 % (ref 4.8–5.6)
HCO3 BLDV-SCNC: 17.7 MMOL/L (ref 22–28)
HCOV 229E RNA SPEC QL NAA+PROBE: NOT DETECTED
HCOV HKU1 RNA SPEC QL NAA+PROBE: NOT DETECTED
HCOV NL63 RNA SPEC QL NAA+PROBE: NOT DETECTED
HCOV OC43 RNA SPEC QL NAA+PROBE: NOT DETECTED
HCT VFR BLD AUTO: 39.5 % (ref 37.5–51)
HCT VFR BLD AUTO: 45.2 % (ref 37.5–51)
HGB BLD-MCNC: 12.7 G/DL (ref 13–17.7)
HGB BLD-MCNC: 14.6 G/DL (ref 13–17.7)
HGB BLDA-MCNC: 10.1 G/DL (ref 13.5–17.5)
HGB UR QL STRIP.AUTO: ABNORMAL
HMPV RNA NPH QL NAA+NON-PROBE: NOT DETECTED
HOLD SPECIMEN: NORMAL
HPIV1 RNA ISLT QL NAA+PROBE: NOT DETECTED
HPIV2 RNA SPEC QL NAA+PROBE: NOT DETECTED
HPIV3 RNA NPH QL NAA+PROBE: NOT DETECTED
HPIV4 P GENE NPH QL NAA+PROBE: NOT DETECTED
HYALINE CASTS UR QL AUTO: ABNORMAL /LPF
IMM GRANULOCYTES # BLD AUTO: 0.07 10*3/MM3 (ref 0–0.05)
IMM GRANULOCYTES NFR BLD AUTO: 0.5 % (ref 0–0.5)
INHALED O2 CONCENTRATION: 100 %
IPAP: 0
KETONES UR QL STRIP: ABNORMAL
L PNEUMO1 AG UR QL IA: NEGATIVE
LEUKOCYTE ESTERASE UR QL STRIP.AUTO: ABNORMAL
LYMPHOCYTES # BLD AUTO: 1.22 10*3/MM3 (ref 0.7–3.1)
LYMPHOCYTES NFR BLD AUTO: 8.8 % (ref 19.6–45.3)
Lab: ABNORMAL
M PNEUMO IGG SER IA-ACNC: NOT DETECTED
MAGNESIUM SERPL-MCNC: 2.2 MG/DL (ref 1.6–2.4)
MAGNESIUM SERPL-MCNC: 2.4 MG/DL (ref 1.6–2.4)
MAGNESIUM SERPL-MCNC: 2.7 MG/DL (ref 1.6–2.4)
MCH RBC QN AUTO: 31.8 PG (ref 26.6–33)
MCH RBC QN AUTO: 32 PG (ref 26.6–33)
MCHC RBC AUTO-ENTMCNC: 32.2 G/DL (ref 31.5–35.7)
MCHC RBC AUTO-ENTMCNC: 32.3 G/DL (ref 31.5–35.7)
MCV RBC AUTO: 99 FL (ref 79–97)
MCV RBC AUTO: 99.1 FL (ref 79–97)
METHADONE UR QL SCN: NEGATIVE
METHGB BLD QL: 0.3 %
MODALITY: ABNORMAL
MONOCYTES # BLD AUTO: 0.69 10*3/MM3 (ref 0.1–0.9)
MONOCYTES NFR BLD AUTO: 5 % (ref 5–12)
MRSA DNA SPEC QL NAA+PROBE: POSITIVE
NEUTROPHILS NFR BLD AUTO: 11.8 10*3/MM3 (ref 1.7–7)
NEUTROPHILS NFR BLD AUTO: 85.3 % (ref 42.7–76)
NITRITE UR QL STRIP: NEGATIVE
NOTIFIED BY: ABNORMAL
NOTIFIED WHO: ABNORMAL
NRBC BLD AUTO-RTO: 0 /100 WBC (ref 0–0.2)
NT-PROBNP SERPL-MCNC: ABNORMAL PG/ML (ref 0–900)
OPIATES UR QL: NEGATIVE
OXYCODONE UR QL SCN: POSITIVE
OXYHGB MFR BLDV: 16 %
PAW @ PEAK INSP FLOW SETTING VENT: 0 CMH2O
PCO2 BLDV: 44.6 MM HG (ref 41–51)
PCP UR QL SCN: NEGATIVE
PH BLDV: 7.21 PH UNITS (ref 7.31–7.41)
PH UR STRIP.AUTO: <=5 [PH] (ref 5–8)
PHOSPHATE SERPL-MCNC: 7.5 MG/DL (ref 2.5–4.5)
PHOSPHATE SERPL-MCNC: 8.3 MG/DL (ref 2.5–4.5)
PLATELET # BLD AUTO: 456 10*3/MM3 (ref 140–450)
PLATELET # BLD AUTO: 510 10*3/MM3 (ref 140–450)
PMV BLD AUTO: 10.1 FL (ref 6–12)
PMV BLD AUTO: 9.7 FL (ref 6–12)
PO2 BLDV: 18.1 MM HG (ref 27–53)
POTASSIUM SERPL-SCNC: 4.2 MMOL/L (ref 3.5–5.2)
POTASSIUM SERPL-SCNC: 4.3 MMOL/L (ref 3.5–5.2)
POTASSIUM SERPL-SCNC: 4.7 MMOL/L (ref 3.5–5.2)
POTASSIUM SERPL-SCNC: 4.8 MMOL/L (ref 3.5–5.2)
PROT SERPL-MCNC: 5.9 G/DL (ref 6–8.5)
PROT SERPL-MCNC: 7.3 G/DL (ref 6–8.5)
PROT UR QL STRIP: ABNORMAL
QT INTERVAL: 344 MS
QTC INTERVAL: 460 MS
RBC # BLD AUTO: 3.99 10*6/MM3 (ref 4.14–5.8)
RBC # BLD AUTO: 4.56 10*6/MM3 (ref 4.14–5.8)
RBC # UR STRIP: ABNORMAL /HPF
REF LAB TEST METHOD: ABNORMAL
RHINOVIRUS RNA SPEC NAA+PROBE: NOT DETECTED
RSV RNA NPH QL NAA+NON-PROBE: NOT DETECTED
S PNEUM AG SPEC QL LA: NEGATIVE
SARS-COV-2 RNA NPH QL NAA+NON-PROBE: NOT DETECTED
SODIUM SERPL-SCNC: 144 MMOL/L (ref 136–145)
SODIUM SERPL-SCNC: 145 MMOL/L (ref 136–145)
SODIUM SERPL-SCNC: 146 MMOL/L (ref 136–145)
SODIUM SERPL-SCNC: 148 MMOL/L (ref 136–145)
SP GR UR STRIP: 1.02 (ref 1–1.03)
SQUAMOUS #/AREA URNS HPF: ABNORMAL /HPF
TOTAL RATE: 0 BREATHS/MINUTE
TRANS CELLS #/AREA URNS HPF: ABNORMAL /HPF
TRICYCLICS UR QL SCN: NEGATIVE
TROPONIN T NUMERIC DELTA: -35 NG/L
TROPONIN T SERPL HS-MCNC: 116 NG/L
TROPONIN T SERPL HS-MCNC: 116 NG/L
TSH SERPL DL<=0.05 MIU/L-ACNC: 3.23 UIU/ML (ref 0.27–4.2)
UROBILINOGEN UR QL STRIP: ABNORMAL
VANCOMYCIN SERPL-MCNC: 18.5 MCG/ML (ref 5–40)
WBC # UR STRIP: ABNORMAL /HPF
WBC NRBC COR # BLD AUTO: 13.84 10*3/MM3 (ref 3.4–10.8)
WBC NRBC COR # BLD AUTO: 15.94 10*3/MM3 (ref 3.4–10.8)
WHOLE BLOOD HOLD COAG: NORMAL
WHOLE BLOOD HOLD SPECIMEN: NORMAL
YEAST URNS QL MICRO: ABNORMAL /HPF

## 2024-01-01 PROCEDURE — 82533 TOTAL CORTISOL: CPT | Performed by: NURSE PRACTITIONER

## 2024-01-01 PROCEDURE — 94640 AIRWAY INHALATION TREATMENT: CPT

## 2024-01-01 PROCEDURE — 25010000002 MAGNESIUM SULFATE 2 GM/50ML SOLUTION: Performed by: NURSE PRACTITIONER

## 2024-01-01 PROCEDURE — 87040 BLOOD CULTURE FOR BACTERIA: CPT | Performed by: EMERGENCY MEDICINE

## 2024-01-01 PROCEDURE — 83605 ASSAY OF LACTIC ACID: CPT | Performed by: EMERGENCY MEDICINE

## 2024-01-01 PROCEDURE — 25010000002 PIPERACILLIN SOD-TAZOBACTAM PER 1 G: Performed by: EMERGENCY MEDICINE

## 2024-01-01 PROCEDURE — 94660 CPAP INITIATION&MGMT: CPT

## 2024-01-01 PROCEDURE — 83880 ASSAY OF NATRIURETIC PEPTIDE: CPT | Performed by: EMERGENCY MEDICINE

## 2024-01-01 PROCEDURE — 87147 CULTURE TYPE IMMUNOLOGIC: CPT | Performed by: NURSE PRACTITIONER

## 2024-01-01 PROCEDURE — 84484 ASSAY OF TROPONIN QUANT: CPT | Performed by: EMERGENCY MEDICINE

## 2024-01-01 PROCEDURE — 84443 ASSAY THYROID STIM HORMONE: CPT | Performed by: NURSE PRACTITIONER

## 2024-01-01 PROCEDURE — 80202 ASSAY OF VANCOMYCIN: CPT

## 2024-01-01 PROCEDURE — 36620 INSERTION CATHETER ARTERY: CPT | Performed by: NURSE PRACTITIONER

## 2024-01-01 PROCEDURE — 99291 CRITICAL CARE FIRST HOUR: CPT | Performed by: INTERNAL MEDICINE

## 2024-01-01 PROCEDURE — 93005 ELECTROCARDIOGRAM TRACING: CPT | Performed by: EMERGENCY MEDICINE

## 2024-01-01 PROCEDURE — 82948 REAGENT STRIP/BLOOD GLUCOSE: CPT

## 2024-01-01 PROCEDURE — 25010000002 SULFUR HEXAFLUORIDE MICROSPH 60.7-25 MG RECONSTITUTED SUSPENSION: Performed by: NURSE PRACTITIONER

## 2024-01-01 PROCEDURE — 83735 ASSAY OF MAGNESIUM: CPT | Performed by: EMERGENCY MEDICINE

## 2024-01-01 PROCEDURE — 94799 UNLISTED PULMONARY SVC/PX: CPT

## 2024-01-01 PROCEDURE — 87070 CULTURE OTHR SPECIMN AEROBIC: CPT | Performed by: NURSE PRACTITIONER

## 2024-01-01 PROCEDURE — 87186 SC STD MICRODIL/AGAR DIL: CPT | Performed by: EMERGENCY MEDICINE

## 2024-01-01 PROCEDURE — 76937 US GUIDE VASCULAR ACCESS: CPT | Performed by: NURSE PRACTITIONER

## 2024-01-01 PROCEDURE — 74176 CT ABD & PELVIS W/O CONTRAST: CPT

## 2024-01-01 PROCEDURE — 25010000002 THIAMINE PER 100 MG: Performed by: NURSE PRACTITIONER

## 2024-01-01 PROCEDURE — 36556 INSERT NON-TUNNEL CV CATH: CPT | Performed by: NURSE PRACTITIONER

## 2024-01-01 PROCEDURE — 03HY32Z INSERTION OF MONITORING DEVICE INTO UPPER ARTERY, PERCUTANEOUS APPROACH: ICD-10-PCS | Performed by: NURSE PRACTITIONER

## 2024-01-01 PROCEDURE — 71045 X-RAY EXAM CHEST 1 VIEW: CPT

## 2024-01-01 PROCEDURE — 83036 HEMOGLOBIN GLYCOSYLATED A1C: CPT | Performed by: NURSE PRACTITIONER

## 2024-01-01 PROCEDURE — 25010000002 MIDAZOLAM PER 1 MG: Performed by: NURSE PRACTITIONER

## 2024-01-01 PROCEDURE — 83735 ASSAY OF MAGNESIUM: CPT | Performed by: NURSE PRACTITIONER

## 2024-01-01 PROCEDURE — 70450 CT HEAD/BRAIN W/O DYE: CPT

## 2024-01-01 PROCEDURE — 25010000002 ALBUMIN HUMAN 25% PER 50 ML: Performed by: NURSE PRACTITIONER

## 2024-01-01 PROCEDURE — 87086 URINE CULTURE/COLONY COUNT: CPT | Performed by: EMERGENCY MEDICINE

## 2024-01-01 PROCEDURE — 25010000003 DEXTROSE 5 % SOLUTION 1,000 ML FLEX CONT: Performed by: NURSE PRACTITIONER

## 2024-01-01 PROCEDURE — 36415 COLL VENOUS BLD VENIPUNCTURE: CPT

## 2024-01-01 PROCEDURE — P9047 ALBUMIN (HUMAN), 25%, 50ML: HCPCS | Performed by: NURSE PRACTITIONER

## 2024-01-01 PROCEDURE — 81001 URINALYSIS AUTO W/SCOPE: CPT | Performed by: EMERGENCY MEDICINE

## 2024-01-01 PROCEDURE — 25010000002 BUMETANIDE PER 0.5 MG: Performed by: INTERNAL MEDICINE

## 2024-01-01 PROCEDURE — 80053 COMPREHEN METABOLIC PANEL: CPT | Performed by: NURSE PRACTITIONER

## 2024-01-01 PROCEDURE — 94664 DEMO&/EVAL PT USE INHALER: CPT

## 2024-01-01 PROCEDURE — 80069 RENAL FUNCTION PANEL: CPT | Performed by: INTERNAL MEDICINE

## 2024-01-01 PROCEDURE — 87077 CULTURE AEROBIC IDENTIFY: CPT | Performed by: EMERGENCY MEDICINE

## 2024-01-01 PROCEDURE — 82077 ASSAY SPEC XCP UR&BREATH IA: CPT | Performed by: EMERGENCY MEDICINE

## 2024-01-01 PROCEDURE — 63710000001 INSULIN REGULAR HUMAN PER 5 UNITS: Performed by: NURSE PRACTITIONER

## 2024-01-01 PROCEDURE — 0202U NFCT DS 22 TRGT SARS-COV-2: CPT | Performed by: EMERGENCY MEDICINE

## 2024-01-01 PROCEDURE — 85025 COMPLETE CBC W/AUTO DIFF WBC: CPT | Performed by: EMERGENCY MEDICINE

## 2024-01-01 PROCEDURE — 87147 CULTURE TYPE IMMUNOLOGIC: CPT | Performed by: EMERGENCY MEDICINE

## 2024-01-01 PROCEDURE — 25010000002 HYDROCORTISONE SOD SUC (PF) 100 MG RECONSTITUTED SOLUTION: Performed by: NURSE PRACTITIONER

## 2024-01-01 PROCEDURE — 99291 CRITICAL CARE FIRST HOUR: CPT

## 2024-01-01 PROCEDURE — 25010000002 THIAMINE HCL 200 MG/2ML SOLUTION 2 ML VIAL: Performed by: NURSE PRACTITIONER

## 2024-01-01 PROCEDURE — 25010000002 GLYCOPYRROLATE 0.2 MG/ML SOLUTION: Performed by: INTERNAL MEDICINE

## 2024-01-01 PROCEDURE — 25010000002 PIPERACILLIN SOD-TAZOBACTAM PER 1 G: Performed by: NURSE PRACTITIONER

## 2024-01-01 PROCEDURE — P9041 ALBUMIN (HUMAN),5%, 50ML: HCPCS | Performed by: INTERNAL MEDICINE

## 2024-01-01 PROCEDURE — 25010000002 ALBUMIN HUMAN 5% PER 50 ML: Performed by: INTERNAL MEDICINE

## 2024-01-01 PROCEDURE — 87076 CULTURE ANAEROBE IDENT EACH: CPT | Performed by: EMERGENCY MEDICINE

## 2024-01-01 PROCEDURE — 87205 SMEAR GRAM STAIN: CPT | Performed by: NURSE PRACTITIONER

## 2024-01-01 PROCEDURE — 25010000002 LORAZEPAM PER 2 MG: Performed by: INTERNAL MEDICINE

## 2024-01-01 PROCEDURE — 25810000003 SODIUM CHLORIDE 0.9 % SOLUTION 250 ML FLEX CONT: Performed by: INTERNAL MEDICINE

## 2024-01-01 PROCEDURE — 25010000002 HALOPERIDOL LACTATE PER 5 MG: Performed by: NURSE PRACTITIONER

## 2024-01-01 PROCEDURE — 87449 NOS EACH ORGANISM AG IA: CPT | Performed by: NURSE PRACTITIONER

## 2024-01-01 PROCEDURE — 85027 COMPLETE CBC AUTOMATED: CPT | Performed by: NURSE PRACTITIONER

## 2024-01-01 PROCEDURE — 84100 ASSAY OF PHOSPHORUS: CPT | Performed by: NURSE PRACTITIONER

## 2024-01-01 PROCEDURE — 25010000002 MORPHINE PER 10 MG: Performed by: INTERNAL MEDICINE

## 2024-01-01 PROCEDURE — 80307 DRUG TEST PRSMV CHEM ANLYZR: CPT | Performed by: EMERGENCY MEDICINE

## 2024-01-01 PROCEDURE — 25010000002 HEPARIN (PORCINE) PER 1000 UNITS: Performed by: NURSE PRACTITIONER

## 2024-01-01 PROCEDURE — 94761 N-INVAS EAR/PLS OXIMETRY MLT: CPT

## 2024-01-01 PROCEDURE — 25010000003 VASOPRESSIN 0.2 UNITS/ML SOLUTION: Performed by: NURSE PRACTITIONER

## 2024-01-01 PROCEDURE — 25010000002 HYDROMORPHONE 1 MG/ML SOLUTION: Performed by: INTERNAL MEDICINE

## 2024-01-01 PROCEDURE — 25810000003 SODIUM CHLORIDE 0.9 % SOLUTION: Performed by: EMERGENCY MEDICINE

## 2024-01-01 PROCEDURE — 02HV33Z INSERTION OF INFUSION DEVICE INTO SUPERIOR VENA CAVA, PERCUTANEOUS APPROACH: ICD-10-PCS | Performed by: NURSE PRACTITIONER

## 2024-01-01 PROCEDURE — 87185 SC STD ENZYME DETCJ PER NZM: CPT | Performed by: EMERGENCY MEDICINE

## 2024-01-01 PROCEDURE — 87181 SC STD AGAR DILUTION PER AGT: CPT | Performed by: EMERGENCY MEDICINE

## 2024-01-01 PROCEDURE — 80053 COMPREHEN METABOLIC PANEL: CPT | Performed by: EMERGENCY MEDICINE

## 2024-01-01 PROCEDURE — 25010000002 VANCOMYCIN 10 G RECONSTITUTED SOLUTION: Performed by: EMERGENCY MEDICINE

## 2024-01-01 PROCEDURE — 5A09357 ASSISTANCE WITH RESPIRATORY VENTILATION, LESS THAN 24 CONSECUTIVE HOURS, CONTINUOUS POSITIVE AIRWAY PRESSURE: ICD-10-PCS | Performed by: INTERNAL MEDICINE

## 2024-01-01 PROCEDURE — 87186 SC STD MICRODIL/AGAR DIL: CPT | Performed by: NURSE PRACTITIONER

## 2024-01-01 PROCEDURE — 87641 MR-STAPH DNA AMP PROBE: CPT | Performed by: NURSE PRACTITIONER

## 2024-01-01 PROCEDURE — 93306 TTE W/DOPPLER COMPLETE: CPT | Performed by: INTERNAL MEDICINE

## 2024-01-01 PROCEDURE — 25010000002 HYDROMORPHONE PER 4 MG: Performed by: PHYSICAL MEDICINE & REHABILITATION

## 2024-01-01 PROCEDURE — 71250 CT THORAX DX C-: CPT

## 2024-01-01 PROCEDURE — 93306 TTE W/DOPPLER COMPLETE: CPT

## 2024-01-01 PROCEDURE — 82805 BLOOD GASES W/O2 SATURATION: CPT

## 2024-01-01 PROCEDURE — 87154 CUL TYP ID BLD PTHGN 6+ TRGT: CPT | Performed by: EMERGENCY MEDICINE

## 2024-01-01 RX ORDER — IBUPROFEN 600 MG/1
1 TABLET ORAL
Status: DISCONTINUED | OUTPATIENT
Start: 2024-01-01 | End: 2024-01-01

## 2024-01-01 RX ORDER — AMOXICILLIN 250 MG
2 CAPSULE ORAL 2 TIMES DAILY
Status: DISCONTINUED | OUTPATIENT
Start: 2024-01-01 | End: 2024-01-01

## 2024-01-01 RX ORDER — ALBUMIN (HUMAN) 12.5 G/50ML
25 SOLUTION INTRAVENOUS ONCE
Status: COMPLETED | OUTPATIENT
Start: 2024-01-01 | End: 2024-01-01

## 2024-01-01 RX ORDER — NYSTATIN 100000 [USP'U]/G
POWDER TOPICAL EVERY 12 HOURS SCHEDULED
Status: DISCONTINUED | OUTPATIENT
Start: 2024-01-01 | End: 2024-01-01

## 2024-01-01 RX ORDER — BISACODYL 5 MG/1
5 TABLET, DELAYED RELEASE ORAL DAILY PRN
Status: DISCONTINUED | OUTPATIENT
Start: 2024-01-01 | End: 2024-01-01

## 2024-01-01 RX ORDER — SODIUM CHLORIDE 0.9 % (FLUSH) 0.9 %
10 SYRINGE (ML) INJECTION AS NEEDED
Status: DISCONTINUED | OUTPATIENT
Start: 2024-01-01 | End: 2024-01-01

## 2024-01-01 RX ORDER — MIDAZOLAM HYDROCHLORIDE 1 MG/ML
2 INJECTION, SOLUTION INTRAMUSCULAR; INTRAVENOUS ONCE
Status: COMPLETED | OUTPATIENT
Start: 2024-01-01 | End: 2024-01-01

## 2024-01-01 RX ORDER — BUMETANIDE 0.25 MG/ML
2 INJECTION, SOLUTION INTRAMUSCULAR; INTRAVENOUS ONCE
Status: COMPLETED | OUTPATIENT
Start: 2024-01-01 | End: 2024-01-01

## 2024-01-01 RX ORDER — MORPHINE SULFATE 2 MG/ML
2 INJECTION, SOLUTION INTRAMUSCULAR; INTRAVENOUS
Status: DISCONTINUED | OUTPATIENT
Start: 2024-01-01 | End: 2024-01-01

## 2024-01-01 RX ORDER — SODIUM CHLORIDE, SODIUM LACTATE, POTASSIUM CHLORIDE, CALCIUM CHLORIDE 600; 310; 30; 20 MG/100ML; MG/100ML; MG/100ML; MG/100ML
50 INJECTION, SOLUTION INTRAVENOUS CONTINUOUS
Status: DISCONTINUED | OUTPATIENT
Start: 2024-01-01 | End: 2024-01-01

## 2024-01-01 RX ORDER — MORPHINE SULFATE 2 MG/ML
2 INJECTION, SOLUTION INTRAMUSCULAR; INTRAVENOUS
Status: DISCONTINUED | OUTPATIENT
Start: 2024-01-01 | End: 2024-01-01 | Stop reason: HOSPADM

## 2024-01-01 RX ORDER — HYDROMORPHONE HYDROCHLORIDE 1 MG/ML
0.5 INJECTION, SOLUTION INTRAMUSCULAR; INTRAVENOUS; SUBCUTANEOUS
Status: DISCONTINUED | OUTPATIENT
Start: 2024-01-01 | End: 2024-01-01

## 2024-01-01 RX ORDER — NICOTINE POLACRILEX 4 MG
15 LOZENGE BUCCAL
Status: DISCONTINUED | OUTPATIENT
Start: 2024-01-01 | End: 2024-01-01

## 2024-01-01 RX ORDER — DEXTROSE MONOHYDRATE 25 G/50ML
25 INJECTION, SOLUTION INTRAVENOUS
Status: DISCONTINUED | OUTPATIENT
Start: 2024-01-01 | End: 2024-01-01

## 2024-01-01 RX ORDER — BISACODYL 10 MG
10 SUPPOSITORY, RECTAL RECTAL DAILY PRN
Status: DISCONTINUED | OUTPATIENT
Start: 2024-01-01 | End: 2024-01-01

## 2024-01-01 RX ORDER — LORAZEPAM 2 MG/ML
2 INJECTION INTRAMUSCULAR
Status: DISCONTINUED | OUTPATIENT
Start: 2024-01-01 | End: 2024-01-01 | Stop reason: HOSPADM

## 2024-01-01 RX ORDER — HYDROCORTISONE SODIUM SUCCINATE 100 MG/2ML
100 INJECTION INTRAMUSCULAR; INTRAVENOUS EVERY 8 HOURS
Status: DISCONTINUED | OUTPATIENT
Start: 2024-01-01 | End: 2024-01-01

## 2024-01-01 RX ORDER — NITROGLYCERIN 0.4 MG/1
0.4 TABLET SUBLINGUAL
Status: DISCONTINUED | OUTPATIENT
Start: 2024-01-01 | End: 2024-01-01

## 2024-01-01 RX ORDER — NOREPINEPHRINE BITARTRATE 0.03 MG/ML
.02-.3 INJECTION, SOLUTION INTRAVENOUS
Status: DISCONTINUED | OUTPATIENT
Start: 2024-01-01 | End: 2024-01-01

## 2024-01-01 RX ORDER — ACETYLCYSTEINE 200 MG/ML
4 SOLUTION ORAL; RESPIRATORY (INHALATION)
Status: DISCONTINUED | OUTPATIENT
Start: 2024-01-01 | End: 2024-01-01

## 2024-01-01 RX ORDER — HEPARIN SODIUM 5000 [USP'U]/ML
5000 INJECTION, SOLUTION INTRAVENOUS; SUBCUTANEOUS EVERY 12 HOURS SCHEDULED
Status: DISCONTINUED | OUTPATIENT
Start: 2024-01-01 | End: 2024-01-01

## 2024-01-01 RX ORDER — SODIUM CHLORIDE 9 MG/ML
40 INJECTION, SOLUTION INTRAVENOUS AS NEEDED
Status: DISCONTINUED | OUTPATIENT
Start: 2024-01-01 | End: 2024-01-01

## 2024-01-01 RX ORDER — HALOPERIDOL 5 MG/ML
2 INJECTION INTRAMUSCULAR EVERY 6 HOURS PRN
Status: ACTIVE | OUTPATIENT
Start: 2024-01-01 | End: 2024-01-01

## 2024-01-01 RX ORDER — HALOPERIDOL 5 MG/ML
2 INJECTION INTRAMUSCULAR ONCE
Status: COMPLETED | OUTPATIENT
Start: 2024-01-01 | End: 2024-01-01

## 2024-01-01 RX ORDER — POLYETHYLENE GLYCOL 3350 17 G/17G
17 POWDER, FOR SOLUTION ORAL DAILY PRN
Status: DISCONTINUED | OUTPATIENT
Start: 2024-01-01 | End: 2024-01-01

## 2024-01-01 RX ORDER — ALBUMIN HUMAN 50 G/1000ML
250 SOLUTION INTRAVENOUS ONCE
Status: COMPLETED | OUTPATIENT
Start: 2024-01-01 | End: 2024-01-01

## 2024-01-01 RX ORDER — IPRATROPIUM BROMIDE AND ALBUTEROL SULFATE 2.5; .5 MG/3ML; MG/3ML
3 SOLUTION RESPIRATORY (INHALATION)
Status: DISCONTINUED | OUTPATIENT
Start: 2024-01-01 | End: 2024-01-01

## 2024-01-01 RX ORDER — MAGNESIUM SULFATE HEPTAHYDRATE 40 MG/ML
2 INJECTION, SOLUTION INTRAVENOUS ONCE
Status: COMPLETED | OUTPATIENT
Start: 2024-01-01 | End: 2024-01-01

## 2024-01-01 RX ORDER — SODIUM CHLORIDE 0.9 % (FLUSH) 0.9 %
10 SYRINGE (ML) INJECTION EVERY 12 HOURS SCHEDULED
Status: DISCONTINUED | OUTPATIENT
Start: 2024-01-01 | End: 2024-01-01

## 2024-01-01 RX ORDER — PANTOPRAZOLE SODIUM 40 MG/10ML
40 INJECTION, POWDER, LYOPHILIZED, FOR SOLUTION INTRAVENOUS
Status: DISCONTINUED | OUTPATIENT
Start: 2024-01-01 | End: 2024-01-01

## 2024-01-01 RX ORDER — GLYCOPYRROLATE 0.2 MG/ML
0.4 INJECTION INTRAMUSCULAR; INTRAVENOUS EVERY 4 HOURS PRN
Status: DISCONTINUED | OUTPATIENT
Start: 2024-01-01 | End: 2024-01-01 | Stop reason: HOSPADM

## 2024-01-01 RX ORDER — IPRATROPIUM BROMIDE AND ALBUTEROL SULFATE 2.5; .5 MG/3ML; MG/3ML
3 SOLUTION RESPIRATORY (INHALATION) EVERY 4 HOURS PRN
Status: DISCONTINUED | OUTPATIENT
Start: 2024-01-01 | End: 2024-01-01

## 2024-01-01 RX ADMIN — SODIUM CHLORIDE 2000 ML: 9 INJECTION, SOLUTION INTRAVENOUS at 02:11

## 2024-01-01 RX ADMIN — INSULIN HUMAN 2 UNITS: 100 INJECTION, SOLUTION PARENTERAL at 06:06

## 2024-01-01 RX ADMIN — MAGNESIUM SULFATE HEPTAHYDRATE 2 G: 40 INJECTION, SOLUTION INTRAVENOUS at 22:56

## 2024-01-01 RX ADMIN — ALBUMIN (HUMAN) 250 ML: 12.5 INJECTION, SOLUTION INTRAVENOUS at 11:51

## 2024-01-01 RX ADMIN — VANCOMYCIN HYDROCHLORIDE 2750 MG: 10 INJECTION, POWDER, LYOPHILIZED, FOR SOLUTION INTRAVENOUS at 02:46

## 2024-01-01 RX ADMIN — NYSTATIN: 100000 POWDER TOPICAL at 09:24

## 2024-01-01 RX ADMIN — NYSTATIN: 100000 POWDER TOPICAL at 20:06

## 2024-01-01 RX ADMIN — HYDROCORTISONE SODIUM SUCCINATE 100 MG: 100 INJECTION, POWDER, FOR SOLUTION INTRAMUSCULAR; INTRAVENOUS at 21:31

## 2024-01-01 RX ADMIN — IPRATROPIUM BROMIDE AND ALBUTEROL SULFATE 3 ML: 2.5; .5 SOLUTION RESPIRATORY (INHALATION) at 07:46

## 2024-01-01 RX ADMIN — IPRATROPIUM BROMIDE AND ALBUTEROL SULFATE 3 ML: 2.5; .5 SOLUTION RESPIRATORY (INHALATION) at 13:56

## 2024-01-01 RX ADMIN — IPRATROPIUM BROMIDE AND ALBUTEROL SULFATE 3 ML: 2.5; .5 SOLUTION RESPIRATORY (INHALATION) at 19:32

## 2024-01-01 RX ADMIN — THIAMINE HYDROCHLORIDE 500 MG: 100 INJECTION, SOLUTION INTRAMUSCULAR; INTRAVENOUS at 11:46

## 2024-01-01 RX ADMIN — INSULIN HUMAN 2 UNITS: 100 INJECTION, SOLUTION PARENTERAL at 05:44

## 2024-01-01 RX ADMIN — Medication 10 ML: at 05:22

## 2024-01-01 RX ADMIN — ACETYLCYSTEINE 4 ML: 200 SOLUTION ORAL; RESPIRATORY (INHALATION) at 07:46

## 2024-01-01 RX ADMIN — NOREPINEPHRINE BITARTRATE 0.3 MCG/KG/MIN: 1 INJECTION, SOLUTION, CONCENTRATE INTRAVENOUS at 21:31

## 2024-01-01 RX ADMIN — MORPHINE SULFATE 2 MG: 2 INJECTION, SOLUTION INTRAMUSCULAR; INTRAVENOUS at 17:31

## 2024-01-01 RX ADMIN — NOREPINEPHRINE BITARTRATE 0.02 MCG/KG/MIN: 0.03 INJECTION, SOLUTION INTRAVENOUS at 00:22

## 2024-01-01 RX ADMIN — NOREPINEPHRINE BITARTRATE 0.3 MCG/KG/MIN: 1 INJECTION, SOLUTION, CONCENTRATE INTRAVENOUS at 04:13

## 2024-01-01 RX ADMIN — SODIUM CHLORIDE 2000 ML: 9 INJECTION, SOLUTION INTRAVENOUS at 23:50

## 2024-01-01 RX ADMIN — MORPHINE SULFATE 2 MG: 2 INJECTION, SOLUTION INTRAMUSCULAR; INTRAVENOUS at 10:24

## 2024-01-01 RX ADMIN — MUPIROCIN 1 APPLICATION: 20 OINTMENT TOPICAL at 09:24

## 2024-01-01 RX ADMIN — INSULIN HUMAN 4 UNITS: 100 INJECTION, SOLUTION PARENTERAL at 23:43

## 2024-01-01 RX ADMIN — PIPERACILLIN AND TAZOBACTAM 4.5 G: 4; .5 INJECTION, POWDER, FOR SOLUTION INTRAVENOUS at 01:44

## 2024-01-01 RX ADMIN — NYSTATIN: 100000 POWDER TOPICAL at 09:02

## 2024-01-01 RX ADMIN — INSULIN HUMAN 4 UNITS: 100 INJECTION, SOLUTION PARENTERAL at 11:46

## 2024-01-01 RX ADMIN — INSULIN HUMAN 2 UNITS: 100 INJECTION, SOLUTION PARENTERAL at 12:23

## 2024-01-01 RX ADMIN — HEPARIN SODIUM 5000 UNITS: 5000 INJECTION INTRAVENOUS; SUBCUTANEOUS at 09:23

## 2024-01-01 RX ADMIN — BUMETANIDE 2 MG: 0.25 INJECTION INTRAMUSCULAR; INTRAVENOUS at 12:19

## 2024-01-01 RX ADMIN — SODIUM CHLORIDE 2000 ML: 9 INJECTION, SOLUTION INTRAVENOUS at 23:36

## 2024-01-01 RX ADMIN — NOREPINEPHRINE BITARTRATE 0.3 MCG/KG/MIN: 1 INJECTION, SOLUTION, CONCENTRATE INTRAVENOUS at 10:59

## 2024-01-01 RX ADMIN — HYDROCORTISONE SODIUM SUCCINATE 100 MG: 100 INJECTION, POWDER, FOR SOLUTION INTRAMUSCULAR; INTRAVENOUS at 06:09

## 2024-01-01 RX ADMIN — VASOPRESSIN IN 0.9% SODIUM CHLORIDE 0.03 UNITS/MIN: 20 INJECTION INTRAVENOUS at 14:45

## 2024-01-01 RX ADMIN — HYDROMORPHONE HYDROCHLORIDE 1 MG: 1 INJECTION, SOLUTION INTRAMUSCULAR; INTRAVENOUS; SUBCUTANEOUS at 16:25

## 2024-01-01 RX ADMIN — PIPERACILLIN AND TAZOBACTAM 4.5 G: 4; .5 INJECTION, POWDER, FOR SOLUTION INTRAVENOUS at 11:56

## 2024-01-01 RX ADMIN — HEPARIN SODIUM 5000 UNITS: 5000 INJECTION INTRAVENOUS; SUBCUTANEOUS at 20:04

## 2024-01-01 RX ADMIN — VASOPRESSIN IN 0.9% SODIUM CHLORIDE 0.03 UNITS/MIN: 20 INJECTION INTRAVENOUS at 01:04

## 2024-01-01 RX ADMIN — MUPIROCIN 1 APPLICATION: 20 OINTMENT TOPICAL at 05:45

## 2024-01-01 RX ADMIN — HYDROCORTISONE SODIUM SUCCINATE 100 MG: 100 INJECTION, POWDER, FOR SOLUTION INTRAMUSCULAR; INTRAVENOUS at 14:45

## 2024-01-01 RX ADMIN — MIDAZOLAM 2 MG: 1 INJECTION INTRAMUSCULAR; INTRAVENOUS at 04:24

## 2024-01-01 RX ADMIN — HEPARIN SODIUM 5000 UNITS: 5000 INJECTION INTRAVENOUS; SUBCUTANEOUS at 09:02

## 2024-01-01 RX ADMIN — NOREPINEPHRINE BITARTRATE 0.3 MCG/KG/MIN: 0.03 INJECTION, SOLUTION INTRAVENOUS at 05:33

## 2024-01-01 RX ADMIN — PIPERACILLIN AND TAZOBACTAM 4.5 G: 4; .5 INJECTION, POWDER, FOR SOLUTION INTRAVENOUS at 23:48

## 2024-01-01 RX ADMIN — SODIUM CHLORIDE 2000 ML: 9 INJECTION, SOLUTION INTRAVENOUS at 00:32

## 2024-01-01 RX ADMIN — IPRATROPIUM BROMIDE AND ALBUTEROL SULFATE 3 ML: 2.5; .5 SOLUTION RESPIRATORY (INHALATION) at 08:03

## 2024-01-01 RX ADMIN — SULFUR HEXAFLUORIDE 2 ML: KIT at 17:36

## 2024-01-01 RX ADMIN — LORAZEPAM 2 MG: 2 INJECTION INTRAMUSCULAR; INTRAVENOUS at 16:22

## 2024-01-01 RX ADMIN — SODIUM BICARBONATE 150 MEQ: 84 INJECTION, SOLUTION INTRAVENOUS at 05:06

## 2024-01-01 RX ADMIN — IPRATROPIUM BROMIDE AND ALBUTEROL SULFATE 3 ML: 2.5; .5 SOLUTION RESPIRATORY (INHALATION) at 12:28

## 2024-01-01 RX ADMIN — PIPERACILLIN AND TAZOBACTAM 4.5 G: 4; .5 INJECTION, POWDER, FOR SOLUTION INTRAVENOUS at 11:46

## 2024-01-01 RX ADMIN — THIAMINE HYDROCHLORIDE 500 MG: 100 INJECTION, SOLUTION INTRAMUSCULAR; INTRAVENOUS at 05:21

## 2024-01-01 RX ADMIN — Medication 10 ML: at 09:23

## 2024-01-01 RX ADMIN — PANTOPRAZOLE SODIUM 40 MG: 40 INJECTION, POWDER, FOR SOLUTION INTRAVENOUS at 05:44

## 2024-01-01 RX ADMIN — LORAZEPAM 2 MG: 2 INJECTION INTRAMUSCULAR; INTRAVENOUS at 13:46

## 2024-01-01 RX ADMIN — VASOPRESSIN IN 0.9% SODIUM CHLORIDE 0.03 UNITS/MIN: 20 INJECTION INTRAVENOUS at 10:26

## 2024-01-01 RX ADMIN — HALOPERIDOL LACTATE 2 MG: 5 INJECTION, SOLUTION INTRAMUSCULAR at 02:15

## 2024-01-01 RX ADMIN — LORAZEPAM 2 MG: 2 INJECTION INTRAMUSCULAR; INTRAVENOUS at 17:21

## 2024-01-01 RX ADMIN — NOREPINEPHRINE BITARTRATE 0.25 MCG/KG/MIN: 1 INJECTION, SOLUTION, CONCENTRATE INTRAVENOUS at 09:02

## 2024-01-01 RX ADMIN — PANTOPRAZOLE SODIUM 40 MG: 40 INJECTION, POWDER, FOR SOLUTION INTRAVENOUS at 05:21

## 2024-01-01 RX ADMIN — Medication 10 ML: at 09:27

## 2024-01-01 RX ADMIN — HYDROMORPHONE HYDROCHLORIDE 0.5 MG: 1 INJECTION, SOLUTION INTRAMUSCULAR; INTRAVENOUS; SUBCUTANEOUS at 11:49

## 2024-01-01 RX ADMIN — HYDROCORTISONE SODIUM SUCCINATE 100 MG: 100 INJECTION, POWDER, FOR SOLUTION INTRAMUSCULAR; INTRAVENOUS at 13:38

## 2024-01-01 RX ADMIN — HYDROCORTISONE SODIUM SUCCINATE 100 MG: 100 INJECTION, POWDER, FOR SOLUTION INTRAMUSCULAR; INTRAVENOUS at 05:21

## 2024-01-01 RX ADMIN — VASOPRESSIN IN 0.9% SODIUM CHLORIDE 0.03 UNITS/MIN: 20 INJECTION INTRAVENOUS at 04:47

## 2024-01-01 RX ADMIN — GLYCOPYRROLATE 0.4 MG: 0.2 INJECTION INTRAMUSCULAR; INTRAVENOUS at 17:32

## 2024-01-01 RX ADMIN — THIAMINE HYDROCHLORIDE 500 MG: 100 INJECTION, SOLUTION INTRAMUSCULAR; INTRAVENOUS at 20:05

## 2024-01-01 RX ADMIN — NOREPINEPHRINE BITARTRATE 0.3 MCG/KG/MIN: 1 INJECTION, SOLUTION, CONCENTRATE INTRAVENOUS at 15:25

## 2024-01-01 RX ADMIN — Medication 10 ML: at 20:04

## 2024-01-01 RX ADMIN — ALBUMIN (HUMAN) 25 G: 0.25 INJECTION, SOLUTION INTRAVENOUS at 06:46

## 2024-01-01 RX ADMIN — MUPIROCIN 1 APPLICATION: 20 OINTMENT TOPICAL at 20:04

## 2024-01-01 RX ADMIN — THIAMINE HYDROCHLORIDE 500 MG: 100 INJECTION, SOLUTION INTRAMUSCULAR; INTRAVENOUS at 05:20

## 2024-01-01 RX ADMIN — INSULIN HUMAN 4 UNITS: 100 INJECTION, SOLUTION PARENTERAL at 17:37

## 2024-01-01 RX ADMIN — THIAMINE HYDROCHLORIDE 500 MG: 100 INJECTION, SOLUTION INTRAMUSCULAR; INTRAVENOUS at 11:00

## 2024-01-05 DIAGNOSIS — G89.29 CHRONIC RIGHT-SIDED LOW BACK PAIN WITH RIGHT-SIDED SCIATICA: ICD-10-CM

## 2024-01-05 DIAGNOSIS — M54.41 CHRONIC RIGHT-SIDED LOW BACK PAIN WITH RIGHT-SIDED SCIATICA: ICD-10-CM

## 2024-01-05 DIAGNOSIS — M47.812 CERVICAL SPINE ARTHRITIS: ICD-10-CM

## 2024-01-05 RX ORDER — HYDROCODONE BITARTRATE AND ACETAMINOPHEN 10; 325 MG/1; MG/1
1 TABLET ORAL EVERY 8 HOURS PRN
Qty: 90 TABLET | Refills: 0 | Status: SHIPPED | OUTPATIENT
Start: 2024-01-05 | End: 2024-01-08 | Stop reason: SDUPTHER

## 2024-01-05 NOTE — TELEPHONE ENCOUNTER
Caller: ELIEL COLEMAN    Relationship: Emergency Contact    Best call back number: [phone]     Requested Prescriptions:   Requested Prescriptions     Pending Prescriptions Disp Refills    HYDROcodone-acetaminophen (NORCO)  MG per tablet 90 tablet 0     Sig: Take 1 tablet by mouth Every 8 (Eight) Hours As Needed for Severe Pain.        Pharmacy where request should be sent: [place] [address] [address] - [phone]  - [phone] FX     Last office visit with prescribing clinician: 12/4/2023   Last telemedicine visit with prescribing clinician: Visit date not found   Next office visit with prescribing clinician: Visit date not found     Additional details provided by patient: DAUGHTER CALLED IN REQUESTING LISTED PRESCRIPTION FOR A REFILL DAUGHTER IS NOT ON VERBAL RELEASE    Does the patient have less than a 3 day supply:  [x] Yes  [] No    Would you like a call back once the refill request has been completed: [x] Yes [] No    If the office needs to give you a call back, can they leave a voicemail: [x] Yes [] No    Olu Jones Rep   01/05/24 12:01 EST

## 2024-01-08 ENCOUNTER — TELEPHONE (OUTPATIENT)
Dept: INTERNAL MEDICINE | Facility: CLINIC | Age: 74
End: 2024-01-08

## 2024-01-08 DIAGNOSIS — G89.29 CHRONIC RIGHT-SIDED LOW BACK PAIN WITH RIGHT-SIDED SCIATICA: ICD-10-CM

## 2024-01-08 DIAGNOSIS — M54.41 CHRONIC RIGHT-SIDED LOW BACK PAIN WITH RIGHT-SIDED SCIATICA: ICD-10-CM

## 2024-01-08 DIAGNOSIS — M47.812 CERVICAL SPINE ARTHRITIS: ICD-10-CM

## 2024-01-08 RX ORDER — HYDROCODONE BITARTRATE AND ACETAMINOPHEN 10; 325 MG/1; MG/1
1 TABLET ORAL EVERY 8 HOURS PRN
Qty: 45 TABLET | Refills: 0 | Status: SHIPPED | OUTPATIENT
Start: 2024-01-08

## 2024-01-08 NOTE — TELEPHONE ENCOUNTER
Caller: ELIEL COLEMAN    Relationship: Emergency Contact    Best call back number: [phone]    What is the best time to reach you: ANYTIME     Who are you requesting to speak with (clinical staff, provider,  specific staff member): CLINICAL STAFF    What was the call regarding: PATIENT'S DAUGHTER IS CALLING TO ASK FOR A TWO WEEK SUPPLY OF HYDROCODONE BE SENT TO THE PHARMACY. SHE SAYS THAT THEY DO NOT HAVE ENOUGH TO FILL THE PRESCRIPTION THAT WAS SENT. ELIEL HAS CALLED AROUND TO OTHER PLACES AND THEY ARE ALSO SHORT. THE PHARMACY CAN HOLD THE MEDICATION FOR THEM, BUT ONLY FOR SO LONG. SHE WOULD LIKE TO HAVE THIS BE CALLED IN AS SOON AS POSSIBLE.     Is it okay if the provider responds through [facility]: NO

## 2024-01-19 DIAGNOSIS — M54.41 CHRONIC RIGHT-SIDED LOW BACK PAIN WITH RIGHT-SIDED SCIATICA: ICD-10-CM

## 2024-01-19 DIAGNOSIS — M47.812 CERVICAL SPINE ARTHRITIS: ICD-10-CM

## 2024-01-19 DIAGNOSIS — G89.29 CHRONIC RIGHT-SIDED LOW BACK PAIN WITH RIGHT-SIDED SCIATICA: ICD-10-CM

## 2024-01-19 NOTE — TELEPHONE ENCOUNTER
Caller: ELIEL COLEMAN    Relationship: Emergency Contact    Best call back number: [phone]     Requested Prescriptions:   Requested Prescriptions     Pending Prescriptions Disp Refills    HYDROcodone-acetaminophen (NORCO)  MG per tablet 45 tablet 0     Sig: Take 1 tablet by mouth Every 8 (Eight) Hours As Needed for Severe Pain.        Pharmacy where request should be sent: [id number] [address]  AT [facility] [address] - [phone]  - [phone] FX     Last office visit with prescribing clinician: 12/4/2023   Last telemedicine visit with prescribing clinician: Visit date not found   Next office visit with prescribing clinician: Visit date not found     Additional details provided by patient:     Does the patient have less than a 3 day supply:  [] Yes  [x] No    Would you like a call back once the refill request has been completed: [] Yes [x] No    If the office needs to give you a call back, can they leave a voicemail: [] Yes [x] No    Olu Diaz Rep   01/19/24 15:41 EST

## 2024-01-22 RX ORDER — HYDROCODONE BITARTRATE AND ACETAMINOPHEN 10; 325 MG/1; MG/1
1 TABLET ORAL EVERY 8 HOURS PRN
Qty: 90 TABLET | Refills: 0 | Status: SHIPPED | OUTPATIENT
Start: 2024-01-22

## 2024-04-01 DIAGNOSIS — M54.41 CHRONIC RIGHT-SIDED LOW BACK PAIN WITH RIGHT-SIDED SCIATICA: ICD-10-CM

## 2024-04-01 DIAGNOSIS — G89.29 CHRONIC RIGHT-SIDED LOW BACK PAIN WITH RIGHT-SIDED SCIATICA: ICD-10-CM

## 2024-04-01 DIAGNOSIS — M47.812 CERVICAL SPINE ARTHRITIS: ICD-10-CM

## 2024-04-01 NOTE — TELEPHONE ENCOUNTER
Caller: ELIEL COLEMAN    Relationship: Emergency Contact    Best call back number: [phone]     Requested Prescriptions:   Requested Prescriptions     Pending Prescriptions Disp Refills    HYDROcodone-acetaminophen (NORCO)  MG per tablet 90 tablet 0     Sig: Take 1 tablet by mouth Every 8 (Eight) Hours As Needed for Severe Pain.        Pharmacy where request should be sent: [facility] #[id number] [facility] [address] AT [facility] [address] - [phone]  - [phone] FX     Last office visit with prescribing clinician: 12/4/2023   Last telemedicine visit with prescribing clinician: Visit date not found   Next office visit with prescribing clinician: Visit date not found     Additional details provided by patient: ELIEL STATES THE PATIENT HAS SWITCHED INSURANCE AND IS BACK IN NETWORK TO SEE EUNICE VYAS.    Does the patient have less than a 3 day supply:  [x] Yes  [] No    PLEASE CALL BACK, IF NO ANSWER LEAVE A DETAILED MESSAGE.    Olu Tran Rep   04/01/24 14:02 EDT

## 2024-04-02 RX ORDER — HYDROCODONE BITARTRATE AND ACETAMINOPHEN 10; 325 MG/1; MG/1
1 TABLET ORAL EVERY 8 HOURS PRN
Qty: 90 TABLET | Refills: 0 | Status: SHIPPED | OUTPATIENT
Start: 2024-04-02

## 2024-05-07 DIAGNOSIS — G89.29 CHRONIC RIGHT-SIDED LOW BACK PAIN WITH RIGHT-SIDED SCIATICA: ICD-10-CM

## 2024-05-07 DIAGNOSIS — M54.41 CHRONIC RIGHT-SIDED LOW BACK PAIN WITH RIGHT-SIDED SCIATICA: ICD-10-CM

## 2024-05-07 DIAGNOSIS — M47.812 CERVICAL SPINE ARTHRITIS: ICD-10-CM

## 2024-05-07 RX ORDER — HYDROCODONE BITARTRATE AND ACETAMINOPHEN 10; 325 MG/1; MG/1
1 TABLET ORAL EVERY 8 HOURS PRN
Qty: 90 TABLET | Refills: 0 | Status: SHIPPED | OUTPATIENT
Start: 2024-05-07

## 2024-06-03 DIAGNOSIS — E11.43 TYPE 2 DIABETES MELLITUS WITH DIABETIC AUTONOMIC NEUROPATHY, WITHOUT LONG-TERM CURRENT USE OF INSULIN: ICD-10-CM

## 2024-06-03 DIAGNOSIS — N40.0 BENIGN PROSTATIC HYPERPLASIA WITHOUT LOWER URINARY TRACT SYMPTOMS: ICD-10-CM

## 2024-06-03 RX ORDER — FINASTERIDE 5 MG/1
5 TABLET, FILM COATED ORAL DAILY
Qty: 30 TABLET | Refills: 5 | Status: SHIPPED | OUTPATIENT
Start: 2024-06-03

## 2024-06-03 RX ORDER — TAMSULOSIN HYDROCHLORIDE 0.4 MG/1
1 CAPSULE ORAL DAILY
Qty: 30 CAPSULE | Refills: 5 | Status: SHIPPED | OUTPATIENT
Start: 2024-06-03

## 2024-06-07 DIAGNOSIS — M47.812 CERVICAL SPINE ARTHRITIS: ICD-10-CM

## 2024-06-07 DIAGNOSIS — M54.41 CHRONIC RIGHT-SIDED LOW BACK PAIN WITH RIGHT-SIDED SCIATICA: ICD-10-CM

## 2024-06-07 DIAGNOSIS — G89.29 CHRONIC RIGHT-SIDED LOW BACK PAIN WITH RIGHT-SIDED SCIATICA: ICD-10-CM

## 2024-06-07 NOTE — TELEPHONE ENCOUNTER
Caller: ELIEL COLEMAN    Relationship: Emergency Contact    Best call back number: [phone]     Requested Prescriptions:   Requested Prescriptions     Pending Prescriptions Disp Refills    HYDROcodone-acetaminophen (NORCO)  MG per tablet 90 tablet 0     Sig: Take 1 tablet by mouth Every 8 (Eight) Hours As Needed for Severe Pain.        Pharmacy where request should be sent: [place] [address] [address] - [phone]  - [phone] FX     Last office visit with prescribing clinician: 12/4/2023   Last telemedicine visit with prescribing clinician: Visit date not found   Next office visit with prescribing clinician: Visit date not found     Additional details provided by patient: OUT OF MEDICATION     Does the patient have less than a 3 day supply:  [x] Yes  [] No    Would you like a call back once the refill request has been completed: [] Yes [x] No    If the office needs to give you a call back, can they leave a voicemail: [] Yes [x] No    Olu Cruz   06/07/24 12:31 EDT

## 2024-06-10 RX ORDER — HYDROCODONE BITARTRATE AND ACETAMINOPHEN 10; 325 MG/1; MG/1
1 TABLET ORAL EVERY 8 HOURS PRN
Qty: 90 TABLET | Refills: 0 | Status: SHIPPED | OUTPATIENT
Start: 2024-06-10

## 2024-07-01 DIAGNOSIS — R11.0 NAUSEA: ICD-10-CM

## 2024-07-01 DIAGNOSIS — E55.9 VITAMIN D DEFICIENCY: ICD-10-CM

## 2024-07-01 DIAGNOSIS — K21.9 GASTROESOPHAGEAL REFLUX DISEASE WITHOUT ESOPHAGITIS: ICD-10-CM

## 2024-07-01 DIAGNOSIS — I25.10 CORONARY ARTERY DISEASE INVOLVING NATIVE CORONARY ARTERY OF NATIVE HEART WITHOUT ANGINA PECTORIS: ICD-10-CM

## 2024-07-01 RX ORDER — ERGOCALCIFEROL 1.25 MG/1
CAPSULE ORAL
Qty: 30 CAPSULE | Refills: 5 | Status: SHIPPED | OUTPATIENT
Start: 2024-07-01

## 2024-07-01 RX ORDER — FAMOTIDINE 20 MG/1
TABLET, FILM COATED ORAL
Qty: 30 TABLET | Refills: 5 | Status: SHIPPED | OUTPATIENT
Start: 2024-07-01

## 2024-07-01 RX ORDER — SIMVASTATIN 20 MG
TABLET ORAL
Qty: 90 TABLET | Refills: 5 | Status: SHIPPED | OUTPATIENT
Start: 2024-07-01

## 2024-07-01 RX ORDER — METOPROLOL SUCCINATE 50 MG/1
50 TABLET, EXTENDED RELEASE ORAL DAILY
Qty: 30 TABLET | Refills: 5 | Status: SHIPPED | OUTPATIENT
Start: 2024-07-01

## 2024-07-01 RX ORDER — PROMETHAZINE HYDROCHLORIDE 25 MG/1
TABLET ORAL
Qty: 20 TABLET | Refills: 5 | Status: SHIPPED | OUTPATIENT
Start: 2024-07-01

## 2024-07-10 ENCOUNTER — PATIENT OUTREACH (OUTPATIENT)
Dept: OTHER | Facility: HOSPITAL | Age: 74
End: 2024-07-10
Payer: MEDICARE

## 2024-07-10 DIAGNOSIS — M54.41 CHRONIC RIGHT-SIDED LOW BACK PAIN WITH RIGHT-SIDED SCIATICA: ICD-10-CM

## 2024-07-10 DIAGNOSIS — M47.812 CERVICAL SPINE ARTHRITIS: ICD-10-CM

## 2024-07-10 DIAGNOSIS — G89.29 CHRONIC RIGHT-SIDED LOW BACK PAIN WITH RIGHT-SIDED SCIATICA: ICD-10-CM

## 2024-07-10 RX ORDER — HYDROCODONE BITARTRATE AND ACETAMINOPHEN 10; 325 MG/1; MG/1
1 TABLET ORAL EVERY 8 HOURS PRN
Qty: 90 TABLET | Refills: 0 | OUTPATIENT
Start: 2024-07-10

## 2024-07-10 NOTE — TELEPHONE ENCOUNTER
Caller: ELIEL COLEMAN    Relationship: Emergency Contact    Best call back number: [phone]    Requested Prescriptions:   Requested Prescriptions     Pending Prescriptions Disp Refills    HYDROcodone-acetaminophen (NORCO)  MG per tablet 90 tablet 0     Sig: Take 1 tablet by mouth Every 8 (Eight) Hours As Needed for Severe Pain.        Pharmacy where request should be sent:    [facility] [phone]  Last office visit with prescribing clinician: 12/4/2023   Last telemedicine visit with prescribing clinician: Visit date not found   Next office visit with prescribing clinician: Visit date not found     Additional details provided by patient: PATIENT IS OUT OF MEDICATION    Does the patient have less than a 3 day supply:  [x] Yes  [] No    Would you like a call back once the refill request has been completed: [x] Yes [] No    If the office needs to give you a call back, can they leave a voicemail: [x] Yes [] No    Olu Shannon Rep   07/10/24 14:18 EDT

## 2024-07-12 ENCOUNTER — TELEPHONE (OUTPATIENT)
Dept: INTERNAL MEDICINE | Facility: CLINIC | Age: 74
End: 2024-07-12
Payer: MEDICARE

## 2024-07-12 NOTE — TELEPHONE ENCOUNTER
Milana Mr. Christiansen is needing a refill on his norco. [facility] pharmacy, [phone]

## 2024-07-15 ENCOUNTER — OFFICE VISIT (OUTPATIENT)
Dept: INTERNAL MEDICINE | Facility: CLINIC | Age: 74
End: 2024-07-15
Payer: MEDICARE

## 2024-07-15 VITALS
OXYGEN SATURATION: 96 % | TEMPERATURE: 97.9 F | DIASTOLIC BLOOD PRESSURE: 70 MMHG | SYSTOLIC BLOOD PRESSURE: 122 MMHG | HEIGHT: 69 IN | WEIGHT: 315 LBS | HEART RATE: 63 BPM | BODY MASS INDEX: 46.65 KG/M2

## 2024-07-15 DIAGNOSIS — M54.41 CHRONIC RIGHT-SIDED LOW BACK PAIN WITH RIGHT-SIDED SCIATICA: Primary | ICD-10-CM

## 2024-07-15 DIAGNOSIS — E11.65 TYPE 2 DIABETES MELLITUS WITH HYPERGLYCEMIA, WITHOUT LONG-TERM CURRENT USE OF INSULIN: ICD-10-CM

## 2024-07-15 DIAGNOSIS — L98.9 SKIN LESION: ICD-10-CM

## 2024-07-15 DIAGNOSIS — M47.812 CERVICAL SPINE ARTHRITIS: ICD-10-CM

## 2024-07-15 DIAGNOSIS — I25.10 CORONARY ARTERY DISEASE INVOLVING NATIVE CORONARY ARTERY OF NATIVE HEART WITHOUT ANGINA PECTORIS: ICD-10-CM

## 2024-07-15 DIAGNOSIS — E53.8 VITAMIN B12 DEFICIENCY: ICD-10-CM

## 2024-07-15 DIAGNOSIS — H26.8 OTHER CATARACT OF BOTH EYES: ICD-10-CM

## 2024-07-15 DIAGNOSIS — G47.33 OBSTRUCTIVE SLEEP APNEA SYNDROME: ICD-10-CM

## 2024-07-15 DIAGNOSIS — R11.0 NAUSEA: ICD-10-CM

## 2024-07-15 DIAGNOSIS — B37.2 CANDIDAL SKIN INFECTION: ICD-10-CM

## 2024-07-15 DIAGNOSIS — J30.89 SEASONAL ALLERGIC RHINITIS DUE TO OTHER ALLERGIC TRIGGER: ICD-10-CM

## 2024-07-15 DIAGNOSIS — N40.0 BENIGN PROSTATIC HYPERPLASIA WITHOUT LOWER URINARY TRACT SYMPTOMS: ICD-10-CM

## 2024-07-15 DIAGNOSIS — J44.9 CHRONIC OBSTRUCTIVE PULMONARY DISEASE, UNSPECIFIED COPD TYPE: ICD-10-CM

## 2024-07-15 DIAGNOSIS — E55.9 VITAMIN D DEFICIENCY: ICD-10-CM

## 2024-07-15 DIAGNOSIS — F33.0 MAJOR DEPRESSIVE DISORDER, RECURRENT, MILD: ICD-10-CM

## 2024-07-15 DIAGNOSIS — K21.9 GASTROESOPHAGEAL REFLUX DISEASE WITHOUT ESOPHAGITIS: ICD-10-CM

## 2024-07-15 DIAGNOSIS — R01.1 HEART MURMUR: ICD-10-CM

## 2024-07-15 DIAGNOSIS — G89.29 CHRONIC RIGHT-SIDED LOW BACK PAIN WITH RIGHT-SIDED SCIATICA: Primary | ICD-10-CM

## 2024-07-15 DIAGNOSIS — R60.0 PEDAL EDEMA: ICD-10-CM

## 2024-07-15 DIAGNOSIS — I10 ESSENTIAL HYPERTENSION: ICD-10-CM

## 2024-07-15 LAB
EXPIRATION DATE: ABNORMAL
HBA1C MFR BLD: 7.3 % (ref 4.5–5.7)
Lab: ABNORMAL

## 2024-07-15 PROCEDURE — 1160F RVW MEDS BY RX/DR IN RCRD: CPT | Performed by: NURSE PRACTITIONER

## 2024-07-15 PROCEDURE — G2211 COMPLEX E/M VISIT ADD ON: HCPCS | Performed by: NURSE PRACTITIONER

## 2024-07-15 PROCEDURE — 99214 OFFICE O/P EST MOD 30 MIN: CPT | Performed by: NURSE PRACTITIONER

## 2024-07-15 PROCEDURE — 3074F SYST BP LT 130 MM HG: CPT | Performed by: NURSE PRACTITIONER

## 2024-07-15 PROCEDURE — 3078F DIAST BP <80 MM HG: CPT | Performed by: NURSE PRACTITIONER

## 2024-07-15 PROCEDURE — 83036 HEMOGLOBIN GLYCOSYLATED A1C: CPT | Performed by: NURSE PRACTITIONER

## 2024-07-15 PROCEDURE — 3051F HG A1C>EQUAL 7.0%<8.0%: CPT | Performed by: NURSE PRACTITIONER

## 2024-07-15 PROCEDURE — 1159F MED LIST DOCD IN RCRD: CPT | Performed by: NURSE PRACTITIONER

## 2024-07-15 RX ORDER — SIMVASTATIN 20 MG
20 TABLET ORAL
Qty: 90 TABLET | Refills: 4 | Status: SHIPPED | OUTPATIENT
Start: 2024-07-15

## 2024-07-15 RX ORDER — PROMETHAZINE HYDROCHLORIDE 25 MG/1
12.5 TABLET ORAL EVERY 8 HOURS PRN
Qty: 20 TABLET | Refills: 5 | Status: SHIPPED | OUTPATIENT
Start: 2024-07-15

## 2024-07-15 RX ORDER — PANTOPRAZOLE SODIUM 40 MG/1
40 TABLET, DELAYED RELEASE ORAL DAILY
Qty: 90 TABLET | Refills: 4 | Status: SHIPPED | OUTPATIENT
Start: 2024-07-15

## 2024-07-15 RX ORDER — HYDROCODONE BITARTRATE AND ACETAMINOPHEN 10; 325 MG/1; MG/1
1 TABLET ORAL EVERY 8 HOURS PRN
Qty: 90 TABLET | Refills: 0 | Status: SHIPPED | OUTPATIENT
Start: 2024-07-15

## 2024-07-15 RX ORDER — IPRATROPIUM BROMIDE AND ALBUTEROL 20; 100 UG/1; UG/1
1 SPRAY, METERED RESPIRATORY (INHALATION) 3 TIMES DAILY
Qty: 12 G | Refills: 4 | Status: SHIPPED | OUTPATIENT
Start: 2024-07-15

## 2024-07-15 RX ORDER — ERGOCALCIFEROL 1.25 MG/1
50000 CAPSULE ORAL
Qty: 12 CAPSULE | Refills: 4 | Status: SHIPPED | OUTPATIENT
Start: 2024-07-15

## 2024-07-15 RX ORDER — CYANOCOBALAMIN 1000 UG/ML
1000 INJECTION, SOLUTION INTRAMUSCULAR; SUBCUTANEOUS
Qty: 10 ML | Refills: 4 | Status: SHIPPED | OUTPATIENT
Start: 2024-07-15

## 2024-07-15 RX ORDER — FINASTERIDE 5 MG/1
5 TABLET, FILM COATED ORAL DAILY
Qty: 90 TABLET | Refills: 4 | Status: SHIPPED | OUTPATIENT
Start: 2024-07-15

## 2024-07-15 RX ORDER — FLUOXETINE HYDROCHLORIDE 20 MG/1
20 CAPSULE ORAL DAILY
Qty: 90 CAPSULE | Refills: 4 | Status: SHIPPED | OUTPATIENT
Start: 2024-07-15

## 2024-07-15 RX ORDER — ALBUTEROL SULFATE 2.5 MG/3ML
2.5 SOLUTION RESPIRATORY (INHALATION) EVERY 6 HOURS PRN
Qty: 270 ML | Refills: 11 | Status: SHIPPED | OUTPATIENT
Start: 2024-07-15

## 2024-07-15 RX ORDER — NYSTATIN 100000 [USP'U]/G
POWDER TOPICAL 3 TIMES DAILY
Qty: 60 G | Refills: 5 | Status: SHIPPED | OUTPATIENT
Start: 2024-07-15

## 2024-07-15 RX ORDER — METOPROLOL SUCCINATE 50 MG/1
50 TABLET, EXTENDED RELEASE ORAL DAILY
Qty: 90 TABLET | Refills: 4 | Status: SHIPPED | OUTPATIENT
Start: 2024-07-15

## 2024-07-15 RX ORDER — NYSTATIN AND TRIAMCINOLONE ACETONIDE 100000; 1 [USP'U]/G; MG/G
1 OINTMENT TOPICAL 2 TIMES DAILY
Qty: 60 G | Refills: 5 | Status: SHIPPED | OUTPATIENT
Start: 2024-07-15

## 2024-07-15 RX ORDER — CETIRIZINE HYDROCHLORIDE 10 MG/1
10 TABLET ORAL DAILY
Qty: 90 TABLET | Refills: 4 | Status: SHIPPED | OUTPATIENT
Start: 2024-07-15

## 2024-07-15 RX ORDER — LISINOPRIL 10 MG/1
10 TABLET ORAL DAILY
Qty: 90 TABLET | Refills: 4 | Status: SHIPPED | OUTPATIENT
Start: 2024-07-15

## 2024-07-15 RX ORDER — FAMOTIDINE 20 MG/1
20 TABLET, FILM COATED ORAL DAILY
Qty: 90 TABLET | Refills: 4 | Status: SHIPPED | OUTPATIENT
Start: 2024-07-15

## 2024-07-15 RX ORDER — TAMSULOSIN HYDROCHLORIDE 0.4 MG/1
1 CAPSULE ORAL DAILY
Qty: 90 CAPSULE | Refills: 4 | Status: SHIPPED | OUTPATIENT
Start: 2024-07-15

## 2024-07-15 RX ORDER — FUROSEMIDE 40 MG/1
40 TABLET ORAL
Qty: 30 TABLET | Refills: 11 | Status: SHIPPED | OUTPATIENT
Start: 2024-07-15

## 2024-07-15 NOTE — PROGRESS NOTES
"Chief Complaint   Patient presents with    Diabetes    Follow-up       HPI  Dieter Christiansen is a 74 y.o. male presents for follow-up on diabetes, COPD, and chronic pain.  He has stopped smoking but hits the vape every now and again.  [facility].  Needs bilateral cataract surgery.  Needing all his medicine refilled.  Has been out for about a month or so.  Hasn't been very active due to his cataracts.  He states he ambulates around the house but needs a wheelchair when he goes out.    The following portions of the patient's history were reviewed and updated as appropriate: allergies, current medications, past family history, past medical history, past social history, past surgical history, and problem list.    Subjective  Review of Systems   Constitutional:  Positive for fatigue. Negative for activity change and appetite change.   HENT:  Negative for congestion.    Eyes:  Positive for blurred vision.   Respiratory:  Positive for cough and shortness of breath.    Cardiovascular:  Negative for chest pain and leg swelling.   Gastrointestinal:  Negative for abdominal pain.   Neurological:  Positive for weakness. Negative for dizziness and confusion.   Psychiatric/Behavioral:  Negative for behavioral problems and decreased concentration.        Objective  Visit Vitals  /70 (BP Location: Left arm, Patient Position: Sitting)   Pulse 63   Temp 97.9 °F (36.6 °C)   Ht 175.3 cm (69\")   Wt (!) 156 kg (344 lb)   SpO2 96%   BMI 50.80 kg/m²        Physical Exam  Vitals and nursing note reviewed.   Constitutional:       Appearance: He is morbidly obese.   HENT:      Head: Normocephalic.   Eyes:      Pupils: Pupils are equal, round, and reactive to light.   Cardiovascular:      Rate and Rhythm: Normal rate and regular rhythm.      Pulses: Normal pulses.      Heart sounds: Normal heart sounds.   Pulmonary:      Effort: Pulmonary effort is normal. No respiratory distress.      Breath sounds: Decreased " breath sounds present.   Musculoskeletal:      Comments: Sitting in a wheelchair   Skin:     General: Skin is warm and dry.      Capillary Refill: Capillary refill takes less than 2 seconds.          Neurological:      General: No focal deficit present.      Mental Status: He is alert and oriented to person, place, and time.      Gait: Gait is intact.   Psychiatric:         Attention and Perception: Attention normal.         Mood and Affect: Mood normal.         Behavior: Behavior normal.        Procedures     Class 3 Severe Obesity (BMI >=40). Obesity-related health conditions include the following: hypertension and coronary heart disease. Obesity is unchanged. BMI is is above average; BMI management plan is completed. We discussed portion control and increasing exercise.             Results for orders placed or performed in visit on 07/15/24   POC Glycosylated Hemoglobin (Hb A1C)    Specimen: Blood   Result Value Ref Range    Hemoglobin A1C 7.3 (A) 4.5 - 5.7 %    Lot Number [id number]     Expiration Date 2/25/2026               Assessment and Plan  Diagnoses and all orders for this visit:    1. Chronic right-sided low back pain with right-sided sciatica (Primary)  -     HYDROcodone-acetaminophen (NORCO)  MG per tablet; Take 1 tablet by mouth Every 8 (Eight) Hours As Needed for Severe Pain.  Dispense: 90 tablet; Refill: 0    2. Type 2 diabetes mellitus with hyperglycemia, without long-term current use of insulin  -     POC Glycosylated Hemoglobin (Hb A1C)  -     metFORMIN (GLUCOPHAGE) 500 MG tablet; Take 1 tablet by mouth Daily With Breakfast.  Dispense: 90 tablet; Refill: 4    3. Other cataract of both eyes    4. Skin lesion  -     Ambulatory Referral to Dermatology    5. Obstructive sleep apnea syndrome    6. Seasonal allergic rhinitis due to other allergic trigger  -     cetirizine (zyrTEC) 10 MG tablet; Take 1 tablet by mouth Daily.  Dispense: 90 tablet; Refill: 4    7. Vitamin B12 deficiency  -      cyanocobalamin 1000 MCG/ML injection; Inject 1 mL into the appropriate muscle as directed by prescriber Every 28 (Twenty-Eight) Days.  Dispense: 10 mL; Refill: 4    8. Gastroesophageal reflux disease without esophagitis  -     famotidine (PEPCID) 20 MG tablet; Take 1 tablet by mouth Daily.  Dispense: 90 tablet; Refill: 4  -     pantoprazole (PROTONIX) 40 MG EC tablet; Take 1 tablet by mouth Daily.  Dispense: 90 tablet; Refill: 4    9. Benign prostatic hyperplasia without lower urinary tract symptoms  -     finasteride (PROSCAR) 5 MG tablet; Take 1 tablet by mouth Daily.  Dispense: 90 tablet; Refill: 4  -     tamsulosin (FLOMAX) 0.4 MG capsule 24 hr capsule; Take 1 capsule by mouth Daily.  Dispense: 90 capsule; Refill: 4    10. Major depressive disorder, recurrent, mild  -     FLUoxetine (PROzac) 20 MG capsule; Take 1 capsule by mouth Daily.  Dispense: 90 capsule; Refill: 4    11. Pedal edema  -     furosemide (LASIX) 40 MG tablet; Take 1 tablet by mouth Every 72 (Seventy-Two) Hours.  Dispense: 30 tablet; Refill: 11    12. Cervical spine arthritis  -     HYDROcodone-acetaminophen (NORCO)  MG per tablet; Take 1 tablet by mouth Every 8 (Eight) Hours As Needed for Severe Pain.  Dispense: 90 tablet; Refill: 0    13. Chronic obstructive pulmonary disease, unspecified COPD type  -     albuterol (PROVENTIL) (2.5 MG/3ML) 0.083% nebulizer solution; Take 2.5 mg by nebulization Every 6 (Six) Hours As Needed for Wheezing.  Dispense: 270 mL; Refill: 11  -     ipratropium-albuterol (Combivent Respimat)  MCG/ACT inhaler; Inhale 1 puff 3 (Three) Times a Day.  Dispense: 12 g; Refill: 4    14. Essential hypertension  -     lisinopril (PRINIVIL,ZESTRIL) 10 MG tablet; Take 1 tablet by mouth Daily.  Dispense: 90 tablet; Refill: 4    15. Coronary artery disease involving native coronary artery of native heart without angina pectoris  -     metoprolol succinate XL (TOPROL-XL) 50 MG 24 hr tablet; Take 1 tablet by mouth Daily.   Dispense: 90 tablet; Refill: 4  -     simvastatin (ZOCOR) 20 MG tablet; Take 1 tablet by mouth every night at bedtime.  Dispense: 90 tablet; Refill: 4    16. Candidal skin infection  -     nystatin (MYCOSTATIN) 119121 UNIT/GM powder; Apply  topically to the appropriate area as directed 3 (Three) Times a Day.  Dispense: 60 g; Refill: 5  -     nystatin-triamcinolone (MYCOLOG) 662199-9.1 UNIT/GM-% ointment; Apply 1 Application topically to the appropriate area as directed 2 (Two) Times a Day.  Dispense: 60 g; Refill: 5    17. Vitamin D deficiency  -     vitamin D (ERGOCALCIFEROL) 1.25 MG (63832 UT) capsule capsule; Take 1 capsule by mouth Every 7 (Seven) Days.  Dispense: 12 capsule; Refill: 4    18. Nausea  -     promethazine (PHENERGAN) 25 MG tablet; Take 0.5 tablets by mouth Every 8 (Eight) Hours As Needed for Nausea or Vomiting.  Dispense: 20 tablet; Refill: 5    19. Heart murmur  -     Adult Transthoracic Echo Complete W/ Cont if Necessary Per Protocol; Future    Other orders  -     Cancel: Compliance Drug Analysis, Ur - Urine, Clean Catch; Future      Needs ECHO - caregiver requests this be scheduled a few months ago  A1c has increased to 7.3 - has been out of Metformin, refills sent  Depression stable on Prozac  Non-compliant with Cpap  HTN stable on Lisinopril   Hunter appropriate  Hydrocodone working well for arthritis  Refer to derm for lesion biopsy        Ian Sanchez, APRN

## 2024-07-24 DIAGNOSIS — R91.1 RIGHT MIDDLE LOBE PULMONARY NODULE: Primary | ICD-10-CM

## 2024-08-08 ENCOUNTER — PATIENT OUTREACH (OUTPATIENT)
Dept: OTHER | Facility: HOSPITAL | Age: 74
End: 2024-08-08
Payer: MEDICARE

## 2024-12-07 PROBLEM — R65.21 SEPTIC SHOCK: Status: ACTIVE | Noted: 2024-01-01

## 2024-12-07 PROBLEM — N17.9 AKI (ACUTE KIDNEY INJURY): Status: ACTIVE | Noted: 2024-01-01

## 2024-12-07 PROBLEM — G93.40 ENCEPHALOPATHY: Status: ACTIVE | Noted: 2024-01-01

## 2024-12-07 PROBLEM — A41.9 SEPTIC SHOCK: Status: ACTIVE | Noted: 2024-01-01

## 2024-12-07 NOTE — PROGRESS NOTES
"Called daughter and she answered. Asked to discuss goals/plan of care for patient. She asked what was going on with him right now. Explained that patient was very sick and was essentially being kept alive with HFNC and 2 pressors (explained HF and pressors to daughter). She asked if the pneumonia was being treated and relayed to her that yes, it was being treated. Explained to her that if HFNC, fluids, pressors were removed that patient may decline and die quickly. Discussed with her that providers will need to know her plan for him. Discussed stopping all treatment, going full comfort, and focusing on keeping patient comfortable during dying process. She states, \"I need to talk to his son and I'll call you back this evening\". Asked daughter to call ICU nurse back as this RN leaves soon and ICU staff are here 24 hours. Updated Karis DAWSON on this conversation.  "

## 2024-12-07 NOTE — PROCEDURES
Insert Arterial Line    Date/Time: 12/7/2024 5:17 AM    Performed by: Kobe Vazquez APRN  Authorized by: Kobe Vazquez APRN    Universal Protocol:     Verbal consent obtained?: Yes      Emergent situation      Consent given by: daughter via phone.    Required items: Required blood products, implants, devices and special equipment available      Patient identity confirmed:  Hospital-assigned identification number, arm band and provided demographic data    Time out: Immediately prior to the procedure a time out was called    A time out verifies correct patient, procedure, equipment, support staff and site/side marked as required:   Indications:     Indications: multiple ABGs and hemodynamic monitoring    Location:     Location:  Left radial  Anesthesia:     Patient sedated: Yes      Sedation:  Midazolam    Vital signs: Vital signs monitored during sedation    Procedure Details:     Ultrasound Guidance: yes      Foster's test normal?: Yes      Needle gauge:  20    Seldinger technique: Seldinger technique used      Number of attempts:  3  Post-procedure:     Post-procedure:  Line sutured and dressing applied    Post-procedure CMS:  Unchanged     patient tolerated the procedure well with no immediate complications

## 2024-12-07 NOTE — PLAN OF CARE
Problem: Adult Inpatient Plan of Care  Goal: Plan of Care Review  Outcome: Progressing  Flowsheets (Taken 12/7/2024 0600)  Progress: declining  Outcome Evaluation: Patient arrived on unit tonight from ED around 0330. Patient confused, agitated and rarely following commands. Patient's Levo increased to .26 very quickly and APRN called to bedside. Left IJ placed and patient maxed on Levo and started on Vaso to maintain a map greater that 65. Patient given one dose of albumin tonight. Patient remains normothermic. Patient started on solumedrol and Protonix. Patient remains on high flow nasal cannula 50L 60%. Wound care consult placed for pressure injury on coccyx. Plan is for patient to have Echo and nephrology consult today. Continue to monitor closely, continue with current plan of care.  Plan of Care Reviewed With: patient

## 2024-12-07 NOTE — PROGRESS NOTES
Continued Stay Note  [facility]     Patient Name: Dieter Christiansen  MRN: [id number]  Today's Date: 12/7/2024    Admit Date: 12/6/2024    Plan: TBD   Discharge Plan       Row Name 12/07/24 1130       Plan    Plan Home hospice patient of [facility]    Plan Comments Hospice RN to bedside. Patient lethargic. Dozing on and off. Able to give staff nurse his full name. Cannot answer further orientation questions. Undisturbed by assessment. Patient is a home hospice patient who was admitted on 9/6/2024 with a terminal diagnosis of respiratory failure with hypoxia and COPD. Per review of hospice chart, patient was last seen by a home nurse on 11/14. Nurse visit was scheduled for 11/29. Daughter cancelled this and asked for a visit on 12/2. Phone calls were made to daughter on 12/2, 12/3, and 12/4 with no answer. Voicemails not being returned. This RN has made hospice provider Tara DAWSON aware that patient is here. Hospice MD, Dr. Marita Jones aware as well. This RN attempted to call daughter. Got her VM. Left a message with inpatient hospice phone number and asked for her to return call. Updated Dr. Hunter, staff nurse, Yeni, and Karis DAWSON who has updated Dr. Raymundo Perkins. Inpatient team will continue to follow along while patient is here. If we can be of any assist, please call ext [phone].                   Discharge Codes    No documentation.                       Savanna Little RN

## 2024-12-07 NOTE — PROGRESS NOTES
Notified by Dr. Alona Hunter of patient's admission. Patient is a home hospice patient of [facility] with a terminal diagnosis of respiratory failure with hypoxia and COPD. He was admitted to [facility] on 9/16/2024. Hospice RN will see this morning. Please call ext [phone] if inpatient hospice RN is needed.

## 2024-12-07 NOTE — CONSULTS
"Palliative Care Initial Consult   Attending Physician: Scott Armstrong MD  Referring Provider: Kobe Vazquez      Reason for Referral:  assistance with clarification of goals of care    Code Status:   Code Status and Medical Interventions: No CPR (Do Not Attempt to Resuscitate); Limited Support; No intubation (DNI)   Ordered at: 12/07/24 0141     Medical Intervention Limits:    No intubation (DNI)     Code Status (Patient has no pulse and is not breathing):    No CPR (Do Not Attempt to Resuscitate)     Medical Interventions (Patient has pulse or is breathing):    Limited Support      Advanced Directives:     Family/Support: dtr     Goals of Care:    Goals of Care/Treatment Preferences:    TBD       HPI:   75 yo male home hospice pt presented to ED with SOA, AMS and \"dirty miramontes.\".  Pt found to be hypoxic and hypotensive per EMS.  H&P documents speaking with dtr who is HCS and confirming she wanted everything done but intubation and CPR.  Subsequently admitted to ICU.  Remains on pressor support and with AMS.  Hx per EMR and staff as pt unable and no family available.     ROS:   Pt unable to answer any questions         Past Medical History:   Diagnosis Date    Chronic back pain     COPD (chronic obstructive pulmonary disease)     Coronary artery disease involving native coronary artery of native heart without angina pectoris     Diabetes mellitus     GERD (gastroesophageal reflux disease)     Heart attack     Hyperlipidemia     Hypertension     Lung nodule     Murmur     MARY ALICE (obstructive sleep apnea)      Past Surgical History:   Procedure Laterality Date    FEMORAL ARTERY STENT       Social History     Socioeconomic History    Marital status:    Tobacco Use    Smoking status: Former     Current packs/day: 0.50     Average packs/day: 0.5 packs/day for 30.0 years (15.0 ttl pk-yrs)     Types: Cigarettes    Smokeless tobacco: Former    Tobacco comments:     20 years ago   Vaping Use    Vaping status: Every Day "   Substance and Sexual Activity    Alcohol use: Not Currently    Drug use: Never    Sexual activity: Defer     Family History   Problem Relation Age of Onset    Diabetes Father     Diabetes Brother        No Known Allergies      Current Facility-Administered Medications:     sennosides-docusate (PERICOLACE) 8.6-50 MG per tablet 2 tablet, 2 tablet, Oral, BID **AND** polyethylene glycol (MIRALAX) packet 17 g, 17 g, Oral, Daily PRN **AND** bisacodyl (DULCOLAX) EC tablet 5 mg, 5 mg, Oral, Daily PRN **AND** bisacodyl (DULCOLAX) suppository 10 mg, 10 mg, Rectal, Daily PRN, Kobe Vazquez APRN    dextrose (D50W) (25 g/50 mL) IV injection 25 g, 25 g, Intravenous, Q15 Min PRN, Kobe Vazquez APRN    dextrose (GLUTOSE) oral gel 15 g, 15 g, Oral, Q15 Min PRN, Kobe Vazquez APRN    glucagon (GLUCAGEN) injection 1 mg, 1 mg, Intramuscular, Q15 Min PRN, Kobe Vazquez APRN    haloperidol lactate (HALDOL) injection 2 mg, 2 mg, Intravenous, Q6H PRN, Kobe Vazquez APRN    heparin (porcine) 5000 UNIT/ML injection 5,000 Units, 5,000 Units, Subcutaneous, Q12H, Kobe Vazquez APRN, 5,000 Units at 12/07/24 0902    Hydrocortisone Sod Suc (PF) (Solu-CORTEF) injection 100 mg, 100 mg, Intravenous, Q8H, Kobe Vazquez APRN, 100 mg at 12/07/24 0609    insulin regular (humuLIN R,novoLIN R) injection 2-9 Units, 2-9 Units, Subcutaneous, Q6H, Kobe Vazquez APRN, 2 Units at 12/07/24 0544    ipratropium-albuterol (DUO-NEB) nebulizer solution 3 mL, 3 mL, Nebulization, Q4H PRN, Kobe Vazquez APRN    ipratropium-albuterol (DUO-NEB) nebulizer solution 3 mL, 3 mL, Nebulization, Q6H - RT, Kobe Vazquez, APRN, 3 mL at 12/07/24 0803    mupirocin (BACTROBAN) 2 % nasal ointment 1 Application, 1 Application, Each Nare, BID, Kobe Vazquez, APRN, 1 Application at 12/07/24 0545    nitroglycerin (NITROSTAT) SL tablet 0.4 mg, 0.4 mg, Sublingual, Q5 Min PRN, Kobe Vazquez, APRN    norepinephrine  (LEVOPHED) 16,000 mcg in sodium chloride 0.9 % 250 mL infusion, 0.02-0.3 mcg/kg/min, Intravenous, Titrated, Scott Armstrong MD, Last Rate: 37.1 mL/hr at 12/07/24 0910, 0.3 mcg/kg/min at 12/07/24 0910    norepinephrine (LEVOPHED) 8 mg in 250 mL NS infusion (premix), 0.02-0.3 mcg/kg/min, Intravenous, Titrated, Eric Graff MD, Last Rate: 69.3 mL/hr at 12/07/24 0601, 0.28 mcg/kg/min at 12/07/24 0601    nystatin (MYCOSTATIN) powder, , Topical, Q12H, Kobe Vazquez APRN, Given at 12/07/24 0902    pantoprazole (PROTONIX) injection 40 mg, 40 mg, Intravenous, Q AM, Kobe Vazquez APRN, 40 mg at 12/07/24 0544    Pharmacy to dose vancomycin, , Not Applicable, Continuous PRN, Kobe Vazquez APRN    piperacillin-tazobactam (ZOSYN) 4.5 g IVPB in 100 mL NS MBP (CD), 4.5 g, Intravenous, Q12H, Kobe Vazquez APRN    sodium bicarbonate 8.4 % 150 mEq in dextrose (D5W) 5 % 1,000 mL infusion (greater than 100 mEq), 150 mEq, Intravenous, Continuous, Kobe Vazquez APRN, Last Rate: 75 mL/hr at 12/07/24 0506, 150 mEq at 12/07/24 0506    sodium chloride 0.9 % flush 10 mL, 10 mL, Intravenous, PRN, Eric Graff MD    sodium chloride 0.9 % flush 10 mL, 10 mL, Intravenous, Q12H, Kobe Vazquez APRN, 10 mL at 12/07/24 0522    sodium chloride 0.9 % flush 10 mL, 10 mL, Intravenous, PRN, Kobe Vazquez APRN    sodium chloride 0.9 % infusion 40 mL, 40 mL, Intravenous, PRN, Kobe Vazquez APRN    thiamine (B-1) 500 mg in sodium chloride 0.9 % 100 mL IVPB, 500 mg, Intravenous, Q8H, Koeb Vazquez APRN, Last Rate: 200 mL/hr at 12/07/24 0520, 500 mg at 12/07/24 0520    vancomycin (dosing per levels), , Not Applicable, Daily, Vralmas, Tiffany, RPH    vasopressin (PITRESSIN) 20 units in 100 mL NS infusion, 0.03 Units/min, Intravenous, Continuous, Kobe Vazquez APRN, Last Rate: 9 mL/hr at 12/07/24 0447, 0.03 Units/min at 12/07/24 0447     Palliative Performance Scale Score:  10    /72  "(BP Location: Left leg, Patient Position: Lying)   Pulse 90   Temp 99.3 °F (37.4 °C) (Bladder)   Resp (!) 38   Ht 175.3 cm (69\")   Wt 132 kg (290 lb)   SpO2 94%   BMI 42.83 kg/m²     Intake/Output Summary (Last 24 hours) at 12/7/2024 0918  Last data filed at 12/7/2024 0614  Gross per 24 hour   Intake 8920.8 ml   Output --   Net 8920.8 ml       Physical Exam:    General Appearance:    Opens eyes only with stim, on hi flow when seen   HEENT:    NC/AT,  anicteric, MMM, face relaxed   Neck:   supple, trachea midline, no JVD   Lungs:     CTA bilat, diminished in bases; respirations regular, even     and unlabored    Heart:    RRR, normal S1 and S2, no M/R/G   Abdomen:     Normal bowel sounds, soft, nontender, nondistended   G/U:   Deferred   MSK/Extremities:   No clubbing , cyanosis, mild edema BLE, feet cool   Pulses:   Pulses palpable and equal bilaterally   Skin:   Warm, dry, no mottling   Neurologic:   A/Ox3, cooperative, moves extremities x 4, no tremor, nl     tone   Psych:   Calm, appropriate         Labs:   Results from last 7 days   Lab Units 12/07/24  0636   WBC 10*3/mm3 15.94*   HEMOGLOBIN g/dL 12.7*   HEMATOCRIT % 39.5   PLATELETS 10*3/mm3 510*     Results from last 7 days   Lab Units 12/07/24  0222   SODIUM mmol/L 146*   POTASSIUM mmol/L 4.3   CHLORIDE mmol/L 108*   CO2 mmol/L 17.0*   BUN mg/dL 114*   CREATININE mg/dL 10.89*   CALCIUM mg/dL 6.8*   BILIRUBIN mg/dL 0.5   ALK PHOS U/L 105   ALT (SGPT) U/L <5   AST (SGOT) U/L 18   GLUCOSE mg/dL 148*     Imaging Results (Last 72 Hours)       Procedure Component Value Units Date/Time    XR Chest 1 View [[id number]] Collected: 12/07/24 0504     Updated: 12/07/24 0508    Narrative:      XR CHEST 1 VW    Date of Exam: 12/7/2024 4:45 AM EST    Indication: IJ placement    Comparison: 12/7/2024.    Findings:  Left internal jugular CVC catheter present with the tip in the upper SVC. There stable patchy airspace disease present within the lung bases bilaterally. No " pleural fluid. No pneumothorax. The pulmonary vasculature appears within normal limits. Stable   cardiomegaly. No acute osseous abnormality identified.      Impression:      Impression:  Left internal jugular CVC catheter with the tip in the upper SVC. No pneumothorax. Stable bibasilar airspace disease.        Electronically Signed: Liliam Gandhi MD    12/7/2024 5:05 AM EST    Workstation ID: [id number]    CT Abdomen Pelvis Without Contrast [[id number]] Collected: 12/07/24 0124     Updated: 12/07/24 0129    Narrative:      CT ABDOMEN PELVIS WO CONTRAST, CT CHEST WO CONTRAST DIAGNOSTIC    Date of Exam: 12/7/2024 1:03 AM EST    Indication: sepsis/altered mental status.    Comparison: None available.    Technique: Axial CT images were obtained of the chest, abdomen and pelvis without the administration of contrast. Reconstructed coronal and sagittal images were also obtained. Automated exposure control and iterative construction methods were used.      Findings:  CT Chest:   The thyroid appears within normal limits. The trachea and the esophagus are unremarkable. Heart appears mildly enlarged. No mediastinal or hilar lymphadenopathy.  No significant pericardial effusion.  The aorta and great vessels appear within normal   limits.  Pulmonary artery is unremarkable.    Patchy airspace consolidation is present within the left upper lobe and the right lower lobe. Additional patchy airspace disease seen within the left lower lobe. Atherosclerotic calcifications are present including within the coronary arteries.. There   are no suspicious lung nodules.  No significant pleural disease.  Central and segmental airways appear patent.      Subcutaneous fat and underlying musculature appear within normal limits.  There are no acute osseous abnormalities or destructive bone lesions.      CT Abdomen and Pelvis:  The liver appears normal in size without focal abnormality. Spleen is normal size and appears homogeneous.  Stomach is  nondistended.  No adrenal mass.  Kidneys appear unremarkable. Simple cysts present bilaterally, left greater than right. No significant   stool burden identified. Fat-containing ventral wall hernia present. No evidence of bowel involvement. No hydronephrosis.  Urinary bladder is within normal limits.  There is no bowel distention.  No pneumatosis intestinalis, pneumoperitoneum or   lymphadenopathy.  No significant ascites.  The appendix is not well visualized..  Gallbladder is nondistended.  Biliary tree is nondistended.  The pancreas appears homogeneous.  Abdominal vasculature is within normal limits. No significant stool burden   identified.    Subcutaneous fat and underlying musculature appear within normal limits.  There are no acute osseous abnormalities or destructive bone lesions.        Impression:      Impression:  1.Multifocal pneumonia most pronounced within the left upper lobe and the right lower lobe.  2.No acute intra-abdominal or intrapelvic process.  3.Ancillary findings as described above.        Electronically Signed: Liliam Gandhi MD    12/7/2024 1:26 AM EST    Workstation ID: [id number]    CT Chest Without Contrast Diagnostic [[id number]] Collected: 12/07/24 0124     Updated: 12/07/24 0129    Narrative:      CT ABDOMEN PELVIS WO CONTRAST, CT CHEST WO CONTRAST DIAGNOSTIC    Date of Exam: 12/7/2024 1:03 AM EST    Indication: sepsis/altered mental status.    Comparison: None available.    Technique: Axial CT images were obtained of the chest, abdomen and pelvis without the administration of contrast. Reconstructed coronal and sagittal images were also obtained. Automated exposure control and iterative construction methods were used.      Findings:  CT Chest:   The thyroid appears within normal limits. The trachea and the esophagus are unremarkable. Heart appears mildly enlarged. No mediastinal or hilar lymphadenopathy.  No significant pericardial effusion.  The aorta and great vessels appear within  normal   limits.  Pulmonary artery is unremarkable.    Patchy airspace consolidation is present within the left upper lobe and the right lower lobe. Additional patchy airspace disease seen within the left lower lobe. Atherosclerotic calcifications are present including within the coronary arteries.. There   are no suspicious lung nodules.  No significant pleural disease.  Central and segmental airways appear patent.      Subcutaneous fat and underlying musculature appear within normal limits.  There are no acute osseous abnormalities or destructive bone lesions.      CT Abdomen and Pelvis:  The liver appears normal in size without focal abnormality. Spleen is normal size and appears homogeneous.  Stomach is nondistended.  No adrenal mass.  Kidneys appear unremarkable. Simple cysts present bilaterally, left greater than right. No significant   stool burden identified. Fat-containing ventral wall hernia present. No evidence of bowel involvement. No hydronephrosis.  Urinary bladder is within normal limits.  There is no bowel distention.  No pneumatosis intestinalis, pneumoperitoneum or   lymphadenopathy.  No significant ascites.  The appendix is not well visualized..  Gallbladder is nondistended.  Biliary tree is nondistended.  The pancreas appears homogeneous.  Abdominal vasculature is within normal limits. No significant stool burden   identified.    Subcutaneous fat and underlying musculature appear within normal limits.  There are no acute osseous abnormalities or destructive bone lesions.        Impression:      Impression:  1.Multifocal pneumonia most pronounced within the left upper lobe and the right lower lobe.  2.No acute intra-abdominal or intrapelvic process.  3.Ancillary findings as described above.        Electronically Signed: Liliam Gandhi MD    12/7/2024 1:26 AM EST    Workstation ID: [id number]    CT Head Without Contrast [[id number]] Collected: 12/07/24 0123     Updated: 12/07/24 0127    Narrative:       CT HEAD WO CONTRAST    Date of Exam: 12/7/2024 1:03 AM EST    Indication: altered mental status.    Comparison: 8/20/2023.    Technique: Axial CT images were obtained of the head without contrast administration.  Automated exposure control and iterative construction methods were used.      Findings:  There is no evidence of hemorrhage. There is no mass effect or midline shift.    There is no extracerebral collection.    Ventricles are normal in size and configuration for patient's stated age.      Posterior fossa is within normal limits.    Calvarium and skull base appear intact.   Visualized sinuses show no air fluid levels. Visualized orbits are unremarkable.      Impression:      Impression:  No acute intracranial process.        Electronically Signed: Liliam Gandhi MD    12/7/2024 1:23 AM EST    Workstation ID: [id number]    XR Chest 1 View [[id number]] Collected: 12/07/24 0031     Updated: 12/07/24 0035    Narrative:      XR CHEST 1 VW    Date of Exam: 12/7/2024 12:10 AM EST    Indication: SOA triage protocol    Comparison: 8/28/2023.    Findings:  Linear atelectasis present within the right lower lobe. There are no airspace consolidations. No pleural fluid. No pneumothorax. The pulmonary vasculature appears within normal limits. The heart appears enlarged, stable as compared to the previous study.   No acute osseous abnormality identified.      Impression:      Impression:  Linear atelectasis present within the right lower lobe. No acute cardiopulmonary process. Stable cardiomegaly.        Electronically Signed: Liliam Gandhi MD    12/7/2024 12:32 AM EST    Workstation ID: [id number]              Lab 12/07/24  0636   HEMOGLOBIN A1C 5.40       Diagnostics:   No valid procedures specified.    A:     Septic shock    CAD (coronary artery disease)    COPD (chronic obstructive pulmonary disease)    Type 2 diabetes mellitus    HTN (hypertension)    HLD (hyperlipidemia)    GERD without esophagitis    MARY ALICE (obstructive sleep  apnea)    Obesity, Class III, BMI 40-49.9 (morbid obesity)    EZEKIEL (acute kidney injury)    Encephalopathy         Impression:   Sepsis with shock  Bacteriuria  COPD DM 2  HTN  Hospice dx of resp failure  A/C hypoxic resp failure - currently on FiO2 58% when seen  Obesity  Encephalopatphy  Multifocal pna  Hypoalbuminemia - 2.2  Lactic acidosis - resolved    Symptoms:  AMS  Dyspnea  Debility       P:    Attempted to reach dtr/HCS unsuccessfully.  Left voice mail for call back.  Notified hospice of admit.  Thank you for this consult and allowing us to participate in patient's plan of care. Palliative Care Team will continue to follow patient.       Alona Hunter MD, 12/7/2024, 09:18 EST

## 2024-12-07 NOTE — H&P
Intensive Care Admission Note     Hypotension/shock    History of Present Illness     Dieter Christiansen is a 74 y.o. male w/ PMHX of COPD, chronic respiratory failure (3 L NC), MARY ALICE, HTN, HLD, GERD, CAD, & DM.      EMS was called to his abode for EMS & SOA.  SBP was 81, spO2 87%.  There is a chronic miramontes w/ purulent urine in bag.      Patient was placed on NRB and aggressively volume resuscitated.  Empiric ABX and levophed were initiated.      Per review of records the patient was inpatient @ [facility] in 08/2024 for E. Coli UTI induced septic shock.  He was discharged from there into the care of Hospice.      I was able to speak with his daughter Rand and she was unable to contribute much to his recent ROS.  She did reaffirm that family desired full support short of DNR/DNI.    Upon evaluation the emergency department the patient is more awake and is intermittently shouting out but ability to give any history is normal    Problem List, Surgical History, Family, Social History, and ROS     Past Medical History:   Diagnosis Date   • Chronic back pain    • COPD (chronic obstructive pulmonary disease)    • Coronary artery disease involving native coronary artery of native heart without angina pectoris    • Diabetes mellitus    • GERD (gastroesophageal reflux disease)    • Heart attack    • Hyperlipidemia    • Hypertension    • Lung nodule    • Murmur    • MARY ALICE (obstructive sleep apnea)       Past Surgical History:   Procedure Laterality Date   • FEMORAL ARTERY STENT         No Known Allergies  Current Facility-Administered Medications on File Prior to Encounter   Medication   • cyanocobalamin injection 1,000 mcg     Current Outpatient Medications on File Prior to Encounter   Medication Sig   • albuterol (PROVENTIL) (2.5 MG/3ML) 0.083% nebulizer solution Take 2.5 mg by nebulization Every 6 (Six) Hours As Needed for Wheezing.   • aspirin 81 MG EC tablet aspirin 81 mg tablet,delayed release   Take 1 tablet every day by oral route.  "  • cetirizine (zyrTEC) 10 MG tablet Take 1 tablet by mouth Daily.   • cyanocobalamin 1000 MCG/ML injection Inject 1 mL into the appropriate muscle as directed by prescriber Every 28 (Twenty-Eight) Days.   • famotidine (PEPCID) 20 MG tablet Take 1 tablet by mouth Daily.   • finasteride (PROSCAR) 5 MG tablet Take 1 tablet by mouth Daily.   • FLUoxetine (PROzac) 20 MG capsule Take 1 capsule by mouth Daily.   • furosemide (LASIX) 40 MG tablet Take 1 tablet by mouth Every 72 (Seventy-Two) Hours.   • HYDROcodone-acetaminophen (NORCO)  MG per tablet Take 1 tablet by mouth Every 8 (Eight) Hours As Needed for Severe Pain.   • ipratropium-albuterol (Combivent Respimat)  MCG/ACT inhaler Inhale 1 puff 3 (Three) Times a Day.   • lisinopril (PRINIVIL,ZESTRIL) 10 MG tablet Take 1 tablet by mouth Daily.   • metFORMIN (GLUCOPHAGE) 500 MG tablet Take 1 tablet by mouth Daily With Breakfast.   • metoprolol succinate XL (TOPROL-XL) 50 MG 24 hr tablet Take 1 tablet by mouth Daily.   • nitroglycerin (NITROSTAT) 0.4 MG SL tablet Place 1 tablet under the tongue Every 5 (Five) Minutes As Needed for Chest Pain for up to 30 days. Take no more than 3 doses in 15 minutes.   • nystatin (MYCOSTATIN) 526918 UNIT/GM powder Apply  topically to the appropriate area as directed 3 (Three) Times a Day.   • nystatin-triamcinolone (MYCOLOG) 514804-9.1 UNIT/GM-% ointment Apply 1 Application topically to the appropriate area as directed 2 (Two) Times a Day.   • pantoprazole (PROTONIX) 40 MG EC tablet Take 1 tablet by mouth Daily.   • promethazine (PHENERGAN) 25 MG tablet Take 0.5 tablets by mouth Every 8 (Eight) Hours As Needed for Nausea or Vomiting.   • simvastatin (ZOCOR) 20 MG tablet Take 1 tablet by mouth every night at bedtime.   • Syringe 21G X 1\" 3 ML misc Use 1 each Every 28 (Twenty-Eight) Days.   • tamsulosin (FLOMAX) 0.4 MG capsule 24 hr capsule Take 1 capsule by mouth Daily.   • vitamin D (ERGOCALCIFEROL) 1.25 MG (32779 UT) capsule " "capsule Take 1 capsule by mouth Every 7 (Seven) Days.     MEDICATION LIST AND ALLERGIES REVIEWED.    Family History   Problem Relation Age of Onset   • Diabetes Father    • Diabetes Brother      Social History     Tobacco Use   • Smoking status: Former     Current packs/day: 0.50     Average packs/day: 0.5 packs/day for 30.0 years (15.0 ttl pk-yrs)     Types: Cigarettes   • Smokeless tobacco: Former   • Tobacco comments:     20 years ago   Vaping Use   • Vaping status: Every Day   Substance Use Topics   • Alcohol use: Not Currently   • Drug use: Never     Social History     Social History Narrative   • Not on file     FAMILY AND SOCIAL HISTORY REVIEWED.    Review of Systems  Unable due to acute delirium    Physical Exam and Clinical Information   BP (!) 80/64   Pulse 106   Temp 96.8 °F (36 °C)   Resp 20   Ht 175.3 cm (69\")   Wt 132 kg (290 lb)   SpO2 (!) 89%   BMI 42.83 kg/m²   Physical Exam  General: Laying in bed in ER; shouting out  Skin: Erythematous rash noted on upper back; global pallor noted, ankle no cyanosis, jaundice, pallor, breakdown noted on visualized aspects of the skin  Eyes: EOMI, PERRLA; eyes track appropriately; sclera anicteric  ENT: Mucous membranes are moist; oropharynx is crowded but clear without erythema, exudates or thrush  Pulmonary: Exam limited due to almost continual shouting and due to suboptimal effort   Cardiac: Irregular irregular with ventricular response rate in the 100s; normal S1 and S2; no murmur, S3, S4  Abdomen: Obese, soft, nontender, nondistended; bowel sounds are present x 4; there are no masses or hepatosplenomegaly appreciated  Genitourinary: No suprapubic tenderness to palpation flank pain to percussion; there is a Torres catheter but no urine evident  Musculoskeletal: Extremities are cool and dry; there is no clubbing, cyanosis; + edema; pedal pulses are not easily palpable  Neurologic: Limited due to agitation and delirium; moves extremities x 4 " spontaneously  Hematology/oncology: No evidence of extensive bruising or active bleeding  Results from last 7 days   Lab Units 12/06/24  2330   WBC 10*3/mm3 13.84*   HEMOGLOBIN g/dL 14.6   PLATELETS 10*3/mm3 456*     Results from last 7 days   Lab Units 12/06/24  2330   SODIUM mmol/L 144   POTASSIUM mmol/L 4.8   CO2 mmol/L 20.0*   BUN mg/dL 133*   CREATININE mg/dL 12.71*   MAGNESIUM mg/dL 2.7*   GLUCOSE mg/dL 97     Estimated Creatinine Clearance: 7 mL/min (A) (by C-G formula based on SCr of 12.71 mg/dL (H)).          Lab Results   Component Value Date    LACTATE 4.2 (C) 12/06/2024        Images:       I reviewed the patient's results and images.     Impression     Medical Problems      Plan/Recommendations     Assessment And Plan:    1: Pulmonary: Acute hypoxemic respiratory failure; listed history of MARY ALICE; history of tobacco abuse and COPD  -Continue supplemental oxygen as needed  -Patient is a DNI  -Consider trial of noninvasive ventilation if he were to decompensate  -Unclear if the patient uses noninvasive ventilation at home  -Source of pulmonary decompensation likely due to infectious versus cardiogenic origin  -Continue outpatient DuoNebs    2: Cardiovascular: Hypotension/shock; atrial fibrillation; coronary artery disease  -Suspect hypotension/shock is multifactorial with contributing factors including possible sepsis, volume depletion, cardiogenic etiology  -Continue pressor support as needed  -Check echocardiogram  -Unclear if atrial fibrillation is a new finding;  -Hold on anticoagulation for now  -BNP is profoundly elevated concerning for possible cardiac source of respiratory compromise    3: Infectious disease: Concern for severe sepsis septic shock  -Unclear etiology but suspect pulmonary versus genitourinary is most likely  -Cultures obtained  -Zosyn and vancomycin    4: Renal/genitourinary/acid-base: Lactic acidosis; acute kidney injury/kidney failure  -Unclear etiology behind the acute kidney  injury with possible contributing factors including medication, volume depletion, cardiogenic, sepsis  -Lactic acid doses most likely due to the above listed issues  -Patient has been aggressively resuscitated but has yet to make any urine  -Would consult with nephrology in the morning    5: Gastrointestinal:  -No apparent acute GI issues  -N.p.o.  -LFTs unremarkable    6: Hematology/oncology: Anemia  -Hemoglobin previously known to be in the anemic range  -Hemoglobin today is within normal limits suggesting volume contraction state  -Platelets are within normal limits  -Monitor CBC as needed  -Would hold off on anticoagulating the patient until either his neurologic status improves or CT can meet of the head can be done to ensure no acute issues in light of the patient's mental status    7: Endocrine: History of diabetes mellitus  -Diabetes mellitus: A1c is within normal limits; will hold outpatient regimen for now; will monitor POC glucose and submental sliding scale insulin  -Thyroid disease: No history; TSH within normal limits  -Systemic steroids: Start hydrocortisone for a relative adrenal insufficiency    8: Neuropsychiatric: Acute delirium; hypoactive delirium  -Patient is now more awake and interactive but still appears confused and delirious  -If neurologic function does not improve consider CT scanning but given his instability this will not be pursued at this time  -Optimize comfort without overstating the patient, his hemodynamic status further    9: Fluids/electrolytes/nutrition:  -N.p.o. for now  -Electrolytes are reasonable  -I's and O's goal for the first 24 hours equals +2 to 3 L    10: Prophylactic measures:  -PUD = Protonix  -DVT = subcu heparin      Time spent 45 minutes (exclusive of procedure time)  including high complexity decision making to assess, manipulate, and support vital organ system failure in this individual who has impairment of one or more vital organ systems such that there is a  high probability of imminent or life threatening deterioration in the patient’s condition.      Scott Armstrong MD  12/07/24 01:36 EST     CC: Ian Sanchez APRN

## 2024-12-07 NOTE — ED PROVIDER NOTES
Subjective   History of Present Illness  74-year-old male brought in for evaluation Alterman status, shortness of breath and hypoxia, and dirty appearing Torres catheter.  The patient reportedly is chronically ill but typically has normal mentation with a GCS of 15 per the patient's POA who is at scene when EMS was contacted.  EMS was contacted because the patient's Torres catheter was noted to be extremely dirty and the patient appeared altered and more short of breath than his previous baseline.  No definitive fever or infectious symptoms reported prior to arrival.  Patient typically wears 3 L of oxygen at baseline.  Oxygen saturations were noted to be 87%.  Oxygen saturations improved to just over 90% with application of nonrebreather by EMS around.  Blood sugar was 93.  His systolic blood pressure was 81 for EMS route.  The Torres catheter does appear to have brown contents in it and it is questionable when it was last changed.  Patient self cannot provide any history and appears tachypneic with increased work of breathing shortness of breath.  Externally audible crackles are noted.  No new medications reported.  Per EMS the patient is DNR but the caregiver was unable to produce DNR paperwork.  The patient also reportedly is on hospice but the caregiver and POA wanted the patient transported anyway and desired full treatment of any condition found.  She simply did not want chest compressions or intubation.      Review of Systems   Constitutional:  Positive for activity change, appetite change and fatigue.   HENT:  Positive for congestion.    Respiratory:  Positive for cough and shortness of breath.    Genitourinary:  Positive for decreased urine volume.   Psychiatric/Behavioral:  Positive for confusion and decreased concentration.        Past Medical History:   Diagnosis Date    Chronic back pain     COPD (chronic obstructive pulmonary disease)     Coronary artery disease involving native coronary artery of native  heart without angina pectoris     Diabetes mellitus     GERD (gastroesophageal reflux disease)     Heart attack     Hyperlipidemia     Hypertension     Lung nodule     Murmur     MARY ALICE (obstructive sleep apnea)        No Known Allergies    Past Surgical History:   Procedure Laterality Date    FEMORAL ARTERY STENT         Family History   Problem Relation Age of Onset    Diabetes Father     Diabetes Brother        Social History     Socioeconomic History    Marital status:    Tobacco Use    Smoking status: Former     Current packs/day: 0.50     Average packs/day: 0.5 packs/day for 30.0 years (15.0 ttl pk-yrs)     Types: Cigarettes    Smokeless tobacco: Former    Tobacco comments:     20 years ago   Vaping Use    Vaping status: Every Day   Substance and Sexual Activity    Alcohol use: Not Currently    Drug use: Never    Sexual activity: Defer           Objective   Physical Exam  Vitals and nursing note reviewed.   Constitutional:       General: He is in acute distress.      Appearance: Normal appearance. He is obese. He is ill-appearing and toxic-appearing. He is not diaphoretic.   HENT:      Head: Normocephalic and atraumatic.      Right Ear: External ear normal.      Left Ear: External ear normal.      Nose: Nose normal.   Eyes:      General: Lids are normal.      Pupils: Pupils are equal, round, and reactive to light.   Neck:      Trachea: No tracheal deviation.   Cardiovascular:      Rate and Rhythm: Normal rate and regular rhythm.      Pulses: No decreased pulses.      Heart sounds: Normal heart sounds. No murmur heard.     No friction rub. No gallop.   Pulmonary:      Effort: Tachypnea, accessory muscle usage, prolonged expiration and respiratory distress present.      Breath sounds: Examination of the right-middle field reveals rales. Examination of the left-middle field reveals rales. Examination of the right-lower field reveals wheezing and rales. Examination of the left-lower field reveals wheezing and  rales. Wheezing and rales present. No decreased breath sounds or rhonchi.   Abdominal:      General: Bowel sounds are normal.      Palpations: Abdomen is soft.      Tenderness: There is generalized abdominal tenderness. There is no guarding or rebound.   Musculoskeletal:         General: No deformity. Normal range of motion.      Cervical back: Normal range of motion and neck supple.   Lymphadenopathy:      Cervical: No cervical adenopathy.   Skin:     General: Skin is warm and dry.      Findings: No rash.   Neurological:      Mental Status: He is lethargic, disoriented and confused.      GCS: GCS eye subscore is 4. GCS verbal subscore is 3. GCS motor subscore is 5.      Cranial Nerves: No cranial nerve deficit.      Sensory: No sensory deficit.   Psychiatric:         Speech: Speech normal.         Behavior: Behavior normal.         Thought Content: Thought content normal.         Judgment: Judgment normal.         Critical Care    Performed by: Eric Graff MD  Authorized by: Eric Graff MD    Critical care provider statement:     Critical care time (minutes):  60    Critical care time was exclusive of:  Separately billable procedures and treating other patients    Critical care was necessary to treat or prevent imminent or life-threatening deterioration of the following conditions:  Sepsis, respiratory failure, CNS failure or compromise and dehydration    Critical care was time spent personally by me on the following activities:  Development of treatment plan with patient or surrogate, discussions with consultants, evaluation of patient's response to treatment, blood draw for specimens, examination of patient, obtaining history from patient or surrogate, ordering and performing treatments and interventions, ordering and review of laboratory studies, ordering and review of radiographic studies, pulse oximetry, re-evaluation of patient's condition and review of old charts    I assumed direction of  critical care for this patient from another provider in my specialty: no      Care discussed with: admitting provider               ED Course                                                       Medical Decision Making  Differential diagnosis includes sepsis, dehydration, renal sufficiency, electrolyte abnormality, intracranial hemorrhage, TIA, stroke, adverse medication, illicit drug use, other unspecified etiology.    Labs show elevated white blood cell count.  Elevated lactic acid level.  Elevated troponin.  Elevated BNP.    ABG shows pH of 7.2 consistent with metabolic acidosis.    Viral respiratory panel is negative.  Alcohol level was negative.  The patient also has significant acute renal insufficiency with a creatinine greater than 12.  With increased gap acidosis.  No significant electrolyte abnormalities.    The patient's POA has expressed that everything needs to be performed.  Greater than 6 L of IV fluid were administered here in the ER and 2 more have been ordered.  However the patient still had not produced urine by the time he was admitted to the ICU.  However his mentation was improving.  His blood pressure was slowly improving with a combination of IV fluids and Levophed.    Imaging of the chest reports concern for pneumonia.  CT scan of the abdomen pelvis and CT scan of the head without contrast shows no acute abnormalities.    Blood cultures and broad-spectrum antibiotics have been initiated.    Oxygen has been applied, 6 L nasal cannula, and oxygen saturations have remained at roughly 88 to 92%.    I discussed the patient with the intensivist, who will consult for admission.    Problems Addressed:  Acute dehydration: complicated acute illness or injury with systemic symptoms that poses a threat to life or bodily functions  Acute renal failure, unspecified acute renal failure type: complicated acute illness or injury with systemic symptoms that poses a threat to life or bodily  functions  Elevated troponin: complicated acute illness or injury with systemic symptoms  Lactic acidosis: complicated acute illness or injury with systemic symptoms  Multifocal pneumonia: complicated acute illness or injury with systemic symptoms that poses a threat to life or bodily functions  Sepsis with acute renal failure and septic shock, due to unspecified organism, unspecified acute renal failure type: complicated acute illness or injury with systemic symptoms that poses a threat to life or bodily functions    Amount and/or Complexity of Data Reviewed  Independent Historian: EMS  External Data Reviewed: labs, radiology and ECG.  Labs: ordered. Decision-making details documented in ED Course.  Radiology: ordered and independent interpretation performed. Decision-making details documented in ED Course.     Details: Chest x-ray independently inter by myself shows questionable bilateral infiltrates, right lower lobe atelectasis, enlarged heart, poor inspiratory view.  Gas and fluid-filled stomach observed on imaging.  ECG/medicine tests: ordered and independent interpretation performed. Decision-making details documented in ED Course.     Details: EKG independently interpreted by myself shows sinus rhythm, significant motion artifact, no acute ischemic changes.    Risk  Prescription drug management.  Decision regarding hospitalization.        Final diagnoses:   Acute dehydration   Acute renal failure, unspecified acute renal failure type   Lactic acidosis   Multifocal pneumonia   Elevated troponin   Sepsis with acute renal failure and septic shock, due to unspecified organism, unspecified acute renal failure type       ED Disposition  ED Disposition       ED Disposition   Decision to Admit    Condition   --    Comment   Level of Care: Critical Care [6]   Diagnosis: Septic shock [[id number]]   Certification: I Certify That Inpatient Hospital Services Are Medically Necessary For Greater Than 2 Midnights                  No follow-up provider specified.       Medication List      No changes were made to your prescriptions during this visit.            Eric Graff MD  12/07/24 9439

## 2024-12-07 NOTE — PROGRESS NOTES
"Pharmacy Consult - Vancomycin Dosing and Monitoring (Renal Dysfunction / Dialysis)    Dieter Christiansen is a 74 y.o. male receiving vancomycin therapy.     Indication: Pneumonia  Consulting Provider: Intensivist  ID Consult: No    Goal Trough: 15-20 mg/L    Current Antimicrobial Therapy  Anti-Infectives (From admission, onward)      Ordered     Dose/Rate Route Frequency Start Stop    12/07/24 0158  piperacillin-tazobactam (ZOSYN) 4.5 g IVPB in 100 mL NS MBP (CD)        Ordering Provider: Kobe Vazquez APRN    4.5 g  over 4 Hours Intravenous Every 12 Hours 12/07/24 1200 12/14/24 1159    12/07/24 0159  vancomycin (dosing per levels)        Ordering Provider: Tiffany Novak RPH     Not Applicable Daily 12/07/24 0900 12/14/24 0859    12/07/24 0157  Pharmacy to dose vancomycin        Ordering Provider: Kobe Vazquez APRN     Not Applicable Continuous PRN 12/07/24 0156 12/14/24 0155    12/07/24 0055  piperacillin-tazobactam (ZOSYN) 4.5 g IVPB in 100 mL NS MBP (CD)        Ordering Provider: Eric Graff MD    4.5 g  over 30 Minutes Intravenous Once 12/07/24 0111      12/07/24 0055  vancomycin 2750 mg/500 mL 0.9% NS IVPB ([facility])        Ordering Provider: Eric Graff MD    20 mg/kg × 132 kg  over 165 Minutes Intravenous Once 12/07/24 0111            Allergies  Allergies as of 12/06/2024    (No Known Allergies)     Labs  Results from last 7 days   Lab Units 12/06/24  2330   BUN mg/dL 133*   CREATININE mg/dL 12.71*     Results from last 7 days   Lab Units 12/06/24  2330   WBC 10*3/mm3 13.84*     Evaluation of Dosing     Last Dose Received in the ED/Outside Facility: Vancomycin 2750 mg (20 mg/kg) IV x 1 ordered in ED  Is Patient on Dialysis or Renal Replacement: No    Height - 175.3 cm (69\")  Weight - 132 kg (290 lb)    Estimated Creatinine Clearance: 7 mL/min (A) (by C-G formula based on SCr of 12.71 mg/dL (H)).    Intake & Output (last 3 days)         12/04 0701  12/05 0700 12/05 0701 12/06 0700 " 12/06 0701  12/07 0700    IV Piggyback   4000    Total Intake(mL/kg)   4000 (30.3)    Net   +4000                 Microbiology  Microbiology Results (last 10 days)       ** No results found for the last 240 hours. **          Vancomycin Levels              Assessment/Plan:  Pharmacy to dose vancomycin for pneumonia. Goal trough: 15-20 mg/L  Patient scheduled to receive a loading dose of vancomycin 2750 mg (20 mg/kg) IV x 1. Given patient's current renal function (SCr 12.71 on admission (up from 1.07 on 9/4/24 per Care Everywhere), will dose vancomycin based upon level for now.  Ordered a random vancomycin level with AM labs on 12/8/24.  Pharmacy will continue to monitor renal function, cultures and sensitivities, and clinical status to adjust regimen as necessary.    Tiffany Novak, PharmD, BCPS  02:28 EST

## 2024-12-07 NOTE — CONSULTS
Referring Provider: Dr. Foster  Reason for Consultation: Acute kidney injury    Subjective     Chief complaint hypotensive/shock    History of present illness:    74-year-old male with history of mild renal insufficiency, but recent creatinine has been 0.88-1.07, previous creatinine 1 year ago within the range of 1.26-1.33, 2 years ago 1.09 mg/dL.  PMH included COPD, chronic respiratory failure on 3 L nasal cannula oxygen at home, MARY ALICE, HTN, HLD, GERD, CAD, DM.  Patient was brought by EMS with shortness of breath hypotension severe on arrival blood pressure was in 50s systolic, patient had a chronic Torres catheter in place with purulent urine in the bag.  Labs showed a creatinine of 12.71, BUN of 133, sodium 146, calcium 6.8, albumin 2.6, CO2 17.  Patient was aggressively treated with sepsis protocol with IV fluids.  And transferred to the ICU with empiric antibiotic.  And started on IV Levophed for blood pressure.  Since yesterday creatinine has improved to 10.89 with a BUN of 14, bicarb remains low 117.  Renal been consulted for further treatment.    History  Past Medical History:   Diagnosis Date    Chronic back pain     COPD (chronic obstructive pulmonary disease)     Coronary artery disease involving native coronary artery of native heart without angina pectoris     Diabetes mellitus     GERD (gastroesophageal reflux disease)     Heart attack     Hyperlipidemia     Hypertension     Lung nodule     Murmur     MARY ALICE (obstructive sleep apnea)    ,   Past Surgical History:   Procedure Laterality Date    FEMORAL ARTERY STENT     ,   Family History   Problem Relation Age of Onset    Diabetes Father     Diabetes Brother    ,   Social History     Socioeconomic History    Marital status:    Tobacco Use    Smoking status: Former     Current packs/day: 0.50     Average packs/day: 0.5 packs/day for 30.0 years (15.0 ttl pk-yrs)     Types: Cigarettes    Smokeless tobacco: Former    Tobacco comments:     20 years ago    Vaping Use    Vaping status: Every Day   Substance and Sexual Activity    Alcohol use: Not Currently    Drug use: Never    Sexual activity: Defer     E-cigarette/Vaping    E-cigarette/Vaping Use Current Every Day User      E-cigarette/Vaping Substances     E-cigarette/Vaping Devices         ,   Facility-Administered Medications Prior to Admission   Medication Dose Route Frequency Provider Last Rate Last Admin    cyanocobalamin injection 1,000 mcg  1,000 mcg Intramuscular Q28 Days Ian Sanchez APRN   1,000 mcg at 12/04/23 1659     Medications Prior to Admission   Medication Sig Dispense Refill Last Dose/Taking    albuterol (PROVENTIL) (2.5 MG/3ML) 0.083% nebulizer solution Take 2.5 mg by nebulization Every 6 (Six) Hours As Needed for Wheezing. 270 mL 11     aspirin 81 MG EC tablet aspirin 81 mg tablet,delayed release   Take 1 tablet every day by oral route.       cetirizine (zyrTEC) 10 MG tablet Take 1 tablet by mouth Daily. 90 tablet 4     cyanocobalamin 1000 MCG/ML injection Inject 1 mL into the appropriate muscle as directed by prescriber Every 28 (Twenty-Eight) Days. 10 mL 4     famotidine (PEPCID) 20 MG tablet Take 1 tablet by mouth Daily. 90 tablet 4     finasteride (PROSCAR) 5 MG tablet Take 1 tablet by mouth Daily. 90 tablet 4     FLUoxetine (PROzac) 20 MG capsule Take 1 capsule by mouth Daily. 90 capsule 4     furosemide (LASIX) 40 MG tablet Take 1 tablet by mouth Every 72 (Seventy-Two) Hours. 30 tablet 11     HYDROcodone-acetaminophen (NORCO)  MG per tablet Take 1 tablet by mouth Every 8 (Eight) Hours As Needed for Severe Pain. 90 tablet 0     ipratropium-albuterol (Combivent Respimat)  MCG/ACT inhaler Inhale 1 puff 3 (Three) Times a Day. 12 g 4     lisinopril (PRINIVIL,ZESTRIL) 10 MG tablet Take 1 tablet by mouth Daily. 90 tablet 4     metFORMIN (GLUCOPHAGE) 500 MG tablet Take 1 tablet by mouth Daily With Breakfast. 90 tablet 4     metoprolol succinate XL (TOPROL-XL) 50 MG 24 hr  "tablet Take 1 tablet by mouth Daily. 90 tablet 4     nitroglycerin (NITROSTAT) 0.4 MG SL tablet Place 1 tablet under the tongue Every 5 (Five) Minutes As Needed for Chest Pain for up to 30 days. Take no more than 3 doses in 15 minutes. 100 tablet 1     nystatin (MYCOSTATIN) 516396 UNIT/GM powder Apply  topically to the appropriate area as directed 3 (Three) Times a Day. 60 g 5     nystatin-triamcinolone (MYCOLOG) 584208-4.1 UNIT/GM-% ointment Apply 1 Application topically to the appropriate area as directed 2 (Two) Times a Day. 60 g 5     pantoprazole (PROTONIX) 40 MG EC tablet Take 1 tablet by mouth Daily. 90 tablet 4     promethazine (PHENERGAN) 25 MG tablet Take 0.5 tablets by mouth Every 8 (Eight) Hours As Needed for Nausea or Vomiting. 20 tablet 5     simvastatin (ZOCOR) 20 MG tablet Take 1 tablet by mouth every night at bedtime. 90 tablet 4     Syringe 21G X 1\" 3 ML misc Use 1 each Every 28 (Twenty-Eight) Days. 4 each 5     tamsulosin (FLOMAX) 0.4 MG capsule 24 hr capsule Take 1 capsule by mouth Daily. 90 capsule 4     vitamin D (ERGOCALCIFEROL) 1.25 MG (62827 UT) capsule capsule Take 1 capsule by mouth Every 7 (Seven) Days. 12 capsule 4    , Scheduled Meds:  heparin (porcine), 5,000 Units, Subcutaneous, Q12H  hydrocortisone sodium succinate, 100 mg, Intravenous, Q8H  insulin regular, 2-9 Units, Subcutaneous, Q6H  ipratropium-albuterol, 3 mL, Nebulization, Q6H - RT  mupirocin, 1 Application, Each Nare, BID  nystatin, , Topical, Q12H  pantoprazole, 40 mg, Intravenous, Q AM  piperacillin-tazobactam, 4.5 g, Intravenous, Q12H  senna-docusate sodium, 2 tablet, Oral, BID  sodium chloride, 10 mL, Intravenous, Q12H  thiamine (B-1) IV, 500 mg, Intravenous, Q8H  vancomycin (dosing per levels), , Not Applicable, Daily   , Continuous Infusions:  norepinephrine, 0.02-0.3 mcg/kg/min, Last Rate: 0.3 mcg/kg/min (12/07/24 0910)  norepinephrine, 0.02-0.3 mcg/kg/min, Last Rate: Stopped (12/07/24 0902)  Pharmacy to dose " vancomycin,   sodium bicarbonate 8.4 % 150 mEq in dextrose (D5W) 5 % 1,000 mL infusion (greater than 100 mEq), 150 mEq, Last Rate: 150 mEq (12/07/24 0506)  vasopressin, 0.03 Units/min, Last Rate: 0.03 Units/min (12/07/24 1337)   , PRN Meds:    senna-docusate sodium **AND** polyethylene glycol **AND** bisacodyl **AND** bisacodyl    dextrose    dextrose    glucagon (human recombinant)    haloperidol lactate    ipratropium-albuterol    nitroglycerin    Pharmacy to dose vancomycin    sodium chloride    sodium chloride    sodium chloride, and Allergies:  Patient has no known allergies.    Review of Systems  Review of systems could not be obtained due to   emergent nature of case.    Objective     Vital Signs  Temp:  [96.8 °F (36 °C)-99.3 °F (37.4 °C)] 99.3 °F (37.4 °C)  Heart Rate:  [] 90  Resp:  [18-38] 38  BP: ()/() 107/72  Arterial Line BP: ()/(48-66) 77/66    I/O this shift:  In: 569 [I.V.:569]  Out: -   I/O last 3 completed shifts:  In: 8920.8 [I.V.:295.8; Other:25; IV Piggyback:8600]  Out: -     Physical Exam:  General appearance: Sick looking male .  HEENT: Atraumatic normocephalic head, eyes pupil reactive, extraocular muscle intact, nose no bleed, oropharynx clear, neck is supple, no JVD, no lymph node enlargement, trachea midline.  Lungs: Bilateral rhonchi's are heard equal chest movement, no crepitation.  Heart: Normal S1, S2, no gallop, murmur, RRR.  Abdomen: Soft, nontender, positive bowel sounds, no organomegaly.  Extremities: Positive lower extremity edema, no cyanosis, no joint swelling.  Neuro: Encephalopathic  Psych: Cannot be evaluated  Skin: Skin is warm and dry.  : No suprapubic fullness or tenderness.    Results Review:   I reviewed the patient's new clinical results.  I reviewed the patient's new imaging results and agree with the interpretation.    WBC WBC   Date Value Ref Range Status   12/07/2024 15.94 (H) 3.40 - 10.80 10*3/mm3 Final   12/06/2024 13.84 (H) 3.40  "- 10.80 10*3/mm3 Final      HGB Hemoglobin   Date Value Ref Range Status   12/07/2024 12.7 (L) 13.0 - 17.7 g/dL Final   12/06/2024 14.6 13.0 - 17.7 g/dL Final      HCT Hematocrit   Date Value Ref Range Status   12/07/2024 39.5 37.5 - 51.0 % Final   12/06/2024 45.2 37.5 - 51.0 % Final      Platlets No results found for: \"LABPLAT\"   MCV MCV   Date Value Ref Range Status   12/07/2024 99.0 (H) 79.0 - 97.0 fL Final   12/06/2024 99.1 (H) 79.0 - 97.0 fL Final          Sodium Sodium   Date Value Ref Range Status   12/07/2024 146 (H) 136 - 145 mmol/L Final   12/06/2024 144 136 - 145 mmol/L Final      Potassium Potassium   Date Value Ref Range Status   12/07/2024 4.3 3.5 - 5.2 mmol/L Final   12/06/2024 4.8 3.5 - 5.2 mmol/L Final      Chloride Chloride   Date Value Ref Range Status   12/07/2024 108 (H) 98 - 107 mmol/L Final   12/06/2024 98 98 - 107 mmol/L Final      CO2 CO2   Date Value Ref Range Status   12/07/2024 17.0 (L) 22.0 - 29.0 mmol/L Final   12/06/2024 20.0 (L) 22.0 - 29.0 mmol/L Final      BUN BUN   Date Value Ref Range Status   12/07/2024 114 (H) 8 - 23 mg/dL Final   12/06/2024 133 (H) 8 - 23 mg/dL Final      Creatinine Creatinine   Date Value Ref Range Status   12/07/2024 10.89 (H) 0.76 - 1.27 mg/dL Final   12/06/2024 12.71 (H) 0.76 - 1.27 mg/dL Final      Calcium Calcium   Date Value Ref Range Status   12/07/2024 6.8 (L) 8.6 - 10.5 mg/dL Final   12/06/2024 8.9 8.6 - 10.5 mg/dL Final      PO4 No results found for: \"CAPO4\"   Albumin Albumin   Date Value Ref Range Status   12/07/2024 2.6 (L) 3.5 - 5.2 g/dL Final   12/06/2024 3.2 (L) 3.5 - 5.2 g/dL Final      Magnesium Magnesium   Date Value Ref Range Status   12/07/2024 2.4 1.6 - 2.4 mg/dL Final   12/06/2024 2.7 (H) 1.6 - 2.4 mg/dL Final      Uric Acid No results found for: \"URICACID\"         heparin (porcine), 5,000 Units, Subcutaneous, Q12H  hydrocortisone sodium succinate, 100 mg, Intravenous, Q8H  insulin regular, 2-9 Units, Subcutaneous, " Q6H  ipratropium-albuterol, 3 mL, Nebulization, Q6H - RT  mupirocin, 1 Application, Each Nare, BID  nystatin, , Topical, Q12H  pantoprazole, 40 mg, Intravenous, Q AM  piperacillin-tazobactam, 4.5 g, Intravenous, Q12H  senna-docusate sodium, 2 tablet, Oral, BID  sodium chloride, 10 mL, Intravenous, Q12H  thiamine (B-1) IV, 500 mg, Intravenous, Q8H  vancomycin (dosing per levels), , Not Applicable, Daily      norepinephrine, 0.02-0.3 mcg/kg/min, Last Rate: 0.3 mcg/kg/min (12/07/24 0910)  norepinephrine, 0.02-0.3 mcg/kg/min, Last Rate: Stopped (12/07/24 0902)  Pharmacy to dose vancomycin,   sodium bicarbonate 8.4 % 150 mEq in dextrose (D5W) 5 % 1,000 mL infusion (greater than 100 mEq), 150 mEq, Last Rate: 150 mEq (12/07/24 0506)  vasopressin, 0.03 Units/min, Last Rate: 0.03 Units/min (12/07/24 8217)        Assessment & Plan       Septic shock    CAD (coronary artery disease)    COPD (chronic obstructive pulmonary disease)    Type 2 diabetes mellitus    HTN (hypertension)    HLD (hyperlipidemia)    GERD without esophagitis    MARY ALICE (obstructive sleep apnea)    Obesity, Class III, BMI 40-49.9 (morbid obesity)    EZEKIEL (acute kidney injury)    Encephalopathy      1.  Acute kidney failure: Most likely hemodynamic effect with shock, SBP 50s on arrival, also had UTI with chronic Torres catheter in place.  Previous creatinine has been range 0.88-1.09 recently.  2.  Shock: On IV pressors and IV fluid at this time.  Acute respiratory failure  Cardiovascular disease: Atrial fibrillation, CAD.  Severe sepsis with septic shock likely UTI.  GERD.  Anemia.  Acute delirium on   Mixed acid-base disorder.   DM  Hyperlipidemia  History of hypertension  MARY ALICE  Recommendation:  Will check labs in AM.  Not a candidate for dialysis.  Continue with the pressors  Avoid nephrotoxic medications.  Keep systolic blood pressure greater than 100.  Check volume status.  Check labs in the morning.  Daily evaluation for renal replacement therapy will be  done.  Adjust medication for the new GFR.  Case discussed with the medical staff taking care of the patient.  Dr. Perkins in regards to patient who is in hospice before coming to the hospital    I discussed the patients findings and my recommendations with primary care team    Patti Grace MD  12/07/24  @[facility]

## 2024-12-07 NOTE — PLAN OF CARE
Problem: Adult Inpatient Plan of Care  Goal: Plan of Care Review  Outcome: Not Progressing  Flowsheets (Taken 12/7/2024 1852)  Outcome Evaluation: Patient maxxed on HFNC this afternoon. Lung sounds becoming significantly worse throughout shift. External rhonchus audible. NT suctioning attempted twice without much success. Patient remains only oriented to self and following commands intermittently. Max levophed and vasopressin infusing to maintain MAP >65. Afebrile. UOP 400mL this shift. RN able to give quick update to contact Rand Deonte, but she was unable to assist with patient database.           Problem: Restraint, Nonviolent  Goal: Absence of Harm or Injury  Outcome: Not Progressing  Intervention: Implement Least Restrictive Safety Strategies  Recent Flowsheet Documentation  Taken 12/7/2024 1800 by Raiza Way RN  Medical Device Protection: tubing secured  Diversional Activities: music  Taken 12/7/2024 1600 by Raiza Way RN  Medical Device Protection: tubing secured  Diversional Activities: music  Taken 12/7/2024 1400 by Raiza Way RN  Medical Device Protection: tubing secured  Diversional Activities: music  Taken 12/7/2024 1200 by Raiza Way RN  Medical Device Protection: tubing secured  Diversional Activities: music  Taken 12/7/2024 1000 by Raiza Way RN  Medical Device Protection: tubing secured  Diversional Activities: music  Taken 12/7/2024 0800 by Raiza Way RN  Medical Device Protection: tubing secured  Diversional Activities: music  Intervention: Protect Dignity, Rights and Personal Wellbeing  Recent Flowsheet Documentation  Taken 12/7/2024 1800 by Raiza Way RN  Trust Relationship/Rapport:   care explained   reassurance provided  Taken 12/7/2024 1600 by Raiza Way RN  Trust Relationship/Rapport:   care explained   reassurance provided  Taken 12/7/2024 1400 by Raiza Way RN  Trust Relationship/Rapport:   care explained   reassurance provided  Taken 12/7/2024 1200 by Seamus  ERNESTO Eastman  Trust Relationship/Rapport:   care explained   reassurance provided   questions encouraged   choices provided   thoughts/feelings acknowledged  Taken 12/7/2024 1000 by Raiza Way RN  Trust Relationship/Rapport:   care explained   reassurance provided  Taken 12/7/2024 0800 by Raiza Way RN  Trust Relationship/Rapport:   care explained   questions encouraged  Intervention: Protect Skin and Joint Integrity  Recent Flowsheet Documentation  Taken 12/7/2024 1800 by Raiza Way RN  Body Position:   weight shifting   tilted   lower extremity elevated   upper extremity elevated  Skin Protection:   incontinence pads utilized   skin sealant/moisture barrier applied  Taken 12/7/2024 1600 by Raiza Way RN  Body Position:   weight shifting   tilted   left   lower extremity elevated   upper extremity elevated  Skin Protection:   incontinence pads utilized   transparent dressing maintained  Taken 12/7/2024 1400 by Raiza Way RN  Body Position:   weight shifting   sitting up in bed   upper extremity elevated   lower extremity elevated  Skin Protection:   incontinence pads utilized   transparent dressing maintained  Taken 12/7/2024 1200 by Raiza Way RN  Body Position:   weight shifting   tilted   right   lower extremity elevated   upper extremity elevated  Skin Protection:   transparent dressing maintained   incontinence pads utilized   skin sealant/moisture barrier applied  Taken 12/7/2024 1000 by Raiza Way RN  Body Position:   weight shifting   supine   upper extremity elevated   lower extremity elevated  Skin Protection:   incontinence pads utilized   transparent dressing maintained  Taken 12/7/2024 0800 by Raiza Way RN  Body Position:   weight shifting   tilted   left   lower extremity elevated   upper extremity elevated  Skin Protection:   incontinence pads utilized   transparent dressing maintained

## 2024-12-07 NOTE — PROCEDURES
"Insert Central Line At Bedside    Date/Time: 12/7/2024 4:45 AM    Performed by: Kobe Vazquez APRN  Authorized by: Kobe Vazquez APRN  Consent given by: daughter via phone.  Required items: required blood products, implants, devices, and special equipment available  Patient identity confirmed: hospital-assigned identification number, arm band and provided demographic data  Time out: Immediately prior to procedure a \"time out\" was called to verify the correct patient, procedure, equipment, support staff and site/side marked as required.  Indications: vascular access  Anesthesia: local infiltration    Anesthesia:  Local Anesthetic: lidocaine 1% without epinephrine  Anesthetic total: 5 mL    Sedation:  Patient sedated: yes  Sedatives: midazolam  Vitals: Vital signs were monitored during sedation.    Preparation: skin prepped with 2% chlorhexidine  Skin prep agent dried: skin prep agent completely dried prior to procedure  Sterile barriers: all five maximum sterile barriers used - cap, mask, sterile gown, sterile gloves, and large sterile sheet  Hand hygiene: hand hygiene performed prior to central venous catheter insertion  Location details: left internal jugular  Patient position: flat  Catheter type: triple lumen  Catheter size: 7 Fr  Pre-procedure: landmarks identified  Ultrasound guidance: yes  Sterile ultrasound techniques: sterile gel and sterile probe covers were used  Number of attempts: 1  Successful placement: yes  Post-procedure: line sutured and dressing applied  Assessment: blood return through all ports, placement verified by x-ray and free fluid flow  Patient tolerance: patient tolerated the procedure well with no immediate complications  Comments: Spoke w/ daughter while atient in ED via phone and at that point she had expressed to me that she was willing for us to do all procedures necessary to prolong patient's life.  Was notified that patient was hypotensive on near max dosage of " vasopressor, hence emergent need for line placement.

## 2024-12-08 NOTE — NURSING NOTE
Around 0115 patient began to have increased work of breathing. Sats dropped to 81%, HR increased to 120s and respirations increased to 35-40 breaths/minute. Patient placed on 60L HFNC, 100% and non-rebreather placed at the same time. RN called respiratory, to bring BiPap to bedside. Patient placed on the BiPap, Nathaniel LUCIO and Taylor DAWSON notified. Both came to bedside when available. Patient given Haldol x1 for agitation See MAR. Patient NT suctioned. Pink sputum obtained. CXR ordered. Patient now resting comfortably on BiPap with sitter at bedside. RN continuing to monitor patient very closely.

## 2024-12-08 NOTE — PLAN OF CARE
Problem: Adult Inpatient Plan of Care  Goal: Plan of Care Review  Outcome: Not Progressing  Flowsheets  Taken 12/8/2024 0543  Outcome Evaluation: Patient now completely bipap dependant on 70%. Patient continues to follow commands intermittently but always opens his eyes to voice. Patient NT suctioned tonight and sputum sent to lab. Patient received one dose of haldol for agitation and patient now resting comfortably on the bibap. Urine output remains low tonight. RN continues to monitor. Patient's T poly 37.7 C. Patient continues to be maxed of vaso and levo. RN continues to monitor closely, continue with current plan of care.  Plan of Care Reviewed With: patient  Taken 12/7/2024 0600  Progress: declining   Goal Outcome Evaluatio

## 2024-12-08 NOTE — PROGRESS NOTES
Intensive Care Follow-up      LOS: 1 day     Mr. Dieter Christiansen, 74 y.o. male is followed for: Septic shock     Subjective - Interval History     Dieter Christiansen is a 74 y.o. male w/ PMHX of COPD, chronic respiratory failure (3 L NC), MARY ALICE, HTN, HLD, GERD, CAD, & DM.       EMS was called to his abode for EMS & SOA.  SBP was 81, spO2 87%.  There is a chronic miramontes w/ purulent urine in bag.       Patient was placed on NRB and aggressively volume resuscitated.  Empiric ABX and levophed were initiated.       Per review of records the patient was inpatient @ [facility] in 08/2024 for E. Coli UTI induced septic shock.  He was discharged from there into the care of Hospice.       I was able to speak with his daughter Rand and she was unable to contribute much to his recent ROS.  She did reaffirm that family desired full support short of DNR/DNI.     Upon evaluation the emergency department the patient is more awake and is intermittently shouting out but ability to give any history is normal    Interval history    Patient is more alert and visibly uncomfortable but does not follow commands  Continues to require vasopressor support  On BiPAP at 60% FiO2    The patient's relevant past medical, surgical and social history were reviewed and updated in Epic as appropriate.     Objective     Infusions:  norepinephrine, 0.02-0.3 mcg/kg/min, Last Rate: 0.3 mcg/kg/min (12/08/24 0413)  Pharmacy to dose vancomycin,   vasopressin, 0.03 Units/min, Last Rate: 0.03 Units/min (12/08/24 0104)      Medications:  acetylcysteine, 4 mL, Nebulization, Q6H - RT  heparin (porcine), 5,000 Units, Subcutaneous, Q12H  hydrocortisone sodium succinate, 100 mg, Intravenous, Q8H  insulin regular, 2-9 Units, Subcutaneous, Q6H  ipratropium-albuterol, 3 mL, Nebulization, Q6H - RT  mupirocin, 1 Application, Each Nare, BID  nystatin, , Topical, Q12H  pantoprazole, 40 mg, Intravenous, Q AM  piperacillin-tazobactam, 4.5 g, Intravenous, Q12H  senna-docusate sodium, 2  tablet, Oral, BID  sodium chloride, 10 mL, Intravenous, Q12H  thiamine (B-1) IV, 500 mg, Intravenous, Q8H  vancomycin (dosing per levels), , Not Applicable, Daily      Intake/Output         12/07/24 0700 - 12/08/24 0659    Intake (ml) 2724.9    Output (ml) 750    Net (ml) 1974.9    Last Weight 125 kg (275 lb 5.7 oz)          Vital Sign Min/Max for last 24 hours  Temp  Min: 99.5 °F (37.5 °C)  Max: 99.9 °F (37.7 °C)   BP  Min: 72/49  Max: 117/71   Pulse  Min: 84  Max: 128   Resp  Min: 18  Max: 40   SpO2  Min: 81 %  Max: 100 %   Flow (L/min) (Oxygen Therapy)  Min: 50  Max: 55        Physical Exam:   GENERAL: Alert, tachypneic, appears overtly uncomfortable   HEENT: No adenopathy   LUNGS: Decreased breath sounds without wheezes   HEART: Regular tachycardia with distant heart sounds   GI: Soft, quiet   EXTREMITIES: Trace pitting edema without clubbing or cyanosis   NEURO/PSYCH: Awake.  Will shout out at times.  Does not follow commands.  Appears to move all fours    Results from last 7 days   Lab Units 12/07/24  0636 12/06/24  2330   WBC 10*3/mm3 15.94* 13.84*   HEMOGLOBIN g/dL 12.7* 14.6   PLATELETS 10*3/mm3 510* 456*     Results from last 7 days   Lab Units 12/07/24  2220 12/07/24  0222 12/06/24  2330   SODIUM mmol/L 145 146* 144   POTASSIUM mmol/L 4.7 4.3 4.8   CO2 mmol/L 14.0* 17.0* 20.0*   BUN mg/dL 104* 114* 133*   CREATININE mg/dL 9.03* 10.89* 12.71*   MAGNESIUM mg/dL 2.2 2.4 2.7*   PHOSPHORUS mg/dL  --  8.3*  --    GLUCOSE mg/dL 259* 148* 97     Estimated Creatinine Clearance: 9.5 mL/min (A) (by C-G formula based on SCr of 9.03 mg/dL (H)).    Results from last 7 days   Lab Units 12/07/24  0636   HEMOGLOBIN A1C % 5.40           Lab Results   Component Value Date    LACTATE 1.6 12/07/2024          Images: Chest x-ray reveals patchy left greater than right infiltrates    I reviewed the patient's results and images.     Impression      Active Hospital Problems    Diagnosis     **Septic shock     EZEKIEL (acute kidney  injury)     Encephalopathy     Obesity, Class III, BMI 40-49.9 (morbid obesity)     CAD (coronary artery disease)     COPD (chronic obstructive pulmonary disease)     Type 2 diabetes mellitus     HLD (hyperlipidemia)     GERD without esophagitis     MARY ALICE (obstructive sleep apnea)     HTN (hypertension)             Plan        Continue broad-spectrum empiric antibiotics  Continue IV fluids  Creatinine improving  Will add morphine and benzodiazepines for control of symptoms.  He is overtly uncomfortable  No plans for intubation, vent support, or CPR in the event of a cardiopulmonary arrest per family direction    I tried to contact the patient's daughter by phone but only got voicemail     Plan of care and goals reviewed with Multidisciplinary Team and Antibiotic Stewardship rounds   I discussed the patient's findings and my recommendations with patient and nursing staff      Critical Care time spent in direct patient care: 33 minutes (excluding procedure time, if applicable) including high complexity decision making to assess, manipulate, and support vital organ system failure in this individual who has impairment of one or more vital organ systems such that there is a high probability of imminent or life threatening deterioration in the patient's condition.      MARLEY Perkins MD  Pulmonary and Critical Care Medicine

## 2024-12-08 NOTE — PROGRESS NOTES
"Pharmacy Consult - Vancomycin Dosing and Monitoring (Renal Dysfunction / Dialysis)    Dieter Christiansen is a 74 y.o. male receiving vancomycin therapy.     Indication: Pneumonia  Consulting Provider: Intensivist  ID Consult: No    Goal Trough: 15-20 mg/L    Current Antimicrobial Therapy  Anti-Infectives (From admission, onward)      Ordered     Dose/Rate Route Frequency Start Stop    12/07/24 0158  piperacillin-tazobactam (ZOSYN) 4.5 g IVPB in 100 mL NS MBP (CD)        Ordering Provider: Kobe Vazquez APRN    4.5 g  over 4 Hours Intravenous Every 12 Hours 12/07/24 1200 12/14/24 1159    12/07/24 0159  vancomycin (dosing per levels)        Ordering Provider: Tiffany Novak RPH     Not Applicable Daily 12/07/24 0900 12/14/24 0859    12/07/24 0157  Pharmacy to dose vancomycin        Ordering Provider: Kobe Vazquez APRN     Not Applicable Continuous PRN 12/07/24 0156 12/14/24 0155    12/07/24 0055  piperacillin-tazobactam (ZOSYN) 4.5 g IVPB in 100 mL NS MBP (CD)        Ordering Provider: Eric Graff MD    4.5 g  over 30 Minutes Intravenous Once 12/07/24 0111 12/07/24 0238    12/07/24 0055  vancomycin 2750 mg/500 mL 0.9% NS IVPB ([facility])        Ordering Provider: Eric Graff MD    20 mg/kg × 132 kg  over 165 Minutes Intravenous Once 12/07/24 0111 12/07/24 0531          Allergies  Allergies as of 12/06/2024    (No Known Allergies)     Labs  Results from last 7 days   Lab Units 12/07/24  2220 12/07/24  0222 12/06/24  2330   BUN mg/dL 104* 114* 133*   CREATININE mg/dL 9.03* 10.89* 12.71*     Results from last 7 days   Lab Units 12/07/24  0636 12/06/24  2330   WBC 10*3/mm3 15.94* 13.84*     Evaluation of Dosing     Last Dose Received in the ED/Outside Facility: Vancomycin 2750 mg (20 mg/kg) IV x 1 ordered in ED  Is Patient on Dialysis or Renal Replacement: No    Height - 175.3 cm (69.02\")  Weight - 125 kg (275 lb 5.7 oz)    Estimated Creatinine Clearance: 9.5 mL/min (A) (by C-G formula based on SCr " of 9.03 mg/dL (H)).    Intake & Output (last 3 days)         12/04 0701 12/05 0700 12/05 0701 12/06 0700 12/06 0701 12/07 0700    IV Piggyback   4000    Total Intake(mL/kg)   4000 (30.3)    Net   +4000                 Microbiology  Microbiology Results (last 10 days)       Procedure Component Value - Date/Time    MRSA Screen, PCR (Inpatient) - Swab, Nares [[id number]]  (Abnormal) Collected: 12/07/24 2224    Lab Status: Final result Specimen: Swab from Nares Updated: 12/08/24 0745     MRSA PCR Positive    Narrative:      The negative predictive value of this diagnostic test is high and should only be used to consider de-escalating anti-MRSA therapy. A positive result may indicate colonization with MRSA and must be correlated clinically.    Respiratory Culture - Sputum, NT Suction [[id number]] Collected: 12/07/24 1953    Lab Status: Preliminary result Specimen: Sputum from NT Suction Updated: 12/07/24 2119     Gram Stain Many (4+) WBCs per low power field      Few (2+) Gram positive cocci in pairs      Rare (1+) Gram positive bacilli      Rare (1+) Epithelial cells per low power field    S. Pneumo Ag Urine or CSF - Urine, Urine, Clean Catch [[id number]]  (Normal) Collected: 12/07/24 0553    Lab Status: Final result Specimen: Urine, Clean Catch Updated: 12/07/24 1318     Strep Pneumo Ag Negative    Legionella Antigen, Urine - Urine, Urine, Clean Catch [[id number]]  (Normal) Collected: 12/07/24 0553    Lab Status: Final result Specimen: Urine, Clean Catch Updated: 12/07/24 1318     LEGIONELLA ANTIGEN, URINE Negative    COVID PRE-OP / PRE-PROCEDURE SCREENING ORDER (NO ISOLATION) - Swab, Nasopharynx [[id number]]  (Normal) Collected: 12/07/24 0026    Lab Status: Final result Specimen: Swab from Nasopharynx Updated: 12/07/24 0424    Narrative:      The following orders were created for panel order COVID PRE-OP / PRE-PROCEDURE SCREENING ORDER (NO ISOLATION) - Swab, Nasopharynx.  Procedure                                Abnormality         Status                     ---------                               -----------         ------                     Respiratory Panel PCR w/...[[id number]]  Normal              Final result                 Please view results for these tests on the individual orders.    Respiratory Panel PCR w/COVID-19(SARS-CoV-2) RAY/CHET/HEIDE/PAD/COR/EDD In-House, NP Swab in UTM/VTM, 2 HR TAT - Swab, Nasopharynx [[id number]]  (Normal) Collected: 12/07/24 0026    Lab Status: Final result Specimen: Swab from Nasopharynx Updated: 12/07/24 0424     ADENOVIRUS, PCR Not Detected     Coronavirus 229E Not Detected     Coronavirus HKU1 Not Detected     Coronavirus NL63 Not Detected     Coronavirus OC43 Not Detected     COVID19 Not Detected     Human Metapneumovirus Not Detected     Human Rhinovirus/Enterovirus Not Detected     Influenza A PCR Not Detected     Influenza B PCR Not Detected     Parainfluenza Virus 1 Not Detected     Parainfluenza Virus 2 Not Detected     Parainfluenza Virus 3 Not Detected     Parainfluenza Virus 4 Not Detected     RSV, PCR Not Detected     Bordetella pertussis pcr Not Detected     Bordetella parapertussis PCR Not Detected     Chlamydophila pneumoniae PCR Not Detected     Mycoplasma pneumo by PCR Not Detected    Narrative:      In the setting of a positive respiratory panel with a viral infection PLUS a negative procalcitonin without other underlying concern for bacterial infection, consider observing off antibiotics or discontinuation of antibiotics and continue supportive care. If the respiratory panel is positive for atypical bacterial infection (Bordetella pertussis, Chlamydophila pneumoniae, or Mycoplasma pneumoniae), consider antibiotic de-escalation to target atypical bacterial infection.    Blood Culture - Blood, Arm, Left [[id number]]  (Abnormal) Collected: 12/07/24 0020    Lab Status: Preliminary result Specimen: Blood from Arm, Left Updated: 12/07/24 2203     Blood Culture Abnormal  Stain     Gram Stain Aerobic Bottle Gram positive cocci in pairs and clusters    Blood Culture ID, PCR - Blood, Arm, Left [[id number]]  (Abnormal) Collected: 12/07/24 0020    Lab Status: Final result Specimen: Blood from Arm, Left Updated: 12/07/24 2326     BCID, PCR Staph aureus. mecA/C and MREJ (methicillin resistance gene) detected. Identification by BCID2 PCR.     BOTTLE TYPE Aerobic Bottle    Narrative:      Infectious disease consultation is highly recommended to rule out distant foci of infection.    Blood Culture - Blood, Arm, Right [[id number]]  (Abnormal) Collected: 12/07/24 0010    Lab Status: Preliminary result Specimen: Blood from Arm, Right Updated: 12/08/24 0857     Blood Culture Abnormal Stain     Gram Stain Anaerobic Bottle Gram positive bacilli      Aerobic Bottle Gram positive cocci in clusters    Narrative:      Blood culture does not meet the specified criteria for PCR identification.  If pregnant, immunocompromised, or clinical concern for meningitis, call lab to run BCID for Listeria monocytogenes.          Vancomycin Levels  Results from last 7 days   Lab Units 12/08/24  0700   VANCOMYCIN RM mcg/mL 18.50             Assessment/Plan:  Pharmacy to dose vancomycin for pneumonia. Goal trough: 15-20 mg/L  Patient receive a loading dose of vancomycin 2750 mg (20 mg/kg) IV x 1. Given patient's current renal function (SCr 12.71 on admission (up from 1.07 on 9/4/24 per Care Everywhere, down to 9.03 on 12/8)). Will dose vancomycin based upon level for now.  Level obtained 12/8 resulted at 18.5 mcg/mL 28 hours after loading dose. Will not re-dose today and will order a random vancomycin level with AM labs on 12/9/24.  Pharmacy will continue to monitor renal function, cultures and sensitivities, and clinical status to adjust regimen as necessary.    Sajan Mcgarry RPH  12/8/2024  09:17 EST

## 2024-12-08 NOTE — PROGRESS NOTES
"Palliative Care Daily Progress Note     Referring: Kobe DAWSON    C/C: pt unable to answer any questions    S: Medical record reviewed. Events noted. Discussed with RN and renal.  Not HD candidate.  Unable to reach family thus far.  Pt restless and moaning on BiPAP.    ROS: Pt unable to answer but clearly uncomfortable    O: Code Status:   Code Status and Medical Interventions: No CPR (Do Not Attempt to Resuscitate); Limited Support; No intubation (DNI); Continue current level of vasopressor support   Ordered at: 12/08/24 0936     Medical Intervention Limits:    No intubation (DNI)     Code Status (Patient has no pulse and is not breathing):    No CPR (Do Not Attempt to Resuscitate)     Medical Interventions (Patient has pulse or is breathing):    Limited Support     Comments:    Continue current level of vasopressor support      Advanced Directives:   completed with Dtr Rand MEDEIROS.  Goals of Care: Ongoing.   Palliative Performance Scale Score: 20      BP 90/65   Pulse 100   Temp 99.9 °F (37.7 °C) (Bladder)   Resp (!) 32   Ht 175.3 cm (69.02\")   Wt 125 kg (275 lb 5.7 oz)   SpO2 (!) 89%   BMI 40.64 kg/m²     Intake/Output Summary (Last 24 hours) at 12/8/2024 1041  Last data filed at 12/8/2024 0600  Gross per 24 hour   Intake 2155.9 ml   Output 750 ml   Net 1405.9 ml       Physical Exam:    General Appearance:    Restless, moderate distress   HEENT:    EOMI, anicteric, MMM, face relaxed   Neck:   supple, trachea midline, no JVD   Lungs:     CTA bilat, diminished in bases; respirations regular, even     and unlabored    Heart:    RRR, normal S1 and S2, no M/R/G   Abdomen:     Normal bowel sounds, soft, nontender, nondistended   G/U:   Deferred   MSK/Extremities:   No clubbing , cyanosis or edema, No wasting   Pulses:   Pulses palpable and equal bilaterally   Skin:   Warm, dry, no mottling   Neurologic:   A/Ox0, moves extremities x 4, no tremor   Psych:   restless       Current Medications:      Current " Facility-Administered Medications:     acetylcysteine (MUCOMYST) 20 % nebulizer solution 4 mL, 4 mL, Nebulization, Q6H - RT, Kobe Vazquez APRN, 4 mL at 12/08/24 0746    sennosides-docusate (PERICOLACE) 8.6-50 MG per tablet 2 tablet, 2 tablet, Oral, BID **AND** polyethylene glycol (MIRALAX) packet 17 g, 17 g, Oral, Daily PRN **AND** bisacodyl (DULCOLAX) EC tablet 5 mg, 5 mg, Oral, Daily PRN **AND** bisacodyl (DULCOLAX) suppository 10 mg, 10 mg, Rectal, Daily PRN, Kobe Vazquez APRN    dextrose (D50W) (25 g/50 mL) IV injection 25 g, 25 g, Intravenous, Q15 Min PRN, Kobe Vazquez APRN    dextrose (GLUTOSE) oral gel 15 g, 15 g, Oral, Q15 Min PRN, Kobe Vazquez APRN    glucagon (GLUCAGEN) injection 1 mg, 1 mg, Intramuscular, Q15 Min PRN, Kobe Vazquez APRN    heparin (porcine) 5000 UNIT/ML injection 5,000 Units, 5,000 Units, Subcutaneous, Q12H, Kobe Vazquez APRN, 5,000 Units at 12/08/24 0923    Hydrocortisone Sod Suc (PF) (Solu-CORTEF) injection 100 mg, 100 mg, Intravenous, Q8H, Kobe Vazquez APRN, 100 mg at 12/08/24 0521    insulin regular (humuLIN R,novoLIN R) injection 2-9 Units, 2-9 Units, Subcutaneous, Q6H, Kobe Vazquez APRN, 2 Units at 12/08/24 0606    ipratropium-albuterol (DUO-NEB) nebulizer solution 3 mL, 3 mL, Nebulization, Q4H PRN, Kobe Vazquez APRN    ipratropium-albuterol (DUO-NEB) nebulizer solution 3 mL, 3 mL, Nebulization, Q6H - RT, Kobe Vazquez APRN, 3 mL at 12/08/24 0746    LORazepam (ATIVAN) injection 2 mg, 2 mg, Intravenous, Q1H PRN, Franklin Perkins MD    morphine injection 2 mg, 2 mg, Intravenous, Q1H PRN, Franklin Perkins MD, 2 mg at 12/08/24 1024    mupirocin (BACTROBAN) 2 % nasal ointment 1 Application, 1 Application, Each Nare, BID, Kobe Vazquez APRN, 1 Application at 12/08/24 0924    nitroglycerin (NITROSTAT) SL tablet 0.4 mg, 0.4 mg, Sublingual, Q5 Min PRN, Kobe Vazquez APRN    norepinephrine (LEVOPHED)  16,000 mcg in sodium chloride 0.9 % 250 mL infusion, 0.02-0.3 mcg/kg/min, Intravenous, Titrated, Scott Armstrong MD, Last Rate: 37.1 mL/hr at 12/08/24 0413, 0.3 mcg/kg/min at 12/08/24 0413    nystatin (MYCOSTATIN) powder, , Topical, Q12H, Kobe Vazquez APRN, Given at 12/08/24 0924    palliative care oral rinse, , Mouth/Throat, PRN, Karis Bardales, EUNICE    pantoprazole (PROTONIX) injection 40 mg, 40 mg, Intravenous, Q AM, Kobe Vazquez APRN, 40 mg at 12/08/24 0521    Pharmacy to dose vancomycin, , Not Applicable, Continuous PRN, Kobe Vazquez APRN    piperacillin-tazobactam (ZOSYN) 4.5 g IVPB in 100 mL NS MBP (CD), 4.5 g, Intravenous, Q12H, Kobe Vazquez APRN, 4.5 g at 12/07/24 2348    sodium chloride 0.9 % flush 10 mL, 10 mL, Intravenous, PRN, Eric Graff MD    sodium chloride 0.9 % flush 10 mL, 10 mL, Intravenous, Q12H, Kobe Vazquez APRN, 10 mL at 12/08/24 0923    sodium chloride 0.9 % flush 10 mL, 10 mL, Intravenous, PRN, Kobe Vazquez APRN    sodium chloride 0.9 % infusion 40 mL, 40 mL, Intravenous, PRN, Kobe Vazquez APRN    thiamine (B-1) 500 mg in sodium chloride 0.9 % 100 mL IVPB, 500 mg, Intravenous, Q8H, Kobe Vazquez APRN, Last Rate: 200 mL/hr at 12/08/24 0521, 500 mg at 12/08/24 0521    vancomycin (dosing per levels), , Not Applicable, Daily, Tiffany Novak, RPH    vasopressin (PITRESSIN) 20 units in 100 mL NS infusion, 0.03 Units/min, Intravenous, Continuous, Kobe Vazquez APRN, Last Rate: 9 mL/hr at 12/08/24 1026, 0.03 Units/min at 12/08/24 1026     Labs:   Results from last 7 days   Lab Units 12/07/24  0636   WBC 10*3/mm3 15.94*   HEMOGLOBIN g/dL 12.7*   HEMATOCRIT % 39.5   PLATELETS 10*3/mm3 510*     Results from last 7 days   Lab Units 12/07/24  2220 12/07/24  0222   SODIUM mmol/L 145 146*   POTASSIUM mmol/L 4.7 4.3   CHLORIDE mmol/L 110* 108*   CO2 mmol/L 14.0* 17.0*   BUN mg/dL 104* 114*   CREATININE mg/dL 9.03* 10.89*   CALCIUM  mg/dL 7.4* 6.8*   BILIRUBIN mg/dL  --  0.5   ALK PHOS U/L  --  105   ALT (SGPT) U/L  --  <5   AST (SGOT) U/L  --  18   GLUCOSE mg/dL 259* 148*     Imaging Results (Last 72 Hours)       Procedure Component Value Units Date/Time    XR Chest 1 View [[id number]] Collected: 12/08/24 0139     Updated: 12/08/24 0143    Narrative:      XR CHEST 1 VW    Date of Exam: 12/8/2024 1:20 AM EST    Indication: Increased work of breathing    Comparison: 12/7/2024.    Findings:  Heart appears enlarged. Patchy airspace disease is seen within the left upper lobe and the left lower lobe in the right lower lobe, new as compared compared to the previous study. No significant effusion identified. Left internal jugular CVC catheter   present with the tip in the proximal SVC.      Impression:      Impression:  New patchy airspace disease within the left upper lobe and the left lower lobe and the right lower lobe, likely related to pneumonia.        Electronically Signed: Liliam Gandhi MD    12/8/2024 1:40 AM EST    Workstation ID: [id number]    XR Chest 1 View [[id number]] Collected: 12/07/24 0504     Updated: 12/07/24 0508    Narrative:      XR CHEST 1 VW    Date of Exam: 12/7/2024 4:45 AM EST    Indication: IJ placement    Comparison: 12/7/2024.    Findings:  Left internal jugular CVC catheter present with the tip in the upper SVC. There stable patchy airspace disease present within the lung bases bilaterally. No pleural fluid. No pneumothorax. The pulmonary vasculature appears within normal limits. Stable   cardiomegaly. No acute osseous abnormality identified.      Impression:      Impression:  Left internal jugular CVC catheter with the tip in the upper SVC. No pneumothorax. Stable bibasilar airspace disease.        Electronically Signed: Liliam Gandhi MD    12/7/2024 5:05 AM EST    Workstation ID: [id number]    CT Abdomen Pelvis Without Contrast [[id number]] Collected: 12/07/24 0124     Updated: 12/07/24 0129    Narrative:      CT ABDOMEN  PELVIS WO CONTRAST, CT CHEST WO CONTRAST DIAGNOSTIC    Date of Exam: 12/7/2024 1:03 AM EST    Indication: sepsis/altered mental status.    Comparison: None available.    Technique: Axial CT images were obtained of the chest, abdomen and pelvis without the administration of contrast. Reconstructed coronal and sagittal images were also obtained. Automated exposure control and iterative construction methods were used.      Findings:  CT Chest:   The thyroid appears within normal limits. The trachea and the esophagus are unremarkable. Heart appears mildly enlarged. No mediastinal or hilar lymphadenopathy.  No significant pericardial effusion.  The aorta and great vessels appear within normal   limits.  Pulmonary artery is unremarkable.    Patchy airspace consolidation is present within the left upper lobe and the right lower lobe. Additional patchy airspace disease seen within the left lower lobe. Atherosclerotic calcifications are present including within the coronary arteries.. There   are no suspicious lung nodules.  No significant pleural disease.  Central and segmental airways appear patent.      Subcutaneous fat and underlying musculature appear within normal limits.  There are no acute osseous abnormalities or destructive bone lesions.      CT Abdomen and Pelvis:  The liver appears normal in size without focal abnormality. Spleen is normal size and appears homogeneous.  Stomach is nondistended.  No adrenal mass.  Kidneys appear unremarkable. Simple cysts present bilaterally, left greater than right. No significant   stool burden identified. Fat-containing ventral wall hernia present. No evidence of bowel involvement. No hydronephrosis.  Urinary bladder is within normal limits.  There is no bowel distention.  No pneumatosis intestinalis, pneumoperitoneum or   lymphadenopathy.  No significant ascites.  The appendix is not well visualized..  Gallbladder is nondistended.  Biliary tree is nondistended.  The pancreas  appears homogeneous.  Abdominal vasculature is within normal limits. No significant stool burden   identified.    Subcutaneous fat and underlying musculature appear within normal limits.  There are no acute osseous abnormalities or destructive bone lesions.        Impression:      Impression:  1.Multifocal pneumonia most pronounced within the left upper lobe and the right lower lobe.  2.No acute intra-abdominal or intrapelvic process.  3.Ancillary findings as described above.        Electronically Signed: Liliam Gandhi MD    12/7/2024 1:26 AM EST    Workstation ID: [id number]    CT Chest Without Contrast Diagnostic [[id number]] Collected: 12/07/24 0124     Updated: 12/07/24 0129    Narrative:      CT ABDOMEN PELVIS WO CONTRAST, CT CHEST WO CONTRAST DIAGNOSTIC    Date of Exam: 12/7/2024 1:03 AM EST    Indication: sepsis/altered mental status.    Comparison: None available.    Technique: Axial CT images were obtained of the chest, abdomen and pelvis without the administration of contrast. Reconstructed coronal and sagittal images were also obtained. Automated exposure control and iterative construction methods were used.      Findings:  CT Chest:   The thyroid appears within normal limits. The trachea and the esophagus are unremarkable. Heart appears mildly enlarged. No mediastinal or hilar lymphadenopathy.  No significant pericardial effusion.  The aorta and great vessels appear within normal   limits.  Pulmonary artery is unremarkable.    Patchy airspace consolidation is present within the left upper lobe and the right lower lobe. Additional patchy airspace disease seen within the left lower lobe. Atherosclerotic calcifications are present including within the coronary arteries.. There   are no suspicious lung nodules.  No significant pleural disease.  Central and segmental airways appear patent.      Subcutaneous fat and underlying musculature appear within normal limits.  There are no acute osseous abnormalities or  destructive bone lesions.      CT Abdomen and Pelvis:  The liver appears normal in size without focal abnormality. Spleen is normal size and appears homogeneous.  Stomach is nondistended.  No adrenal mass.  Kidneys appear unremarkable. Simple cysts present bilaterally, left greater than right. No significant   stool burden identified. Fat-containing ventral wall hernia present. No evidence of bowel involvement. No hydronephrosis.  Urinary bladder is within normal limits.  There is no bowel distention.  No pneumatosis intestinalis, pneumoperitoneum or   lymphadenopathy.  No significant ascites.  The appendix is not well visualized..  Gallbladder is nondistended.  Biliary tree is nondistended.  The pancreas appears homogeneous.  Abdominal vasculature is within normal limits. No significant stool burden   identified.    Subcutaneous fat and underlying musculature appear within normal limits.  There are no acute osseous abnormalities or destructive bone lesions.        Impression:      Impression:  1.Multifocal pneumonia most pronounced within the left upper lobe and the right lower lobe.  2.No acute intra-abdominal or intrapelvic process.  3.Ancillary findings as described above.        Electronically Signed: Liliam Gandhi MD    12/7/2024 1:26 AM EST    Workstation ID: [id number]    CT Head Without Contrast [[id number]] Collected: 12/07/24 0123     Updated: 12/07/24 0127    Narrative:      CT HEAD WO CONTRAST    Date of Exam: 12/7/2024 1:03 AM EST    Indication: altered mental status.    Comparison: 8/20/2023.    Technique: Axial CT images were obtained of the head without contrast administration.  Automated exposure control and iterative construction methods were used.      Findings:  There is no evidence of hemorrhage. There is no mass effect or midline shift.    There is no extracerebral collection.    Ventricles are normal in size and configuration for patient's stated age.      Posterior fossa is within normal  limits.    Calvarium and skull base appear intact.   Visualized sinuses show no air fluid levels. Visualized orbits are unremarkable.      Impression:      Impression:  No acute intracranial process.        Electronically Signed: Liliam Gandhi MD    12/7/2024 1:23 AM EST    Workstation ID: [id number]    XR Chest 1 View [[id number]] Collected: 12/07/24 0031     Updated: 12/07/24 0035    Narrative:      XR CHEST 1 VW    Date of Exam: 12/7/2024 12:10 AM EST    Indication: SOA triage protocol    Comparison: 8/28/2023.    Findings:  Linear atelectasis present within the right lower lobe. There are no airspace consolidations. No pleural fluid. No pneumothorax. The pulmonary vasculature appears within normal limits. The heart appears enlarged, stable as compared to the previous study.   No acute osseous abnormality identified.      Impression:      Impression:  Linear atelectasis present within the right lower lobe. No acute cardiopulmonary process. Stable cardiomegaly.        Electronically Signed: Liliam Gandhi MD    12/7/2024 12:32 AM EST    Workstation ID: [id number]              Lab 12/07/24  0636   HEMOGLOBIN A1C 5.40         Diagnostics: Reviewed    A:     Septic shock    CAD (coronary artery disease)    COPD (chronic obstructive pulmonary disease)    Type 2 diabetes mellitus    HTN (hypertension)    HLD (hyperlipidemia)    GERD without esophagitis    MARY ALICE (obstructive sleep apnea)    Obesity, Class III, BMI 40-49.9 (morbid obesity)    EZEKIEL (acute kidney injury)    Encephalopathy       Impression:  Sepsis with shock  Bacteriuria  COPD DM 2  HTN  Hospice dx of resp failure  A/C hypoxic resp failure - currently on FiO2 58% when seen  Obesity  Encephalopatphy  Multifocal pna  Hypoalbuminemia - 2.2  EZEKIEL - Cr 9.03    Symptoms:   AMS  Dyspnea  Debility    P:   Discussed with renal.  Discussed with RN.  Note morphine and ativan added per intensivist.  In light of renal function will change morphine to equivalent dose hydromorphone.  Continue to try to reach family. Palliative Care Team will continue to follow patient.     Alona Hunter MD, 12/8/2024, 10:41 EST

## 2024-12-08 NOTE — SIGNIFICANT NOTE
Exam confirms with auscultation zero audible heart tones and zero audible respirations. Mr.Jimmy GHAZAL Christiansen was pronounced dead at 1742.  MD notified by Patient's RN.    Jacey Arguello RN  Clinical House Supervisor  12/8/2024 18:59 EST

## 2024-12-08 NOTE — PROGRESS NOTES
Continued Stay Note  [facility]     Patient Name: Dieter Christiansen  MRN: [id number]  Today's Date: 12/8/2024    Admit Date: 12/6/2024    Plan: Home hospice patient   Discharge Plan       Row Name 12/08/24 0915       Plan    Plan Home hospice patient    Plan Comments Hospice RN to bedside. Patient lying in bed awake, eyes open, gasping, calling out, restless. Unable to console patient. Attempted to call daughter, Rand to discuss goals of care. Left her a voicemail with callback number. On review of hospice documentation, there is a living will listing Rand at the [facility]. She is his stepdaughter. There is documentation of an adopted son. All home visits made by hospice staff have been with the stepdaughter, Rand, with no mention of the son being present. Hospice will continue to follow and try to call Rand again. Please call ext [phone] for assist from the inpatient hospice RN.                   Discharge Codes    No documentation.                       Savanna Little RN

## 2024-12-08 NOTE — DISCHARGE SUMMARY
Death Summary    Patient name: Dieter Christiansen  CSN: [id number]  MRN: [id number]  : 1950  Today's date: 2024     Date of Admission: 2024  Date and Time of Death:  2024; 1742    Admitting Physician:  Dr. Scott Armstrong MD; Intensivist  Primary Care Provider: Ian Sanchez APRN  Consultations:  Dr. Alona Hunter MD; Palliative Care     Dr. Patti Grace MD; Nephrology    Admission Diagnosis: Septic Shock     Diagnoses at the Time of Death:     Septic shock    CAD (coronary artery disease)    COPD (chronic obstructive pulmonary disease)    Type 2 diabetes mellitus    HTN (hypertension)    HLD (hyperlipidemia)    GERD without esophagitis    MARY ALICE (obstructive sleep apnea)    Obesity, Class III, BMI 40-49.9 (morbid obesity)    EZEKIEL (acute kidney injury)    Encephalopathy    Procedures:  519 Insert Central Line At Bedside  517 Insert Arterial Line    History of Present Illness:  Dieter Christiansen is a 74 y.o. male with PMHx COPD, chronic respiratory failure on 3L home O2, MARY ALIEC, HTN, HLD, GERD, CAD, and DM who presented to [facility] ED via EMS on 24 for shortness of air. He had a chronic Torres with purulent drainage in the bag. He was placed on NRB and volume resuscitated. Empiric antibiotics started and levophed initiated. Per record review, patient was inpatient at [facility] in 2024 with E. Coli UTI induced septic shock and was discharged home in the care of Hospice. He was admitted to the ICU for further care. Family did endorse patient was a DNR/DNI.    Hospital Course:  Patient was admitted to the ICU and required increasing FiO2 requirements, eventually BiPAP. He was delirious and confused. He required vasopressors for blood pressure support. Echocardiogram on 24 with LVEF ~20%. Despite aggressive treatment for pneumonia, patient remained critically ill and family eventually elected to pursue comfort measures only on 2024. Patient was pronounced  dead on 12/08/24 at 1742.    Tg Kwon, MSN, APRN, ACNPC-AG  Pulmonary and Critical Care Medicine  Electronically signed by EUNICE Bower, 12/08/24, 6:16 PM EST.

## 2024-12-08 NOTE — PROGRESS NOTES
"   LOS: 1 day    Patient Care Team:  Ian Sanchez APRN as PCP - General (Family Medicine)    Chief Complaint:    74-year-old male with history of mild renal insufficiency, but recent creatinine has been 0.88-1.07, previous creatinine 1 year ago within the range of 1.26-1.33, 2 years ago 1.09 mg/dL.  PMH included COPD, chronic respiratory failure on 3 L nasal cannula oxygen at home, MARY ALICE, HTN, HLD, GERD, CAD, DM.  Patient was brought by EMS with shortness of breath hypotension severe on arrival blood pressure was in 50s systolic, patient had a chronic Torres catheter in place with purulent urine in the bag.  Labs showed a creatinine of 12.71, BUN of 133, sodium 146, calcium 6.8, albumin 2.6, CO2 17.   Subjective     Interval History:     Patient remained critically ill in ICU setting.  Case discussed with Dr. Hunter  Case discussed with RN staff.  Family unavailable in the room or on the phone.  Patient is DNR, DNI.  Remains critically ill, oliguric with high BUN and creatinine.    Review of Systems:   Cannot be evaluated    Objective     Vital Sign Min/Max for last 24 hours  Temp  Min: 99.5 °F (37.5 °C)  Max: 100.4 °F (38 °C)   BP  Min: 72/49  Max: 136/99   Pulse  Min: 84  Max: 128   Resp  Min: 18  Max: 40   SpO2  Min: 81 %  Max: 100 %   Flow (L/min) (Oxygen Therapy)  Min: 50  Max: 55   Weight  Min: 125 kg (275 lb 5.7 oz)  Max: 132 kg (291 lb 0.1 oz)     Flowsheet Rows      Flowsheet Row First Filed Value   Admission Height 175.3 cm (69\") Documented at 12/07/2024 0057   Admission Weight 132 kg (290 lb) Documented at 12/06/2024 2348            I/O this shift:  In: 451 [I.V.:451]  Out: 100 [Urine:100]  I/O last 3 completed shifts:  In: 58194.7 [I.V.:2720.7; Other:25; IV Piggyback:8900]  Out: 750 [Urine:750]    Physical Exam:  General Appearance: Nonverbal  male mildly agitated.  Eyes: PER, EOMI.  Neck: Supple no JVD.  Lungs: Few rhonchi's and Rales are heard equal chest movement, nonlabored.  Heart: No " "gallop, murmur, rub, RRR.  Abdomen: Soft, nontender, positive bowel sounds, obesity  Extremities: Dependent bilateral sacral lower extremity edema, no cyanosis.  Neuro: Cannot be evaluated  : Torres catheter in place, yellow urine  Psych evaluation cannot be done.  Skin is warm and dry.    WBC WBC   Date Value Ref Range Status   12/07/2024 15.94 (H) 3.40 - 10.80 10*3/mm3 Final   12/06/2024 13.84 (H) 3.40 - 10.80 10*3/mm3 Final      HGB Hemoglobin   Date Value Ref Range Status   12/07/2024 12.7 (L) 13.0 - 17.7 g/dL Final   12/06/2024 14.6 13.0 - 17.7 g/dL Final      HCT Hematocrit   Date Value Ref Range Status   12/07/2024 39.5 37.5 - 51.0 % Final   12/06/2024 45.2 37.5 - 51.0 % Final      Platlets No results found for: \"LABPLAT\"   MCV MCV   Date Value Ref Range Status   12/07/2024 99.0 (H) 79.0 - 97.0 fL Final   12/06/2024 99.1 (H) 79.0 - 97.0 fL Final          Sodium Sodium   Date Value Ref Range Status   12/07/2024 145 136 - 145 mmol/L Final   12/07/2024 146 (H) 136 - 145 mmol/L Final   12/06/2024 144 136 - 145 mmol/L Final      Potassium Potassium   Date Value Ref Range Status   12/07/2024 4.7 3.5 - 5.2 mmol/L Final     Comment:     Slight hemolysis detected by analyzer. Result may be falsely elevated.   12/07/2024 4.3 3.5 - 5.2 mmol/L Final   12/06/2024 4.8 3.5 - 5.2 mmol/L Final      Chloride Chloride   Date Value Ref Range Status   12/07/2024 110 (H) 98 - 107 mmol/L Final   12/07/2024 108 (H) 98 - 107 mmol/L Final   12/06/2024 98 98 - 107 mmol/L Final      CO2 CO2   Date Value Ref Range Status   12/07/2024 14.0 (L) 22.0 - 29.0 mmol/L Final   12/07/2024 17.0 (L) 22.0 - 29.0 mmol/L Final   12/06/2024 20.0 (L) 22.0 - 29.0 mmol/L Final      BUN BUN   Date Value Ref Range Status   12/07/2024 104 (H) 8 - 23 mg/dL Final   12/07/2024 114 (H) 8 - 23 mg/dL Final   12/06/2024 133 (H) 8 - 23 mg/dL Final      Creatinine Creatinine   Date Value Ref Range Status   12/07/2024 9.03 (H) 0.76 - 1.27 mg/dL Final   12/07/2024 " "10.89 (H) 0.76 - 1.27 mg/dL Final   12/06/2024 12.71 (H) 0.76 - 1.27 mg/dL Final      Calcium Calcium   Date Value Ref Range Status   12/07/2024 7.4 (L) 8.6 - 10.5 mg/dL Final   12/07/2024 6.8 (L) 8.6 - 10.5 mg/dL Final   12/06/2024 8.9 8.6 - 10.5 mg/dL Final      PO4 No results found for: \"CAPO4\"   Albumin Albumin   Date Value Ref Range Status   12/07/2024 2.6 (L) 3.5 - 5.2 g/dL Final   12/06/2024 3.2 (L) 3.5 - 5.2 g/dL Final      Magnesium Magnesium   Date Value Ref Range Status   12/07/2024 2.2 1.6 - 2.4 mg/dL Final   12/07/2024 2.4 1.6 - 2.4 mg/dL Final   12/06/2024 2.7 (H) 1.6 - 2.4 mg/dL Final      Uric Acid No results found for: \"URICACID\"        Results Review:     I reviewed the patient's new clinical results.  I reviewed the patient's new imaging results and agree with the interpretation.    acetylcysteine, 4 mL, Nebulization, Q6H - RT  heparin (porcine), 5,000 Units, Subcutaneous, Q12H  hydrocortisone sodium succinate, 100 mg, Intravenous, Q8H  insulin regular, 2-9 Units, Subcutaneous, Q6H  ipratropium-albuterol, 3 mL, Nebulization, Q6H - RT  mupirocin, 1 Application, Each Nare, BID  nystatin, , Topical, Q12H  pantoprazole, 40 mg, Intravenous, Q AM  piperacillin-tazobactam, 4.5 g, Intravenous, Q12H  senna-docusate sodium, 2 tablet, Oral, BID  sodium chloride, 10 mL, Intravenous, Q12H  thiamine (B-1) IV, 500 mg, Intravenous, Q8H  vancomycin (dosing per levels), , Not Applicable, Daily      norepinephrine, 0.02-0.3 mcg/kg/min, Last Rate: 0.3 mcg/kg/min (12/08/24 1059)  Pharmacy to dose vancomycin,   vasopressin, 0.03 Units/min, Last Rate: 0.03 Units/min (12/08/24 1026)        Medication Review: Reviewed    Assessment & Plan       Septic shock    CAD (coronary artery disease)    COPD (chronic obstructive pulmonary disease)    Type 2 diabetes mellitus    HTN (hypertension)    HLD (hyperlipidemia)    GERD without esophagitis    MARY ALICE (obstructive sleep apnea)    Obesity, Class III, BMI 40-49.9 (morbid obesity)   "  EZEKIEL (acute kidney injury)    Encephalopathy      1.  Acute kidney failure: Most likely hemodynamic effect with shock, SBP 50s on arrival, also had UTI with chronic Torres catheter in place.  Previous creatinine has been range 0.88-1.09 recently.  2.  Shock: On IV pressors and IV fluid at this time.  3.  Acute respiratory failure  4.  Cardiovascular disease: Atrial fibrillation, CAD.  5.  Severe sepsis with septic shock likely UTI.  6.  GERD.  7.  Anemia.  8.  Acute delirium on  9.  Mixed acid-base disorder.  10.  DM  11.  Hyperlipidemia  12.  History of hypertension  13.  MARY ALICE    Recommendation:  Mild improvement in creatinine from 12.71, today 9.03.  Patient is oliguric  Patient has been on hospice prior to admission, not a candidate for short or long-term dialysis.  IV albumin plus IV diuretics ordered.  Continue with IV pressors.  Continue with antibiotics per ID  Avoid nephrotoxic medications.  Keep systolic blood pressure greater than 100.  Check volume status.  Check labs in the morning.  Adjust medication for the new GFR.  Case discussed with the medical staff taking care of the patient.  Patti Grace MD  12/08/24  11:21 EST

## 2024-12-08 NOTE — PLAN OF CARE
Goal Outcome Evaluation:                                            Problem: Restraint, Nonviolent  Goal: Absence of Harm or Injury  Outcome: Unable to Meet

## 2024-12-08 NOTE — SIGNIFICANT NOTE
After several discussions with family regarding goals of care, I ultimately spoke with Rand MCKEON, regarding goals of care. At this time, she wishes to make the patient comfort measures only. Appropriate orders placed.    Electronically signed by EUNICE Bower, 12/08/24, 4:15 PM EST.

## 2024-12-09 LAB
BACTERIA SPEC AEROBE CULT: ABNORMAL
BACTERIA SPEC AEROBE CULT: ABNORMAL
GRAM STN SPEC: ABNORMAL
GRAM STN SPEC: ABNORMAL
ISOLATED FROM: ABNORMAL
ISOLATED FROM: ABNORMAL

## 2024-12-09 NOTE — PROGRESS NOTES
"Enter Query Response Below      Query Response: MRSA Sepsis due to pneumonia              If applicable, please update the problem list.   Patient: Dieter Christiansen        : 1950  Account: [id number]           Admit Date: 2024        How to Respond to this query:       a. Click New Note     b. Answer query within the yellow box.                c. Update the Problem List, if applicable.    If you have any questions about this query contact me at: [email or web address]     Dr. Perkins,    75 yo male admitted for \"acute hypoxemic respiratory failure and concern for severe sepsis/septic shock\" per H&P dated 24.  Risk Factors: chronic indwelling Torres catheter and DM type 2. Recent admission () for E. Coli UTI induced septic shock.  Clinical indicators: ED note \"problems addressed multifocal pneumonia.\"  Nephrology consulted; -24 notes include \"Severe sepsis with septic shock likely UTI.\"  Urine culture collected 24 \"25,000 CFU/mL Gram Negative Bacill. Large/3+ Budding Yeast w/Hyphae.\"  MRSA PCR swab nares \"positive.\" Blood culture left arm \"Staphylococcus aureus, MRSA.\"  Chest CT 24 \"Multifocal pneumonia most pronounced within the left upper lobe and the right lower lobe.\"  Treatment: IV Zosyn and Vancomycin    Please clarify the etiology of sepsis:    Sepsis due to UTI related to indwelling catheter (please identify organism)______  Sepsis due to UTI unrelated to indwelling catheter (please identify organism)_________  MRSA Sepsis due to pneumonia  MRSA Sepsis source unknown  Other- specify__________    By submitting this query, we are merely seeking further clarification of documentation to accurately reflect all conditions that you are monitoring, evaluating, treating or that extend the hospitalization or utilize additional resources of care. Please utilize your independent clinical judgment when addressing the question(s) above.     This query and your response, once " completed, will be entered into the legal medical record.    Sincerely,  Aviva MAIN RN CCDS  System Director Clinical Documentation Integrity Program

## 2024-12-10 LAB
BACTERIA SPEC AEROBE CULT: ABNORMAL
BACTERIA SPEC RESP CULT: ABNORMAL
BACTERIA SPEC RESP CULT: ABNORMAL
GRAM STN SPEC: ABNORMAL
ISOLATED FROM: ABNORMAL